# Patient Record
Sex: FEMALE | Race: WHITE | Employment: OTHER | URBAN - METROPOLITAN AREA
[De-identification: names, ages, dates, MRNs, and addresses within clinical notes are randomized per-mention and may not be internally consistent; named-entity substitution may affect disease eponyms.]

---

## 2017-12-10 PROBLEM — A41.9 SEPSIS (HCC): Status: ACTIVE | Noted: 2017-12-10

## 2017-12-10 PROBLEM — J20.9 ACUTE BRONCHITIS: Status: ACTIVE | Noted: 2017-12-10

## 2017-12-10 PROBLEM — R65.10 SIRS (SYSTEMIC INFLAMMATORY RESPONSE SYNDROME) (HCC): Status: ACTIVE | Noted: 2017-12-10

## 2018-05-01 ENCOUNTER — OFFICE VISIT (OUTPATIENT)
Dept: NEUROLOGY | Age: 78
End: 2018-05-01
Payer: MEDICARE

## 2018-05-01 VITALS
BODY MASS INDEX: 35.63 KG/M2 | TEMPERATURE: 97.1 F | HEIGHT: 67 IN | HEART RATE: 77 BPM | SYSTOLIC BLOOD PRESSURE: 123 MMHG | DIASTOLIC BLOOD PRESSURE: 77 MMHG | WEIGHT: 227 LBS | RESPIRATION RATE: 18 BRPM

## 2018-05-01 DIAGNOSIS — G25.0 ESSENTIAL TREMOR: Primary | ICD-10-CM

## 2018-05-01 PROCEDURE — 99205 OFFICE O/P NEW HI 60 MIN: CPT | Performed by: PSYCHIATRY & NEUROLOGY

## 2018-05-01 RX ORDER — PRIMIDONE 50 MG/1
50 TABLET ORAL 3 TIMES DAILY
Qty: 90 TABLET | Refills: 5 | Status: SHIPPED | OUTPATIENT
Start: 2018-05-01 | End: 2018-09-10 | Stop reason: SDUPTHER

## 2018-05-02 ENCOUNTER — TELEPHONE (OUTPATIENT)
Dept: NEUROLOGY | Age: 78
End: 2018-05-02

## 2018-05-03 ENCOUNTER — TELEPHONE (OUTPATIENT)
Dept: NEUROLOGY | Age: 78
End: 2018-05-03

## 2018-06-15 ENCOUNTER — OFFICE VISIT (OUTPATIENT)
Dept: CARDIOLOGY CLINIC | Age: 78
End: 2018-06-15
Payer: MEDICARE

## 2018-06-15 VITALS
HEART RATE: 76 BPM | BODY MASS INDEX: 36.21 KG/M2 | HEIGHT: 67 IN | WEIGHT: 230.7 LBS | SYSTOLIC BLOOD PRESSURE: 122 MMHG | RESPIRATION RATE: 16 BRPM | DIASTOLIC BLOOD PRESSURE: 60 MMHG

## 2018-06-15 DIAGNOSIS — I10 HTN (HYPERTENSION), BENIGN: Primary | Chronic | ICD-10-CM

## 2018-06-15 PROCEDURE — 99213 OFFICE O/P EST LOW 20 MIN: CPT | Performed by: INTERNAL MEDICINE

## 2018-06-15 PROCEDURE — 93000 ELECTROCARDIOGRAM COMPLETE: CPT | Performed by: INTERNAL MEDICINE

## 2018-09-10 ENCOUNTER — OFFICE VISIT (OUTPATIENT)
Dept: NEUROLOGY | Age: 78
End: 2018-09-10
Payer: MEDICARE

## 2018-09-10 VITALS
OXYGEN SATURATION: 97 % | BODY MASS INDEX: 35.79 KG/M2 | HEIGHT: 67 IN | WEIGHT: 228 LBS | HEART RATE: 70 BPM | SYSTOLIC BLOOD PRESSURE: 147 MMHG | TEMPERATURE: 99.3 F | RESPIRATION RATE: 18 BRPM | DIASTOLIC BLOOD PRESSURE: 66 MMHG

## 2018-09-10 DIAGNOSIS — M54.17 L-S RADICULOPATHY: Primary | ICD-10-CM

## 2018-09-10 PROCEDURE — 99214 OFFICE O/P EST MOD 30 MIN: CPT | Performed by: PSYCHIATRY & NEUROLOGY

## 2018-09-10 RX ORDER — WARFARIN SODIUM 5 MG/1
7 TABLET ORAL NIGHTLY
COMMUNITY

## 2018-09-10 RX ORDER — PRIMIDONE 50 MG/1
50 TABLET ORAL 3 TIMES DAILY
Qty: 90 TABLET | Refills: 11 | Status: SHIPPED | OUTPATIENT
Start: 2018-09-10 | End: 2019-09-19 | Stop reason: SDUPTHER

## 2018-09-10 RX ORDER — WARFARIN SODIUM 1 MG/1
6 TABLET ORAL
COMMUNITY
End: 2021-03-23

## 2018-09-10 NOTE — PROGRESS NOTES
This 67-year-old right-handed woman was referred for additional evaluation and management of tremors. She remained an excellent historian as well as her granddaughter. Her medications included Coumadin, primidone, Amaryl, Cardizem, Aldactone, prenatal vitamins, lisinopril, furosemide and laxatives. She developed tremors over 3 years ago. These have worsened recently. The shakings involved only her hands and only one day are outstretched or when she is eating or writing. She was diagnosed with late life tremors. She had improved moderately with primidone 50 mg twice daily. However, her head shaking and hand tremors have persisted. These were especially troublesome when attempting to eat. She is willing to increase the medication even more. She noted posterior neck pains as well as. She was also plagued with right shoulder discomforts. There were no shooting discomforts into her arms or legs. Physical examination displayed stable vital signs. She was an elderly woman in no acute distress who was alert, cooperative and oriented. She was very pleasant. Her neck was supple without thyromegaly; there was tenderness in both posterior cervical paraspinals. There were +1 carotid upstrokes without bruits. Her spine revealed minimal gibbus deformity without tenderness. Her lungs were clear to auscultation. Cardiac examination revealed an irregular irregular rhythm but was otherwise unrevealing. Peripheral pulses were +1 without bruits. Her limbs were unremarkable. Neurological examination produced an intact mental status except for minimal vocal quivering. Cranial nerve examination was unremarkable except for increased palpebral fissure in the left eye. Motor examination produced normal tone and bulk with 5/5 strength throughout. There was minimal tremor of the outstretched hands as well as minimal horizontal tremor of her head. There was no cogwheel rigidity or bradykinesia.   Vibration

## 2018-09-20 ENCOUNTER — HOSPITAL ENCOUNTER (OUTPATIENT)
Dept: NEUROLOGY | Age: 78
Discharge: HOME | End: 2018-09-20
Payer: MEDICARE

## 2018-09-20 VITALS — HEIGHT: 67 IN | BODY MASS INDEX: 35.63 KG/M2 | WEIGHT: 227 LBS

## 2018-09-20 DIAGNOSIS — M54.17 L-S RADICULOPATHY: ICD-10-CM

## 2018-09-20 PROCEDURE — 95886 MUSC TEST DONE W/N TEST COMP: CPT | Performed by: PSYCHIATRY & NEUROLOGY

## 2018-09-20 PROCEDURE — 95886 MUSC TEST DONE W/N TEST COMP: CPT

## 2018-09-20 PROCEDURE — 95911 NRV CNDJ TEST 9-10 STUDIES: CPT

## 2018-09-20 PROCEDURE — 95911 NRV CNDJ TEST 9-10 STUDIES: CPT | Performed by: PSYCHIATRY & NEUROLOGY

## 2018-09-20 NOTE — PROCEDURES
[address] [employer]         Full Name: Jovanny Landry Gender: Female  MRN: [id number] YOB: 1940  Location: [facility] ()      Visit Date: 9/20/2018 09:52  Age: 69 Years 0 Months Old  Examining Physician: Dr. Delano Haley   Referring Physician: Dr. Delano Haley   Technician: Leon Blizzard   Height: 5 feet 7 inch  Weight: 227 lbs  Notes: L/S Radiculopathy       Motor NCS      Nerve / Sites Lat. Lat Diff Amplitude Amp. 1-2 Distance Velocity Temp.    ms ms mV % cm m/s °C   R Peroneal - EDB      Ankle 4.95  1.0 100 8  33.9      Pop fossa 14.06 9.11 1.0 97.8 37 41 33.9   L Peroneal - EDB      Ankle 4.64  0.1 100 8  34      Pop fossa 14.01 9.38 0.1 72.9 41 44 34   R Tibial - AH      Ankle 4.22  0.9 100 8  34.1      Pop fossa 15.31 11.09 0.4 41 41 37 34.1   L Tibial - AH      Ankle 6.09  0.1 100 8  34      Pop fossa 16.61 10.52 0.1 97.8 41 39 34       Sensory NCS      Nerve / Sites Onset Lat Peak Lat PP Amp Distance Velocity Temp.    ms ms µV cm m/s °C   R Superficial peroneal - Ankle      Lat leg NR NR NR 10 NR 34   L Superficial peroneal - Ankle      Lat leg NR NR NR 10 NR 34   R Sural - Ankle (Calf)      Calf NR NR NR 14 NR 34   L Sural - Ankle (Calf)      Calf NR NR NR 14 NR 34       F  Wave      Nerve F Lat M Lat F-M Lat    ms ms ms   R Peroneal - EDB 58.2 5.4 52.8   R Tibial - AH 58.7 6.0 52.7   L Peroneal - EDB NR NR NR   L Tibial - AH NR NR NR       H Reflex      Nerve Lat Hmax    ms   R Tibial - Soleus 34.3   L Tibial - Soleus 34. 3       EMG         EMG Summary Table     Spontaneous MUAP Recruitment   Muscle IA Fib PSW Fasc H.F. Amp Dur. PPP Pattern   R. Lumbar paraspinals (mid) Incr None None None CRDs N N N N   R. Lumbar paraspinals (low) Incr None None None CRDs N N N N   R. Sacral paraspinals Incr None None None CRDs N N N N   R. Gluteus medius N None None None Few Serrated N N N N   R. Iliopsoas N None None None +2 Nascent N N N N   R.  Biceps femoris neuropathy but also suspected lumbosacral radicular disease. Her radicular disease and peripheral neuropathy are producing her difficulties. MRIs of lumbosacral spine were in order. Clinical correlation was highly advised.       Normal nerve Conduction Values    Sensory Nerves Peak to Peak  Amplitude  (mV) Peak Latency (ms)   Superficial Radial Sensory Antidromic (10cm) 11 2.8    Median Sensory Antidromic Dig II   Palm (7cm)  Wrist (14 cm)  Age 19-49 BMI <24  Age 47-78 BMI <24  Age 20-48 BMI >/= 24  Age 47-78 BMI >/= 24   8  13  19  15  13  8   2.3  4     Ulnar Sensory Antidromic Dig V (14 cm)  Age 20-48 BMI <24  Age 47-78 BMI <24  Age 20-48 BMI >/= 24  Age 47-78 BMI >/= 24 9  13  13  8  4 4   Medial Antebrachial cutaneous Sensory Antidromic (10 cm)   3 2.6   Lateral Antebrachial cutaneous Sensory Antidromic (10 cm) 6 2.5   Sural Sensory Antidromic (14 cm) 4 4.5     Medial and lateral Plantars (14 cm)   Compare side to side <3.8     Superficial peroneal sensory (10 cm)  Age <9  Age 7-34  Age 35-46  Age 52-63  Age >57   >6  >6  >5  >4  >3   <4.3  <4.4  <4.5  <4.6  <4.6     Saphenous sensory (12 cm)  Age <9  Age 7-34  Age 35-46  Age 52-63  Age >57   >6  >6  >4  >4  >3   <4.3  <4.4  <4.5  <4.6  <4.6     Dorsal ulnar sensory (8 cm)  Age <9  Age 7-34  Age 35-46  Age 52-63  Age >57   >24  >24  >16  >9  >5   <2.9  <3  <3.1  <3.1  <3.2         Study Latency difference (ms)   Median compared to (minus) ulnar motor comparison  All ages  Age 20-48  Age 47-78   1.5  1.4  1.7   Median to Ulnar comparison (second lumbrical/interossei)  0.4     Combined Sensory Index:   Study Latency Difference (ms)</=   Median to Ulnar Palmar Orthodromic mixed nerve comparison 0.3   Median to Ulnar sensory Ring finger comparison 0.4       Median: Radial sensory digit 1 comparison 0.5     Combined Sensory Index (CSI)  0.9       Motor Nerves Baseline to Peak Amplitude  (mV) Conduction Velocity (m/s) Distal Latency (ms)   Median motor >4  >4  >4  >4  >3    <3.5  <3.5  <3.5  <3.5  <3.8   Axillary motor (Deltoid)  Age <9  Age 7-34  Age 35-46  Age 52-63  Age >57   >4  >4  >4  >4  >3    <4.8  <4.8  <4.8  <4.8  <5     Tibial motor (ADQP)  Age <9  Age 7-34  Age 35-46  Age 52-63  Age >57   >4  >4  >4  >3  >3   >41  >41  >41  >40  >40   <6.0  <6.0  <6.5  <6.5  <6.5   Peroneal motor (TA)  Age <9  Age 7-32>4  Age 35-46  Age 52-63  Age >57   >4  >4  >4  >3  >3   >41  >41  >40  >40  >40   <4  <4  <4  <4.5  <4.5     Femoral motor (RF)  Age <9  Age 7-34  Age 35-46  Age 52-63  Age >57   >4  >4  >4  >3  >3      <6.0  <6.0  <6.5  <6.5  <6.5         Nerve F  Minimal latency (ms)   Median (APB) 32   Ulnar (ADM)  32   Peroneal motor (EDB) <56   Tibial motor (AH) <56     Nerve  H-reflex latency (ms) H reflex- side to side latency  difference (ms)   Tibial  (gatroc-soleus) <34  <1.5

## 2018-11-29 ENCOUNTER — TELEPHONE (OUTPATIENT)
Dept: NEUROLOGY | Age: 78
End: 2018-11-29

## 2018-11-29 NOTE — TELEPHONE ENCOUNTER
The patient may increase her primidone. Please have her take 50 mg in the morning and afternoon and 100 mg at night. Please have her report back in 3-4 weeks.   Thank you

## 2018-11-29 NOTE — TELEPHONE ENCOUNTER
Patient called in stating that she had a visit with her PCP on 11/28/2018. She stated that he mentioned to her that he noticed her tremors were getting a little worse. He recommended the possibility of increasing her Primidone. She currently takes 50 MG TID. She is wanting to know if it should be increased now or if this should wait until her next visit with Dr. Pardeep Figueroa on 1/15/2018. MA informed patient that this would be forwarded to Dr. Pardeep Figueroa for advisement. Patient can be reached at [phone].

## 2019-01-15 ENCOUNTER — OFFICE VISIT (OUTPATIENT)
Dept: NEUROLOGY | Age: 79
End: 2019-01-15
Payer: MEDICARE

## 2019-01-15 VITALS
DIASTOLIC BLOOD PRESSURE: 72 MMHG | HEART RATE: 89 BPM | RESPIRATION RATE: 18 BRPM | WEIGHT: 230 LBS | SYSTOLIC BLOOD PRESSURE: 153 MMHG | TEMPERATURE: 98.6 F | BODY MASS INDEX: 36.1 KG/M2 | HEIGHT: 67 IN

## 2019-01-15 DIAGNOSIS — M54.17 L-S RADICULOPATHY: ICD-10-CM

## 2019-01-15 DIAGNOSIS — G25.0 ESSENTIAL TREMOR: Primary | ICD-10-CM

## 2019-01-15 DIAGNOSIS — E11.42 DIABETIC POLYNEUROPATHY ASSOCIATED WITH TYPE 2 DIABETES MELLITUS (HCC): ICD-10-CM

## 2019-01-15 PROCEDURE — 99215 OFFICE O/P EST HI 40 MIN: CPT | Performed by: PSYCHIATRY & NEUROLOGY

## 2019-05-20 ENCOUNTER — OFFICE VISIT (OUTPATIENT)
Dept: NEUROLOGY | Age: 79
End: 2019-05-20
Payer: MEDICARE

## 2019-05-20 VITALS
TEMPERATURE: 97.6 F | DIASTOLIC BLOOD PRESSURE: 67 MMHG | BODY MASS INDEX: 35.79 KG/M2 | RESPIRATION RATE: 18 BRPM | HEIGHT: 67 IN | WEIGHT: 228 LBS | SYSTOLIC BLOOD PRESSURE: 122 MMHG | OXYGEN SATURATION: 97 % | HEART RATE: 63 BPM

## 2019-05-20 DIAGNOSIS — E11.42 DIABETIC POLYNEUROPATHY ASSOCIATED WITH TYPE 2 DIABETES MELLITUS (HCC): ICD-10-CM

## 2019-05-20 DIAGNOSIS — G25.0 ESSENTIAL TREMOR: Primary | ICD-10-CM

## 2019-05-20 DIAGNOSIS — M54.17 L-S RADICULOPATHY: ICD-10-CM

## 2019-05-20 PROCEDURE — 99215 OFFICE O/P EST HI 40 MIN: CPT | Performed by: PSYCHIATRY & NEUROLOGY

## 2019-05-20 NOTE — PROGRESS NOTES
This 72-year-old right-handed woman was referred for additional evaluation and management of tremors. She remained an excellent historian as well as her aide. Her medications included Coumadin, primidone, Amaryl, Cardizem, Aldactone, prenatal vitamins, lisinopril, furosemide and laxatives. She developed tremors over 5 years ago. She was then diagnosed with late life tremors. She continued on primidone 50 mg in the morning and afternoon and 100 mg at night. She still missed her afternoon dose. She again noted tremors---especially when attempting to write or perform fine finger movements. She had no difficulties crocheting. There remained no vocal tremor or head shaking. She denied other neurological issues. She was sleeping erratically---sometimes not retiring until 1:30 in the morning. She would sit for hours crocheting or doing her paperwork. She was exercising little. On many occasions, she missed lunch. She would awaken twice during the night after drinking fluids until bedtime. She reported no other medical issues. Her blood sugars were moderately well controlled. Her last hemoglobin A1c was 7.1. Her aide agreed. She remained in good spirits. Review of systems was otherwise unremarkable. Physical examination displayed stable vital signs. She was an elderly woman in no acute distress who was alert, cooperative and oriented. She was very pleasant. There were +1 carotid upstrokes without bruits. Her lungs were clear to auscultation. Cardiac examination revealed an irregular, irregular rhythm but was otherwise unrevealing. Her limbs were unremarkable except for minimal, bilateral pes planus deformity. Neurological examination produced an intact mental status except for minimal vocal quivering. Cranial nerve examination was unremarkable except for an increased palpebral fissure in the left eye.   Motor examination produced normal tone and bulk with 5/5 strength

## 2019-06-07 NOTE — PROGRESS NOTES
[facility]  Marie Heads. Baudilio Hu M.D. Inge Major M.D.  Justin Oneill. Lulla Hodgkins, M.D. Nuvia Fuentes M.D. Zachary Calvert M.D. Julio Perez   1940  Everton Law MD      This 71-year-old woman had outpatient cardiac assessment today. Her history includes COPD and atrial fibrillation. She receives [facility]    Medical History:  1. Atrial fibrillation initial [id number]. Converted spontaneously. Diastolic CHF at that time. AF recurred later in . Started on sotalol. And converted spontaneously. 2. History goiter  3. Admitted 2017 with progressive cough and dyspnea  Dx : Pneumonia /bronchitis. 4. COPD. 5. Atrial fibrillation. Chronic OAC (Coumadin). Admission INR=1.6. 6. Hypertension and LVH. 7. CKD. 8. DM.  9. Obesity. BMI 36.59 - 2019. 10. Surgical history: ADRIEN/BSO, breast biopsy, vitreous hemorrhage and cataracts  11. Per EMR, history of aortic valve stenosis and CHF. 12. Apparently a rhythm control strategy has been chronically deployed. Noncompliant with BID dosing of Sotolol and was taken off  medication during hospitalization Duration of recurrent AF and long term monitoring of anti-arrythmic Rx unknown. Records have been requested per Dr. Arguelles Min office,2017. 13. Echo 12/10/2017. EF biplane = 52%, no chamber dilatation. RVSP 40. AF. 14. CXR, 2017. Patchy airspace opacities are present in upper lobes of both lungs. Improved aeration is seen at mid lung fields and lung bases when compared to prior chest radiograph. Short-term follow-up may be helpful for further evaluation. Family history:  Father  76 pulmonary embolus, also had Hashimoto thyroiditis. Mother  68, end-stage CHF, carotid stenosis, hypertension.     Review of Systems:  Constitutional: negative for fever and chills  Respiratory: negative for cough and hemoptysis  Cardiovascular:   Gastrointestinal: negative for abdominal pain, diarrhea, nausea and vomiting  Genitourinary:negative for dysuria and hematuria  Derm: negative for rash and skin lesion(s)  Neurological: negative for seizures and tremors  Endocrine: negative for diabetic symptoms including polydipsia and polyuria  Musculoskeletal: negative for CTD  Psychiatric: negative for psychosis and major depression    On examination, she is an alert, pleasant, obese, elderly female in no distress. Skin is warm and dry. Respirations are unlabored. /82   Pulse 60   Resp 16   Ht 5' 7\" (1.702 m)   Wt 233 lb 9.6 oz (106 kg)   BMI 36.59 kg/m² . HEENT negative for scleral icterus. Extraocular muscles intact. No facial asymmetry or central cyanosis. Neck without masses or goiter. No bruit or JVD. Cardiac apex not displaced. Rhythm irregular. Grade 1 ROMAN left sternal border r. Abdomen normal.  Extremities without edema. EKG shows atrial fibrillation. Ventricular response 60/min. Nonspecific ST segment abnormality. She is asymptomatic on DOAC. Medications are reviewed and discussed with her. She will continue close follow-up with Dr. Bianca Trevizo. She will have cardiac follow-up in 1 year.     At completion of today's visit, medications include the following:    Current Outpatient Medications:     docusate sodium (COLACE) 100 MG capsule, Take 100 mg by mouth 2 times daily, Disp: , Rfl:     warfarin (JANTOVEN) 5 MG tablet, Take 6 mg by mouth , Disp: , Rfl:     warfarin (COUMADIN) 1 MG tablet, Take 6 mg by mouth , Disp: , Rfl:     primidone (MYSOLINE) 50 MG tablet, Take 1 tablet by mouth 3 times daily, Disp: 90 tablet, Rfl: 11    Prenatal MV-Min-Fe Fum-FA-DHA (PRENATAL 1 PO), Take by mouth daily, Disp: , Rfl:     glimepiride (AMARYL) 4 MG tablet, Take 1 tablet by mouth 2 times daily (before meals) (Patient taking differently: Take 4 mg by mouth 2 times daily ), Disp: 30 tablet, Rfl: 3    diltiazem (CARDIZEM CD) 360 MG extended release capsule, Take 1 capsule

## 2019-06-10 ENCOUNTER — OFFICE VISIT (OUTPATIENT)
Dept: CARDIOLOGY CLINIC | Age: 79
End: 2019-06-10
Payer: MEDICARE

## 2019-06-10 VITALS
DIASTOLIC BLOOD PRESSURE: 82 MMHG | RESPIRATION RATE: 16 BRPM | SYSTOLIC BLOOD PRESSURE: 128 MMHG | BODY MASS INDEX: 36.66 KG/M2 | HEIGHT: 67 IN | WEIGHT: 233.6 LBS | HEART RATE: 60 BPM

## 2019-06-10 DIAGNOSIS — I10 HTN (HYPERTENSION), BENIGN: Primary | ICD-10-CM

## 2019-06-10 PROCEDURE — 93000 ELECTROCARDIOGRAM COMPLETE: CPT | Performed by: INTERNAL MEDICINE

## 2019-06-10 PROCEDURE — 99213 OFFICE O/P EST LOW 20 MIN: CPT | Performed by: INTERNAL MEDICINE

## 2019-06-10 RX ORDER — DOCUSATE SODIUM 100 MG/1
100 CAPSULE, LIQUID FILLED ORAL PRN
COMMUNITY
End: 2022-05-24 | Stop reason: ALTCHOICE

## 2019-09-19 ENCOUNTER — OFFICE VISIT (OUTPATIENT)
Dept: NEUROLOGY | Age: 79
End: 2019-09-19
Payer: MEDICARE

## 2019-09-19 VITALS
DIASTOLIC BLOOD PRESSURE: 69 MMHG | HEART RATE: 87 BPM | SYSTOLIC BLOOD PRESSURE: 154 MMHG | HEIGHT: 68 IN | BODY MASS INDEX: 35.16 KG/M2 | RESPIRATION RATE: 17 BRPM | WEIGHT: 232 LBS | OXYGEN SATURATION: 95 %

## 2019-09-19 DIAGNOSIS — E11.42 DIABETIC POLYNEUROPATHY ASSOCIATED WITH TYPE 2 DIABETES MELLITUS (HCC): ICD-10-CM

## 2019-09-19 DIAGNOSIS — G25.0 ESSENTIAL TREMOR: Primary | ICD-10-CM

## 2019-09-19 DIAGNOSIS — M54.17 L-S RADICULOPATHY: ICD-10-CM

## 2019-09-19 PROCEDURE — 99215 OFFICE O/P EST HI 40 MIN: CPT | Performed by: PSYCHIATRY & NEUROLOGY

## 2019-09-19 RX ORDER — PRIMIDONE 50 MG/1
50 TABLET ORAL 3 TIMES DAILY
Qty: 90 TABLET | Refills: 11 | Status: SHIPPED
Start: 2019-09-19 | End: 2020-07-14 | Stop reason: SDUPTHER

## 2019-11-15 ENCOUNTER — TELEPHONE (OUTPATIENT)
Dept: NEUROLOGY | Age: 79
End: 2019-11-15

## 2019-11-21 ENCOUNTER — HOSPITAL ENCOUNTER (OUTPATIENT)
Dept: GENERAL RADIOLOGY | Age: 79
Discharge: HOME | End: 2019-11-23
Payer: MEDICARE

## 2019-11-21 ENCOUNTER — HOSPITAL ENCOUNTER (OUTPATIENT)
Age: 79
Discharge: HOME | End: 2019-11-23
Payer: MEDICARE

## 2019-11-21 DIAGNOSIS — M25.551 PAIN IN RIGHT HIP: ICD-10-CM

## 2019-11-21 PROCEDURE — 73502 X-RAY EXAM HIP UNI 2-3 VIEWS: CPT

## 2020-01-14 ENCOUNTER — OFFICE VISIT (OUTPATIENT)
Dept: NEUROLOGY | Age: 80
End: 2020-01-14
Payer: MEDICARE

## 2020-01-14 VITALS
HEIGHT: 68 IN | OXYGEN SATURATION: 94 % | HEART RATE: 99 BPM | WEIGHT: 227 LBS | DIASTOLIC BLOOD PRESSURE: 72 MMHG | SYSTOLIC BLOOD PRESSURE: 161 MMHG | RESPIRATION RATE: 12 BRPM | BODY MASS INDEX: 34.4 KG/M2

## 2020-01-14 PROCEDURE — 99215 OFFICE O/P EST HI 40 MIN: CPT | Performed by: PSYCHIATRY & NEUROLOGY

## 2020-01-14 NOTE — PROGRESS NOTES
head.  There was no jaw tremor, cogwheel rigidity or bradykinesia. There were no other abnormal movements. Vibration was moderately decreased to the mid calves; pinprick and light touch continued intact. Coordination was unrevealing. She walked with an arthritic gait and again displayed minimal bilateral foot drop. Laboratory data revealed EDX studies consistent with lumbosacral motor radiculopathies and moderate diabetic peripheral neuropathy. Hip films revealed moderate degenerative joint disease. This individual presents a long history of tremors of the outstretched hands. She suffers from late life tremors-- she feels these have worsened. Therefore, primidone was increased to 100 mg twice daily. She also suffers from early diabetic peripheral neuropathy and motor radiculopathy. She, fortunately, has not developed pain or motor weakness from these issues. 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 If necessary, injection can be given at the site. She is medically stable despite her comorbidities. She will return in 6 months. She will take primidone 100 mg twice daily. She will call any time if problems arise. She we will continue with 3 meals per day and retire at 11:30 PM at the latest.    I spent 40 minutes with the patient with over 50 % spent in counseling and disease management. All patient issues were addressed and all questions were answered.

## 2020-02-03 NOTE — PROGRESS NOTES
(COUMADIN) 1 MG tablet, Take 6 mg by mouth , Disp: , Rfl:     Prenatal MV-Min-Fe Fum-FA-DHA (PRENATAL 1 PO), Take by mouth daily, Disp: , Rfl:     glimepiride (AMARYL) 4 MG tablet, Take 1 tablet by mouth 2 times daily (before meals) (Patient taking differently: Take 4 mg by mouth 2 times daily ), Disp: 30 tablet, Rfl: 3    diltiazem (CARDIZEM CD) 360 MG extended release capsule, Take 1 capsule by mouth daily, Disp: 30 capsule, Rfl: 3    latanoprost (XALATAN) 0.005 % ophthalmic solution, Place 1 drop into both eyes nightly, Disp: , Rfl:     lisinopril (PRINIVIL;ZESTRIL) 20 MG tablet, Take 20 mg by mouth nightly.  , Disp: , Rfl:     spironolactone (ALDACTONE) 25 MG tablet, Take 25 mg by mouth daily. , Disp: , Rfl:     Vitamin D (CHOLECALCIFEROL) 1000 UNITS CAPS capsule, Take 2,000 Units by mouth daily , Disp: , Rfl:       Note: This report was completed utilizing computer voice recognition software. Every effort has been made to ensure accuracy, however; inadvertent computerized transcription errors may be present. Fatmata Amaya.  Ravinder Aguilar MD

## 2020-02-04 ENCOUNTER — OFFICE VISIT (OUTPATIENT)
Dept: CARDIOLOGY CLINIC | Age: 80
End: 2020-02-04
Payer: MEDICARE

## 2020-02-04 VITALS
HEIGHT: 67 IN | BODY MASS INDEX: 36.22 KG/M2 | RESPIRATION RATE: 16 BRPM | DIASTOLIC BLOOD PRESSURE: 68 MMHG | HEART RATE: 104 BPM | SYSTOLIC BLOOD PRESSURE: 136 MMHG | WEIGHT: 230.8 LBS

## 2020-02-04 PROCEDURE — 93000 ELECTROCARDIOGRAM COMPLETE: CPT | Performed by: INTERNAL MEDICINE

## 2020-02-04 PROCEDURE — 99213 OFFICE O/P EST LOW 20 MIN: CPT | Performed by: INTERNAL MEDICINE

## 2020-06-11 ENCOUNTER — HOSPITAL ENCOUNTER (OUTPATIENT)
Dept: MAMMOGRAPHY | Age: 80
Discharge: HOME | End: 2020-06-13
Payer: MEDICARE

## 2020-06-11 PROCEDURE — 77063 BREAST TOMOSYNTHESIS BI: CPT

## 2020-06-22 ENCOUNTER — TELEPHONE (OUTPATIENT)
Dept: GENERAL RADIOLOGY | Age: 80
End: 2020-06-22

## 2020-06-22 NOTE — TELEPHONE ENCOUNTER
Call to patient in reference to her recent mammogram at [address] [place] [address] . Instructed patient that the radiologist has recommended some additional breast imaging, in order to make a final determination/result. Informed her that a request for orders for additional imaging has been faxed to her physician. Once we receive the script a  from [facility] will contact her to schedule the additional imaging study/studies. Verbalizes understanding and is agreeable to proceed.        Matt Knight RN, OCN, CN-ROSANNA Henson

## 2020-07-02 ENCOUNTER — HOSPITAL ENCOUNTER (OUTPATIENT)
Dept: GENERAL RADIOLOGY | Age: 80
End: 2020-07-02
Payer: MEDICARE

## 2020-07-02 ENCOUNTER — HOSPITAL ENCOUNTER (OUTPATIENT)
Dept: GENERAL RADIOLOGY | Age: 80
Discharge: HOME | End: 2020-07-04
Payer: MEDICARE

## 2020-07-02 PROCEDURE — G0279 TOMOSYNTHESIS, MAMMO: HCPCS

## 2020-07-14 ENCOUNTER — OFFICE VISIT (OUTPATIENT)
Dept: NEUROLOGY | Age: 80
End: 2020-07-14
Payer: MEDICARE

## 2020-07-14 VITALS
OXYGEN SATURATION: 96 % | TEMPERATURE: 98.7 F | DIASTOLIC BLOOD PRESSURE: 74 MMHG | SYSTOLIC BLOOD PRESSURE: 167 MMHG | RESPIRATION RATE: 12 BRPM | HEART RATE: 99 BPM

## 2020-07-14 PROCEDURE — 99215 OFFICE O/P EST HI 40 MIN: CPT | Performed by: PSYCHIATRY & NEUROLOGY

## 2020-07-14 RX ORDER — PRIMIDONE 50 MG/1
100 TABLET ORAL 2 TIMES DAILY
Qty: 120 TABLET | Refills: 11 | Status: SHIPPED
Start: 2020-07-14 | End: 2020-12-02 | Stop reason: SDUPTHER

## 2020-07-14 NOTE — PROGRESS NOTES
This 67-year-old right-handed woman was referred for evaluation and management of tremors. She remained an excellent historian as well as her friend. Her medications continued as Coumadin, primidone, Amaryl, Cardizem, Aldactone, prenatal vitamins, lisinopril and laxatives. She developed tremors 7 years ago. She was diagnosed with late life tremors. She was now on primidone 50 mg in the morning and 100 mg at night, despite my recommendations to take 100 mg in the morning as well. With that dosing, her tremors were reduced, but continued, especially when writing or eating. Her friend again noted she needed increasing doses of primidone. There were now horizontal head tremors but no vocal quivering. She denied other medical issues again. She was sleeping well. She still exercise minimally. She denied recurrent pains of her right greater trochanter. Unfortunately, she is still cheating on her diet. However, her last hemoglobin A1c was under 7.0. She denied other medical issues. She remained in good spirits. Review of systems was otherwise unremarkable. Physical examination displayed stable vital signs. She was an elderly woman in no acute distress who was alert, cooperative and oriented. She remained very pleasant. She still appreciated my excellent humor. There were +1 carotid upstrokes without bruits. Her lungs were clear to auscultation. Cardiac testing revealed an irregular, irregular rhythm but was otherwise unrevealing. Her limbs displayed moderate tenderness over the right greater trochanteric bursa and minimal, bilateral pes planus deformities. Neurological examination produced an intact mental status, with no vocal quivering. Cranial nerve testing was unremarkable, with an unchanged, increased left palpebral fissure. I found normal tone and bulk with 5/5 strength throughout.   There was minimal tremor of the outstretched hands and increasing horizontal tremor of her head.  There was no jaw tremor, cogwheel rigidity or bradykinesia. There were no other abnormal movements. Vibration was moderately decreased to the mid calves; pinprick and light touch continued intact. Coordination was unrevealing. She walked with an arthritic gait and again displayed minimal bilateral foot drop. Laboratory data revealed EDX studies consistent with lumbosacral motor radiculopathies and moderate diabetic peripheral neuropathy. Hip films revealed moderate degenerative joint disease. There were no recent blood test available. This individual presents a long history of tremors of the outstretched hands and now her head. 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 Therefore, primidone was increased to 100 mg twice daily. She may require increasing doses. She also suffers from early diabetic peripheral neuropathy and motor radiculopathy. She, fortunately, has not developed pain or motor weakness from these issues. She requires better dieting and blood sugar control. She also suffered from a right hip pointer, which has resolved on its own. She is medically stable despite her comorbidities. She will return in 6 months. She will take primidone 100 mg twice daily. She report back in 1 to 2 months. She will call any time if problems arise. She will, hopefully, improve her diet. I spent 40 minutes with the patient with over 50 % spent in counseling and disease management. All patient issues were addressed and all questions were answered.

## 2020-08-04 ENCOUNTER — OFFICE VISIT (OUTPATIENT)
Dept: CARDIOLOGY CLINIC | Age: 80
End: 2020-08-04
Payer: MEDICARE

## 2020-08-04 VITALS
DIASTOLIC BLOOD PRESSURE: 72 MMHG | HEIGHT: 68 IN | HEART RATE: 92 BPM | SYSTOLIC BLOOD PRESSURE: 140 MMHG | OXYGEN SATURATION: 98 % | WEIGHT: 232.2 LBS | BODY MASS INDEX: 35.19 KG/M2 | RESPIRATION RATE: 16 BRPM

## 2020-08-04 PROCEDURE — 99213 OFFICE O/P EST LOW 20 MIN: CPT | Performed by: INTERNAL MEDICINE

## 2020-08-04 PROCEDURE — 93000 ELECTROCARDIOGRAM COMPLETE: CPT | Performed by: INTERNAL MEDICINE

## 2020-08-04 NOTE — PROGRESS NOTES
[facility]  [facility]. Viry Dickey M.D. Mj Mtecalf M.D.  Camron Nguyễn. Teresa Millard M.D. Kadi Liang, Chino Villalpando M.D. Ricardo Goldberg M.D. MD Wilmer Perez   1940  Jean-Pierre Burrows MD      This 78-year-old woman is seen today for outpatient cardiac follow-up. She has had a history of atrial fibrillation since at least 2010 at that time had HFpEF. She additionally has atrial fibrillation and COPD. She is chronically anticoagulated with the Coumadin. She continues to live in her own home and seems self-sufficient    :  Medical History:  1. Atrial fibrillation initial episode, 2010. Converted spontaneously. Diastolic CHF at that time. AF recurred later in 2010. Started on sotalol. And converted spontaneously. 2. History goiter  3. Admitted 12/09/2017 with progressive cough and dyspnea  Dx : Pneumonia /bronchitis. 4. COPD. 5. Atrial fibrillation. Chronic OAC (Coumadin). Admission INR=1.6. 6. Hypertension and LVH. 7. CKD. 8. DM.  9. Obesity. BMI 36- 02/2020. 10. Surgical history: ADRIEN/BSO, breast biopsy, vitreous hemorrhage and cataracts  11. Per EMR, history of aortic valve stenosis and CHF. 12. Apparently a rhythm control strategy has been chronically deployed. Noncompliant with BID dosing of Sotolol and was taken off  medication during hospitalization Duration of recurrent AF and long term monitoring of anti-arrythmic Rx unknown. Records have been requested per Dr. Gil Faye office,12/2017. 13. Echo, 12/10/2017. EF biplane = 52%, no chamber dilatation. RVSP 40. AF. 14. CXR, 12/13/2017. Patchy airspace opacities  in upper lobes bilateral.  Improved aeration at mid lung fields and  bases when compared to prior CXR.  .          Review of Systems:  Constitutional: negative for fever and chills  Respiratory: negative for cough and hemoptysis  Cardiovascular:   Gastrointestinal: negative for abdominal pain, diarrhea, nausea and vomiting  Genitourinary:negative for dysuria and hematuria  Derm: negative for rash and skin lesion(s)  Neurological: negative for seizures and tremors  Endocrine: negative for diabetic symptoms including polydipsia and polyuria  Musculoskeletal: negative for CTD  Psychiatric: negative for psychosis and major depression    On examination, she is an elderly  female in no acute distress. Skin is warm and dry. Respirations are unlabored. BP (!) 140/72 (Site: Left Upper Arm, Position: Sitting, Cuff Size: Medium Adult)   Pulse 92   Resp 16   Ht 5' 7.5\" (1.715 m)   Wt 232 lb 3.2 oz (105.3 kg)   SpO2 98%   BMI 35.83 kg/m² . HEENT negative for scleral icterus. Extraocular muscles intact. No facial asymmetry or central cyanosis. Neck without masses or goiter. No bruit or JVD. Cardiac apex not displaced. Rhythm r irregular. 2/6 ROMAN left sternal border. Abdomen normal.  Extremities without edema. Lungs are clear    EKG today shows nonspecific ST segment abnormality. Jazmin atrial fibrillation. Ventricular response 92/min. The patient is functional capacity remains unchanged. She reports some recent fluctuation in INR based on diet but continues to have no interest in switching to a DOAC.   She will have cardiac follow-up in 1 year    At completion of today's visit, medications include the following:    Current Outpatient Medications:     primidone (MYSOLINE) 50 MG tablet, Take 2 tablets by mouth 2 times daily, Disp: 120 tablet, Rfl: 11    docusate sodium (COLACE) 100 MG capsule, Take 100 mg by mouth 2 times daily, Disp: , Rfl:     warfarin (JANTOVEN) 5 MG tablet, Take 6 mg by mouth , Disp: , Rfl:     warfarin (COUMADIN) 1 MG tablet, Take 6 mg by mouth , Disp: , Rfl:     Prenatal MV-Min-Fe Fum-FA-DHA (PRENATAL 1 PO), Take by mouth daily, Disp: , Rfl:     glimepiride (AMARYL) 4 MG tablet, Take 1 tablet by mouth 2 times daily (before meals) (Patient taking differently: Take 4 mg by mouth 2 times daily ), Disp: 30 tablet, Rfl: 3    diltiazem (CARDIZEM CD) 360 MG extended release capsule, Take 1 capsule by mouth daily, Disp: 30 capsule, Rfl: 3    latanoprost (XALATAN) 0.005 % ophthalmic solution, Place 1 drop into both eyes nightly, Disp: , Rfl:     lisinopril (PRINIVIL;ZESTRIL) 20 MG tablet, Take 20 mg by mouth nightly.  , Disp: , Rfl:     spironolactone (ALDACTONE) 25 MG tablet, Take 25 mg by mouth daily. , Disp: , Rfl:     Vitamin D (CHOLECALCIFEROL) 1000 UNITS CAPS capsule, Take 2,000 Units by mouth daily , Disp: , Rfl:       Note: This report was completed utilizing computer voice recognition software. Every effort has been made to ensure accuracy, however; inadvertent computerized transcription errors may be present.     --Rocio Waters MD on 8/4/2020 at 1:44 PM

## 2020-10-20 ENCOUNTER — TELEPHONE (OUTPATIENT)
Dept: NEUROLOGY | Age: 80
End: 2020-10-20

## 2020-10-20 NOTE — TELEPHONE ENCOUNTER
LM for patient to call office to schedule a follow up appointment with Dr Teofilo Higuera in Jan 2020

## 2020-12-01 ENCOUNTER — TELEPHONE (OUTPATIENT)
Dept: NEUROLOGY | Age: 80
End: 2020-12-01

## 2020-12-01 NOTE — TELEPHONE ENCOUNTER
Jalyn Richards called on behalf of pt to update office on tremors. Since increasing primidone at 7/2020 ov, pt has not had any improvement of tremors and feels they have worsened slightly. Pt has follow up appt 1/5/21. Forwarding to Dr. Kedar Cano for advisement.   Electronically signed by Jonathan Nagel on 12/1/20 at 11:28 AM EST

## 2020-12-02 RX ORDER — PRIMIDONE 50 MG/1
100 TABLET ORAL 3 TIMES DAILY
Qty: 180 TABLET | Refills: 5 | Status: SHIPPED
Start: 2020-12-02 | End: 2021-08-03 | Stop reason: SDUPTHER

## 2020-12-02 NOTE — TELEPHONE ENCOUNTER
Please have her increase the primidone to 100 mg 3 times daily. Please have report back in 3 to 4 weeks.   Thank you

## 2020-12-02 NOTE — TELEPHONE ENCOUNTER
MA informed Sofía Kumari of recommendations from provider. Sofía Kumari understood and will have pt increase primidone to 100mg tid. New Rx pending for provider signature.   Electronically signed by Declan Hartmann on 12/2/20 at 10:41 AM EST

## 2021-01-20 ENCOUNTER — HOSPITAL ENCOUNTER (OUTPATIENT)
Dept: GENERAL RADIOLOGY | Age: 81
Discharge: HOME | End: 2021-01-22
Payer: MEDICARE

## 2021-01-20 ENCOUNTER — HOSPITAL ENCOUNTER (OUTPATIENT)
Age: 81
Discharge: HOME | End: 2021-01-22
Payer: MEDICARE

## 2021-01-20 DIAGNOSIS — M25.531 PAIN IN BOTH WRISTS: ICD-10-CM

## 2021-01-20 DIAGNOSIS — M79.641 PAIN IN BOTH HANDS: ICD-10-CM

## 2021-01-20 DIAGNOSIS — M25.532 PAIN IN BOTH WRISTS: ICD-10-CM

## 2021-01-20 DIAGNOSIS — M79.642 PAIN IN BOTH HANDS: ICD-10-CM

## 2021-01-20 PROCEDURE — 73110 X-RAY EXAM OF WRIST: CPT

## 2021-01-20 PROCEDURE — 73130 X-RAY EXAM OF HAND: CPT

## 2021-01-29 ENCOUNTER — IMMUNIZATION (OUTPATIENT)
Dept: PRIMARY CARE CLINIC | Age: 81
End: 2021-01-29
Payer: MEDICARE

## 2021-01-29 PROCEDURE — 91300 COVID-19, PFIZER VACCINE 30MCG/0.3ML DOSE: CPT | Performed by: NURSE PRACTITIONER

## 2021-01-29 PROCEDURE — 0001A COVID-19, PFIZER VACCINE 30MCG/0.3ML DOSE: CPT | Performed by: NURSE PRACTITIONER

## 2021-02-22 ENCOUNTER — IMMUNIZATION (OUTPATIENT)
Dept: PRIMARY CARE CLINIC | Age: 81
End: 2021-02-22
Payer: MEDICARE

## 2021-02-22 PROCEDURE — 0002A COVID-19, PFIZER VACCINE 30MCG/0.3ML DOSE: CPT | Performed by: NURSE PRACTITIONER

## 2021-02-22 PROCEDURE — 91300 COVID-19, PFIZER VACCINE 30MCG/0.3ML DOSE: CPT | Performed by: NURSE PRACTITIONER

## 2021-03-23 ENCOUNTER — OFFICE VISIT (OUTPATIENT)
Dept: NEUROLOGY | Age: 81
End: 2021-03-23
Payer: MEDICARE

## 2021-03-23 VITALS
BODY MASS INDEX: 34.72 KG/M2 | RESPIRATION RATE: 18 BRPM | HEART RATE: 99 BPM | SYSTOLIC BLOOD PRESSURE: 136 MMHG | WEIGHT: 225 LBS | DIASTOLIC BLOOD PRESSURE: 84 MMHG | TEMPERATURE: 97.7 F | OXYGEN SATURATION: 96 %

## 2021-03-23 DIAGNOSIS — M54.17 L-S RADICULOPATHY: Primary | ICD-10-CM

## 2021-03-23 DIAGNOSIS — E11.42 DIABETIC POLYNEUROPATHY ASSOCIATED WITH TYPE 2 DIABETES MELLITUS (HCC): ICD-10-CM

## 2021-03-23 DIAGNOSIS — G25.0 ESSENTIAL TREMOR: ICD-10-CM

## 2021-03-23 PROCEDURE — 99215 OFFICE O/P EST HI 40 MIN: CPT | Performed by: PSYCHIATRY & NEUROLOGY

## 2021-03-23 RX ORDER — WARFARIN SODIUM 2 MG/1
7 TABLET ORAL NIGHTLY
COMMUNITY

## 2021-03-23 NOTE — PROGRESS NOTES
This 58-year-old right-handed woman was referred for evaluation and management of tremors. She remained an excellent historian as well as her friend. Her medications continued as warfarin, primidone, glimepiride, diltiazem, spironolactone, prenatal vitamins, lisinopril and laxatives. She developed tremors over 7 years ago, subsequently diagnosed with late life tremors. She continued on primidone 100 mg 3 times daily. However, she was frequently missing her second dose. When taking the proper dosing, her tremors were reduced, although still present when writing or eating. There continued horizontal head tremors, but no vocal quivering. She denied other neurological issues. She was sleeping well. She still exercise minimally. She denied recurrent pains of her right greater trochanter. Unfortunately, she is still cheating on her diet. However, her last hemoglobin A1c was under 7.0. She denied other medical issues. She remained in good spirits. Review of systems was otherwise unremarkable. Physical examination displayed stable vital signs. She was an elderly woman in no acute distress who was alert, cooperative and oriented. She remained very pleasant. She still appreciated my wonderful humor. There were +1 carotid upstrokes without bruits. Her lungs were clear to auscultation. Cardiac testing revealed an irregular, irregular rhythm but was otherwise unrevealing. Her limbs no longer displayed moderate tenderness over the right greater trochanteric bursa but minimal, bilateral pes planus deformities. Neurological examination produced an intact mental status, with no vocal quivering. Cranial nerve testing was unremarkable, with an unchanged, increased left palpebral fissure. I found normal tone and bulk with 5/5 strength throughout. There was minimal tremor of the outstretched hands and horizontal tremor of her head.   There remained no jaw tremor, cogwheel rigidity or bradykinesia. There were no other abnormal movements. Vibration was moderately decreased to the mid calves; pinprick and light touch continued intact. Coordination was unrevealing. She walked with an arthritic gait and again displayed minimal bilateral foot drop. Laboratory data revealed EDX studies consistent with lumbosacral motor radiculopathies and moderate diabetic peripheral neuropathy. Hip films revealed moderate degenerative joint disease. There were no recent blood test available. This individual presents a long history of tremors of the outstretched hands and now her head. 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 However, she is not taking primidone as prescribed. Hopefully, she will take 100 mg 3 times daily, not missing any doses. If necessary, increasing doses will be given. She also suffers from early diabetic peripheral neuropathy and motor radiculopathy. She has not developed pain or motor weakness from these issues. She still requires better dieting and blood sugar control. She also suffered from a right hip pointer, which has resolved. She is medically stable despite her comorbidities. She will return in 6 months. She was maintained at primidone 100 mg thrice daily. She report back in 1 to 2 months. She will call any time if problems arise. She will, hopefully, improve her diet. I spent 40 minutes with the patient with over 50 % spent in counseling and disease management. All patient issues were addressed and all questions were answered.

## 2021-05-03 ENCOUNTER — HOSPITAL ENCOUNTER (OUTPATIENT)
Dept: GENERAL RADIOLOGY | Age: 81
Discharge: HOME | End: 2021-05-05
Payer: MEDICARE

## 2021-05-03 ENCOUNTER — HOSPITAL ENCOUNTER (OUTPATIENT)
Age: 81
Discharge: HOME | End: 2021-05-05
Payer: MEDICARE

## 2021-05-03 DIAGNOSIS — M25.551 RIGHT HIP PAIN: ICD-10-CM

## 2021-05-03 PROCEDURE — 73502 X-RAY EXAM HIP UNI 2-3 VIEWS: CPT

## 2021-06-12 ENCOUNTER — APPOINTMENT (OUTPATIENT)
Dept: CT IMAGING | Age: 81
End: 2021-06-12
Payer: MEDICARE

## 2021-06-12 ENCOUNTER — HOSPITAL ENCOUNTER (OUTPATIENT)
Age: 81
Setting detail: OBSERVATION
Discharge: HOME | End: 2021-06-14
Attending: EMERGENCY MEDICINE | Admitting: INTERNAL MEDICINE
Payer: MEDICARE

## 2021-06-12 DIAGNOSIS — R91.8 LUNG NODULES: ICD-10-CM

## 2021-06-12 DIAGNOSIS — M25.551 CHRONIC PAIN OF RIGHT HIP: ICD-10-CM

## 2021-06-12 DIAGNOSIS — R26.2 UNABLE TO AMBULATE: Primary | ICD-10-CM

## 2021-06-12 DIAGNOSIS — G89.29 CHRONIC PAIN OF RIGHT HIP: ICD-10-CM

## 2021-06-12 PROBLEM — E16.2 HYPOGLYCEMIA: Status: ACTIVE | Noted: 2021-06-12

## 2021-06-12 PROBLEM — R53.1 GENERAL WEAKNESS: Status: ACTIVE | Noted: 2021-06-12

## 2021-06-12 PROBLEM — S22.31XD CLOSED FRACTURE OF ONE RIB OF RIGHT SIDE WITH ROUTINE HEALING: Status: ACTIVE | Noted: 2021-06-12

## 2021-06-12 PROBLEM — R29.6 FALLS FREQUENTLY: Status: ACTIVE | Noted: 2021-06-12

## 2021-06-12 LAB
ALBUMIN SERPL-MCNC: 3.9 G/DL (ref 3.5–5.2)
ALP BLD-CCNC: 100 U/L (ref 35–104)
ALT SERPL-CCNC: 38 U/L (ref 0–32)
ANION GAP SERPL CALCULATED.3IONS-SCNC: 12 MMOL/L (ref 7–16)
AST SERPL-CCNC: 24 U/L (ref 0–31)
BASOPHILS ABSOLUTE: 0.01 E9/L (ref 0–0.2)
BASOPHILS RELATIVE PERCENT: 0.2 % (ref 0–2)
BILIRUB SERPL-MCNC: <0.2 MG/DL (ref 0–1.2)
BUN BLDV-MCNC: 25 MG/DL (ref 6–23)
CALCIUM SERPL-MCNC: 9.1 MG/DL (ref 8.6–10.2)
CHLORIDE BLD-SCNC: 104 MMOL/L (ref 98–107)
CHP ED QC CHECK: YES
CHP ED QC CHECK: YES
CO2: 23 MMOL/L (ref 22–29)
CREAT SERPL-MCNC: 1.3 MG/DL (ref 0.5–1)
EOSINOPHILS ABSOLUTE: 0.01 E9/L (ref 0.05–0.5)
EOSINOPHILS RELATIVE PERCENT: 0.2 % (ref 0–6)
GFR AFRICAN AMERICAN: 48
GFR NON-AFRICAN AMERICAN: 39 ML/MIN/1.73
GLUCOSE BLD-MCNC: 275 MG/DL
GLUCOSE BLD-MCNC: 65 MG/DL
GLUCOSE BLD-MCNC: 77 MG/DL (ref 74–99)
HCT VFR BLD CALC: 34.8 % (ref 34–48)
HEMOGLOBIN: 11.4 G/DL (ref 11.5–15.5)
IMMATURE GRANULOCYTES #: 0.04 E9/L
IMMATURE GRANULOCYTES %: 0.7 % (ref 0–5)
LYMPHOCYTES ABSOLUTE: 0.28 E9/L (ref 1.5–4)
LYMPHOCYTES RELATIVE PERCENT: 4.7 % (ref 20–42)
MCH RBC QN AUTO: 31 PG (ref 26–35)
MCHC RBC AUTO-ENTMCNC: 32.8 % (ref 32–34.5)
MCV RBC AUTO: 94.6 FL (ref 80–99.9)
METER GLUCOSE: 275 MG/DL (ref 74–99)
METER GLUCOSE: 65 MG/DL (ref 74–99)
MONOCYTES ABSOLUTE: 0.32 E9/L (ref 0.1–0.95)
MONOCYTES RELATIVE PERCENT: 5.3 % (ref 2–12)
NEUTROPHILS ABSOLUTE: 5.36 E9/L (ref 1.8–7.3)
NEUTROPHILS RELATIVE PERCENT: 88.9 % (ref 43–80)
PDW BLD-RTO: 14.5 FL (ref 11.5–15)
PLATELET # BLD: 99 E9/L (ref 130–450)
PLATELET CONFIRMATION: NORMAL
PMV BLD AUTO: 9.8 FL (ref 7–12)
POTASSIUM REFLEX MAGNESIUM: 4.3 MMOL/L (ref 3.5–5)
RBC # BLD: 3.68 E12/L (ref 3.5–5.5)
SARS-COV-2, NAAT: NOT DETECTED
SODIUM BLD-SCNC: 139 MMOL/L (ref 132–146)
TOTAL PROTEIN: 6.2 G/DL (ref 6.4–8.3)
WBC # BLD: 6 E9/L (ref 4.5–11.5)

## 2021-06-12 PROCEDURE — 6370000000 HC RX 637 (ALT 250 FOR IP): Performed by: EMERGENCY MEDICINE

## 2021-06-12 PROCEDURE — 80053 COMPREHEN METABOLIC PANEL: CPT

## 2021-06-12 PROCEDURE — 2500000003 HC RX 250 WO HCPCS: Performed by: EMERGENCY MEDICINE

## 2021-06-12 PROCEDURE — 71250 CT THORAX DX C-: CPT

## 2021-06-12 PROCEDURE — G0378 HOSPITAL OBSERVATION PER HR: HCPCS

## 2021-06-12 PROCEDURE — 70450 CT HEAD/BRAIN W/O DYE: CPT

## 2021-06-12 PROCEDURE — 87635 SARS-COV-2 COVID-19 AMP PRB: CPT

## 2021-06-12 PROCEDURE — 74176 CT ABD & PELVIS W/O CONTRAST: CPT

## 2021-06-12 PROCEDURE — 73700 CT LOWER EXTREMITY W/O DYE: CPT

## 2021-06-12 PROCEDURE — 99285 EMERGENCY DEPT VISIT HI MDM: CPT

## 2021-06-12 PROCEDURE — 82962 GLUCOSE BLOOD TEST: CPT

## 2021-06-12 PROCEDURE — 85025 COMPLETE CBC W/AUTO DIFF WBC: CPT

## 2021-06-12 PROCEDURE — 96374 THER/PROPH/DIAG INJ IV PUSH: CPT

## 2021-06-12 RX ORDER — FOLIC ACID 1 MG/1
1 TABLET ORAL EVERY MORNING
COMMUNITY

## 2021-06-12 RX ORDER — OXYCODONE HYDROCHLORIDE AND ACETAMINOPHEN 5; 325 MG/1; MG/1
1 TABLET ORAL ONCE
Status: COMPLETED | OUTPATIENT
Start: 2021-06-12 | End: 2021-06-12

## 2021-06-12 RX ORDER — DOXYCYCLINE HYCLATE 100 MG/1
100 CAPSULE ORAL ONCE
Status: COMPLETED | OUTPATIENT
Start: 2021-06-12 | End: 2021-06-12

## 2021-06-12 RX ORDER — DEXTROSE MONOHYDRATE 25 G/50ML
25 INJECTION, SOLUTION INTRAVENOUS ONCE
Status: COMPLETED | OUTPATIENT
Start: 2021-06-12 | End: 2021-06-12

## 2021-06-12 RX ADMIN — DEXTROSE MONOHYDRATE 25 G: 25 INJECTION, SOLUTION INTRAVENOUS at 13:19

## 2021-06-12 RX ADMIN — DOXYCYCLINE HYCLATE 100 MG: 100 CAPSULE ORAL at 18:50

## 2021-06-12 RX ADMIN — OXYCODONE HYDROCHLORIDE AND ACETAMINOPHEN 1 TABLET: 5; 325 TABLET ORAL at 18:50

## 2021-06-12 ASSESSMENT — ENCOUNTER SYMPTOMS
CONSTIPATION: 0
SHORTNESS OF BREATH: 0
FACIAL SWELLING: 0
NAUSEA: 0
ABDOMINAL PAIN: 0
RHINORRHEA: 0
COUGH: 0
EYE REDNESS: 0
SINUS PAIN: 0
BACK PAIN: 0

## 2021-06-12 ASSESSMENT — PAIN DESCRIPTION - LOCATION: LOCATION: RIB CAGE

## 2021-06-12 ASSESSMENT — PAIN SCALES - GENERAL: PAINLEVEL_OUTOF10: 8

## 2021-06-12 ASSESSMENT — PAIN DESCRIPTION - PAIN TYPE: TYPE: ACUTE PAIN

## 2021-06-12 ASSESSMENT — PAIN DESCRIPTION - ORIENTATION: ORIENTATION: RIGHT

## 2021-06-12 NOTE — ED PROVIDER NOTES
HPI:  6/12/21, Time: 4:55 PM EDT         Cathy Garcia is a 79 y.o. female presenting to the ED for fall, rib pain, flank pain right side, beginning this am ago. The complaint has been persistent, moderate in severity, and worsened by nothing. 80 yo f who states fell rolling out of bed hurting right flank and ribs. Pt ashley;led ems and was found to have LOw BS was given oral glucose tabs. Pt denies cp, sob, abdominal pain, urinary sx, hematuria, dysuria. Pain is 3/10. Pt denies any head or neck injury. Pt hasn taken anything for sx. She denies any presyncope or dizzyness    Review of Systems:   Review of Systems   Constitutional: Negative for chills, fatigue and unexpected weight change. HENT: Negative for congestion, ear discharge, facial swelling, rhinorrhea and sinus pain. Eyes: Negative for redness and visual disturbance. Respiratory: Negative for cough and shortness of breath. Cardiovascular: Negative for chest pain, palpitations and leg swelling. Gastrointestinal: Negative for abdominal pain, constipation and nausea. Endocrine: Negative for polydipsia and polyuria. Genitourinary: Positive for flank pain. Negative for pelvic pain and urgency. Musculoskeletal: Negative for back pain and neck pain. Skin: Negative for pallor and rash. Allergic/Immunologic: Negative for food allergies and immunocompromised state. Neurological: Negative for dizziness, syncope, weakness and headaches. Hematological: Negative for adenopathy. Psychiatric/Behavioral: Negative for agitation.  The patient is not nervous/anxious.                  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 Past Surgical History:  has a past surgical history that includes Hysterectomy (1986); Eye surgery (2004); Colonoscopy (5840,5108,2610); and Cataract removal (2004). Social History:  reports that she has quit smoking. Her smoking use included cigarettes. She has a 45.00 pack-year smoking history. She quit smokeless tobacco use about 28 years ago. She reports that she does not drink alcohol and does not use drugs. Family History: family history is not on file. The patients home medications have been reviewed. Allergies: Patient has no known allergies.     -------------------------------------------------- RESULTS -------------------------------------------------  All laboratory and radiology results have been personally reviewed by myself   LABS:  Results for orders placed or performed during the hospital encounter of 06/12/21   CBC Auto Differential   Result Value Ref Range    WBC 6.0 4.5 - 11.5 E9/L    RBC 3.68 3.50 - 5.50 E12/L    Hemoglobin 11.4 (L) 11.5 - 15.5 g/dL    Hematocrit 34.8 34.0 - 48.0 %    MCV 94.6 80.0 - 99.9 fL    MCH 31.0 26.0 - 35.0 pg    MCHC 32.8 32.0 - 34.5 %    RDW 14.5 11.5 - 15.0 fL    Platelets 99 (L) 890 - 450 E9/L    MPV 9.8 7.0 - 12.0 fL    Neutrophils % 88.9 (H) 43.0 - 80.0 %    Immature Granulocytes % 0.7 0.0 - 5.0 %    Lymphocytes % 4.7 (L) 20.0 - 42.0 %    Monocytes % 5.3 2.0 - 12.0 %    Eosinophils % 0.2 0.0 - 6.0 %    Basophils % 0.2 0.0 - 2.0 %    Neutrophils Absolute 5.36 1.80 - 7.30 E9/L    Immature Granulocytes # 0.04 E9/L    Lymphocytes Absolute 0.28 (L) 1.50 - 4.00 E9/L    Monocytes Absolute 0.32 0.10 - 0.95 E9/L    Eosinophils Absolute 0.01 (L) 0.05 - 0.50 E9/L    Basophils Absolute 0.01 0.00 - 0.20 E9/L   Comprehensive Metabolic Panel w/ Reflex to MG   Result Value Ref Range    Sodium 139 132 - 146 mmol/L    Potassium reflex Magnesium 4.3 3.5 - 5.0 mmol/L    Chloride 104 98 - 107 mmol/L    CO2 23 22 - 29 mmol/L    Anion Gap 12 7 - 16 mmol/L    Glucose 77 74 - 99 mg/dL    BUN 25 (H) 6 - 23 mg/dL    CREATININE 1.3 (H) 0.5 - 1.0 mg/dL    GFR Non-African American 39 >=60 mL/min/1.73    GFR African American 48     Calcium 9.1 8.6 - 10.2 mg/dL    Total Protein 6.2 (L) 6.4 - 8.3 g/dL    Albumin 3.9 3.5 - 5.2 g/dL    Total Bilirubin <0.2 0.0 - 1.2 mg/dL    Alkaline Phosphatase 100 35 - 104 U/L    ALT 38 (H) 0 - 32 U/L    AST 24 0 - 31 U/L   Platelet Confirmation   Result Value Ref Range    Platelet Confirmation CONFIRMED    POCT glucose   Result Value Ref Range    Glucose 65 mg/dL    QC OK? yes    POCT Glucose   Result Value Ref Range    Meter Glucose 65 (L) 74 - 99 mg/dL   POCT glucose   Result Value Ref Range    Glucose 275 mg/dL    QC OK? yes    POCT Glucose   Result Value Ref Range    Meter Glucose 275 (H) 74 - 99 mg/dL       RADIOLOGY:  Interpreted by Radiologist.  CT HEAD WO CONTRAST   Final Result   No acute intracranial abnormality. CT ABDOMEN PELVIS WO CONTRAST Additional Contrast? None   Final Result   1. Cluster of nodules located in right middle lobe measuring up to 11 mm   with surrounding ground-glass opacities. Findings could suggest developing   atypical pneumonia or neoplasm. Short-term follow-up recommended. 2.  Healing right 10th rib fracture. 3.  Diverticulosis. 4.  Nonobstructing bilateral renal calculi. CT CHEST WO CONTRAST    (Results Pending)       ------------------------- NURSING NOTES AND VITALS REVIEWED ---------------------------   The nursing notes within the ED encounter and vital signs as below have been reviewed.    BP (!) 187/95   Pulse 90   Temp 96.9 °F (36.1 °C) (Axillary)   Resp 14   Ht 5' 7\" (1.702 m)   Wt 225 lb (102.1 kg)   SpO2 100%   BMI 35.24 kg/m²   Oxygen Saturation Interpretation: Normal      ---------------------------------------------------PHYSICAL EXAM--------------------------------------      Constitutional/General: Alert and oriented x3, well appearing, non toxic in NAD  Head: Normocephalic and atraumatic  Eyes: PERRL, EOMI  Mouth: Oropharynx clear, handling secretions, no trismus  Neck: Supple, full ROM,   Pulmonary: Lungs clear to auscultation bilaterally, no wheezes, rales, or rhonchi. Not in respiratory distress  Cardiovascular:  Regular rate and rhythm, no murmurs, gallops, or rubs. 2+ distal pulses  Abdomen: Soft, non tender, non distended, R flank pain, lower rib tenderness  Extremities: Moves all extremities x 4. Warm and well perfused  Skin: warm and dry without rash  Neurologic: GCS 15, 5/5 upper and lower ext strength and eqquial, no numbness  Psych: Normal Affect      ------------------------------ ED COURSE/MEDICAL DECISION MAKING----------------------  Medications   dextrose 50 % IV solution (25 g Intravenous Given 6/12/21 1319)         ED COURSE:       Medical Decision Making:    Pt presented w flank pain and hypoglycemia, I personally gave pt juice. Repeat bs stable,. CT scans pending for flank pain post fall. Pt signed out to dr mayer    Counseling: The emergency provider has spoken with the patient and discussed todays results, in addition to providing specific details for the plan of care and counseling regarding the diagnosis and prognosis. Questions are answered at this time and they are agreeable with the plan.      --------------------------------- IMPRESSION AND DISPOSITION ---------------------------------    IMPRESSION  No diagnosis found. DISPOSITION  Disposition: pending  Patient condition is fair      NOTE: This report was transcribed using voice recognition software.  Every effort was made to ensure accuracy; however, inadvertent computerized transcription errors may be present       Yazan Asencio DO  06/12/21 [id number]

## 2021-06-12 NOTE — ED NOTES
Patient was ambulated to bathroom with nurse. She was very unsteady on feet and needed constant adjustment to stay upright. Pt with adequate strength but inability to maintain balance. Also having right sided back pain. Oxygen level maintained at 94% on RA. Niraj Weiner. Kailyn Recinos, RN  06/12/21 530 [facility]  Kailyn Recinos RN  06/12/21 2856

## 2021-06-13 PROBLEM — E11.9 DM (DIABETES MELLITUS) (HCC): Status: ACTIVE | Noted: 2021-06-13

## 2021-06-13 LAB
ANION GAP SERPL CALCULATED.3IONS-SCNC: 8 MMOL/L (ref 7–16)
BACTERIA: ABNORMAL /HPF
BILIRUBIN URINE: NEGATIVE
BLOOD, URINE: ABNORMAL
BUN BLDV-MCNC: 21 MG/DL (ref 6–23)
CALCIUM SERPL-MCNC: 9.1 MG/DL (ref 8.6–10.2)
CHLORIDE BLD-SCNC: 107 MMOL/L (ref 98–107)
CLARITY: CLEAR
CO2: 23 MMOL/L (ref 22–29)
COLOR: YELLOW
CREAT SERPL-MCNC: 1.1 MG/DL (ref 0.5–1)
GFR AFRICAN AMERICAN: 58
GFR NON-AFRICAN AMERICAN: 48 ML/MIN/1.73
GLUCOSE BLD-MCNC: 100 MG/DL (ref 74–99)
GLUCOSE URINE: NEGATIVE MG/DL
HCT VFR BLD CALC: 30.9 % (ref 34–48)
HEMOGLOBIN: 10.7 G/DL (ref 11.5–15.5)
INR BLD: 2.1
KETONES, URINE: NEGATIVE MG/DL
LEUKOCYTE ESTERASE, URINE: ABNORMAL
METER GLUCOSE: 102 MG/DL (ref 74–99)
METER GLUCOSE: 115 MG/DL (ref 74–99)
METER GLUCOSE: 117 MG/DL (ref 74–99)
METER GLUCOSE: 65 MG/DL (ref 74–99)
METER GLUCOSE: 84 MG/DL (ref 74–99)
NITRITE, URINE: POSITIVE
PH UA: 6 (ref 5–9)
POTASSIUM REFLEX MAGNESIUM: 4.5 MMOL/L (ref 3.5–5)
PROTEIN UA: NEGATIVE MG/DL
PROTHROMBIN TIME: 23.1 SEC (ref 9.3–12.4)
RBC UA: ABNORMAL /HPF (ref 0–2)
SODIUM BLD-SCNC: 138 MMOL/L (ref 132–146)
SPECIFIC GRAVITY UA: 1.01 (ref 1–1.03)
UROBILINOGEN, URINE: 0.2 E.U./DL
WBC UA: ABNORMAL /HPF (ref 0–5)

## 2021-06-13 PROCEDURE — 2580000003 HC RX 258: Performed by: NURSE PRACTITIONER

## 2021-06-13 PROCEDURE — 6370000000 HC RX 637 (ALT 250 FOR IP): Performed by: NURSE PRACTITIONER

## 2021-06-13 PROCEDURE — 97116 GAIT TRAINING THERAPY: CPT

## 2021-06-13 PROCEDURE — 6370000000 HC RX 637 (ALT 250 FOR IP): Performed by: INTERNAL MEDICINE

## 2021-06-13 PROCEDURE — 82962 GLUCOSE BLOOD TEST: CPT

## 2021-06-13 PROCEDURE — G0378 HOSPITAL OBSERVATION PER HR: HCPCS

## 2021-06-13 PROCEDURE — 36415 COLL VENOUS BLD VENIPUNCTURE: CPT

## 2021-06-13 PROCEDURE — 97161 PT EVAL LOW COMPLEX 20 MIN: CPT

## 2021-06-13 PROCEDURE — 80048 BASIC METABOLIC PNL TOTAL CA: CPT

## 2021-06-13 PROCEDURE — 81001 URINALYSIS AUTO W/SCOPE: CPT

## 2021-06-13 PROCEDURE — 85014 HEMATOCRIT: CPT

## 2021-06-13 PROCEDURE — 96372 THER/PROPH/DIAG INJ SC/IM: CPT

## 2021-06-13 PROCEDURE — 97530 THERAPEUTIC ACTIVITIES: CPT

## 2021-06-13 PROCEDURE — 94664 DEMO&/EVAL PT USE INHALER: CPT

## 2021-06-13 PROCEDURE — 85018 HEMOGLOBIN: CPT

## 2021-06-13 PROCEDURE — 85610 PROTHROMBIN TIME: CPT

## 2021-06-13 PROCEDURE — 6360000002 HC RX W HCPCS: Performed by: NURSE PRACTITIONER

## 2021-06-13 RX ORDER — FOLIC ACID 1 MG/1
1 TABLET ORAL DAILY
Status: DISCONTINUED | OUTPATIENT
Start: 2021-06-13 | End: 2021-06-14 | Stop reason: HOSPADM

## 2021-06-13 RX ORDER — LISINOPRIL 20 MG/1
20 TABLET ORAL NIGHTLY
Status: DISCONTINUED | OUTPATIENT
Start: 2021-06-13 | End: 2021-06-14 | Stop reason: HOSPADM

## 2021-06-13 RX ORDER — GLIMEPIRIDE 4 MG/1
4 TABLET ORAL
Status: DISCONTINUED | OUTPATIENT
Start: 2021-06-14 | End: 2021-06-13

## 2021-06-13 RX ORDER — ONDANSETRON 4 MG/1
4 TABLET, ORALLY DISINTEGRATING ORAL EVERY 8 HOURS PRN
Status: DISCONTINUED | OUTPATIENT
Start: 2021-06-13 | End: 2021-06-14 | Stop reason: HOSPADM

## 2021-06-13 RX ORDER — DEXTROSE MONOHYDRATE 25 G/50ML
12.5 INJECTION, SOLUTION INTRAVENOUS PRN
Status: DISCONTINUED | OUTPATIENT
Start: 2021-06-13 | End: 2021-06-14 | Stop reason: HOSPADM

## 2021-06-13 RX ORDER — ONDANSETRON 2 MG/ML
4 INJECTION INTRAMUSCULAR; INTRAVENOUS EVERY 6 HOURS PRN
Status: DISCONTINUED | OUTPATIENT
Start: 2021-06-13 | End: 2021-06-14 | Stop reason: HOSPADM

## 2021-06-13 RX ORDER — SODIUM CHLORIDE 9 MG/ML
25 INJECTION, SOLUTION INTRAVENOUS PRN
Status: DISCONTINUED | OUTPATIENT
Start: 2021-06-13 | End: 2021-06-14 | Stop reason: HOSPADM

## 2021-06-13 RX ORDER — DILTIAZEM HYDROCHLORIDE 120 MG/1
360 CAPSULE, COATED, EXTENDED RELEASE ORAL DAILY
Status: DISCONTINUED | OUTPATIENT
Start: 2021-06-13 | End: 2021-06-14 | Stop reason: HOSPADM

## 2021-06-13 RX ORDER — WARFARIN SODIUM 6 MG/1
6 TABLET ORAL
Status: COMPLETED | OUTPATIENT
Start: 2021-06-13 | End: 2021-06-13

## 2021-06-13 RX ORDER — LATANOPROST 50 UG/ML
1 SOLUTION/ DROPS OPHTHALMIC NIGHTLY
Status: DISCONTINUED | OUTPATIENT
Start: 2021-06-13 | End: 2021-06-14 | Stop reason: HOSPADM

## 2021-06-13 RX ORDER — ACETAMINOPHEN 650 MG/1
650 SUPPOSITORY RECTAL EVERY 6 HOURS PRN
Status: DISCONTINUED | OUTPATIENT
Start: 2021-06-13 | End: 2021-06-14 | Stop reason: HOSPADM

## 2021-06-13 RX ORDER — ACETAMINOPHEN 325 MG/1
650 TABLET ORAL EVERY 6 HOURS PRN
Status: DISCONTINUED | OUTPATIENT
Start: 2021-06-13 | End: 2021-06-14 | Stop reason: HOSPADM

## 2021-06-13 RX ORDER — PRIMIDONE 50 MG/1
100 TABLET ORAL 3 TIMES DAILY
Status: DISCONTINUED | OUTPATIENT
Start: 2021-06-13 | End: 2021-06-14 | Stop reason: HOSPADM

## 2021-06-13 RX ORDER — SODIUM CHLORIDE 0.9 % (FLUSH) 0.9 %
5-40 SYRINGE (ML) INJECTION EVERY 12 HOURS SCHEDULED
Status: DISCONTINUED | OUTPATIENT
Start: 2021-06-13 | End: 2021-06-14 | Stop reason: HOSPADM

## 2021-06-13 RX ORDER — DEXTROSE MONOHYDRATE 50 MG/ML
100 INJECTION, SOLUTION INTRAVENOUS PRN
Status: DISCONTINUED | OUTPATIENT
Start: 2021-06-13 | End: 2021-06-14 | Stop reason: HOSPADM

## 2021-06-13 RX ORDER — SODIUM CHLORIDE 0.9 % (FLUSH) 0.9 %
5-40 SYRINGE (ML) INJECTION PRN
Status: DISCONTINUED | OUTPATIENT
Start: 2021-06-13 | End: 2021-06-14 | Stop reason: HOSPADM

## 2021-06-13 RX ORDER — IPRATROPIUM BROMIDE AND ALBUTEROL SULFATE 2.5; .5 MG/3ML; MG/3ML
1 SOLUTION RESPIRATORY (INHALATION) EVERY 6 HOURS PRN
Status: DISCONTINUED | OUTPATIENT
Start: 2021-06-13 | End: 2021-06-14 | Stop reason: HOSPADM

## 2021-06-13 RX ORDER — BENZONATATE 100 MG/1
100 CAPSULE ORAL 3 TIMES DAILY PRN
Status: DISCONTINUED | OUTPATIENT
Start: 2021-06-13 | End: 2021-06-14 | Stop reason: HOSPADM

## 2021-06-13 RX ORDER — SPIRONOLACTONE 25 MG/1
25 TABLET ORAL DAILY
Status: DISCONTINUED | OUTPATIENT
Start: 2021-06-13 | End: 2021-06-14 | Stop reason: HOSPADM

## 2021-06-13 RX ORDER — NICOTINE POLACRILEX 4 MG
15 LOZENGE BUCCAL PRN
Status: DISCONTINUED | OUTPATIENT
Start: 2021-06-13 | End: 2021-06-14 | Stop reason: HOSPADM

## 2021-06-13 RX ADMIN — PRIMIDONE 100 MG: 50 TABLET ORAL at 21:08

## 2021-06-13 RX ADMIN — WARFARIN SODIUM 6 MG: 6 TABLET ORAL at 17:43

## 2021-06-13 RX ADMIN — DILTIAZEM HYDROCHLORIDE 360 MG: 120 CAPSULE, EXTENDED RELEASE ORAL at 09:17

## 2021-06-13 RX ADMIN — PRIMIDONE 100 MG: 50 TABLET ORAL at 09:17

## 2021-06-13 RX ADMIN — SPIRONOLACTONE 25 MG: 25 TABLET ORAL at 09:17

## 2021-06-13 RX ADMIN — BENZONATATE 100 MG: 100 CAPSULE ORAL at 21:08

## 2021-06-13 RX ADMIN — PRIMIDONE 100 MG: 50 TABLET ORAL at 12:31

## 2021-06-13 RX ADMIN — Medication 10 ML: at 09:18

## 2021-06-13 RX ADMIN — IPRATROPIUM BROMIDE AND ALBUTEROL SULFATE 1 AMPULE: .5; 3 SOLUTION RESPIRATORY (INHALATION) at 22:36

## 2021-06-13 RX ADMIN — LISINOPRIL 20 MG: 20 TABLET ORAL at 21:08

## 2021-06-13 RX ADMIN — FOLIC ACID 1 MG: 1 TABLET ORAL at 09:17

## 2021-06-13 RX ADMIN — ENOXAPARIN SODIUM 40 MG: 40 INJECTION SUBCUTANEOUS at 09:17

## 2021-06-13 RX ADMIN — LATANOPROST 1 DROP: 50 SOLUTION OPHTHALMIC at 21:09

## 2021-06-13 RX ADMIN — LISINOPRIL 20 MG: 20 TABLET ORAL at 01:55

## 2021-06-13 RX ADMIN — Medication 10 ML: at 21:09

## 2021-06-13 ASSESSMENT — PAIN SCALES - GENERAL
PAINLEVEL_OUTOF10: 0

## 2021-06-13 NOTE — ED NOTES
Spoke to [employer] on 7w verified sbar received.       Para Him, RN  06/12/21 1638

## 2021-06-13 NOTE — PROGRESS NOTES
Discontinue lovenox 40 mg daily.   Give warfarin 6 mg tonight as ordered in addition to the already given lovenox 40 mg dose per perfect serve Dr. Lalo Mason

## 2021-06-13 NOTE — ED PROVIDER NOTES
Patient signed out to me from previous physician. I was to discuss patient's CAT scan results and guide disposition. Patient has evidence of multiple nodules which was discussed at length with the patient. There is a concern for possible inflammatory process. The patient has had a productive cough recently. I am not highly suspicious for a community-acquired pneumonia but with her productive cough and the concern for possible infectious etiology of these nodules she will be covered with doxycycline for her safety. I believe this will also cover for the patient's family's concern for cellulitis although I find no evidence of this to the patient's groin. I see more evidence of a tinea cruris type infection. The patient initially wanted to be discharged however the family states that she is not able to care for self at home. She has been increasingly difficult to ambulate with chronic right-sided hip pain that he she has had multiple evaluations with multiple images performed with no answer. The family believes that the patient requires admission for placement as they cannot care for her and she cannot care for self at home. I agree with this after our ambulatory pulse ox was failed by the patient. She was unable to stand on her own even. I believe the patient will benefit from admission for further evaluation and management.  Discussed patient care with admitting team.     Latha Kolb MD  06/12/21 [id number]

## 2021-06-13 NOTE — H&P
Prior to Admission:    Medications Prior to Admission: methotrexate (RHEUMATREX) 2.5 MG chemo tablet, Take 2.5 mg by mouth once a week On Fridays  folic acid (FOLVITE) 1 MG tablet, Take 1 mg by mouth daily  lisinopril (PRINIVIL;ZESTRIL) 20 MG tablet, Take 20 mg by mouth nightly. warfarin (JANTOVEN) 1 MG tablet, Take 1 mg by mouth daily  primidone (MYSOLINE) 50 MG tablet, Take 2 tablets by mouth 3 times daily  docusate sodium (COLACE) 100 MG capsule, Take 100 mg by mouth as needed   warfarin (JANTOVEN) 5 MG tablet, Take 6 mg by mouth   glimepiride (AMARYL) 4 MG tablet, Take 1 tablet by mouth 2 times daily (before meals) (Patient taking differently: Take 4 mg by mouth 2 times daily )  diltiazem (CARDIZEM CD) 360 MG extended release capsule, Take 1 capsule by mouth daily  latanoprost (XALATAN) 0.005 % ophthalmic solution, Place 1 drop into both eyes nightly  spironolactone (ALDACTONE) 25 MG tablet, Take 25 mg by mouth daily. Vitamin D (CHOLECALCIFEROL) 1000 UNITS CAPS capsule, Take 2,000 Units by mouth daily     Allergies:  Patient has no known allergies. Social History:   TOBACCO:   reports that she has quit smoking. Her smoking use included cigarettes. She has a 45.00 pack-year smoking history. She quit smokeless tobacco use about 28 years ago. ETOH:   reports no history of alcohol use. MARITAL STATUS:    OCCUPATION:      Family History:   No family history on file.     REVIEW OF SYSTEMS:    General ROS: negative  Hematological and Lymphatic ROS: negative  Endocrine ROS: negative  Respiratory ROS: no cough, shortness of breath, or wheezing  Cardiovascular ROS: no chest pain or dyspnea on exertion  Gastrointestinal ROS: no abdominal pain, change in bowel habits, or black or bloody stools  Genito-Urinary ROS: no dysuria, trouble voiding, or hematuria  Neurological ROS: no TIA or stroke symptoms  negative    Vitals:  BP (!) 177/79   Pulse 104   Temp 98.3 °F (36.8 °C) (Oral)   Resp 16   Ht 5' 7\" (1.702 m) Wt 225 lb (102.1 kg)   SpO2 95%   BMI 35.24 kg/m²     PHYSICAL EXAM:  General:  Awake, alert, oriented X 3. Well developed, well nourished, well groomed. No apparent distress. HEENT:  Normocephalic, atraumatic. Pupils equal, round, reactive to light. No scleral icterus. No conjunctival injection. Normal lips, teeth, and gums. No nasal discharge. Neck:  Supple, FROM  Heart:  RRR, no murmurs, gallops, rubs, carotid upstroke normal, no carotid bruits  Lungs:  CTA bilaterally, bilat symmetrical expansion, no wheeze, rales, or rhonchi  Abdomen: Bowel sounds present, soft, nontender, no masses, no organomegaly, no peritoneal signs  Extremities:  No clubbing, cyanosis, or edema  Skin:  Warm and dry, no open lesions or rash  Neuro:  Cranial nerves 2-12 intact, no focal deficits  Vascular: Radial and pedal Pulses 2+  Breast: deferred  Rectal: deferred  Genitalia:  deferred      DATA:     Recent Labs     06/12/21  1321 06/13/21  0517   WBC 6.0  --    HGB 11.4* 10.7*   PLT 99*  --      Recent Labs     06/12/21  1321 06/13/21  0517    138   K 4.3 4.5   BUN 25* 21   CREATININE 1.3* 1.1*     Recent Labs     06/12/21  1321 06/13/21  0743   PROT 6.2*  --    INR  --  2.1     Recent Labs     06/12/21  1321   AST 24   ALT 38*   ALKPHOS 100   BILITOT <0.2     No results for input(s): BNP in the last 72 hours. No results for input(s): CKTOTAL, CKMB, CKMBINDEX, TROPONINI in the last 72 hours. ASSESSMENT:      Principal Problem:    Ambulatory dysfunction  Active Problems:    HTN (hypertension), benign    Paroxysmal atrial fibrillation (HCC)    L-S radiculopathy    General weakness    Falls frequently    Hypoglycemia     Healing Closed fracture of one rib of right side with routine healing # 10     Pulmonary nodules    Unable to ambulate    DM (diabetes mellitus) ([facility]  Resolved Problems:    * No resolved hospital problems. *        PLAN:    Fall, initial evaluation  Mechanical, rolled out of bed.   -PT/OT consulted. -Trauma imaging including CT ABD/PEL, CT right hip, CT chest negative for acute trauma. CT head negative.  -Weightbearing as tolerated. Hypoglycemia  Secondary to glimepiride use in the 79year-old. -Stop glimepiride. Recommend discontinuing at discharge. Alternative therapy reasonable for this patient.     Paroxysmal atrial fibrillation  -Continue warfarin, Cardizem CD    Chronic HFpEF  -Continue lisinopril, Cardizem CD, spironolactone    Essential hypertension  -Continue lisinopril, spironolactone        DVT prophylaxis  PT OT  Discharge plan    Bettina Birmingham DO  6/13/2021  10:02 AM

## 2021-06-13 NOTE — PROGRESS NOTES
Initial Evaluation      Attending Provider:  Tashi Arellano MD    Evaluating PT:  [place] PT    Room #:  5485/6012-S  Diagnosis:  Mechanical fall OOB, roll onto floor   Pertinent PMHx/PSHx:  DM, CHF, HTN, A-Fib, Fatty Liver, Diastolic dysfunction, LVH, Mitral Regurg, Glaucoma,  Precautions:  Falls, General, cardiac,   Equipment Needs:  [employer]    SUBJECTIVE:    Pt lives with alone in a 1 story home with 2 stairs and 1 rail to enter. Pt ambulated with no AD PTA. OBJECTIVE:   Initial Evaluation  Date: 6/13/21 Treatment Short Term/ Long Term   Goals   Was pt agreeable to Eval/treatment? Yes     Does pt have pain? Rib RT      Bed Mobility  Rolling: Indep  Supine to sit: S/SBA  Sit to supine: NA  Scooting: Indep/S  Indep all levels   Transfers Sit to stand: CGA  Stand to sit: S/SBA  Stand pivot: NA  Indep/S all surfaces    Ambulation   100 feet with [employer] SBA  200 feet with [employer] negotiation: ascended and descended 4 steps with 1 rail S/SBA  4 steps with 1 rail Indep   ROM WFL     MMT WFL     AM-PAC 6 Clicks 47/23       Pt is A & O x 3   Sensation:  Pt denies numbness and tingling to extremities  Edema:  NA  Balance: sitting:Indep standing: S/SBA with [employer]  Endurance: Good    ASSESSMENT:    Comments: In bed on arrival receptive for getting OOB into chair. Reporting RT rib area pain but tolerable. Bed mobility SBA-Indep levels using bed rails to assist.  No inc c/o RT rib pain during bed mobility and transfer standing. Transfer w/o c/o dizziness. Amb with [employer] despite no AD used prior to hospitalization. Gait pattern reciprocal step through w/o drifting, lateral veering or LOB. No fatigue reported during 100' walk, steps and to room. Steps safely negotiated with 1 HR guide only. Return to room and sat in Lg stack chair. Call light and wall phone on LT side on bed and reachable. Tray brought to front for lunch tray eating. Very appreciative for care.       Treatment:  Patient practiced and was instructed in the following treatment:     Eval   Amb   Steps    Pt's/ family goals   1. Home    Patient and or family understand(s) diagnosis, prognosis, and plan of care. PLAN:    PT care will be provided in accordance with the objectives noted above. Exercises and functional mobility practice will be used as well as appropriate assistive devices or modalities to obtain goals. Patient and family education will also be administered as needed. Frequency of treatments: 2-5x/week x 1-2 weeks. Total Treatment Time  20 minutes     Evaluation Time includes thorough review of current medical information, gathering information on past medical history/social history and prior level of function, completion of standardized testing/informal observation of tasks, assessment of data and education on plan of care and goals.     CPT codes:  [x] Low Complexity PT evaluation 68972  [] Moderate Complexity PT evaluation 74104  [] High Complexity PT evaluation 27646  [] PT Re-evaluation 77980  [x] Gait training 76768 10 minutes  [] Manual therapy 39450  minutes  [x] Therapeutic activities 96206  10 minutes  [] Therapeutic exercises 88318  minutes  [] Neuromuscular reeducation 63677   minutes    Ronald Jay PT

## 2021-06-14 VITALS
BODY MASS INDEX: 35.24 KG/M2 | DIASTOLIC BLOOD PRESSURE: 72 MMHG | HEART RATE: 92 BPM | OXYGEN SATURATION: 94 % | WEIGHT: 224.5 LBS | TEMPERATURE: 98 F | SYSTOLIC BLOOD PRESSURE: 142 MMHG | HEIGHT: 67 IN | RESPIRATION RATE: 16 BRPM

## 2021-06-14 LAB — METER GLUCOSE: 119 MG/DL (ref 74–99)

## 2021-06-14 PROCEDURE — G0378 HOSPITAL OBSERVATION PER HR: HCPCS

## 2021-06-14 PROCEDURE — 97535 SELF CARE MNGMENT TRAINING: CPT

## 2021-06-14 PROCEDURE — 82962 GLUCOSE BLOOD TEST: CPT

## 2021-06-14 PROCEDURE — 97165 OT EVAL LOW COMPLEX 30 MIN: CPT

## 2021-06-14 PROCEDURE — 6370000000 HC RX 637 (ALT 250 FOR IP): Performed by: NURSE PRACTITIONER

## 2021-06-14 RX ADMIN — SPIRONOLACTONE 25 MG: 25 TABLET ORAL at 10:10

## 2021-06-14 RX ADMIN — PRIMIDONE 100 MG: 50 TABLET ORAL at 10:12

## 2021-06-14 RX ADMIN — DILTIAZEM HYDROCHLORIDE 360 MG: 120 CAPSULE, EXTENDED RELEASE ORAL at 10:10

## 2021-06-14 RX ADMIN — FOLIC ACID 1 MG: 1 TABLET ORAL at 10:10

## 2021-06-14 ASSESSMENT — PAIN SCALES - GENERAL: PAINLEVEL_OUTOF10: 0

## 2021-06-14 NOTE — PROGRESS NOTES
Subjective:  Feeling better   No CP or SOB  No fever or chills   No uncontrolled pain  No vomiting or diarrhea     Objective:    BP (!) 152/74   Pulse 96   Temp 98.1 °F (36.7 °C) (Oral)   Resp 16   Ht 5' 6.5\" (1.689 m)   Wt 224 lb 8 oz (101.8 kg)   SpO2 96%   BMI 35.69 kg/m²     24HR INTAKE/OUTPUT:      Intake/Output Summary (Last 24 hours) at 6/14/2021 0938  Last data filed at 6/14/2021 0732  Gross per 24 hour   Intake 360 ml   Output 750 ml   Net -390 ml     General appearance: NAD, conversant  Neck: FROM, supple   Lungs: Clear bilaterally no wheezes, no rhonchi, no crackles  CV: RRR, no MRGs; normal carotid upstroke and amplitude without Bruits  Abdomen: Soft, non-tender; no masses or HSM  Extremities: No edema, no cyanosis, no clubbing  Skin: Intact no rash, no lesions, no ulcers    Psych: Alert and oriented normal affect  Neuro: Nonfocal  Most Recent Labs  Lab Results   Component Value Date    WBC 6.0 06/12/2021    HGB 10.7 (L) 06/13/2021    HCT 30.9 (L) 06/13/2021    PLT 99 (L) 06/12/2021     06/13/2021    K 4.5 06/13/2021     06/13/2021    CREATININE 1.1 (H) 06/13/2021    BUN 21 06/13/2021    CO2 23 06/13/2021    GLUCOSE 100 (H) 06/13/2021    ALT 38 (H) 06/12/2021    AST 24 06/12/2021    INR 2.1 06/13/2021    TSH 0.565 12/11/2017    LABA1C 7.8 (H) 08/24/2012     No results for input(s): MG in the last 72 hours. Lab Results   Component Value Date    CALCIUM 9.1 06/13/2021        CT HIP RIGHT WO CONTRAST   Final Result   No evidence of acute right hip fracture. Tiny well corticated bony fragment adjacent to the greater trochanter which   may represent sequela from prior avulsion injury. Consider further evaluation with MRI as clinically indicated. CT HEAD WO CONTRAST   Final Result   No acute intracranial abnormality. CT CHEST WO CONTRAST   Final Result   No evidence of acute trauma, specifically no evidence for right rib fractures.       A cluster of subcentimeter solid appearing nodules are noted involving the   right middle right lower lobes with adjacent halo of ground-glass appearance. Although the findings may be related to infectious or inflammatory process, 3   month CT follow-up is recommended to exclude pulmonary masses. PET scan may   also be helpful to further characterize on a nonemergent basis. CT ABDOMEN PELVIS WO CONTRAST Additional Contrast? None   Final Result   1. Cluster of nodules located in right middle lobe measuring up to 11 mm   with surrounding ground-glass opacities. Findings could suggest developing   atypical pneumonia or neoplasm. Short-term follow-up recommended. 2.  Healing right 10th rib fracture. 3.  Diverticulosis. 4.  Nonobstructing bilateral renal calculi. Assessment    Principal Problem:    Ambulatory dysfunction  Active Problems:    HTN (hypertension), benign    Paroxysmal atrial fibrillation (HCC)    L-S radiculopathy    General weakness    Falls frequently    Hypoglycemia     Healing Closed fracture of one rib of right side with routine healing # 10     Pulmonary nodules    Unable to ambulate    DM (diabetes mellitus) ([facility] [address]  Resolved Problems:    * No resolved hospital problems. *      Plan: 80-year-old female history of PAF, hyperlipidemia, diabetes admitted post fall    Fall, initial evaluation  Mechanical, rolled out of bed. -PT/OT consulted. -Trauma imaging including CT ABD/PEL, CT right hip, CT chest negative for acute trauma. CT head negative.  -Weightbearing as tolerated.     DM2 with hypoglycemia  Secondary to glimepiride use in the 79year-old. -Stop glimepiride. Recommend discontinuing at discharge. Alternative therapy reasonable for this patient.   -MBS/SSI     Paroxysmal atrial fibrillation  -Continue warfarin, Cardizem CD     Chronic HFpEF  -Continue lisinopril, Cardizem CD, spironolactone     Essential hypertension  -Continue lisinopril, spironolactone     DVT prophylaxis  PT OT  Discharge plan Home w [facility]    Electronically signed by Shakira Landrum MD on 6/14/2021 at 9:38 AM

## 2021-06-14 NOTE — PROGRESS NOTES
for technique d/t poor grasp and intention tremor Mod I   UB Dressing SBA  Mod I   LB Dressing Min A  Donning/doffing brief and pants  Donning/doffing socks with cross over method Significant safety concerns in LB dressing as pt has poor technique initially. Increased time and instruction to recognize and complete task with compensatory strategies for safety. Pt has questionable understanding post training, high likelihood pt will return to her known method, recommend  follow up to ensure safety and decreased fall risk at home Mod I    Bathing Min A  Tub transfer: Min A  Use of clamp on tub rail Increased time and trials in order to complete safe tub transfer. Pt has highly unsafe technique for home and almost trips with initial trial utilizing her own technique. Max increased time and cues provided to instruct on safe compensatory strategies and proper use of clamp on tub rail for safety in home environment. OT recommends having friend present and assisting in tub transfer at home. Pt verbalizes she will sponge bathe initially at home. Mod I   Toileting Min A  Use of grab bar for support in transfer Pt is unaware of soiled brief and requires mod cues for cleanliness to agree to change brief and pad. Pt requires increased time and cues for safe technique, attempts to stand on jeans with B feet still in cuffs to manage brief with no awareness of fall risk with this technique.  Able to manage jessica care Mod I   Bed Mobility  Supine to sit: out of bed on entry     Functional Transfers STS: SBA  Mod I   Functional Mobility SBA with [address]  Hallway distance  Min cues on safety and management of WW in bathroom environment  Mod I during ADLs   Balance Sitting: fair plus    Standing: fair at [address]     Activity Tolerance fair  standing reagan x7-8 min with fair plus balance during self care tasks   B UE Proximally: 4/5  Distally: 3+/5  Grasp: grossly 3+/5, fatigues quickly  Intention tremor with movements  Poor plus FMC during self function, completion of standardized testing/informal observation of tasks, assessment of data, and development of POC/Goals    Yesy Lee OTR/L  [id number]

## 2021-06-14 NOTE — PROGRESS NOTES
Wound / ostomy dept consulted for this pt admitted for Ambulatory dysfunction. History includes: A-fib, DM, CHF, HTN and glaucoma. The Pt has moist , red pannus folds. The area is tender. Recommend Interdry cloth to wick moisture and decrease bio burden. Instructed the Pt on its use and benefits.

## 2021-07-29 ENCOUNTER — TELEPHONE (OUTPATIENT)
Dept: CARDIOLOGY CLINIC | Age: 81
End: 2021-07-29

## 2021-07-29 NOTE — TELEPHONE ENCOUNTER
Left message for Rosalie Yen to contact office to schedule patient for office visit with Dr. Rossi Warren the week of 8/23/2021.

## 2021-08-03 RX ORDER — PRIMIDONE 50 MG/1
100 TABLET ORAL 3 TIMES DAILY
Qty: 180 TABLET | Refills: 5 | Status: SHIPPED
Start: 2021-08-03 | End: 2022-05-24

## 2021-09-24 ENCOUNTER — HOSPITAL ENCOUNTER (OUTPATIENT)
Dept: CT IMAGING | Age: 81
Discharge: HOME | End: 2021-09-26
Payer: MEDICARE

## 2021-09-24 DIAGNOSIS — R91.8 LUNG MASS: ICD-10-CM

## 2021-09-24 PROCEDURE — 71250 CT THORAX DX C-: CPT

## 2021-09-29 ENCOUNTER — OFFICE VISIT (OUTPATIENT)
Dept: CARDIOLOGY CLINIC | Age: 81
End: 2021-09-29
Payer: MEDICARE

## 2021-09-29 VITALS
WEIGHT: 216 LBS | RESPIRATION RATE: 18 BRPM | HEART RATE: 93 BPM | BODY MASS INDEX: 33.9 KG/M2 | DIASTOLIC BLOOD PRESSURE: 72 MMHG | SYSTOLIC BLOOD PRESSURE: 124 MMHG | HEIGHT: 67 IN

## 2021-09-29 DIAGNOSIS — E08.00 DIABETES MELLITUS DUE TO UNDERLYING CONDITION WITH HYPEROSMOLARITY WITHOUT COMA, WITHOUT LONG-TERM CURRENT USE OF INSULIN (HCC): ICD-10-CM

## 2021-09-29 DIAGNOSIS — R29.6 FALLS FREQUENTLY: ICD-10-CM

## 2021-09-29 DIAGNOSIS — D68.2 HYPOPROTHROMBINEMIA (HCC): ICD-10-CM

## 2021-09-29 DIAGNOSIS — E11.641 UNCONTROLLED TYPE 2 DIABETES MELLITUS WITH HYPOGLYCEMIA AND COMA (HCC): Chronic | ICD-10-CM

## 2021-09-29 DIAGNOSIS — I10 HTN (HYPERTENSION), BENIGN: ICD-10-CM

## 2021-09-29 DIAGNOSIS — I48.0 PAROXYSMAL ATRIAL FIBRILLATION (HCC): Primary | Chronic | ICD-10-CM

## 2021-09-29 PROCEDURE — 93000 ELECTROCARDIOGRAM COMPLETE: CPT | Performed by: INTERNAL MEDICINE

## 2021-09-29 PROCEDURE — 99214 OFFICE O/P EST MOD 30 MIN: CPT | Performed by: INTERNAL MEDICINE

## 2021-10-27 ENCOUNTER — OFFICE VISIT (OUTPATIENT)
Dept: NEUROLOGY | Age: 81
End: 2021-10-27
Payer: MEDICARE

## 2021-10-27 VITALS
TEMPERATURE: 96.5 F | OXYGEN SATURATION: 96 % | SYSTOLIC BLOOD PRESSURE: 137 MMHG | WEIGHT: 206 LBS | RESPIRATION RATE: 18 BRPM | HEART RATE: 98 BPM | BODY MASS INDEX: 32.33 KG/M2 | HEIGHT: 67 IN | DIASTOLIC BLOOD PRESSURE: 57 MMHG

## 2021-10-27 DIAGNOSIS — R25.1 TREMORS OF NERVOUS SYSTEM: ICD-10-CM

## 2021-10-27 DIAGNOSIS — E11.42 DIABETIC POLYNEUROPATHY ASSOCIATED WITH TYPE 2 DIABETES MELLITUS (HCC): Primary | ICD-10-CM

## 2021-10-27 PROCEDURE — 99215 OFFICE O/P EST HI 40 MIN: CPT | Performed by: PSYCHIATRY & NEUROLOGY

## 2021-10-27 RX ORDER — GLIMEPIRIDE 1 MG/1
1 TABLET ORAL
COMMUNITY

## 2021-10-27 NOTE — PROGRESS NOTES
This 62-year-old right-handed woman was referred for evaluation and management of tremors and diabetic neuropathy. She remained an excellent historian as well as her friend. Her medications were now methotrexate, folate, warfarin, primidone, glimepiride, diltiazem, spironolactone, prenatal vitamins, cholecalciferol, lisinopril and laxatives. She developed tremors 8 years ago, subsequently diagnosed with late life tremors. She continued on primidone 100 mg 3 times daily, now taking her doses more regularly. Her tremors were reduced, now slight when writing or eating. There continued horizontal head tremors, but no vocal quivering. She denied other neurological issues. There were difficulties holding objects. She noted marked pains in her wrist and finger joints. She was diagnosed with rheumatoid arthritis, responding well to methotrexate and folate rescue. There remained difficulties completely flexing all her fingers. She slept well. She still minimally exercise. She denied recurrent pains of her right greater trochanter. She was better with her diet. She lost 30 pounds in weight, with hemoglobin A1c of 6.1. She denied other medical issues. She remained in good spirits. Review of systems was otherwise unremarkable. Physical examination displayed stable vital signs. She was an elderly woman in no acute distress who was alert, cooperative and oriented. She remained very pleasant. She still appreciated my wit and humor. There were +1 carotid upstrokes without bruits. Her lungs were clear to auscultation. Cardiac testing revealed an irregular, irregular rhythm but was otherwise unrevealing. Her limbs not displayed minimal bogginess at both wrists and all PIP joints. Neurological examination produced an intact mental status, without vocal quivering. Cranial nerve testing was unremarkable, with an unchanged, increased left palpebral fissure.   I found normal tone and bulk with 5/5 strength throughout. There was only slight tremor of the outstretched hands and minimal horizontal head tremor. I found no jaw tremor, cogwheel rigidity or bradykinesia. Vibration was moderately decreased to the mid calves; pinprick and light touch continued intact. Coordination was unrevealing. She walked with an arthritic and unsteady gait, with minimal bilateral foot drop. She lurched minimally from side to side when turning. Romberg's was unremarkable. Electrodiagnostic studies were consistent with lumbosacral motor radiculopathies and moderate diabetic peripheral neuropathy. CBC with differential revealed the previously low platelet count. Her lymphocyte count was also reduced. This individual presents longstanding tremors of the outstretched hands and head. 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 We will continue that regimen. She also suffers from early diabetic peripheral neuropathy and motor radiculopathy. She has not developed pain or motor weakness from these disorders. Her sugars are better controlled. She now displays a cautious gait, likely from joint position sense abnormalities. She must use a cane at all times, especially in the inclement weather. She also suffered from a right hip pointer, which has resolved. She is medically stable despite her comorbidities, including recently diagnosed rheumatoid arthritis. She will return in 6 months. She was maintained on primidone 100 mg thrice daily. She will use a cane at all times. She will call any time if problems arise. I spent 40 minutes with the patient with over 50 % spent in counseling and disease management. All patient issues were addressed and all questions were answered.

## 2021-11-30 ENCOUNTER — HOSPITAL ENCOUNTER (OUTPATIENT)
Age: 81
Discharge: HOME | End: 2021-12-02

## 2021-11-30 PROCEDURE — 88305 TISSUE EXAM BY PATHOLOGIST: CPT

## 2022-01-28 ENCOUNTER — TELEPHONE (OUTPATIENT)
Dept: CARDIOLOGY | Age: 82
End: 2022-01-28

## 2022-01-28 NOTE — TELEPHONE ENCOUNTER
CALLED PATIENT AND LEFT MESSAGE TO SCHEDULE ECHO.     Electronically signed by aMna Welsh on 1/28/2022 at 9:25 AM

## 2022-02-01 ENCOUNTER — TELEPHONE (OUTPATIENT)
Dept: CARDIOLOGY | Age: 82
End: 2022-02-01

## 2022-03-31 ENCOUNTER — TELEPHONE (OUTPATIENT)
Dept: CARDIOLOGY | Age: 82
End: 2022-03-31

## 2022-03-31 NOTE — TELEPHONE ENCOUNTER
Spoke with patient's friend Rachna No and confirmed echocardiogram appointment on April 1, 2022 at 1100.

## 2022-04-01 ENCOUNTER — HOSPITAL ENCOUNTER (OUTPATIENT)
Dept: CARDIOLOGY | Age: 82
Discharge: HOME | End: 2022-04-01
Payer: MEDICARE

## 2022-04-01 DIAGNOSIS — I10 HTN (HYPERTENSION), BENIGN: ICD-10-CM

## 2022-04-01 LAB
LV EF: 63 %
LVEF MODALITY: NORMAL

## 2022-04-01 PROCEDURE — 93306 TTE W/DOPPLER COMPLETE: CPT | Performed by: PSYCHIATRY & NEUROLOGY

## 2022-04-05 ENCOUNTER — OFFICE VISIT (OUTPATIENT)
Dept: CARDIOLOGY CLINIC | Age: 82
End: 2022-04-05
Payer: MEDICARE

## 2022-04-05 VITALS
RESPIRATION RATE: 18 BRPM | HEIGHT: 67 IN | WEIGHT: 200.5 LBS | HEART RATE: 88 BPM | BODY MASS INDEX: 31.47 KG/M2 | SYSTOLIC BLOOD PRESSURE: 132 MMHG | DIASTOLIC BLOOD PRESSURE: 80 MMHG

## 2022-04-05 DIAGNOSIS — R91.8 PULMONARY NODULES: ICD-10-CM

## 2022-04-05 DIAGNOSIS — R29.6 FALLS FREQUENTLY: ICD-10-CM

## 2022-04-05 DIAGNOSIS — E11.649 UNCONTROLLED TYPE 2 DIABETES MELLITUS WITH HYPOGLYCEMIA WITHOUT COMA (HCC): Chronic | ICD-10-CM

## 2022-04-05 DIAGNOSIS — D68.2 HYPOPROTHROMBINEMIA (HCC): ICD-10-CM

## 2022-04-05 DIAGNOSIS — I10 HTN (HYPERTENSION), BENIGN: Chronic | ICD-10-CM

## 2022-04-05 DIAGNOSIS — I31.39 PERICARDIAL EFFUSION: ICD-10-CM

## 2022-04-05 DIAGNOSIS — I36.1 NONRHEUMATIC TRICUSPID VALVE REGURGITATION: ICD-10-CM

## 2022-04-05 DIAGNOSIS — S22.31XD CLOSED FRACTURE OF ONE RIB OF RIGHT SIDE WITH ROUTINE HEALING: ICD-10-CM

## 2022-04-05 DIAGNOSIS — I07.1 TRICUSPID VALVE INSUFFICIENCY, UNSPECIFIED ETIOLOGY: ICD-10-CM

## 2022-04-05 DIAGNOSIS — I48.0 PAROXYSMAL ATRIAL FIBRILLATION (HCC): Primary | ICD-10-CM

## 2022-04-05 PROCEDURE — 93000 ELECTROCARDIOGRAM COMPLETE: CPT | Performed by: INTERNAL MEDICINE

## 2022-04-05 PROCEDURE — 99214 OFFICE O/P EST MOD 30 MIN: CPT | Performed by: INTERNAL MEDICINE

## 2022-04-05 NOTE — PROGRESS NOTES
Out Patient CARDIOLOGY FOLLOW UP    Name: Nori Trejo    Age: [de-identified] y.o. Date of Service: 4/5/2022      Referring Physician: No admitting provider for patient encounter. Chief Complaint   Patient presents with    Atrial Fibrillation     3 month ov- pt has no cardiac complaints        History of Present Illness: [de-identified] female with history of atrial fibrillation, diastolic heart failure, COPD, hypertension, chronic kidney disease, diabetes, obesity, and history of LVH. She presented for follow-up visit today with her daughter. She denies any recurrent fall and report doing well. I reviewed her echocardiogram with her and her daughter. Her heart is pumping fine. There is mild to moderate regurgitation. we will monitor and there is also physiologic pericardial effusion and asymptomatic and subsequently will monitor as well. Medical History:  1. Atrial fibrillation initial episode, 2010.  Converted spontaneously.  Diastolic CHF at that time.  AF recurred later in 2010.  Started on sotalol.  And converted spontaneously. 2. History goiter  3. Admitted 12/09/2017 with progressive cough and dyspnea  Dx : Pneumonia /bronchitis. 4. COPD. 5. Atrial fibrillation. Chronic OAC (Coumadin). Admission  INR=1.6. 6. Hypertension and LVH. 7. CKD. 8. DM.  9. Obesity.  BMI 36- 02/2020. 10. Surgical history: ADRIEN/BSO, breast biopsy, vitreous hemorrhage and cataracts  11. Per EMR, history of aortic valve stenosis and CHF. 12. Apparently a rhythm control strategy has been chronically deployed. Noncompliant with BID dosing of Sotolol and was taken off  medication during hospitalization Duration of recurrent AF and long term monitoring of anti-arrythmic Rx unknown. Records have been requested per Dr. Lamar Carson office,12/2017. 13. Echo, 12/10/2017. EF biplane = 52%, no chamber dilatation. RVSP 40. AF.    14. CXR, 12/13/2017.  Patchy airspace opacities  in upper lobes bilateral.  Improved aeration at mid lung fields and  bases when compared to prior CXR. .     Review of Systems:   Cardiac: As per HPI  General: Denies fever or chills  Pulmonary: As per HPI  HEENT: Denies runny nose  GI: No complaints  : No complaints  Endocrine: Denies night sweats  Musculoskeletal: No complaints  Skin: Dry skin  Neuro: No complaints  Psych: Denies depression    Past Medical History:  Past Medical History:   Diagnosis Date    Atrial fibrillation ([facility] 02/2010    converted with  oral    Atrial fibrillation ([facility] 05/2011    declined conversion; rate control only    Diabetes mellitus ([facility] 5594    Diastolic CHF, acute on chronic ([facility] 0411    Diastolic dysfunction 4659    STAGE I ;     Fatty liver disease, nonalcoholic 8251    Hypertension 1996    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 A. pressure    Rectal bleed 8/23/2012       Past Surgical History:  Past Surgical History:   Procedure Laterality Date    CATARACT REMOVAL  2004    BILATERAL    COLONOSCOPY  [id number],8970    BENIGN POLYPS ;INT  HEMORRHOIDS    EYE SURGERY  2004    VITREOUS  HEMORRHAGE    HYSTERECTOMY  1986    [facility]       Family History:  History reviewed. No pertinent family history. Social History:  Social History     Socioeconomic History    Marital status:       Spouse name: Not on file    Number of children: Not on file    Years of education: Not on file    Highest education level: Not on file   Occupational History    Not on file   Tobacco Use    Smoking status: Former Smoker     Packs/day: 1.00     Years: 45.00     Pack years: 45.00     Types: Cigarettes    Smokeless tobacco: Former User     Quit date: 3/23/1993   Vaping Use    Vaping Use: Never used   Substance and Sexual Activity    Alcohol use: No    Drug use: No    Sexual activity: Not on file   Other Topics Concern    Not on file   Social History Narrative    Not on file     Social Determinants of Health     Financial Resource Strain:     Difficulty of Paying Living Expenses: Not on file   Food Insecurity:     Worried About Running Out of Food in the Last Year: Not on file    Livia of Food in the Last Year: Not on file   Transportation Needs:     Lack of Transportation (Medical): Not on file    Lack of Transportation (Non-Medical): Not on file   Physical Activity:     Days of Exercise per Week: Not on file    Minutes of Exercise per Session: Not on file   Stress:     Feeling of Stress : Not on file   Social Connections:     Frequency of Communication with Friends and Family: Not on file    Frequency of Social Gatherings with Friends and Family: Not on file    Attends Protestant Services: Not on file    Active Member of [address] [place] [address] [employer] or Organizations: Not on file    Attends Club or Organization Meetings: Not on file    Marital Status: Not on file   Intimate Partner Violence:     Fear of Current or Ex-Partner: Not on file    Emotionally Abused: Not on file    Physically Abused: Not on file    Sexually Abused: Not on file   Housing Stability:     Unable to Pay for Housing in the Last Year: Not on file    Number of [place] in the Last Year: Not on file    Unstable Housing in the Last Year: Not on file       Allergies:  No Known Allergies    Home Medications:  Prior to Admission medications    Medication Sig Start Date End Date Taking?  Authorizing Provider   glimepiride (AMARYL) 1 MG tablet Take 1 mg by mouth every morning (before breakfast)   Yes Historical Provider, MD   primidone (MYSOLINE) 50 MG tablet Take 2 tablets by mouth 3 times daily  Patient taking differently: Take 150 mg by mouth in the morning and at bedtime  8/3/21  Yes ERIC Vargas NP   folic acid (FOLVITE) 1 MG tablet Take 1 mg by mouth daily   Yes Historical Provider, MD warfarin (JANTOVEN) 1 MG tablet Take 1 mg by mouth daily   Yes Historical Provider, MD   docusate sodium (COLACE) 100 MG capsule Take 100 mg by mouth as needed    Yes Historical Provider, MD   warfarin (JANTOVEN) 5 MG tablet Take 6 mg by mouth 7 mg total   Yes Historical Provider, MD   diltiazem (CARDIZEM CD) 360 MG extended release capsule Take 1 capsule by mouth daily 12/16/17  Yes César Phillips MD   latanoprost (XALATAN) 0.005 % ophthalmic solution Place 1 drop into both eyes nightly   Yes Historical Provider, MD   lisinopril (PRINIVIL;ZESTRIL) 20 MG tablet Take 20 mg by mouth nightly. Yes Historical Provider, MD   spironolactone (ALDACTONE) 25 MG tablet Take 25 mg by mouth daily. Yes Historical Provider, MD   Vitamin D (CHOLECALCIFEROL) 1000 UNITS CAPS capsule Take 2,000 Units by mouth daily    Yes Historical Provider, MD   methotrexate (RHEUMATREX) 2.5 MG chemo tablet Take 2.5 mg by mouth once a week 8 tablets on Friday  Patient not taking: Reported on 4/5/2022    Historical Provider, MD       Current Medications:  Current Outpatient Medications   Medication Sig Dispense Refill    glimepiride (AMARYL) 1 MG tablet Take 1 mg by mouth every morning (before breakfast)      primidone (MYSOLINE) 50 MG tablet Take 2 tablets by mouth 3 times daily (Patient taking differently: Take 150 mg by mouth in the morning and at bedtime ) 564 tablet 5    folic acid (FOLVITE) 1 MG tablet Take 1 mg by mouth daily      warfarin (JANTOVEN) 1 MG tablet Take 1 mg by mouth daily      docusate sodium (COLACE) 100 MG capsule Take 100 mg by mouth as needed       warfarin (JANTOVEN) 5 MG tablet Take 6 mg by mouth 7 mg total      diltiazem (CARDIZEM CD) 360 MG extended release capsule Take 1 capsule by mouth daily 30 capsule 3    latanoprost (XALATAN) 0.005 % ophthalmic solution Place 1 drop into both eyes nightly      lisinopril (PRINIVIL;ZESTRIL) 20 MG tablet Take 20 mg by mouth nightly.         spironolactone (ALDACTONE) 25 MG tablet Take 25 mg by mouth daily.  Vitamin D (CHOLECALCIFEROL) 1000 UNITS CAPS capsule Take 2,000 Units by mouth daily       methotrexate (RHEUMATREX) 2.5 MG chemo tablet Take 2.5 mg by mouth once a week 8 tablets on Friday (Patient not taking: Reported on 4/5/2022)       Current Facility-Administered Medications   Medication Dose Route Frequency Provider Last Rate Last Admin    perflutren lipid microspheres (DEFINITY) injection 1.65 mg  1.5 mL IntraVENous ONCE PRN Patricio Mckeon MD             Physical Exam:  /80   Pulse 88   Resp 18   Ht 5' 7\" (1.702 m)   Wt 200 lb 8 oz (90.9 kg)   BMI 31.40 kg/m²   Wt Readings from Last 3 Encounters:   04/05/22 200 lb 8 oz (90.9 kg)   10/27/21 206 lb (93.4 kg)   09/29/21 216 lb (98 kg)       Appearance: Alert and oriented x3 not in acute distress. Skin: Dry skin  Head: Atraumatic  Eyes: Intact extraocular muscles   ENMT: Mucous membranes are moist  Neck: Supple  Lungs: Clear to auscultation  Cardiac: Normal S1 and S2  Abdomen: Protuberant  Extremities: Intact range of motion  Neurologic: No focal neurological deficits  Peripheral Pulses: 2+ peripheral pulses    Intake/Output:  No intake or output data in the 24 hours ending 04/05/22 1303  [unfilled]    Laboratory Tests:  No results for input(s): NA, K, CL, CO2, BUN, CREATININE, GLUCOSE, CALCIUM in the last 72 hours. Lab Results   Component Value Date    MG 1.8 08/26/2012     No results for input(s): ALKPHOS, ALT, AST, PROT, BILITOT, BILIDIR, LABALBU in the last 72 hours. No results for input(s): WBC, RBC, HGB, HCT, MCV, MCH, MCHC, RDW, PLT, MPV in the last 72 hours. Lab Results   Component Value Date    TROPONINI <0.01 08/24/2012     No results for input(s): CKTOTAL, CKMB, CKMBINDEX, TROPHS in the last 72 hours.   Lab Results   Component Value Date    INR 2.1 06/13/2021    INR 4.1 12/15/2017    INR 5.3 (HH) 12/14/2017    PROTIME 23.1 (H) 06/13/2021    PROTIME 48.1 (H) 12/15/2017 PROTIME 62.6 (H) 12/14/2017     Lab Results   Component Value Date    TSH 0.565 12/11/2017    TSH 1.327 08/24/2012     Lab Results   Component Value Date    LABA1C 7.8 (H) 08/24/2012     No results found for: EAG  Lab Results   Component Value Date    CHOL 166 08/24/2012     Lab Results   Component Value Date    TRIG 292 (H) 08/24/2012     Lab Results   Component Value Date    HDL 27.2 (A) 08/24/2012     Lab Results   Component Value Date    LDLCALC 80 08/24/2012     No results found for: LABVLDL, VLDL  No results found for: CHOLHDLRATIO  No results for input(s): PROBNP in the last 72 hours. Cardiac Tests:  EKG reviewed (EKG date: Shows atrial fibrillation 88bpm and  nonspecific ST-T wave changes.):    Echocardiogram reviewed: 12/2017  Summary   Ejection fraction biplane = 52%   Left atrium is of normal size. Probable atrial ffibrillation   Structurally normal mitral valve. The aortic valve is trileaflet. The aortic valve appears mildly sclerotic. TTE 4/1/2022  Technically difficult examination. Left ventricular size is grossly normal.   Ejection fraction is visually estimated at 60 to 65%. Normal right ventricular size and function. Mild mitral regurgitation is present. Mild to moderate tricuspid regurgitation. RVSP is 38 mmHg. Mild pulmonic regurgitation present. Small pericardial effusion without tamponade      Stress test reviewed: None on file    Cardiac catheterization reviewed: On file    CXR reviewed: The ASCVD Risk score (Leigh Ann Mueller, et al., 2013) failed to calculate for the following reasons: The 2013 ASCVD risk score is only valid for ages 36 to 78    ASSESSMENT / PLAN:    1. Paroxysmal atrial fibrillation (HCC)  Continue on diltiazem and Coumadin for rate control and anticoagulation respectively  Follow-up with your PCP for Coumadin management.   Apparently patient was on sotalol in the past but was noncompliant and subsequently she was taken off of this medication She again reports he had routine blood work with his PCP and was told blood work were within normal limit  She has had no recurrent falls but if he develop recurrent falls we will discuss referring her for watchman evaluation  Echo shows EF of 60 to 65%    2. HTN (hypertension), benign  Blood pressure is stable      3. Uncontrolled type 2 diabetes mellitus with hypoglycemia and coma ([address]  Follow-up with your PCP  4. Hypoprothrombinemia ([address]  Follow-up with your PCP  5. Falls frequently  Will refer for watchman device if recurrent falls  6. Diabetes mellitus due to underlying condition with hyperosmolarity without coma, without long-term current use of insulin ([address]  Follow-up with your PCP  7. Mild to moderate tricuspid regurgitation, mild mitral regurgitation and mild pulmonic regurgitation  Asymptomatic and will monitor for now. 8.  Physiologic pericardial effusion  Asymptomatic and will monitor          FOLLOW-UP 3 to 6 months      Thank you for allowing me to participate in your patient's care. Please feel free to contact me if you have any questions or concerns.     Laurence Escamilla MD  [facility]) Cardiology

## 2022-04-07 ENCOUNTER — TELEPHONE (OUTPATIENT)
Dept: CARDIOLOGY CLINIC | Age: 82
End: 2022-04-07

## 2022-04-07 NOTE — TELEPHONE ENCOUNTER
Contacted patient with echo results per Dr. Glenn Guerrero.  she verbalized understanding.    ----- Message from Michele Mccord MD sent at 4/6/2022  4:45 PM EDT -----  Heart is pumping fine. Details will be discussed during follow-up visit.

## 2022-04-26 ENCOUNTER — OFFICE VISIT (OUTPATIENT)
Dept: NEUROLOGY | Age: 82
End: 2022-04-26
Payer: MEDICARE

## 2022-04-26 VITALS
SYSTOLIC BLOOD PRESSURE: 150 MMHG | DIASTOLIC BLOOD PRESSURE: 60 MMHG | OXYGEN SATURATION: 97 % | BODY MASS INDEX: 32.02 KG/M2 | WEIGHT: 204 LBS | HEART RATE: 90 BPM | HEIGHT: 67 IN | TEMPERATURE: 96.6 F

## 2022-04-26 DIAGNOSIS — G25.0 ESSENTIAL TREMOR: Primary | ICD-10-CM

## 2022-04-26 DIAGNOSIS — E11.42 DIABETIC POLYNEUROPATHY ASSOCIATED WITH TYPE 2 DIABETES MELLITUS (HCC): ICD-10-CM

## 2022-04-26 DIAGNOSIS — M54.17 L-S RADICULOPATHY: ICD-10-CM

## 2022-04-26 DIAGNOSIS — M05.79 RHEUMATOID ARTHRITIS INVOLVING MULTIPLE SITES WITH POSITIVE RHEUMATOID FACTOR (HCC): ICD-10-CM

## 2022-04-26 PROCEDURE — 99215 OFFICE O/P EST HI 40 MIN: CPT | Performed by: PSYCHIATRY & NEUROLOGY

## 2022-04-26 NOTE — PROGRESS NOTES
This 81-year-old right-handed woman was referred for evaluation and management of tremors and diabetic neuropathy. She remained a good historian; her friend again provided an excellent history. Her medications were now folate, warfarin, primidone, glimepiride, diltiazem, spironolactone, multivitamins, cholecalciferol, lisinopril; cyanocobalamin injections and laxatives. She developed tremors 9 years ago, subsequently diagnosed with late life tremors. She continued on primidone 100 mg thrice daily, taking those doses regularly. Her tremors remained reduced, only minimal when writing or eating. There continued horizontal head tremors, but no vocal quivering. She did not find these troublesome. She denied other neurological issues. There continued difficulties holding objects, with bogginess in her wrist and fingers. Her rheumatoid arthritis, had responded well to methotrexate; she was no longer taking that medication. She more recently refused newer agents. She slept well. She was again rarely exercising. She continued to lose weight. She received her COVID-19 vaccinations and booster. Her friend noted decreasing mood. She was now rarely leaving the home, usually sitting in a poorly lightheaded room. She is recently diagnosed with cyanocobalamin deficiency. She was now receiving monthly injections; her affect had not improved so far. Review of systems was otherwise unremarkable. Physical examination displayed stable vital signs. Blood pressure is 150/60. She was an elderly woman in no acute distress who was alert, cooperative and oriented. She remained pleasant; however her mood was flat and she was not humorous today. Her neck remained supple. Graylon Steven Her lungs were clear to auscultation. Cardiac testing revealed an irregular, irregular rhythm but was otherwise unrevealing. Her limbs again displayed minimal bogginess at both wrists and all PIP joints.   She cannot completely extend issues were addressed and all questions were answered.

## 2022-05-19 ENCOUNTER — APPOINTMENT (OUTPATIENT)
Dept: GENERAL RADIOLOGY | Age: 82
End: 2022-05-19
Payer: MEDICARE

## 2022-05-19 ENCOUNTER — HOSPITAL ENCOUNTER (EMERGENCY)
Age: 82
Discharge: HOME | End: 2022-05-19
Attending: EMERGENCY MEDICINE
Payer: MEDICARE

## 2022-05-19 ENCOUNTER — APPOINTMENT (OUTPATIENT)
Dept: CT IMAGING | Age: 82
End: 2022-05-19
Payer: MEDICARE

## 2022-05-19 VITALS
OXYGEN SATURATION: 97 % | HEART RATE: 79 BPM | BODY MASS INDEX: 32.02 KG/M2 | DIASTOLIC BLOOD PRESSURE: 69 MMHG | WEIGHT: 204 LBS | HEIGHT: 67 IN | SYSTOLIC BLOOD PRESSURE: 166 MMHG | TEMPERATURE: 97.8 F | RESPIRATION RATE: 16 BRPM

## 2022-05-19 DIAGNOSIS — S09.90XA CLOSED HEAD INJURY, INITIAL ENCOUNTER: Primary | ICD-10-CM
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
## 2022-05-19 LAB
ALBUMIN SERPL-MCNC: 3.8 G/DL (ref 3.5–5.2)
ALP BLD-CCNC: 119 U/L (ref 35–104)
ALT SERPL-CCNC: 18 U/L (ref 0–32)
ANION GAP SERPL CALCULATED.3IONS-SCNC: 13 MMOL/L (ref 7–16)
ANISOCYTOSIS: ABNORMAL
APTT: 33.3 SEC (ref 24.5–35.1)
AST SERPL-CCNC: 29 U/L (ref 0–31)
ATYPICAL LYMPHOCYTE RELATIVE PERCENT: 0.9 % (ref 0–4)
BASOPHILS ABSOLUTE: 0 E9/L (ref 0–0.2)
BASOPHILS RELATIVE PERCENT: 0 % (ref 0–2)
BILIRUB SERPL-MCNC: 0.3 MG/DL (ref 0–1.2)
BUN BLDV-MCNC: 30 MG/DL (ref 6–23)
CALCIUM SERPL-MCNC: 8.8 MG/DL (ref 8.6–10.2)
CHLORIDE BLD-SCNC: 99 MMOL/L (ref 98–107)
CO2: 22 MMOL/L (ref 22–29)
CREAT SERPL-MCNC: 1.4 MG/DL (ref 0.5–1)
EOSINOPHILS ABSOLUTE: 0 E9/L (ref 0.05–0.5)
EOSINOPHILS RELATIVE PERCENT: 0 % (ref 0–6)
GFR AFRICAN AMERICAN: 44
GFR NON-AFRICAN AMERICAN: 36 ML/MIN/1.73
GLUCOSE BLD-MCNC: 112 MG/DL (ref 74–99)
HCT VFR BLD CALC: 34 % (ref 34–48)
HEMOGLOBIN: 11.5 G/DL (ref 11.5–15.5)
INR BLD: 1.6
LYMPHOCYTES ABSOLUTE: 0.4 E9/L (ref 1.5–4)
LYMPHOCYTES RELATIVE PERCENT: 7.8 % (ref 20–42)
MCH RBC QN AUTO: 31.3 PG (ref 26–35)
MCHC RBC AUTO-ENTMCNC: 33.8 % (ref 32–34.5)
MCV RBC AUTO: 92.6 FL (ref 80–99.9)
MONOCYTES ABSOLUTE: 0.31 E9/L (ref 0.1–0.95)
MONOCYTES RELATIVE PERCENT: 7 % (ref 2–12)
NEUTROPHILS ABSOLUTE: 3.7 E9/L (ref 1.8–7.3)
NEUTROPHILS RELATIVE PERCENT: 84.3 % (ref 43–80)
NUCLEATED RED BLOOD CELLS: 0 /100 WBC
OVALOCYTES: ABNORMAL
PDW BLD-RTO: 12.9 FL (ref 11.5–15)
PLATELET # BLD: 84 E9/L (ref 130–450)
PLATELET CONFIRMATION: NORMAL
PMV BLD AUTO: 9.8 FL (ref 7–12)
POLYCHROMASIA: ABNORMAL
POTASSIUM REFLEX MAGNESIUM: 4.6 MMOL/L (ref 3.5–5)
PROTHROMBIN TIME: 17.5 SEC (ref 9.3–12.4)
RBC # BLD: 3.67 E12/L (ref 3.5–5.5)
SODIUM BLD-SCNC: 134 MMOL/L (ref 132–146)
TOTAL CK: 431 U/L (ref 20–180)
TOTAL PROTEIN: 6.1 G/DL (ref 6.4–8.3)
WBC # BLD: 4.4 E9/L (ref 4.5–11.5)

## 2022-05-19 PROCEDURE — 71045 X-RAY EXAM CHEST 1 VIEW: CPT

## 2022-05-19 PROCEDURE — 93005 ELECTROCARDIOGRAM TRACING: CPT | Performed by: EMERGENCY MEDICINE

## 2022-05-19 PROCEDURE — 36415 COLL VENOUS BLD VENIPUNCTURE: CPT

## 2022-05-19 PROCEDURE — 99285 EMERGENCY DEPT VISIT HI MDM: CPT

## 2022-05-19 PROCEDURE — 80053 COMPREHEN METABOLIC PANEL: CPT

## 2022-05-19 PROCEDURE — 2580000003 HC RX 258: Performed by: EMERGENCY MEDICINE

## 2022-05-19 PROCEDURE — 85025 COMPLETE CBC W/AUTO DIFF WBC: CPT

## 2022-05-19 PROCEDURE — 72125 CT NECK SPINE W/O DYE: CPT

## 2022-05-19 PROCEDURE — 70450 CT HEAD/BRAIN W/O DYE: CPT

## 2022-05-19 PROCEDURE — 82550 ASSAY OF CK (CPK): CPT

## 2022-05-19 PROCEDURE — 72170 X-RAY EXAM OF PELVIS: CPT

## 2022-05-19 PROCEDURE — 85730 THROMBOPLASTIN TIME PARTIAL: CPT

## 2022-05-19 PROCEDURE — 85610 PROTHROMBIN TIME: CPT

## 2022-05-19 RX ORDER — 0.9 % SODIUM CHLORIDE 0.9 %
1000 INTRAVENOUS SOLUTION INTRAVENOUS ONCE
Status: COMPLETED | OUTPATIENT
Start: 2022-05-19 | End: 2022-05-19

## 2022-05-19 RX ADMIN — SODIUM CHLORIDE 1000 ML: 9 INJECTION, SOLUTION INTRAVENOUS at 14:30

## 2022-05-19 NOTE — ED NOTES
I was told by nursing that the patient had multiple falls per her friend that is at bedside. Per nursing she was weak going to the wheelchair. The nurse and I ambulated the patient in the room. She used a walker. She was able to take 4 steps. She states that she does not want to stay in the emergency room she states that she can take care of her self. She is alert and oriented x3. She is adamant about not being admitted to the hospital or not going to rehabilitation. As she fell by sliding out of her chair today. Patient set for discharge again.      Petra Morgan MD  Resident  05/19/22 6523

## 2022-05-19 NOTE — ED PROVIDER NOTES
HPI:  5/19/22,   Time: 12:07 PM EDT       Stephie Gomez is a [de-identified] y.o. female presenting to the ED for fall, beginning 12 hrs ago. The complaint has been persistent, moderate in severity, and worsened by nothing. mech fall last night. Pt chronically with issues getting up from seated, unable to get up due to chronic weakness. Has hematoma to left forehead, on coumadin, bib ems. No cp/sob/abd pain/ext pain. No neck pain. No sensory changes or new weakness    Review of Systems:   Pertinent positives and negatives are stated within HPI, all other systems reviewed and are negative.          --------------------------------------------- PAST HISTORY ---------------------------------------------  Past Medical History:  has a past medical history of Atrial fibrillation ([address] Atrial fibrillation ([address] Diabetes mellitus ([address] Diastolic CHF, acute on chronic ([address] Diastolic dysfunction, Fatty liver disease, nonalcoholic, Hypertension, Hypoprothrombinemia ([address] Hypotension, LVH (left ventricular hypertrophy), Mitral regurgitation, Open-angle glaucoma, Pulmonary regurgitation, and Rectal bleed. Past Surgical History:  has a past surgical history that includes Hysterectomy (1986); Eye surgery (2004); Colonoscopy (6174,7740,3034); and Cataract removal (2004). Social History:  reports that she has quit smoking. Her smoking use included cigarettes. She has a 45.00 pack-year smoking history. She quit smokeless tobacco use about 29 years ago. She reports that she does not drink alcohol and does not use drugs. Family History: family history is not on file. The patients home medications have been reviewed. Allergies: Patient has no known allergies.         ---------------------------------------------------PHYSICAL EXAM--------------------------------------    Constitutional/General: Alert and oriented x3, well appearing, non toxic in NAD  Head: Normocephalic and small left temp hematoma  Eyes: PERRL, EOMI, conjunctive normal, sclera non icteric  Mouth: Oropharynx clear, handling secretions, no trismus, no asymmetry of the posterior oropharynx or uvular edema  Neck: Supple, full ROM, non tender to palpation in the midline,   Respiratory: Lungs clear to auscultation bilaterally, no wheezes, rales, or rhonchi. Not in respiratory distress  Cardiovascular:  Regular rate. Regular rhythm. No murmurs, gallops, or rubs. 2+ distal pulses  Chest: No chest wall tenderness  GI:  Abdomen Soft, Non tender, Non distended. .   Musculoskeletal: Moves all extremities x 4. Warm and well perfused, no clubbing, cyanosis, or edema. Capillary refill <3 seconds  Integument: skin warm and dry. No rashes. Lymphatic: no lymphadenopathy noted  Neurologic: GCS 15, no focal deficits, s  Psychiatric: Normal Affect    -------------------------------------------------- RESULTS -------------------------------------------------  I have personally reviewed all laboratory and imaging results for this patient. Results are listed below.      LABS:  Results for orders placed or performed during the hospital encounter of 05/19/22   CBC with Auto Differential   Result Value Ref Range    WBC 4.4 (L) 4.5 - 11.5 E9/L    RBC 3.67 3.50 - 5.50 E12/L    Hemoglobin 11.5 11.5 - 15.5 g/dL    Hematocrit 34.0 34.0 - 48.0 %    MCV 92.6 80.0 - 99.9 fL    MCH 31.3 26.0 - 35.0 pg    MCHC 33.8 32.0 - 34.5 %    RDW 12.9 11.5 - 15.0 fL    Platelets 84 (L) 622 - 450 E9/L    MPV 9.8 7.0 - 12.0 fL    Neutrophils % 84.3 (H) 43.0 - 80.0 %    Lymphocytes % 7.8 (L) 20.0 - 42.0 %    Monocytes % 7.0 2.0 - 12.0 %    Eosinophils % 0.0 0.0 - 6.0 %    Basophils % 0.0 0.0 - 2.0 %    Neutrophils Absolute 3.70 1.80 - 7.30 E9/L    Lymphocytes Absolute 0.40 (L) 1.50 - 4.00 E9/L    Monocytes Absolute 0.31 0.10 - 0.95 E9/L    Eosinophils Absolute 0.00 (L) 0.05 - 0.50 E9/L    Basophils Absolute 0.00 0.00 - 0.20 E9/L    Atypical Lymphocytes Relative 0.9 0.0 - 4.0 %    nRBC 0.0 /100 WBC Anisocytosis 1+     Polychromasia 1+     Ovalocytes 1+    Comprehensive Metabolic Panel w/ Reflex to MG   Result Value Ref Range    Sodium 134 132 - 146 mmol/L    Potassium reflex Magnesium 4.6 3.5 - 5.0 mmol/L    Chloride 99 98 - 107 mmol/L    CO2 22 22 - 29 mmol/L    Anion Gap 13 7 - 16 mmol/L    Glucose 112 (H) 74 - 99 mg/dL    BUN 30 (H) 6 - 23 mg/dL    CREATININE 1.4 (H) 0.5 - 1.0 mg/dL    GFR Non-African American 36 >=60 mL/min/1.73    GFR African American 44     Calcium 8.8 8.6 - 10.2 mg/dL    Total Protein 6.1 (L) 6.4 - 8.3 g/dL    Albumin 3.8 3.5 - 5.2 g/dL    Total Bilirubin 0.3 0.0 - 1.2 mg/dL    Alkaline Phosphatase 119 (H) 35 - 104 U/L    ALT 18 0 - 32 U/L    AST 29 0 - 31 U/L   Protime-INR   Result Value Ref Range    Protime 17.5 (H) 9.3 - 12.4 sec    INR 1.6    APTT   Result Value Ref Range    aPTT 33.3 24.5 - 35.1 sec   CK   Result Value Ref Range    Total  (H) 20 - 180 U/L   EKG 12 Lead   Result Value Ref Range    Ventricular Rate 109 BPM    Atrial Rate 119 BPM    QRS Duration 72 ms    Q-T Interval 332 ms    QTc Calculation (Bazett) 447 ms    R Axis 44 degrees    T Axis 40 degrees       RADIOLOGY:  Interpreted by Radiologist.  XR CHEST PORTABLE   Final Result   There is no acute cardiopulmonary disease. XR PELVIS (1-2 VIEWS)   Final Result   No evidence of pelvis or hip fracture. CT Head WO Contrast   Final Result   No acute intracranial abnormality. Cortical atrophy and periventricular leukomalacia. CT Cervical Spine WO Contrast   Final Result   No acute abnormality of the cervical spine. Extensive degenerative changes, ankylosis and facet arthrosis. EKG:  This EKG is signed and interpreted by the EP.     Time: 1341  Rate: 110  Rhythm: Sinus  Interpretation: non-specific EKG  Comparison: None      ------------------------- NURSING NOTES AND VITALS REVIEWED ---------------------------   The nursing notes within the ED encounter and vital signs as below have been reviewed by myself. BP (!) 166/69   Pulse 79   Temp 97.8 °F (36.6 °C)   Resp 16   Ht 5' 7\" (1.702 m)   Wt 204 lb (92.5 kg)   SpO2 97%   BMI 31.95 kg/m²   Oxygen Saturation Interpretation: Normal    The patients available past medical records and past encounters were reviewed. ------------------------------ ED COURSE/MEDICAL DECISION MAKING----------------------  Medications   0.9 % sodium chloride bolus (has no administration in time range)         ED COURSE:       Medical Decision Making:    Results noted, concern for rhabdo, but cpk less than limits for rhabdo. Mild cr bump, ivf and dc home. Pt agreeable with poc. Pt states main concern was soft tissue hematoma, ct neg for emergent path, dc home      This patient's ED course included: a personal history and physicial examination    This patient has remained hemodynamically stable during their ED course. Re-Evaluations:             Re-evaluation. Patients symptoms show no change    Re-examination  5/19/22   \2:07 PM EDT          Vital Signs:   Vitals:    05/19/22 1159 05/19/22 1405   BP: (!) 173/76 (!) 166/69   Pulse: 88 79   Resp: 16 16   Temp: 97.8 °F (36.6 °C)    SpO2: 97% 97%   Weight: 204 lb (92.5 kg)    Height: 5' 7\" (1.702 m)              Counseling: The emergency provider has spoken with the patient and family member patient and sister and discussed todays results, in addition to providing specific details for the plan of care and counseling regarding the diagnosis and prognosis. Questions are answered at this time and they are agreeable with the plan.       --------------------------------- IMPRESSION AND DISPOSITION ---------------------------------    IMPRESSION  1. Closed head injury, initial encounter    2.  Fall on same level from slipping, tripping or stumbling, initial encounter        DISPOSITION  Disposition: Discharge to home  Patient condition is stable    NOTE: This report was transcribed using voice

## 2022-05-19 NOTE — ED NOTES
This RN at bedside to help patient into wheelchair. Patient unable to ambulate or take steps, states she feels weak. Friend at bedside stating she has been shuffling and weak lately. provider informed.      Elisha Ace RN  05/19/22 7301

## 2022-05-19 NOTE — ED NOTES
This RN and provider at bedside to evaluate patient, patient able to stand up and ambulate with walker which is her baseline at home. States she was just feeling weak previously from being in bed for too long. Patient able to take steps without assistance.      Noemí Esquivel RN  05/19/22 5139

## 2022-05-20 LAB
EKG ATRIAL RATE: 119 BPM
EKG Q-T INTERVAL: 332 MS
EKG QRS DURATION: 72 MS
EKG QTC CALCULATION (BAZETT): 447 MS
EKG R AXIS: 44 DEGREES
EKG T AXIS: 40 DEGREES
EKG VENTRICULAR RATE: 109 BPM

## 2022-05-24 ENCOUNTER — APPOINTMENT (OUTPATIENT)
Dept: CT IMAGING | Age: 82
End: 2022-05-24
Payer: MEDICARE

## 2022-05-24 ENCOUNTER — HOSPITAL ENCOUNTER (INPATIENT)
Age: 82
LOS: 9 days | Discharge: NURSING, REHAB OR LONG-TERM CARE | DRG: 551 | End: 2022-06-02
Attending: EMERGENCY MEDICINE | Admitting: FAMILY MEDICINE
Payer: MEDICARE

## 2022-05-24 ENCOUNTER — APPOINTMENT (OUTPATIENT)
Dept: GENERAL RADIOLOGY | Age: 82
End: 2022-05-24
Payer: MEDICARE

## 2022-05-24 ENCOUNTER — HOSPITAL ENCOUNTER (EMERGENCY)
Age: 82
Discharge: ANOTHER HOSPITAL | End: 2022-05-24
Attending: EMERGENCY MEDICINE
Payer: MEDICARE

## 2022-05-24 VITALS
DIASTOLIC BLOOD PRESSURE: 75 MMHG | HEART RATE: 105 BPM | RESPIRATION RATE: 16 BRPM | TEMPERATURE: 98 F | OXYGEN SATURATION: 99 % | SYSTOLIC BLOOD PRESSURE: 151 MMHG

## 2022-05-24 DIAGNOSIS — T14.90XA TRAUMA: Primary | ICD-10-CM

## 2022-05-24 DIAGNOSIS — G93.89 CEREBELLAR MASS: ICD-10-CM

## 2022-05-24 DIAGNOSIS — R93.7 ABNORMAL CT SCAN, CERVICAL SPINE: ICD-10-CM

## 2022-05-24 DIAGNOSIS — R29.6 FREQUENT FALLS: ICD-10-CM

## 2022-05-24 DIAGNOSIS — U07.1 COVID-19: ICD-10-CM
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
## 2022-05-24 DIAGNOSIS — R93.89 ABNORMAL CT SCAN, NECK: ICD-10-CM

## 2022-05-24 DIAGNOSIS — R26.2 AMBULATORY DYSFUNCTION: ICD-10-CM

## 2022-05-24 DIAGNOSIS — R53.1 GENERAL WEAKNESS: ICD-10-CM

## 2022-05-24 DIAGNOSIS — M54.2 NECK PAIN: ICD-10-CM

## 2022-05-24 DIAGNOSIS — U07.1 PNEUMONIA DUE TO 2019-NCOV: ICD-10-CM

## 2022-05-24 DIAGNOSIS — S12.591A OTHER CLOSED NONDISPLACED FRACTURE OF SIXTH CERVICAL VERTEBRA, INITIAL ENCOUNTER (HCC): Primary | ICD-10-CM

## 2022-05-24 DIAGNOSIS — J12.82 PNEUMONIA DUE TO 2019-NCOV: ICD-10-CM

## 2022-05-24 DIAGNOSIS — M48.12 DIFFUSE IDIOPATHIC SKELETAL HYPEROSTOSIS OF CERVICAL SPINE: ICD-10-CM

## 2022-05-24 LAB
ALBUMIN SERPL-MCNC: 3.5 G/DL (ref 3.5–5.2)
ALP BLD-CCNC: 107 U/L (ref 35–104)
ALT SERPL-CCNC: 32 U/L (ref 0–32)
ANION GAP SERPL CALCULATED.3IONS-SCNC: 13 MMOL/L (ref 7–16)
APTT: 32.9 SEC (ref 24.5–35.1)
AST SERPL-CCNC: 44 U/L (ref 0–31)
BACTERIA: ABNORMAL /HPF
BASOPHILS ABSOLUTE: 0 E9/L (ref 0–0.2)
BASOPHILS RELATIVE PERCENT: 0 % (ref 0–2)
BILIRUB SERPL-MCNC: 0.5 MG/DL (ref 0–1.2)
BILIRUBIN URINE: NEGATIVE
BLOOD, URINE: ABNORMAL
BUN BLDV-MCNC: 20 MG/DL (ref 6–23)
C-REACTIVE PROTEIN: 7.9 MG/DL (ref 0–0.4)
CALCIUM SERPL-MCNC: 8.7 MG/DL (ref 8.6–10.2)
CHLORIDE BLD-SCNC: 102 MMOL/L (ref 98–107)
CLARITY: CLEAR
CO2: 21 MMOL/L (ref 22–29)
COLOR: YELLOW
CREAT SERPL-MCNC: 1.1 MG/DL (ref 0.5–1)
EKG ATRIAL RATE: 133 BPM
EKG Q-T INTERVAL: 326 MS
EKG QRS DURATION: 68 MS
EKG QTC CALCULATION (BAZETT): 443 MS
EKG R AXIS: 59 DEGREES
EKG T AXIS: 72 DEGREES
EKG VENTRICULAR RATE: 111 BPM
EOSINOPHILS ABSOLUTE: 0 E9/L (ref 0.05–0.5)
EOSINOPHILS RELATIVE PERCENT: 0 % (ref 0–6)
FERRITIN: 653 NG/ML
GFR AFRICAN AMERICAN: 58
GFR NON-AFRICAN AMERICAN: 48 ML/MIN/1.73
GLUCOSE BLD-MCNC: 107 MG/DL (ref 74–99)
GLUCOSE URINE: NEGATIVE MG/DL
HCT VFR BLD CALC: 35.3 % (ref 34–48)
HEMOGLOBIN: 12 G/DL (ref 11.5–15.5)
INR BLD: 1.7
KETONES, URINE: NEGATIVE MG/DL
LACTATE DEHYDROGENASE: 311 U/L (ref 135–214)
LEUKOCYTE ESTERASE, URINE: NEGATIVE
LYMPHOCYTES ABSOLUTE: 0.13 E9/L (ref 1.5–4)
LYMPHOCYTES RELATIVE PERCENT: 5.3 % (ref 20–42)
MAGNESIUM: 1.5 MG/DL (ref 1.6–2.6)
MCH RBC QN AUTO: 30.7 PG (ref 26–35)
MCHC RBC AUTO-ENTMCNC: 34 % (ref 32–34.5)
MCV RBC AUTO: 90.3 FL (ref 80–99.9)
MONOCYTES ABSOLUTE: 0.1 E9/L (ref 0.1–0.95)
MONOCYTES RELATIVE PERCENT: 4.4 % (ref 2–12)
NEUTROPHILS ABSOLUTE: 2.34 E9/L (ref 1.8–7.3)
NEUTROPHILS RELATIVE PERCENT: 90.3 % (ref 43–80)
NITRITE, URINE: NEGATIVE
NUCLEATED RED BLOOD CELLS: 0 /100 WBC
OVALOCYTES: ABNORMAL
PDW BLD-RTO: 12.9 FL (ref 11.5–15)
PH UA: 6 (ref 5–9)
PLATELET # BLD: 88 E9/L (ref 130–450)
PLATELET CONFIRMATION: NORMAL
PMV BLD AUTO: 9.7 FL (ref 7–12)
POIKILOCYTES: ABNORMAL
POTASSIUM SERPL-SCNC: 3.9 MMOL/L (ref 3.5–5)
PRO-BNP: 2984 PG/ML (ref 0–450)
PROCALCITONIN: 0.19 NG/ML (ref 0–0.08)
PROTEIN UA: 100 MG/DL
PROTHROMBIN TIME: 18.8 SEC (ref 9.3–12.4)
RBC # BLD: 3.91 E12/L (ref 3.5–5.5)
RBC UA: ABNORMAL /HPF (ref 0–2)
SARS-COV-2, NAAT: DETECTED
SEDIMENTATION RATE, ERYTHROCYTE: 54 MM/HR (ref 0–20)
SODIUM BLD-SCNC: 136 MMOL/L (ref 132–146)
SPECIFIC GRAVITY UA: 1.02 (ref 1–1.03)
TOTAL CK: 189 U/L (ref 20–180)
TOTAL PROTEIN: 6.2 G/DL (ref 6.4–8.3)
TROPONIN, HIGH SENSITIVITY: 23 NG/L (ref 0–9)
TROPONIN, HIGH SENSITIVITY: 24 NG/L (ref 0–9)
UROBILINOGEN, URINE: 0.2 E.U./DL
WBC # BLD: 2.6 E9/L (ref 4.5–11.5)
WBC UA: ABNORMAL /HPF (ref 0–5)

## 2022-05-24 PROCEDURE — 83735 ASSAY OF MAGNESIUM: CPT

## 2022-05-24 PROCEDURE — 85651 RBC SED RATE NONAUTOMATED: CPT

## 2022-05-24 PROCEDURE — 85610 PROTHROMBIN TIME: CPT

## 2022-05-24 PROCEDURE — 99285 EMERGENCY DEPT VISIT HI MDM: CPT

## 2022-05-24 PROCEDURE — 70450 CT HEAD/BRAIN W/O DYE: CPT

## 2022-05-24 PROCEDURE — 85025 COMPLETE CBC W/AUTO DIFF WBC: CPT

## 2022-05-24 PROCEDURE — 84484 ASSAY OF TROPONIN QUANT: CPT

## 2022-05-24 PROCEDURE — 93005 ELECTROCARDIOGRAM TRACING: CPT | Performed by: STUDENT IN AN ORGANIZED HEALTH CARE EDUCATION/TRAINING PROGRAM

## 2022-05-24 PROCEDURE — 87635 SARS-COV-2 COVID-19 AMP PRB: CPT

## 2022-05-24 PROCEDURE — 2140000000 HC CCU INTERMEDIATE R&B

## 2022-05-24 PROCEDURE — 2580000003 HC RX 258: Performed by: STUDENT IN AN ORGANIZED HEALTH CARE EDUCATION/TRAINING PROGRAM

## 2022-05-24 PROCEDURE — 86140 C-REACTIVE PROTEIN: CPT

## 2022-05-24 PROCEDURE — 84145 PROCALCITONIN (PCT): CPT

## 2022-05-24 PROCEDURE — 72125 CT NECK SPINE W/O DYE: CPT

## 2022-05-24 PROCEDURE — 71045 X-RAY EXAM CHEST 1 VIEW: CPT

## 2022-05-24 PROCEDURE — 73521 X-RAY EXAM HIPS BI 2 VIEWS: CPT

## 2022-05-24 PROCEDURE — 83880 ASSAY OF NATRIURETIC PEPTIDE: CPT

## 2022-05-24 PROCEDURE — 85730 THROMBOPLASTIN TIME PARTIAL: CPT

## 2022-05-24 PROCEDURE — 71250 CT THORAX DX C-: CPT

## 2022-05-24 PROCEDURE — 82550 ASSAY OF CK (CPK): CPT

## 2022-05-24 PROCEDURE — 81001 URINALYSIS AUTO W/SCOPE: CPT

## 2022-05-24 PROCEDURE — 83615 LACTATE (LD) (LDH) ENZYME: CPT

## 2022-05-24 PROCEDURE — 87088 URINE BACTERIA CULTURE: CPT

## 2022-05-24 PROCEDURE — 82728 ASSAY OF FERRITIN: CPT

## 2022-05-24 PROCEDURE — 80053 COMPREHEN METABOLIC PANEL: CPT

## 2022-05-24 RX ORDER — 0.9 % SODIUM CHLORIDE 0.9 %
500 INTRAVENOUS SOLUTION INTRAVENOUS ONCE
Status: COMPLETED | OUTPATIENT
Start: 2022-05-24 | End: 2022-05-24

## 2022-05-24 RX ORDER — MAGNESIUM SULFATE IN WATER 40 MG/ML
2000 INJECTION, SOLUTION INTRAVENOUS ONCE
Status: DISCONTINUED | OUTPATIENT
Start: 2022-05-24 | End: 2022-05-24 | Stop reason: HOSPADM

## 2022-05-24 RX ORDER — PRIMIDONE 50 MG/1
150 TABLET ORAL 2 TIMES DAILY
COMMUNITY

## 2022-05-24 RX ADMIN — SODIUM CHLORIDE 500 ML: 9 INJECTION, SOLUTION INTRAVENOUS at 21:28

## 2022-05-24 RX ADMIN — SODIUM CHLORIDE 500 ML: 9 INJECTION, SOLUTION INTRAVENOUS at 13:58

## 2022-05-24 ASSESSMENT — ENCOUNTER SYMPTOMS
COLOR CHANGE: 0
CONSTIPATION: 0
WHEEZING: 0
SHORTNESS OF BREATH: 0
COUGH: 0
ABDOMINAL DISTENTION: 0
DIARRHEA: 0
BACK PAIN: 0
STRIDOR: 0
VOMITING: 0
ABDOMINAL PAIN: 0
NAUSEA: 0

## 2022-05-24 ASSESSMENT — PAIN SCALES - GENERAL: PAINLEVEL_OUTOF10: 6

## 2022-05-24 ASSESSMENT — PAIN DESCRIPTION - PAIN TYPE: TYPE: ACUTE PAIN

## 2022-05-24 ASSESSMENT — PAIN - FUNCTIONAL ASSESSMENT
PAIN_FUNCTIONAL_ASSESSMENT: 0-10
PAIN_FUNCTIONAL_ASSESSMENT: 0-10

## 2022-05-24 ASSESSMENT — PAIN DESCRIPTION - LOCATION
LOCATION: FACE
LOCATION: NECK;BACK

## 2022-05-24 ASSESSMENT — PAIN DESCRIPTION - DESCRIPTORS
DESCRIPTORS: ACHING
DESCRIPTORS: DISCOMFORT

## 2022-05-24 ASSESSMENT — PAIN DESCRIPTION - ORIENTATION: ORIENTATION: LEFT

## 2022-05-24 NOTE — ED PROVIDER NOTES
Department of Emergency Medicine   ED Provider Note  Admit Date/RoomTime: 5/24/2022  6:35 PM  ED Room: 13/13          History of Present Illness:  5/24/22, Time: 6:39 PM EDT         Kody Mead is a [de-identified] y.o. female with history of diabetes, paroxysmal A. Fib (on anticoagulation with warfarin) presenting to the ED for generalized weakness, neck pain, beginning 5 days ago after a fall on 5/19/2022. The complaint has been intermittent, moderate in severity, and worsened by light exertion. Patient states that the pillow slipped off her chair on 5/19/2022, she fell onto the ground and landed on her tailbone. She then had pain around her coccyx region as well as neck pain due to a flexion and extension mechanism of head movement when she fell. She laid on the ground for several hours unable to get up due to generalized weakness. She will presented to the emergency department at [facility], was evaluated with CT imaging with no acute process and discharged home. Since then she has continued to have pain around the coccyx as well as neck pain. Today she was unable to stand up due to generalized weakness. She denies any focal weakness or numbness or tingling. She uses a cane and walker at home, lives by herself. She states for the past 2 days she has had a cough productive of a mild amount of sputum, no shortness of breath or fevers or chills. She denies any chest pain. She was vaccinated x2 for COVID-19. Patient was evaluated today at [facility], had repeat CT imaging which showed evidence of diffuse idiopathic skeletal hyperostosis as well as a lucency around C5/C6 concerning for a possible fracture of the anterior ossification with a few bubbles of air. Patient was placed in c-collar, transferred to here to [facility] for further evaluation. Patient was also found to have COVID-19, with evidence of scattered bilateral pneumonia on chest CT.   She is not hypoxic. Review of Systems:     Pertinent positives and negatives are stated within HPI. 10 point ROS otherwise negative.    --------------------------------------------- PAST HISTORY ---------------------------------------------  Past Medical History:  has a past medical history of Atrial fibrillation ([facility] [address] Atrial fibrillation ([facility] [address] Diabetes mellitus ([facility] [address] Diastolic CHF, acute on chronic ([facility] [address] Diastolic dysfunction, Fatty liver disease, nonalcoholic, Hypertension, Hypoprothrombinemia ([facility] [address] Hypotension, LVH (left ventricular hypertrophy), Mitral regurgitation, Open-angle glaucoma, Pulmonary regurgitation, and Rectal bleed. Past Surgical History:  has a past surgical history that includes Hysterectomy (1986); Eye surgery (2004); Colonoscopy ([id number]); and Cataract removal (2004). Social History:  reports that she has quit smoking. Her smoking use included cigarettes. She has a 45.00 pack-year smoking history. She quit smokeless tobacco use about 29 years ago. She reports that she does not drink alcohol and does not use drugs. Family History: family history is not on file. The patients home medications have been reviewed. Allergies: Patient has no known allergies. ---------------------------------------------------PHYSICAL EXAM--------------------------------------    Constitutional/General: AAO to person/place/time/purpose, NAD, no labored breathing  Head: Normocephalic and atraumatic  Eyes: EOMI, conjunctiva normal, sclera non icteric  Mouth: Moist mucous membranes, uvula midline  Neck: Cervical collar in place, no midline tenderness  Respiratory: Lungs clear to auscultation bilaterally, no wheezes, rales, or rhonchi. Not in respiratory distress  Cardiovascular: Mildly tachycardic. regular rhythm. No murmurs, no gallops, or rubs. 2+ distal pulses. Equal extremity pulses. Chest: No chest wall tenderness or deformity  GI:  Abdomen Soft, Non tender, Non distended. No rebound, guarding, or rigidity. No pulsatile masses. Musculoskeletal: Moves all extremities x 4. Warm and well perfused, no clubbing, cyanosis, or edema. Capillary refill <3 seconds  Integument: skin warm and dry. No rashes. Neurologic: GCS 15, no focal deficits, symmetric strength 5/5 in the major muscle groups of upper and lower extremities bilaterally  Psychiatric: Normal Affect      -----------------------------------------PROCEDURES----------------------------------------------------      -------------------------------------------------- RESULTS -------------------------------------------------  I have personally reviewed all laboratory and imaging results for this patient. Results are listed below. LABS:  No results found for this visit on 05/24/22. CT CHEST WO CONTRAST [[id number]] Collected: 05/24/22 1613   Order Status: Completed Updated: 05/24/22 1636   Narrative:     EXAMINATION:   CT OF THE CHEST WITHOUT CONTRAST 5/24/2022 1:47 pm     TECHNIQUE:   CT of the chest was performed without the administration of intravenous   contrast. Multiplanar reformatted images are provided for review. Automated   exposure control, iterative reconstruction, and/or weight based adjustment of   the mA/kV was utilized to reduce the radiation dose to as low as reasonably   achievable. COMPARISON:   Chest x-ray 05/24/2022, 05/19/2022.  CT chest 09/24/2021.      HISTORY:   ORDERING SYSTEM PROVIDED HISTORY: chest pain   TECHNOLOGIST PROVIDED HISTORY:   Reason for exam:->chest pain   Decision Support Exception - unselect if not a suspected or confirmed   emergency medical condition->Emergency Medical Condition (MA)     FINDINGS:   Mediastinum: No significant lymphadenopathy is seen, but evaluation is   limited without intravenous contrast.  The heart is prominent size, but   stable.  Atherosclerotic vascular calcification including coronary artery and   aortic valve calcification noted.  The main pulmonary artery is dilated   suggestive of possible pulmonary arterial hypertension.  Thoracic aorta is   normal in caliber.  Trace pericardial effusion is noted. Lungs/pleura: A right upper lobe posterior consolidative opacity is   identified with air bronchograms.  Additional scattered patchy bilateral   peribronchial opacities are noted throughout the lungs.  Diffuse airway wall   thickening is seen.  No pleural effusion is identified.  No evidence of   significant pulmonary edema. Upper Abdomen: A left adrenal adenoma is stable.  Partially visualized   kidneys demonstrate nonobstructing renal calculi. Soft Tissues/Bones:  No acute abnormality of the visualized osseous   structures. Impression:     Right upper lobe pneumonia with endobronchial spread of infection throughout   the lungs    CT HEAD WO CONTRAST [[id number]] Collected: 05/24/22 1403   Order Status: Completed Updated: 05/24/22 1407   Narrative:     EXAMINATION:   CT OF THE HEAD WITHOUT CONTRAST  5/24/2022 1:13 pm     TECHNIQUE:   CT of the head was performed without the administration of intravenous   contrast. Automated exposure control, iterative reconstruction, and/or weight   based adjustment of the mA/kV was utilized to reduce the radiation dose to as   low as reasonably achievable. COMPARISON:   None. HISTORY:   ORDERING SYSTEM PROVIDED HISTORY: HEAD TRAUMA, CLOSED, MILD, GCS >= 13, NO   RISK FACTORS, NEURO EXAM NORMAL   TECHNOLOGIST PROVIDED HISTORY:   Has a \"code stroke\" or \"stroke alert\" been called? ->No   Reason for exam:->pain   Decision Support Exception - unselect if not a suspected or confirmed   emergency medical condition->Emergency Medical Condition (MA)     FINDINGS:   BRAIN/VENTRICLES: There is no acute intracranial hemorrhage, mass effect or   midline shift.  No abnormal extra-axial fluid collection.  The gray-white   differentiation is maintained without evidence of an acute infarct. Kamilah Dellen is   no evidence of hydrocephalus. ORBITS: The visualized portion of the orbits demonstrate no acute abnormality. SINUSES: The visualized paranasal sinuses and mastoid air cells demonstrate   no acute abnormality. SOFT TISSUES/SKULL:  No acute abnormality of the visualized skull or soft   tissues. Impression:     No acute intracranial abnormality. No intracranial hemorrhage. XR CHEST PORTABLE [[id number]] Collected: 05/24/22 1357   Order Status: Completed Updated: 05/24/22 1401   Narrative:     EXAMINATION:   ONE XRAY VIEW OF THE CHEST     5/24/2022 1:14 pm     COMPARISON:   Chest radiograph May 19, 2022     HISTORY:   ORDERING SYSTEM PROVIDED HISTORY: fatigue   TECHNOLOGIST PROVIDED HISTORY:   Reason for exam:->fatigue     FINDINGS:   Focal opacity identified in right upper lung.  Mild diffuse opacities in left   lung.  There is no effusion or pneumothorax. The cardiomediastinal silhouette   is without acute process. The osseous structures are without acute process. Impression:     Focal opacity in right upper lung may represent pneumonia or pulmonary edema   in the proper clinical setting.  Further evaluation may be obtained with CT   chest if clinically warranted. XR HIP BILATERAL W AP PELVIS (2 VIEWS) [[id number]] Collected: 05/24/22 1355   Order Status: Completed Updated: 05/24/22 135   Narrative:     EXAMINATION:   ONE XRAY VIEW OF THE PELVIS AND TWO XRAY VIEWS OF EACH OF THE BILATERAL HIPS     5/24/2022 1:14 pm     COMPARISON:   None. HISTORY:   ORDERING SYSTEM PROVIDED HISTORY: pain   TECHNOLOGIST PROVIDED HISTORY:   Reason for exam:->pain     FINDINGS:   No acute fractures or dislocations in the pelvis, right hip or left hip. Bilateral femoral head contours are intact. Bilateral hip joints intact. Symphysis pubis is not widened. Bilateral sacroiliac joints are intact.     Impression:     No acute osseous findings in the pelvis, right hip or left hip    CT CERVICAL SPINE WO CONTRAST [[id number]] Collected: 05/24/22 1327   Order Status: Completed Updated: 05/24/22 1349   Narrative:     EXAMINATION:   CT OF THE CERVICAL SPINE WITHOUT CONTRAST 5/24/2022 1:13 pm     TECHNIQUE:   CT of the cervical spine was performed without the administration of   intravenous contrast. Multiplanar reformatted images are provided for review. Automated exposure control, iterative reconstruction, and/or weight based   adjustment of the mA/kV was utilized to reduce the radiation dose to as low   as reasonably achievable. COMPARISON:   None. HISTORY:   ORDERING SYSTEM PROVIDED HISTORY: pain   TECHNOLOGIST PROVIDED HISTORY:   Reason for exam:->pain   Decision Support Exception - unselect if not a suspected or confirmed   emergency medical condition->Emergency Medical Condition (MA)     FINDINGS:   BONES/ALIGNMENT: There is large flowing anterior calcification calcification   C3 through C7-T1 suggesting DISH.  There is linear lucency with bubbles of   gas extending through the dense calcification anterior longitudinal ligament   and into the disc space C5-6. Lateral masses align normally with C2.     DEGENERATIVE CHANGES: There is marked disc space narrowing C5-6 C6-7. Mild-to-moderate degenerative change seen at the facets. SOFT TISSUES: Calcified mass seen lateral left cerebellum along petrous apex   measures 2.5 cm possible meningioma. Impression:     There is rigid spine secondary to DISH with large ossification anteriorly C3   through T1.  However there is now a few bubbles of gas along linear lucency   at the C5-6 level which could represent fracture of the anterior   ossification.  However alignment is maintained.  Facets are maintained.  MRI   could further evaluate as well as evaluate any ligamentous injury. Critical results were called by Dr. Bobo Ramos to Dr. Will Green On   5/24/2022 at 13:44.       LABS------------    COVID-19, Rapid [[id number]] (Abnormal) Collected: 05/24/22 1220   Order Status: Completed Specimen: Nasopharyngeal Swab Updated: 05/24/22 1338    SARS-CoV-2, NAAT DETECTED Abnormal  Not Detected     Comment: Corrected result; previously reported as Not Detected on 05/24/2022 at 13:21   by [facility]   Rapid NAAT:   Negative results should be treated as presumptive and,   if inconsistent with clinical signs and symptoms or necessary for   patient management, should be tested with an alternative molecular   assay. Negative results do not preclude SARS-CoV-2 infection and   should not be used as the sole basis for patient management decisions. This test has been authorized by the FDA under an Emergency Use   Authorization (EUA) for use by authorized laboratories. Fact sheet for Healthcare Providers:   BuildHer.es   Fact sheet for Patients: BuildHer.es     METHODOLOGY: Isothermal Nucleic Acid Amplification       CK [[id number]] (Abnormal) Collected: 05/24/22 1220   Order Status: Completed Specimen: Blood Updated: 05/24/22 1321    Total  High  20 - 180 U/L    Magnesium [[id number]] (Abnormal) Collected: 05/24/22 1220   Order Status: Completed Specimen: Blood Updated: 05/24/22 1321    Magnesium 1.5 Low  1.6 - 2.6 mg/dL    Brain Natriuretic Peptide [[id number]] (Abnormal) Collected: 05/24/22 1220   Order Status: Completed Specimen: Blood Updated: 05/24/22 1321    Pro-BNP 2,984 High  0 - 450 pg/mL    Comprehensive Metabolic Panel [[id number]] (Abnormal) Collected: 05/24/22 1220   Order Status: Completed Specimen: Blood Updated: 05/24/22 1321    Sodium 136 132 - 146 mmol/L     Potassium 3.9 3.5 - 5.0 mmol/L     Chloride 102 98 - 107 mmol/L     CO2 21 Low  22 - 29 mmol/L     Anion Gap 13 7 - 16 mmol/L     Glucose 107 High  74 - 99 mg/dL     BUN 20 6 - 23 mg/dL     CREATININE 1.1 High  0.5 - 1.0 mg/dL     GFR Non- 48 >=60 mL/min/1.73     Comment: Chronic Kidney Disease: less than 60 ml/min/1.73 sq.m.         Kidney Failure: less than 15 ml/min/1.73 sq.m. Results valid for patients 18 years and older. GFR  58    Calcium 8.7 8.6 - 10.2 mg/dL     Total Protein 6.2 Low  6.4 - 8.3 g/dL     Albumin 3.5 3.5 - 5.2 g/dL     Total Bilirubin 0.5 0.0 - 1.2 mg/dL     Alkaline Phosphatase 107 High  35 - 104 U/L     ALT 32 0 - 32 U/L     AST 44 High  0 - 31 U/L    Troponin [[id number]] (Abnormal) Collected: 05/24/22 1220   Order Status: Completed Specimen: Blood Updated: 05/24/22 1321    Troponin, High Sensitivity 24 High  0 - 9 ng/L     Comment: High Sensitivity Troponin values cannot be compared with   other Troponin methodologies. Patients with high levels of Biotin oral intake (i.e. >5 mg/day)   may have falsely decreased Troponin levels. Samples collected   within 8 hours of biotin intake may require additional information   for diagnosis.        APTT [[id number]] Collected: 05/24/22 1220   Order Status: Completed Specimen: Blood Updated: 05/24/22 1305    aPTT 32.9 24.5 - 35.1 sec    Protime-INR [[id number]] (Abnormal) Collected: 05/24/22 1220   Order Status: Completed Specimen: Blood Updated: 05/24/22 1302    Protime 18.8 High  9.3 - 12.4 sec     INR 1.7     Urinalysis with Microscopic [[id number]] (Abnormal) Collected: 05/24/22 1354   Order Status: Completed Specimen: Urine, clean catch Updated: 05/24/22 1726    Color, UA Yellow Straw/Yellow     Clarity, UA Clear Clear     Glucose, Ur Negative Negative mg/dL     Bilirubin Urine Negative Negative     Ketones, Urine Negative Negative mg/dL     Specific Gravity, UA 1.025 1.005 - 1.030     Blood, Urine MODERATE Abnormal  Negative     pH, UA 6.0 5.0 - 9.0     Protein,  Abnormal  Negative mg/dL     Urobilinogen, Urine 0.2 <2.0 E.U./dL     Nitrite, Urine Negative Negative     Leukocyte Esterase, Urine Negative Negative     WBC, UA NONE 0 - 5 /HPF     RBC, UA 2-5 0 - 2 /HPF     Bacteria, UA RARE Abnormal  None Seen /HPF    Lactate Dehydrogenase [[id number]] (Abnormal) Collected: 05/24/22 1220   Order Status: Completed Updated: 05/24/22 1709     High  135 - 214 U/L    Sedimentation Rate [[id number]] Collected: 05/24/22 1220   Order Status: No result Updated: 05/24/22 1648                       Troponin [[id number]] (Abnormal) Collected: 05/24/22 1354   Order Status: Completed Specimen: Blood Updated: 05/24/22 1432    Troponin, High Sensitivity 23 High  0 - 9 ng/L     Comment: High Sensitivity Troponin values cannot be compared with   other Troponin methodologies. Patients with high levels of Biotin oral intake (i.e. >5 mg/day)   may have falsely decreased Troponin levels. Samples collected   within 8 hours of biotin intake may require additional information   for diagnosis.        CBC with Auto Differential [[id number]] (Abnormal) Collected: 05/24/22 1220   Order Status: Completed Specimen: Blood Updated: 05/24/22 1418    WBC 2.6 Low  4.5 - 11.5 E9/L     RBC 3.91 3.50 - 5.50 E12/L     Hemoglobin 12.0 11.5 - 15.5 g/dL     Hematocrit 35.3 34.0 - 48.0 %     MCV 90.3 80.0 - 99.9 fL     MCH 30.7 26.0 - 35.0 pg     MCHC 34.0 32.0 - 34.5 %     RDW 12.9 11.5 - 15.0 fL     Platelets 20 ABJ  399 - 450 E9/L     MPV 9.7 7.0 - 12.0 fL     Neutrophils % 90.3 High  43.0 - 80.0 %     Lymphocytes % 5.3 Low  20.0 - 42.0 %     Monocytes % 4.4 2.0 - 12.0 %     Eosinophils % 0.0 0.0 - 6.0 %     Basophils % 0.0 0.0 - 2.0 %     Neutrophils Absolute 2.34 1.80 - 7.30 E9/L     Lymphocytes Absolute 0.13 Low  1.50 - 4.00 E9/L     Monocytes Absolute 0.10 0.10 - 0.95 E9/L     Eosinophils Absolute 0.00 Low  0.05 - 0.50 E9/L     Basophils Absolute 0.00 0.00 - 0.20 E9/L     nRBC 0.0 /100 WBC     Poikilocytes 1+    Ovalocytes 1+   Platelet Confirmation [[id number]] Collected: 05/24/22 1220   Order Status: Completed Updated: 05/24/22 1418    Platelet Confirmation CONFIRMED           RADIOLOGY:  Interpreted by Radiologist unless otherwise specified  No orders to display ------------------------- NURSING NOTES AND VITALS REVIEWED ---------------------------   The nursing notes within the ED encounter and vital signs as below have been reviewed by myself. BP (!) 147/114   Pulse (!) 105   Temp 98.2 °F (36.8 °C)   Resp 18   Ht 5' 5\" (1.651 m)   Wt 192 lb (87.1 kg)   SpO2 96%   BMI 31.95 kg/m²   Oxygen Saturation Interpretation: Normal    The patients available past medical records and past encounters were reviewed. ------------------------------ ED COURSE/MEDICAL DECISION MAKING----------------------  Medications   0.9 % sodium chloride bolus (500 mLs IntraVENous New Bag 5/24/22 2128)            Medical Decision Making: This is an 21-year-old female presenting to the emergency department as a trauma transfer from [facility].  Patient had a fall from a chair on 5/19/2022, was evaluated at [facility] at that time and was found to have no acute findings on imaging. She developed a cough, generalized fatigue and weakness, continue to have pain in her neck as well as her coccyx from the original fall. Patient was seen again in the emergency department after she was unable to stand up, had repeat imaging at that time which showed a lucency concerning for a fracture of the anterior ossification at C5/C6. Discussed case with neurosurgery, did note that this likely is a fracture, but no acute intervention needed, recommended soft cervical collar. While in the emergency department at [facility], patient was found to be positive for COVID-19. On CT imaging patient with diffuse infiltrates, consistent with COVID-19 pneumonia versus bacterial pneumonia. Patient with no leukocytosis, no hypoxia. She is vaccinated x2 for COVID-19. Here she is hypertensive, afebrile, intermittently mildly tachycardic. She is in no respiratory distress. Given patient is not hypoxic, Decadron was not given.   Will defer possible antibiotics for bacterial pneumonia to inpatient team.  Patient also noted to have a possible cerebellar mass on CT imaging incidentally. Given patient's generalized weakness, multiple falls and inability to stand, the fact the patient lives at home by herself, patient will be admitted to the hospital for further work-up, treatment, monitoring. See ED COURSE for additional MDM. Re-Evaluations:             ED Course as of 05/24/22 2149   Tue May 24, 2022   2007 Discussed case with neurosurgery Dr. Faustino Dhillon, will evaluate CT and call back [RH]   2007 Discussed case with Dr. Faustino Dhillon, neurosurgery. He states that patient does have a fracture of the anterior ossification of the cervical spine. He recommended soft collar. He states fracture is not unstable, no emergent intervention necessary, states he will be able to see patient in the morning [RH]   2126 Discussed case with Dr. Janis Simons, agreed to admit patient [RH]      ED Course User Index  [RH] [address], DO         This patient's ED course included: a personal history and physicial examination, re-evaluation prior to disposition, multiple bedside re-evaluations, IV medications, cardiac monitoring and continuous pulse oximetry    This patient has improved during their ED course. Consultations:  See ED Course    Counseling: The emergency provider has spoken with the patient and discussed todays results, in addition to providing specific details for the plan of care and counseling regarding the diagnosis and prognosis. Questions are answered at this time and they are agreeable with the plan.       --------------------------------- IMPRESSION AND DISPOSITION ---------------------------------    IMPRESSION  1. Other closed nondisplaced fracture of sixth cervical vertebra, initial encounter ([facility] [address]    2. COVID-19    3. Pneumonia due to 2019-nCoV    4. Cerebellar mass    5. Abnormal CT scan, cervical spine    6.  Diffuse idiopathic skeletal hyperostosis of cervical spine    7. Accidental fall from chair, subsequent encounter    8. General weakness        DISPOSITION  Disposition: Admit to intermediate  Patient condition is stable    NOTE: This report was transcribed using voice recognition software. Every effort was made to ensure accuracy; however, inadvertent computerized transcription errors may be present. Also please note that patient was seen and examined by attending physician. Plan of care and disposition discussed with attending physician and they were immediately available or present for all procedures performed.        -- Wilner Delatorre D.O. PGY-2     Resident Physician     Emergency Medicine      5/24/2022 9:49 PM        [address] DO  Resident  05/24/22 1439

## 2022-05-24 NOTE — ED NOTES
Report called to Adolfo Steiner, [facility], [facility]. Made aware antibiotics and mag sulfate ordered and not given due to time constraints. Med orders placed [phone], squad arrival at [address] [facility].      Mirian Garcia RN  05/24/22 1794

## 2022-05-24 NOTE — ED PROVIDER NOTES
[facility] ENCOUNTER      Pt Name: Yaima Rojas  MRN: [id number]  [place] 1940  Date of evaluation: 5/24/2022      CHIEF COMPLAINT       Chief Complaint   Patient presents with    Extremity Weakness    Neck Pain    Tailbone Pain        HPI  Yaima Rojas is a [de-identified] y.o. female history of diffuse idiopathic skeletal hyperostosis, a condition where her ligaments harden presents with complaints of extremity weakness, neck pain, and tailbone pain. Patient was recently seen on 5/19/2022 when she fell off her chair. States that she fell back in her head. Was too weak to get up. She was evaluated with CT imaging and discharged home. She is presenting here today with complaints of severe extremity weakness, fatigue, myalgias, neck pain, and tailbone pain. States that she was unable to get up out of her chair. Pain is exacerbated with movement. Alleviated with rest.  Is denying any focal neurologic deficits currently. Denies any recent fevers, chills, chest pain, shortness of breath, abdominal pain, flank pain, dysuria, hematuria, diarrhea, constipation, new rashes or sores. Except as noted above the remainder of the review of systems was reviewed and negative. Review of Systems   Constitutional: Positive for fatigue. Negative for activity change, appetite change, diaphoresis, fever and unexpected weight change. HENT: Negative for congestion. Eyes: Negative for visual disturbance. Respiratory: Negative for cough, shortness of breath, wheezing and stridor. Cardiovascular: Negative for chest pain, palpitations and leg swelling. Gastrointestinal: Negative for abdominal distention, abdominal pain, constipation, diarrhea, nausea and vomiting. Endocrine: Negative for polyphagia and polyuria. Genitourinary: Negative for dysuria and hematuria. Musculoskeletal: Positive for myalgias and neck pain.  Negative for back pain and joint swelling. Skin: Negative for color change, pallor, rash and wound. Neurological: Negative for dizziness and syncope. Hematological: Negative for adenopathy. Does not bruise/bleed easily. Psychiatric/Behavioral: Negative for agitation. Physical Exam  Vitals and nursing note reviewed. Constitutional:       General: She is not in acute distress. Appearance: Normal appearance. She is not ill-appearing or diaphoretic. HENT:      Head: Normocephalic and atraumatic. Right Ear: External ear normal.      Left Ear: External ear normal.      Nose: Nose normal.   Eyes:      Extraocular Movements: Extraocular movements intact. Pupils: Pupils are equal, round, and reactive to light. Neck:      Comments: Diffuse ecchymosis patient's neck. No meningeal signs. Cardiovascular:      Rate and Rhythm: Normal rate and regular rhythm. Pulses: Normal pulses. Heart sounds: Normal heart sounds. Pulmonary:      Effort: Pulmonary effort is normal. No respiratory distress. Breath sounds: Normal breath sounds. No stridor. No wheezing or rhonchi. Abdominal:      General: Abdomen is flat. Palpations: Abdomen is soft. Tenderness: There is no abdominal tenderness. There is no right CVA tenderness, left CVA tenderness or rebound. Negative signs include Fofana's sign, Rovsing's sign and McBurney's sign. Musculoskeletal:         General: Normal range of motion. Cervical back: Tenderness present. No rigidity. Right lower leg: No edema. Left lower leg: No edema. Skin:     General: Skin is warm and dry. Capillary Refill: Capillary refill takes less than 2 seconds. Neurological:      General: No focal deficit present. Mental Status: She is alert and oriented to person, place, and time. Sensory: No sensory deficit. Motor: No weakness.       Gait: Gait abnormal.   Psychiatric:         Mood and Affect: Mood normal.          Procedures MDM  Number of Diagnoses or Management Options  Abnormal CT scan, neck  Ambulatory dysfunction  COVID-19  Frequent falls  Neck pain  Trauma  Diagnosis management comments: 55-year-old female history of DISH (diffuse idiopathic skeletal hyper ostosis ), A. fib on warfarin presents with myalgias, extremity weakness, presents with complaints of neck pain, and lower back pain for the last 4 days. Patient had a mechanical fall from a chair on 5/19/2022. She was evaluated at [facility].  CT imaging negative for any acute process. Today she states that she was unable to get out of her bed as she was too weak. Vitals here are significant for hypertension. Patient is afebrile in no distress. On physical exam patient has ecchymosis at the back of her neck. Mild tenderness to palpation. No areas of crepitus on palpation. Lungs clear to auscultation bilaterally. Normal S1-S2. Abdomen soft nontender with no rebound or guarding. Patient is able to bend her lower extremities without difficulty. Diagnostic labs and imaging interpreted and reviewed. Significant for area of lucency at C5-C6. This was compared to previous CT imaging the patient had 5 days ago. This was not present on previous exam.  Concerned that this may have been caused by patient's fall 5 days ago. Patient also is positive for COVID-19. She is not in any acute respiratory distress. Patient informed of all the results or evaluation. She is agreeable with plan for transfer. Dr. Cl Jurado accepts patient. Amount and/or Complexity of Data Reviewed  Decide to obtain previous medical records or to obtain history from someone other than the patient: yes       ED Course as of 05/24/22 [facility] May 24, 2022   1347 Dr. Wanda Hanna, lucency C5-C6. Gas at space. Interruption of rigid spine. No change in the facets. Alignment fine. [JV]   6092 Dr. Mary Anne Padilla accepted trauma transfer.  [JV]      ED Course User Index  [JV] Macrina Viramontes MD       --------------------------------------------- PAST HISTORY ---------------------------------------------  Past Medical History:  has a past medical history of Atrial fibrillation ([facility] [address] Atrial fibrillation ([facility] [address] Diabetes mellitus ([facility] [address] Diastolic CHF, acute on chronic ([facility] [address] Diastolic dysfunction, Fatty liver disease, nonalcoholic, Hypertension, Hypoprothrombinemia ([facility] [address] Hypotension, LVH (left ventricular hypertrophy), Mitral regurgitation, Open-angle glaucoma, Pulmonary regurgitation, and Rectal bleed. Past Surgical History:  has a past surgical history that includes Hysterectomy (1986); Eye surgery (2004); Colonoscopy (9194,4783,2489); and Cataract removal (2004). Social History:  reports that she has quit smoking. Her smoking use included cigarettes. She has a 45.00 pack-year smoking history. She quit smokeless tobacco use about 29 years ago. She reports that she does not drink alcohol and does not use drugs. Family History: family history is not on file. The patients home medications have been reviewed. Allergies: Patient has no known allergies.     -------------------------------------------------- RESULTS -------------------------------------------------    Lab  Results for orders placed or performed during the hospital encounter of 05/24/22   COVID-19, Rapid    Specimen: Nasopharyngeal Swab   Result Value Ref Range    SARS-CoV-2, NAAT DETECTED (A) Not Detected   CBC with Auto Differential   Result Value Ref Range    WBC 2.6 (L) 4.5 - 11.5 E9/L    RBC 3.91 3.50 - 5.50 E12/L    Hemoglobin 12.0 11.5 - 15.5 g/dL    Hematocrit 35.3 34.0 - 48.0 %    MCV 90.3 80.0 - 99.9 fL    MCH 30.7 26.0 - 35.0 pg    MCHC 34.0 32.0 - 34.5 %    RDW 12.9 11.5 - 15.0 fL    Platelets 88 (L) 093 - 450 E9/L    MPV 9.7 7.0 - 12.0 fL    Neutrophils % 90.3 (H) 43.0 - 80.0 %    Lymphocytes % 5.3 (L) 20.0 - 42.0 %    Monocytes % 4.4 2.0 - 12.0 %    Eosinophils % 0.0 0.0 - 6.0 %    Basophils % 0.0 0.0 - 2.0 %    Neutrophils Absolute 2.34 1.80 - 7.30 E9/L    Lymphocytes Absolute 0.13 (L) 1.50 - 4.00 E9/L    Monocytes Absolute 0.10 0.10 - 0.95 E9/L    Eosinophils Absolute 0.00 (L) 0.05 - 0.50 E9/L    Basophils Absolute 0.00 0.00 - 0.20 E9/L    nRBC 0.0 /100 WBC    Poikilocytes 1+     Ovalocytes 1+    Comprehensive Metabolic Panel   Result Value Ref Range    Sodium 136 132 - 146 mmol/L    Potassium 3.9 3.5 - 5.0 mmol/L    Chloride 102 98 - 107 mmol/L    CO2 21 (L) 22 - 29 mmol/L    Anion Gap 13 7 - 16 mmol/L    Glucose 107 (H) 74 - 99 mg/dL    BUN 20 6 - 23 mg/dL    CREATININE 1.1 (H) 0.5 - 1.0 mg/dL    GFR Non-African American 48 >=60 mL/min/1.73    GFR African American 58     Calcium 8.7 8.6 - 10.2 mg/dL    Total Protein 6.2 (L) 6.4 - 8.3 g/dL    Albumin 3.5 3.5 - 5.2 g/dL    Total Bilirubin 0.5 0.0 - 1.2 mg/dL    Alkaline Phosphatase 107 (H) 35 - 104 U/L    ALT 32 0 - 32 U/L    AST 44 (H) 0 - 31 U/L   Magnesium   Result Value Ref Range    Magnesium 1.5 (L) 1.6 - 2.6 mg/dL   Troponin   Result Value Ref Range    Troponin, High Sensitivity 24 (H) 0 - 9 ng/L   Protime-INR   Result Value Ref Range    Protime 18.8 (H) 9.3 - 12.4 sec    INR 1.7    APTT   Result Value Ref Range    aPTT 32.9 24.5 - 35.1 sec   Brain Natriuretic Peptide   Result Value Ref Range    Pro-BNP 2,984 (H) 0 - 450 pg/mL   Urinalysis with Microscopic   Result Value Ref Range    Color, UA Yellow Straw/Yellow    Clarity, UA Clear Clear    Glucose, Ur Negative Negative mg/dL    Bilirubin Urine Negative Negative    Ketones, Urine Negative Negative mg/dL    Specific Gravity, UA 1.025 1.005 - 1.030    Blood, Urine MODERATE (A) Negative    pH, UA 6.0 5.0 - 9.0    Protein,  (A) Negative mg/dL    Urobilinogen, Urine 0.2 <2.0 E.U./dL    Nitrite, Urine Negative Negative    Leukocyte Esterase, Urine Negative Negative   CK   Result Value Ref Range    Total  (H) 20 - 180 U/L   Platelet Confirmation   Result Value Ref Range Platelet Confirmation CONFIRMED    Troponin   Result Value Ref Range    Troponin, High Sensitivity 23 (H) 0 - 9 ng/L   EKG 12 Lead   Result Value Ref Range    Ventricular Rate 111 BPM    Atrial Rate 133 BPM    QRS Duration 68 ms    Q-T Interval 326 ms    QTc Calculation (Bazett) 443 ms    R Axis 59 degrees    T Axis 72 degrees       Radiology  CT CHEST WO CONTRAST   Final Result   Right upper lobe pneumonia with endobronchial spread of infection throughout   the lungs         XR CHEST PORTABLE   Final Result   Focal opacity in right upper lung may represent pneumonia or pulmonary edema   in the proper clinical setting. Further evaluation may be obtained with CT   chest if clinically warranted. XR HIP BILATERAL W AP PELVIS (2 VIEWS)   Final Result   No acute osseous findings in the pelvis, right hip or left hip         CT HEAD WO CONTRAST   Final Result   No acute intracranial abnormality. No intracranial hemorrhage. CT CERVICAL SPINE WO CONTRAST   Final Result   There is rigid spine secondary to DISH with large ossification anteriorly C3   through T1. However there is now a few bubbles of gas along linear lucency   at the C5-6 level which could represent fracture of the anterior   ossification. However alignment is maintained. Facets are maintained. MRI   could further evaluate as well as evaluate any ligamentous injury. Critical results were called by Dr. Burleigh Sacks to Dr. Jaden Aldridge On   5/24/2022 at 13:44. MRI NECK SOFT TISSUE W CONTRAST    (Results Pending)       EKG:  Heart rate 110. Atrial fibrillation. Normal axis deviation. QTc 440. No ST elevations or depressions. Q waves present in leads V1 through V3.   Present on previous EKG.      ------------------------- NURSING NOTES AND VITALS REVIEWED ---------------------------  Date / Time Roomed:  5/24/2022 11:30 AM  ED Bed Assignment:  14/14    The nursing notes within the ED encounter and vital signs as below have been reviewed. Patient Vitals for the past 24 hrs:   BP Temp Temp src Pulse Resp SpO2   05/24/22 1451 -- -- -- -- -- 99 %   05/24/22 1421 (!) 182/100 -- -- 97 16 99 %   05/24/22 1154 -- 98 °F (36.7 °C) Oral -- -- --   05/24/22 1153 (!) 175/95 -- Oral 114 16 98 %       Oxygen Saturation Interpretation: Normal      ------------------------------------------ PROGRESS NOTES ------------------------------------------  Re-evaluation(s):  Time: 1400. Patients symptoms show no change  Repeat physical examination is not changed    Time: 1600. Patients symptoms show no change  Repeat physical examination is not changed    I have spoken with the patient and discussed todays results, in addition to providing specific details for the plan of care and counseling regarding the diagnosis and prognosis. Their questions are answered at this time and they are agreeable with the plan. I have discussed the risks and benefits of transfer and they wish to proceed with the transfer. --------------------------------- ADDITIONAL PROVIDER NOTES ---------------------------------  Consultations:  Spoke with Dr. Ammy Jules (Emergency Medicine). Discussed case. They accept the patient for transfer. Reason for transfer: Higher level of care. This patient's ED course included: a personal history and physicial examination, re-evaluation prior to disposition, multiple bedside re-evaluations, IV medications, cardiac monitoring and continuous pulse oximetry    This patient has remained hemodynamically stable during their ED course. Please note that the withdrawal or failure to initiate urgent interventions for this patient would likely result in a life threatening deterioration or permanent disability. Accordingly this patient received 30 minutes of critical care time, excluding separately billable procedures. Clinical Impression  1. Trauma    2. COVID-19    3. Frequent falls    4. Ambulatory dysfunction    5.  Neck pain    6. Abnormal CT scan, neck          Disposition  Patient's disposition: Transfer to [facility]. Transferred by: Garfield Horton MD.  Patient's condition is fair.        Garfield Horton MD  Resident  05/24/22 5164

## 2022-05-24 NOTE — ED NOTES
Pt AOx4, easy respirations, C-collar in place, call bell within reach, connected to monitor, stable vitals, and purewick placed for incontinence.       Any Mascorro RN  05/24/22 3029

## 2022-05-24 NOTE — LETTER
[facility]  CERTIFICATION OF NECESSITY  FOR NON-EMERGENCY TRANSPORTATION   BY GROUND AMBULANCE      Individual Information   1. Name: Shawanda Robins 2. [place] Medicaid Billing Number:    3. Address: [place] [address]      Transportation Provider Information   4. Provider Name:    5. [place] Medicaid Provider Number:  National Provider Identifier (NPI):      Certification  7. Criteria:  During transport, this individual requires:  [x] Medical treatment or continuous     supervision by an EMT. [] The administration or regulation of oxygen by another person. [] Supervised protective restraint. 8. Period Beginning Date:    5. Length  [x] Not more than 1 day(s)  [] One Year     Additional Information Relevant to Certification   10. Comments or Explanations, If Necessary or Appropriate   C5-C6 fracture with collar. Debilitation with falls  covid 19  afib rvr  Confusion at times  Uncontrolled htn     Certifying Practitioner Information   11. Name of Practitioner: Dr Karla Simon   12. [place] Medicaid Provider Number, If Applicable:  [address] Provider Identifier (NPI):      Signature Information   14. Date of Signature: Electronically signed by Nayeli Mejia RN on 5/31/2022 at 3:43 PM 15. Name of Person Signing: Electronically signed by Nayeli Mejia RN on 5/31/2022 at 3:44 PM   16. Signature and Professional Designation: Electronically signed by Nayeli Mejia RN on 5/31/2022 at 3:44 PM     OD 91301  Rev. 7/2015     [address] Encounter Date/Time: 5/24/2022 [facility] Account: [de-identified]    MRN: [id number]    Patient: Shawanda Robins    Contact Serial #: [id number]      ENCOUNTER          Patient Class: I Private Enc?   No Unit  Priyank Longoria [facility] Service: MED   Encounter DX: General weakness [R53.1]   ADM Provider: Fatemeh Oropeza DO   Procedure:     ATT Provider: Otto Todd MD   REF Provider:        Admission DX: General weakness, Cerebellar mass, Abnormal CT scan, cervical spine, Accidental fall from chair, subsequent encounter, Other closed nondisplaced fracture of sixth cervical vertebra, initial encounter ([facility] [address] Pneumonia due to 2019-nCoV, Diffuse idiopathic skeletal hyperostosis of cervical spine, Pneumonia due to COVID-19 virus, COVID-19 and DX codes: R53.1, G93.89, R93.7, W07. Jesse Quiver, U07.1, J12.82, M48.12, U07.1, J12.82, U07.1      PATIENT                 Name: Eulalia Jean Baptiste : 1940 (81 yrs)   [facility] Sex: Female   City: [place] [address]         Marital Status:    Employer: RETIRED         Sabianist: Jew   Primary Care Provider: Steven Higgins MD         Primary Phone: [phone]   EMERGENCY CONTACT   Contact Name Legal Guardian? Relationship to Patient Home Phone Work Phone   1. Rosita Greene  2. Gay Goldmann      Other  Other [phone]              GUARANTOR            Guarantor: Eulalia Jean Baptiste     : 1940   Address: [address] [place] [address] Sex: Female     [id number] [place] [address]     Relation to Patient: Self       Home Phone: [phone]   Guarantor ID: [id number]       Work Phone:     Guarantor Employer: RETIRED         Status: RETIRED      COVERAGE        PRIMARY INSURANCE   Payor: [facility] MEDICARE Plan: [facility]*   Payor Address: [facility] [id number] [place] [address]       Group Number: [id number] Type: INDEMNITY   Subscriber Name: Elena Alvarez : 1940   Subscriber ID: [id number] [facility] [place]. Rel. to Sub: Self   SECONDARY INSURANCE   Payor:   Plan:     Payor Address:  ,           Group Number:   Insurance Type:     Subscriber Name:   Subscriber :     Subscriber ID:   Pat.  Rel. to Sub:             CSN: [id number]

## 2022-05-25 LAB
ALBUMIN SERPL-MCNC: 3.5 G/DL (ref 3.5–5.2)
ALP BLD-CCNC: 100 U/L (ref 35–104)
ALT SERPL-CCNC: 29 U/L (ref 0–32)
ANION GAP SERPL CALCULATED.3IONS-SCNC: 18 MMOL/L (ref 7–16)
ANISOCYTOSIS: ABNORMAL
AST SERPL-CCNC: 43 U/L (ref 0–31)
BASOPHILS ABSOLUTE: 0 E9/L (ref 0–0.2)
BASOPHILS RELATIVE PERCENT: 0.2 % (ref 0–2)
BILIRUB SERPL-MCNC: 0.5 MG/DL (ref 0–1.2)
BUN BLDV-MCNC: 18 MG/DL (ref 6–23)
BURR CELLS: ABNORMAL
C-REACTIVE PROTEIN: 14.5 MG/DL (ref 0–0.4)
CALCIUM SERPL-MCNC: 8.5 MG/DL (ref 8.6–10.2)
CHLORIDE BLD-SCNC: 102 MMOL/L (ref 98–107)
CO2: 17 MMOL/L (ref 22–29)
CREAT SERPL-MCNC: 1 MG/DL (ref 0.5–1)
D DIMER: 783 NG/ML DDU
EOSINOPHILS ABSOLUTE: 0 E9/L (ref 0.05–0.5)
EOSINOPHILS RELATIVE PERCENT: 0 % (ref 0–6)
FERRITIN: 667 NG/ML
FIBRINOGEN: 629 MG/DL (ref 200–400)
GFR AFRICAN AMERICAN: >60
GFR NON-AFRICAN AMERICAN: 53 ML/MIN/1.73
GLUCOSE BLD-MCNC: 91 MG/DL (ref 74–99)
HCT VFR BLD CALC: 34.7 % (ref 34–48)
HEMOGLOBIN: 12 G/DL (ref 11.5–15.5)
INR BLD: 1.8
LACTATE DEHYDROGENASE: 286 U/L (ref 135–214)
LACTIC ACID: 1 MMOL/L (ref 0.5–2.2)
LYMPHOCYTES ABSOLUTE: 0.2 E9/L (ref 1.5–4)
LYMPHOCYTES RELATIVE PERCENT: 5.2 % (ref 20–42)
MCH RBC QN AUTO: 30.2 PG (ref 26–35)
MCHC RBC AUTO-ENTMCNC: 34.6 % (ref 32–34.5)
MCV RBC AUTO: 87.4 FL (ref 80–99.9)
METER GLUCOSE: 112 MG/DL (ref 74–99)
MONOCYTES ABSOLUTE: 0.16 E9/L (ref 0.1–0.95)
MONOCYTES RELATIVE PERCENT: 3.5 % (ref 2–12)
NEUTROPHILS ABSOLUTE: 3.64 E9/L (ref 1.8–7.3)
NEUTROPHILS RELATIVE PERCENT: 91.3 % (ref 43–80)
OVALOCYTES: ABNORMAL
PDW BLD-RTO: 12.9 FL (ref 11.5–15)
PLATELET # BLD: 92 E9/L (ref 130–450)
PLATELET CONFIRMATION: NORMAL
PMV BLD AUTO: 9.5 FL (ref 7–12)
POIKILOCYTES: ABNORMAL
POLYCHROMASIA: ABNORMAL
POTASSIUM REFLEX MAGNESIUM: 4 MMOL/L (ref 3.5–5)
PROCALCITONIN: 0.23 NG/ML (ref 0–0.08)
PROTHROMBIN TIME: 19.1 SEC (ref 9.3–12.4)
RBC # BLD: 3.97 E12/L (ref 3.5–5.5)
SODIUM BLD-SCNC: 137 MMOL/L (ref 132–146)
TOTAL PROTEIN: 6.1 G/DL (ref 6.4–8.3)
TROPONIN, HIGH SENSITIVITY: 28 NG/L (ref 0–9)
VITAMIN D 25-HYDROXY: 51 NG/ML (ref 30–100)
WBC # BLD: 4 E9/L (ref 4.5–11.5)

## 2022-05-25 PROCEDURE — 82962 GLUCOSE BLOOD TEST: CPT

## 2022-05-25 PROCEDURE — 6360000002 HC RX W HCPCS: Performed by: FAMILY MEDICINE

## 2022-05-25 PROCEDURE — 80053 COMPREHEN METABOLIC PANEL: CPT

## 2022-05-25 PROCEDURE — 83605 ASSAY OF LACTIC ACID: CPT

## 2022-05-25 PROCEDURE — 2500000003 HC RX 250 WO HCPCS: Performed by: FAMILY MEDICINE

## 2022-05-25 PROCEDURE — 84484 ASSAY OF TROPONIN QUANT: CPT

## 2022-05-25 PROCEDURE — 85384 FIBRINOGEN ACTIVITY: CPT

## 2022-05-25 PROCEDURE — 6370000000 HC RX 637 (ALT 250 FOR IP): Performed by: FAMILY MEDICINE

## 2022-05-25 PROCEDURE — 85025 COMPLETE CBC W/AUTO DIFF WBC: CPT

## 2022-05-25 PROCEDURE — 2140000000 HC CCU INTERMEDIATE R&B

## 2022-05-25 PROCEDURE — 2580000003 HC RX 258: Performed by: FAMILY MEDICINE

## 2022-05-25 PROCEDURE — 82728 ASSAY OF FERRITIN: CPT

## 2022-05-25 PROCEDURE — 85378 FIBRIN DEGRADE SEMIQUANT: CPT

## 2022-05-25 PROCEDURE — 83615 LACTATE (LD) (LDH) ENZYME: CPT

## 2022-05-25 PROCEDURE — 36415 COLL VENOUS BLD VENIPUNCTURE: CPT

## 2022-05-25 PROCEDURE — 85610 PROTHROMBIN TIME: CPT

## 2022-05-25 PROCEDURE — 84145 PROCALCITONIN (PCT): CPT

## 2022-05-25 PROCEDURE — 82306 VITAMIN D 25 HYDROXY: CPT

## 2022-05-25 PROCEDURE — 86140 C-REACTIVE PROTEIN: CPT

## 2022-05-25 RX ORDER — DEXAMETHASONE SODIUM PHOSPHATE 10 MG/ML
6 INJECTION INTRAMUSCULAR; INTRAVENOUS EVERY 24 HOURS
Status: DISCONTINUED | OUTPATIENT
Start: 2022-05-25 | End: 2022-06-03 | Stop reason: HOSPADM

## 2022-05-25 RX ORDER — DILTIAZEM HYDROCHLORIDE 120 MG/1
360 CAPSULE, COATED, EXTENDED RELEASE ORAL DAILY
Status: DISCONTINUED | OUTPATIENT
Start: 2022-05-25 | End: 2022-06-03 | Stop reason: HOSPADM

## 2022-05-25 RX ORDER — LISINOPRIL 20 MG/1
20 TABLET ORAL NIGHTLY
Status: DISCONTINUED | OUTPATIENT
Start: 2022-05-25 | End: 2022-06-03 | Stop reason: HOSPADM

## 2022-05-25 RX ORDER — SODIUM CHLORIDE 0.9 % (FLUSH) 0.9 %
10 SYRINGE (ML) INJECTION PRN
Status: DISCONTINUED | OUTPATIENT
Start: 2022-05-25 | End: 2022-06-03 | Stop reason: HOSPADM

## 2022-05-25 RX ORDER — MAGNESIUM SULFATE IN WATER 40 MG/ML
2000 INJECTION, SOLUTION INTRAVENOUS PRN
Status: DISCONTINUED | OUTPATIENT
Start: 2022-05-25 | End: 2022-06-03 | Stop reason: HOSPADM

## 2022-05-25 RX ORDER — PRIMIDONE 50 MG/1
150 TABLET ORAL 2 TIMES DAILY
Status: DISCONTINUED | OUTPATIENT
Start: 2022-05-25 | End: 2022-06-03 | Stop reason: HOSPADM

## 2022-05-25 RX ORDER — SPIRONOLACTONE 25 MG/1
25 TABLET ORAL EVERY MORNING
Status: DISCONTINUED | OUTPATIENT
Start: 2022-05-25 | End: 2022-06-03 | Stop reason: HOSPADM

## 2022-05-25 RX ORDER — PROMETHAZINE HYDROCHLORIDE 12.5 MG/1
12.5 TABLET ORAL EVERY 6 HOURS PRN
Status: DISCONTINUED | OUTPATIENT
Start: 2022-05-25 | End: 2022-06-03 | Stop reason: HOSPADM

## 2022-05-25 RX ORDER — POLYETHYLENE GLYCOL 3350 17 G/17G
17 POWDER, FOR SOLUTION ORAL DAILY PRN
Status: DISCONTINUED | OUTPATIENT
Start: 2022-05-25 | End: 2022-06-03 | Stop reason: HOSPADM

## 2022-05-25 RX ORDER — SODIUM CHLORIDE 0.9 % (FLUSH) 0.9 %
10 SYRINGE (ML) INJECTION EVERY 12 HOURS SCHEDULED
Status: DISCONTINUED | OUTPATIENT
Start: 2022-05-25 | End: 2022-06-03 | Stop reason: HOSPADM

## 2022-05-25 RX ORDER — LATANOPROST 50 UG/ML
1 SOLUTION/ DROPS OPHTHALMIC NIGHTLY
Status: DISCONTINUED | OUTPATIENT
Start: 2022-05-25 | End: 2022-06-03 | Stop reason: HOSPADM

## 2022-05-25 RX ORDER — POTASSIUM CHLORIDE 7.45 MG/ML
10 INJECTION INTRAVENOUS PRN
Status: DISCONTINUED | OUTPATIENT
Start: 2022-05-25 | End: 2022-06-03 | Stop reason: HOSPADM

## 2022-05-25 RX ORDER — ACETAMINOPHEN 325 MG/1
650 TABLET ORAL EVERY 6 HOURS PRN
Status: DISCONTINUED | OUTPATIENT
Start: 2022-05-25 | End: 2022-06-03 | Stop reason: HOSPADM

## 2022-05-25 RX ORDER — ACETAMINOPHEN 650 MG/1
650 SUPPOSITORY RECTAL EVERY 6 HOURS PRN
Status: DISCONTINUED | OUTPATIENT
Start: 2022-05-25 | End: 2022-06-03 | Stop reason: HOSPADM

## 2022-05-25 RX ORDER — GLIPIZIDE 5 MG/1
2.5 TABLET ORAL
Status: DISCONTINUED | OUTPATIENT
Start: 2022-05-25 | End: 2022-06-03 | Stop reason: HOSPADM

## 2022-05-25 RX ORDER — SODIUM CHLORIDE 9 MG/ML
INJECTION, SOLUTION INTRAVENOUS PRN
Status: DISCONTINUED | OUTPATIENT
Start: 2022-05-25 | End: 2022-06-03 | Stop reason: HOSPADM

## 2022-05-25 RX ORDER — ONDANSETRON 2 MG/ML
4 INJECTION INTRAMUSCULAR; INTRAVENOUS EVERY 6 HOURS PRN
Status: DISCONTINUED | OUTPATIENT
Start: 2022-05-25 | End: 2022-06-03 | Stop reason: HOSPADM

## 2022-05-25 RX ORDER — POTASSIUM CHLORIDE 20 MEQ/1
40 TABLET, EXTENDED RELEASE ORAL PRN
Status: DISCONTINUED | OUTPATIENT
Start: 2022-05-25 | End: 2022-06-03 | Stop reason: HOSPADM

## 2022-05-25 RX ORDER — FOLIC ACID 1 MG/1
1 TABLET ORAL EVERY MORNING
Status: DISCONTINUED | OUTPATIENT
Start: 2022-05-25 | End: 2022-06-03 | Stop reason: HOSPADM

## 2022-05-25 RX ORDER — DILTIAZEM HYDROCHLORIDE 5 MG/ML
10 INJECTION INTRAVENOUS ONCE
Status: COMPLETED | OUTPATIENT
Start: 2022-05-25 | End: 2022-05-25

## 2022-05-25 RX ORDER — VITAMIN B COMPLEX
1000 TABLET ORAL EVERY MORNING
Status: DISCONTINUED | OUTPATIENT
Start: 2022-05-25 | End: 2022-06-03 | Stop reason: HOSPADM

## 2022-05-25 RX ADMIN — FOLIC ACID 1 MG: 1 TABLET ORAL at 09:21

## 2022-05-25 RX ADMIN — DILTIAZEM HYDROCHLORIDE 10 MG: 5 INJECTION INTRAVENOUS at 01:29

## 2022-05-25 RX ADMIN — PRIMIDONE 150 MG: 50 TABLET ORAL at 20:29

## 2022-05-25 RX ADMIN — LISINOPRIL 20 MG: 20 TABLET ORAL at 01:53

## 2022-05-25 RX ADMIN — POLYETHYLENE GLYCOL 3350 17 G: 17 POWDER, FOR SOLUTION ORAL at 09:21

## 2022-05-25 RX ADMIN — ACETAMINOPHEN 650 MG: 325 TABLET ORAL at 20:48

## 2022-05-25 RX ADMIN — Medication 1000 UNITS: at 09:21

## 2022-05-25 RX ADMIN — Medication 10 ML: at 20:30

## 2022-05-25 RX ADMIN — WARFARIN SODIUM 7 MG: 6 TABLET ORAL at 02:22

## 2022-05-25 RX ADMIN — Medication 10 ML: at 09:21

## 2022-05-25 RX ADMIN — GLIPIZIDE 2.5 MG: 5 TABLET ORAL at 06:23

## 2022-05-25 RX ADMIN — SPIRONOLACTONE 25 MG: 25 TABLET ORAL at 09:21

## 2022-05-25 RX ADMIN — LATANOPROST 1 DROP: 50 SOLUTION OPHTHALMIC at 01:53

## 2022-05-25 RX ADMIN — PRIMIDONE 150 MG: 50 TABLET ORAL at 09:21

## 2022-05-25 RX ADMIN — LISINOPRIL 20 MG: 20 TABLET ORAL at 20:30

## 2022-05-25 RX ADMIN — DEXAMETHASONE SODIUM PHOSPHATE 6 MG: 10 INJECTION INTRAMUSCULAR; INTRAVENOUS at 01:38

## 2022-05-25 RX ADMIN — DILTIAZEM HYDROCHLORIDE 360 MG: 120 CAPSULE, COATED, EXTENDED RELEASE ORAL at 09:21

## 2022-05-25 RX ADMIN — PRIMIDONE 150 MG: 50 TABLET ORAL at 01:53

## 2022-05-25 RX ADMIN — WARFARIN SODIUM 7 MG: 6 TABLET ORAL at 20:29

## 2022-05-25 RX ADMIN — MAGNESIUM SULFATE HEPTAHYDRATE 2000 MG: 40 INJECTION, SOLUTION INTRAVENOUS at 02:10

## 2022-05-25 RX ADMIN — LATANOPROST 1 DROP: 50 SOLUTION OPHTHALMIC at 20:47

## 2022-05-25 ASSESSMENT — PAIN SCALES - GENERAL: PAINLEVEL_OUTOF10: 5

## 2022-05-25 ASSESSMENT — PAIN DESCRIPTION - LOCATION: LOCATION: GENERALIZED

## 2022-05-25 NOTE — PROGRESS NOTES
Hospitalist Progress Note      PCP: Nito Power MD    Date of Admission: 5/24/2022  Days in the hospital: 1       Subjective  Patient seen and examined at bedside. Forgetful, chart reviewed, overnight events reviewed. Awaiting cardiology and neurosurgery evaluation. She otherwise is saturating well on room air. Denies any headache, dizziness, chest pain. Exam:    BP (!) 119/109   Pulse 92   Temp 97.4 °F (36.3 °C) (Temporal)   Resp 18   Ht 5' 5\" (1.651 m)   Wt 192 lb (87.1 kg)   SpO2 98%   BMI 31.95 kg/m²     HEENT: No pallor, no icterus. C-collar noted  Respiratory:  CTA, good air entry. Cardiovascular: RRR, no murmur. Abdomen: Soft, non-tender, BS noted. Musculoskeletal: No joint pains or joint swelling noted. Neurologic: awake, alert, forgetful    Assessment/Plan:  · COVID-19 infection, currently on steroids, saturating well on room air, continue to follow closely   · A. fib with RVR, heart rate better controlled, monitor, INR slightly subtherapeutic, awaiting cardiology evaluation  · Possible C6 fracture, follow-up with neurosurgery  · Leukopenia secondary to COVID-19 infection  · Hypomagnesemia, corrected, check labs in a.m.     Labs:   Recent Labs     05/24/22  1220 05/25/22  0125   WBC 2.6* 4.0*   HGB 12.0 12.0   HCT 35.3 34.7   PLT 88* 92*     Recent Labs     05/24/22  1220 05/25/22  0125    137   K 3.9 4.0    102   CO2 21* 17*   BUN 20 18   CREATININE 1.1* 1.0   CALCIUM 8.7 8.5*     Recent Labs     05/24/22  1220 05/25/22  0125   AST 44* 43*   ALT 32 29   BILITOT 0.5 0.5   ALKPHOS 107* 100     Recent Labs     05/24/22  1220 05/25/22  0125   INR 1.7 1.8     Recent Labs     05/24/22  1220   CKTOTAL 189*       Medications:  Reviewed    Infusion Medications    sodium chloride       Scheduled Medications    dilTIAZem  360 mg Oral Daily    folic acid  1 mg Oral QAM    glipiZIDE  2.5 mg Oral QAM AC    latanoprost  1 drop Both Eyes Nightly    lisinopril  20 mg Oral Nightly    primidone  150 mg Oral BID    spironolactone  25 mg Oral QAM    Vitamin D  1,000 Units Oral QAM    warfarin  7 mg Oral Nightly    sodium chloride flush  10 mL IntraVENous 2 times per day    dexamethasone  6 mg IntraVENous Q24H     PRN Meds: sodium chloride flush, sodium chloride, promethazine **OR** ondansetron, polyethylene glycol, acetaminophen **OR** acetaminophen, potassium chloride **OR** potassium alternative oral replacement **OR** potassium chloride, magnesium sulfate      Intake/Output Summary (Last 24 hours) at 5/25/2022 1326  Last data filed at 5/25/2022 0538  Gross per 24 hour   Intake 499.58 ml   Output --   Net 499.58 ml     · Body mass index is 31.95 kg/m². · Diet  · ADULT DIET; Regular; Low Sodium (2 gm)    · Code Status  · Full Code       Electronically signed by Ruddy Arriaga MD on 5/25/2022 at 1:26 PM  [employer]   Please contact me through perfect [employer]    NOTE: This report was transcribed using voice recognition software. Every effort was made to ensure accuracy; however, inadvertent computerized transcription errors may be present.

## 2022-05-25 NOTE — PLAN OF CARE
Patient's chart updated to reflect:      . - HF care plan, HF education points and HF discharge instructions.  -Orders: 2 gram sodium diet, daily weights, I/O.  -PCP and/or Cardiologist appointment to be scheduled within 7 days of hospital discharge.  -History of HF, not primary admission Dx.   Patient admitted for treatment of COVID-19 pneumonia  Chico Coto RN RN, BSN  Heart Failure Navigator

## 2022-05-25 NOTE — ED NOTES
Pt cleaned up from incontinent urine and purewick placed back. Linens and gown changed.       Susan Malik, CORDELL  05/24/22 4602

## 2022-05-25 NOTE — PLAN OF CARE
Problem: Chronic Conditions and Co-morbidities  Goal: Patient's chronic conditions and co-morbidity symptoms are monitored and maintained or improved  Outcome: Progressing     Problem: Skin/Tissue Integrity  Goal: Absence of new skin breakdown  Description: 1. Monitor for areas of redness and/or skin breakdown  2. Assess vascular access sites hourly  3. Every 4-6 hours minimum:  Change oxygen saturation probe site  4. Every 4-6 hours:  If on nasal continuous positive airway pressure, respiratory therapy assess nares and determine need for appliance change or resting period.   Outcome: Progressing     Problem: Cardiovascular - Adult  Goal: Maintains optimal cardiac output and hemodynamic stability  Outcome: Progressing     Problem: Safety - Adult  Goal: Free from fall injury  Outcome: Progressing     Problem: Metabolic/Fluid and Electrolytes - Adult  Goal: Hemodynamic stability and optimal renal function maintained  Outcome: Progressing     Problem: Cardiovascular - Adult  Goal: Maintains optimal cardiac output and hemodynamic stability  Outcome: Progressing

## 2022-05-25 NOTE — ED NOTES
Lindsay Arce ([phone]) called for pt update. She also asked to be called with any updates on pt.       Ej Womack RN  05/24/22 2123

## 2022-05-25 NOTE — ED NOTES
Report called and given to RN. MARTIAR faxed. RN notified of still needing a soft c-collar to replace the current c-collar for pt comfort.       Zain Perez RN  05/24/22 3378

## 2022-05-25 NOTE — CONSULTS
[address]                  [address] [facility]  [facility]                                  CONSULTATION    PATIENT NAME: Jacob Hernandez                     :        1940  MED REC NO:   [id number]                            ROOM:       6412  ACCOUNT NO:   [de-identified]                           ADMIT DATE: 2022  PROVIDER:     Jordon Black MD    CONSULT DATE:  2022    CHIEF COMPLAINT:  Mechanical fall. HISTORY OF PRESENT ILLNESS:  This 80-year-old female who apparently fell  and was taken to [facility] Emergency Room where she was tested positive  for COVID-19. She also underwent a CT scan of the brain, which revealed  no acute pathology. CT scan of the cervical spine was also performed  and reviewed. It was reported to show large ossification anterior to C3  through T1 and there are few bubbles of gas along linear lucency at the  level of C5-C6. There may be fracture of the anterior ossification at  the level of C5-C6. The patient does complain of generalized weakness  and neck pain. She was then transferred to [facility] for further  management. The CT scan of the cervical spine was reviewed by me and I  discussed the findings with the ER physician. PAST MEDICAL HISTORY:  Atrial fibrillation, diabetes mellitus, diastolic  CHF, hypertension, hypotension, mitral regurgitation, left ventricular  hypertrophy, pulmonary regurgitation, and rectal bleed. PAST SURGICAL HISTORY:  Cataract removal, colonoscopy, eye surgery, and  hysterectomy. SOCIAL HISTORY:  Former smoker. Does not use alcohol or street drugs. PERSONAL HISTORY:  No known allergies. PHYSICAL EXAMINATION:  GENERAL:  She is a well-developed, well-nourished female in no acute  distress. She does have cough. She is COVID positive. HEENT:  Pupils are equal and reactive. Extraocular movements are  present. NEUROLOGIC:  She can count the fingers well. She states she had no  focal motor or sensory symptoms in the upper or lower extremities. She  does have generalized weakness. IMAGING:  I reviewed the various studies. IMPRESSION:  1. 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 2.  Multiple medical comorbidities including positive COVID-19. PLAN:  No neurosurgical intervention is recommended at this time. The  patient does have slight tenderness in the cervical spine. We will get  an MRI scan of the cervical spine when medically stable. At this time,  she can be monitored for medical issues. We will follow along.         Samantha Brown MD    D: 05/25/2022 16:11:06       T: 05/25/2022 16:13:29     [email or web address]  Job#: [id number]     Doc#: [id number]    CC:

## 2022-05-25 NOTE — H&P
Hospitalist History & Physical      PCP: Miguel Wallis MD    Date of Service: Pt seen/examined on 5/24/2022     Chief Complaint:  had concerns including Fall (transfer from [address] [place] [address]) and Concern For COVID-19 (tested positive today). History Of Present Illness:    Ms. Chi Bowman, a [de-identified]y.o. year old female  who  has a past medical history of Atrial fibrillation ([address] Atrial fibrillation ([address] Diabetes mellitus ([address] Diastolic CHF, acute on chronic ([address] Diastolic dysfunction, Fatty liver disease, nonalcoholic, Hypertension, Hypoprothrombinemia ([address] Hypotension, LVH (left ventricular hypertrophy), Mitral regurgitation, Open-angle glaucoma, Pulmonary regurgitation, and Rectal bleed. Patient presented to the emergency department after a fall that occurred on the 19th of this month. She is also reporting generalized weakness, and neck pain. Patient reported she slipped off a chair landing on her tailbone. Neck pain due to flexion and extension. After the fall she laid on the ground for several hours unable to get up due to generalized weakness. She initially presented to the [facility] emergency department which work-up did not show any acute processes. She was discharged home. She continued to have pain around her coccyx and neck pain. Today she is unable to stand due to generalized weakness. She also reports last today she had a cough productive of sputum. Denies fever, chills, chest pain. She was vaccinated x2 for COVID-19. Vital signs show the patient to be tachycardic with a rate of 117. EKG shows atrial fibrillation. She is also hypertensive with a pressure of 167/115. Patient is afebrile. Laboratory studies demonstrate creatinine 1.1, magnesium 1.5, glucose 107, total CK1 89, CRP 7.9, , proBNP 2000 984, troponin of 24 with repeat of 23, WBC 2.6. CT chest shows right upper lobe pneumonia with endobronchial spread of infection throughout the lungs.   CT cervical spine shows a few bubbles of gas on linear lucency at the C5-6 level which could represent fracture. Neurosurgery was consulted in emergency department. They advised c-collar and they will see the patient in the morning. Medicine consulted for admission. 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 A. pressure    Rectal bleed 8/23/2012       Past Surgical History:   Procedure Laterality Date    CATARACT REMOVAL  2004    BILATERAL    COLONOSCOPY  [id number]    BENIGN POLYPS ;INT  HEMORRHOIDS    EYE SURGERY  2004    VITREOUS  HEMORRHAGE    HYSTERECTOMY  1986    ADRIEN-BSO       Prior to Admission medications    Medication Sig Start Date End Date Taking?  Authorizing Provider   primidone (MYSOLINE) 50 MG tablet Take 150 mg by mouth 2 times daily    Historical Provider, MD   glimepiride (AMARYL) 1 MG tablet Take 1 mg by mouth every morning (before breakfast)    Historical Provider, MD   folic acid (FOLVITE) 1 MG tablet Take 1 mg by mouth every morning     Historical Provider, MD   warfarin (JANTOVEN) 2 MG tablet Take 7 mg by mouth nightly *TAKE ALONG WITH 5MG=7MG*  **SEE OTHER ORDER**    Historical Provider, MD   warfarin (JANTOVEN) 5 MG tablet Take 7 mg by mouth nightly *TAKE ALONG WITH 2MG=7MG*  **SEE OTHER ORDER**    Historical Provider, MD   diltiazem (CARDIZEM CD) 360 MG extended release capsule Take 1 capsule by mouth daily 12/16/17   Emmanuel Richmond MD   latanoprost (XALATAN) 0.005 % ophthalmic solution Place 1 drop into both eyes nightly    Historical Provider, MD   lisinopril (PRINIVIL;ZESTRIL) 20 MG tablet Take 20 mg by mouth nightly. Historical Provider, MD   spironolactone (ALDACTONE) 25 MG tablet Take 25 mg by mouth every morning     Historical Provider, MD   Vitamin D (CHOLECALCIFEROL) 1000 UNITS CAPS capsule Take 1,000 Units by mouth every morning     Historical Provider, MD         Allergies:  Patient has no known allergies. Social History:    TOBACCO:   reports that she has quit smoking. Her smoking use included cigarettes. She has a 45.00 pack-year smoking history. She quit smokeless tobacco use about 29 years ago. ETOH:   reports no history of alcohol use. Family History:    Reviewed in detail and negative for DM, CAD, Cancer, CVA. Positive as follows\"  History reviewed. No pertinent family history. REVIEW OF SYSTEMS:   Pertinent positives as noted in the HPI. All other systems reviewed and negative. PHYSICAL EXAM:  BP (!) 178/117   Pulse (!) 105   Temp 98.2 °F (36.8 °C)   Resp 18   Ht 5' 5\" (1.651 m)   Wt 192 lb (87.1 kg)   SpO2 98%   BMI 31.95 kg/m²   General appearance: No apparent distress, appears stated age and cooperative. HEENT: Normal cephalic, atraumatic without obvious deformity. Pupils equal, round, and reactive to light. Extra ocular muscles intact. Conjunctivae/corneas clear. Neck: C-collar in place  Respiratory: Clear to auscultation bilaterally  Cardiovascular: Tachycardic, irregularly irregular  Abdomen: Soft, nontender, nondistended  Musculoskeletal: No clubbing, cyanosis, edema of bilateral lower extremities. Brisk capillary refill. Skin: Normal skin color. No rashes or lesions. Neurologic:  Neurovascularly intact without any focal sensory/motor deficits.  Cranial nerves: II-XII intact, grossly non-focal.  Psychiatric: Alert and oriented, thought content appropriate, normal insight    Reviewed EKG and CXR personally      CBC:   Recent Labs     05/24/22  1220   WBC 2.6*   RBC 3.91   HGB 12.0   HCT 35.3   MCV 90.3   RDW 12.9   PLT 88*     BMP:   Recent Labs     05/24/22  1220      K 3.9      CO2 21*   BUN 20   CREATININE 1.1*   MG 1.5*     LFT:  Recent Labs     05/24/22  1220   PROT 6.2*   ALKPHOS 107*   ALT 32   AST 44*   BILITOT 0.5     CE:  Recent Labs     05/24/22  1220   CKTOTAL 189*     PT/INR:   Recent Labs     05/24/22  1220   INR 1.7   APTT 32.9     BNP: No results for input(s): BNP in the last 72 hours. ESR:   Lab Results   Component Value Date    SEDRATE 54 (H) 05/24/2022     CRP:   Lab Results   Component Value Date    CRP 7.9 (H) 05/24/2022     D Dimer: No results found for: DDIMER   Folate and B12: No results found for: [id number], No results found for: FOLATE  Lactic Acid:   Lab Results   Component Value Date    LACTA 1.8 12/09/2017     Thyroid Studies:   Lab Results   Component Value Date    TSH 0.565 12/11/2017       Oupatient labs:  Lab Results   Component Value Date    CHOL 166 08/24/2012    TRIG 292 (H) 08/24/2012    HDL 27.2 (A) 08/24/2012    LDLCALC 80 08/24/2012    TSH 0.565 12/11/2017    INR 1.7 05/24/2022    LABA1C 7.8 (H) 08/24/2012       Urinalysis:    Lab Results   Component Value Date    NITRU Negative 05/24/2022    WBCUA NONE 05/24/2022    BACTERIA RARE 05/24/2022    RBCUA 2-5 05/24/2022    RBCUA >20 08/24/2012    BLOODU MODERATE 05/24/2022    SPECGRAV 1.025 05/24/2022    GLUCOSEU Negative 05/24/2022       Imaging:  XR PELVIS (1-2 VIEWS)    Result Date: 5/19/2022  EXAMINATION: ONE XRAY VIEW OF THE PELVIS 5/19/2022 1:03 pm COMPARISON: July 6, 2017. HISTORY: ORDERING SYSTEM PROVIDED HISTORY: fall TECHNOLOGIST PROVIDED HISTORY: Reason for exam:->fall FINDINGS: No evidence of pelvic fracture. No fracture or dislocation of bilateral hips. No evidence of pelvis or hip fracture.      XR HIP BILATERAL W AP PELVIS (2 VIEWS)    Result Date: 5/24/2022  EXAMINATION: ONE XRAY VIEW OF THE PELVIS AND TWO XRAY VIEWS OF EACH OF THE BILATERAL HIPS 5/24/2022 1:14 pm COMPARISON: None. HISTORY: ORDERING SYSTEM PROVIDED HISTORY: pain TECHNOLOGIST PROVIDED HISTORY: Reason for exam:->pain FINDINGS: No acute fractures or dislocations in the pelvis, right hip or left hip. Bilateral femoral head contours are intact. Bilateral hip joints intact. Symphysis pubis is not widened. Bilateral sacroiliac joints are intact. No acute osseous findings in the pelvis, right hip or left hip     CT HEAD WO CONTRAST    Result Date: 5/24/2022  EXAMINATION: CT OF THE HEAD WITHOUT CONTRAST  5/24/2022 1:13 pm TECHNIQUE: CT of the head was performed without the administration of intravenous contrast. Automated exposure control, iterative reconstruction, and/or weight based adjustment of the mA/kV was utilized to reduce the radiation dose to as low as reasonably achievable. COMPARISON: None. HISTORY: ORDERING SYSTEM PROVIDED HISTORY: HEAD TRAUMA, CLOSED, MILD, GCS >= 13, NO RISK FACTORS, NEURO EXAM NORMAL TECHNOLOGIST PROVIDED HISTORY: Has a \"code stroke\" or \"stroke alert\" been called? ->No Reason for exam:->pain Decision Support Exception - unselect if not a suspected or confirmed emergency medical condition->Emergency Medical Condition (MA) FINDINGS: BRAIN/VENTRICLES: There is no acute intracranial hemorrhage, mass effect or midline shift. No abnormal extra-axial fluid collection. The gray-white differentiation is maintained without evidence of an acute infarct. There is no evidence of hydrocephalus. ORBITS: The visualized portion of the orbits demonstrate no acute abnormality. SINUSES: The visualized paranasal sinuses and mastoid air cells demonstrate no acute abnormality. SOFT TISSUES/SKULL:  No acute abnormality of the visualized skull or soft tissues. No acute intracranial abnormality. No intracranial hemorrhage.      CT Head WO Contrast    Result Date: PROVIDED HISTORY: Reason for exam:->chest pain Decision Support Exception - unselect if not a suspected or confirmed emergency medical condition->Emergency Medical Condition (MA) FINDINGS: Mediastinum: No significant lymphadenopathy is seen, but evaluation is limited without intravenous contrast.  The heart is prominent size, but stable. Atherosclerotic vascular calcification including coronary artery and aortic valve calcification noted. The main pulmonary artery is dilated suggestive of possible pulmonary arterial hypertension. Thoracic aorta is normal in caliber. Trace pericardial effusion is noted. Lungs/pleura: A right upper lobe posterior consolidative opacity is identified with air bronchograms. Additional scattered patchy bilateral peribronchial opacities are noted throughout the lungs. Diffuse airway wall thickening is seen. No pleural effusion is identified. No evidence of significant pulmonary edema. Upper Abdomen: A left adrenal adenoma is stable. Partially visualized kidneys demonstrate nonobstructing renal calculi. Soft Tissues/Bones:  No acute abnormality of the visualized osseous structures. Right upper lobe pneumonia with endobronchial spread of infection throughout the lungs     CT CERVICAL SPINE WO CONTRAST    Result Date: 5/24/2022  EXAMINATION: CT OF THE CERVICAL SPINE WITHOUT CONTRAST 5/24/2022 1:13 pm TECHNIQUE: CT of the cervical spine was performed without the administration of intravenous contrast. Multiplanar reformatted images are provided for review. Automated exposure control, iterative reconstruction, and/or weight based adjustment of the mA/kV was utilized to reduce the radiation dose to as low as reasonably achievable. COMPARISON: None.  HISTORY: ORDERING SYSTEM PROVIDED HISTORY: pain TECHNOLOGIST PROVIDED HISTORY: Reason for exam:->pain Decision Support Exception - unselect if not a suspected or confirmed emergency medical condition->Emergency Medical Condition (MA) FINDINGS: BONES/ALIGNMENT: There is large flowing anterior calcification calcification C3 through C7-T1 suggesting DISH. There is linear lucency with bubbles of gas extending through the dense calcification anterior longitudinal ligament and into the disc space C5-6. Lateral masses align normally with C2. DEGENERATIVE CHANGES: There is marked disc space narrowing C5-6 C6-7. Mild-to-moderate degenerative change seen at the facets. SOFT TISSUES: Calcified mass seen lateral left cerebellum along petrous apex measures 2.5 cm possible meningioma. There is rigid spine secondary to DISH with large ossification anteriorly C3 through T1. However there is now a few bubbles of gas along linear lucency at the C5-6 level which could represent fracture of the anterior ossification. However alignment is maintained. Facets are maintained. MRI could further evaluate as well as evaluate any ligamentous injury. Critical results were called by Dr. Darwin Saenz to Dr. Mohan Parsons On 5/24/2022 at 13:44. CT Cervical Spine WO Contrast    Result Date: 5/19/2022  EXAMINATION: CT OF THE CERVICAL SPINE WITHOUT CONTRAST 5/19/2022 12:46 pm TECHNIQUE: CT of the cervical spine was performed without the administration of intravenous contrast. Multiplanar reformatted images are provided for review. Automated exposure control, iterative reconstruction, and/or weight based adjustment of the mA/kV was utilized to reduce the radiation dose to as low as reasonably achievable. COMPARISON: None. HISTORY: ORDERING SYSTEM PROVIDED HISTORY: fall TECHNOLOGIST PROVIDED HISTORY: Reason for exam:->fall Decision Support Exception - unselect if not a suspected or confirmed emergency medical condition->Emergency Medical Condition (MA) FINDINGS: BONES/ALIGNMENT: There is no acute fracture or traumatic malalignment.   There is grade 1 degenerative anterolisthesis of C7 on T1. DEGENERATIVE CHANGES: There are multilevel degenerative changes and extensive ankylosis. Multilevel anterior and posterior bridging osteophytes noted. Bilateral facet arthrosis noted. SOFT TISSUES: There is no prevertebral soft tissue swelling. There is focal calcification in the right thyroid gland. No acute abnormality of the cervical spine. Extensive degenerative changes, ankylosis and facet arthrosis. XR CHEST PORTABLE    Result Date: 5/24/2022  EXAMINATION: ONE XRAY VIEW OF THE CHEST 5/24/2022 1:14 pm COMPARISON: Chest radiograph May 19, 2022 HISTORY: ORDERING SYSTEM PROVIDED HISTORY: fatigue TECHNOLOGIST PROVIDED HISTORY: Reason for exam:->fatigue FINDINGS: Focal opacity identified in right upper lung. Mild diffuse opacities in left lung. There is no effusion or pneumothorax. The cardiomediastinal silhouette is without acute process. The osseous structures are without acute process. Focal opacity in right upper lung may represent pneumonia or pulmonary edema in the proper clinical setting. Further evaluation may be obtained with CT chest if clinically warranted. XR CHEST PORTABLE    Result Date: 5/19/2022  EXAMINATION: ONE XRAY VIEW OF THE CHEST 5/19/2022 1:03 pm COMPARISON: None. HISTORY: ORDERING SYSTEM PROVIDED HISTORY: fall TECHNOLOGIST PROVIDED HISTORY: Reason for exam:->fall FINDINGS: The cardiac silhouette is at upper limits of normal in size. The lungs are clear. There is no focal infiltrate or effusion. There is no pneumothorax     There is no acute cardiopulmonary disease.        ASSESSMENT:  -COVID-19 pneumonia  -Possible C6 fracture  -Atrial fibrillation with RVR  -Hypomagnesemia  -Generalized weakness  -Chronically anticoagulated on Coumadin  -CKD stage III plan-diabetes mellitus type 2  -Glaucoma  -Hypertension  -Congestive heart failure      PLAN:  -Admit to medicine  -Consult neurosurgery  -Consult cardiology  -C-collar  -Dexamethasone 6 mg IV daily  -Monitor serial inflammatory markers  -Monitor serum electrolytes replete as needed  -PT/OT  -COVID-19 precautions  -Continue home medications        Diet: No diet orders on file  Code Status: Prior  Surrogate decision maker confirmed with patient:   Extended Emergency Contact Information  Primary Emergency Contact: Rosita Greene  Address: [address] [place] [address] [address] [facility] [address] Phone: [phone]  Relation: Other  Secondary Emergency Contact: [place], [address] [place] [address] Phone: [phone]  Mobile Phone: [phone]  Relation: Other    DVT Prophylaxis: []Lovenox []Heparin []PCD [] [address] []Encouraged ambulation  Disposition: []Med/Surg [] Intermediate [] ICU/CCU  Admit status: [] Observation [] Inpatient     +++++++++++++++++++++++++++++++++++++++++++++++++  Nitesh Ibrahim DO  +++++++++++++++++++++++++++++++++++++++++++++++++  NOTE: This report was transcribed using voice recognition software. Every effort was made to ensure accuracy; however, inadvertent computerized transcription errors may be present.

## 2022-05-25 NOTE — ED NOTES
The only soft c-collar on unit is a large which did not fit pt. An x-large soft c-collar is needed. Central called and asked to send up x-large soft c-collar.       Gavin Abreu RN  05/24/22 [id number]

## 2022-05-26 ENCOUNTER — APPOINTMENT (OUTPATIENT)
Dept: MRI IMAGING | Age: 82
DRG: 551 | End: 2022-05-26
Payer: MEDICARE

## 2022-05-26 LAB
ALBUMIN SERPL-MCNC: 3.2 G/DL (ref 3.5–5.2)
ALP BLD-CCNC: 85 U/L (ref 35–104)
ALT SERPL-CCNC: 27 U/L (ref 0–32)
ANION GAP SERPL CALCULATED.3IONS-SCNC: 15 MMOL/L (ref 7–16)
AST SERPL-CCNC: 29 U/L (ref 0–31)
BASOPHILS ABSOLUTE: 0 E9/L (ref 0–0.2)
BASOPHILS RELATIVE PERCENT: 0 % (ref 0–2)
BILIRUB SERPL-MCNC: 0.4 MG/DL (ref 0–1.2)
BUN BLDV-MCNC: 29 MG/DL (ref 6–23)
C-REACTIVE PROTEIN: 14.9 MG/DL (ref 0–0.4)
CALCIUM SERPL-MCNC: 8.6 MG/DL (ref 8.6–10.2)
CHLORIDE BLD-SCNC: 101 MMOL/L (ref 98–107)
CO2: 19 MMOL/L (ref 22–29)
CREAT SERPL-MCNC: 1 MG/DL (ref 0.5–1)
D DIMER: 686 NG/ML DDU
EOSINOPHILS ABSOLUTE: 0 E9/L (ref 0.05–0.5)
EOSINOPHILS RELATIVE PERCENT: 0 % (ref 0–6)
GFR AFRICAN AMERICAN: >60
GFR NON-AFRICAN AMERICAN: 53 ML/MIN/1.73
GLUCOSE BLD-MCNC: 184 MG/DL (ref 74–99)
HCT VFR BLD CALC: 33 % (ref 34–48)
HEMOGLOBIN: 11.1 G/DL (ref 11.5–15.5)
INR BLD: 2.5
LYMPHOCYTES ABSOLUTE: 0.14 E9/L (ref 1.5–4)
LYMPHOCYTES RELATIVE PERCENT: 4.4 % (ref 20–42)
MAGNESIUM: 2.1 MG/DL (ref 1.6–2.6)
MCH RBC QN AUTO: 30.4 PG (ref 26–35)
MCHC RBC AUTO-ENTMCNC: 33.6 % (ref 32–34.5)
MCV RBC AUTO: 90.4 FL (ref 80–99.9)
METER GLUCOSE: 144 MG/DL (ref 74–99)
METER GLUCOSE: 176 MG/DL (ref 74–99)
MONOCYTES ABSOLUTE: 0.14 E9/L (ref 0.1–0.95)
MONOCYTES RELATIVE PERCENT: 4.3 % (ref 2–12)
NEUTROPHILS ABSOLUTE: 3.28 E9/L (ref 1.8–7.3)
NEUTROPHILS RELATIVE PERCENT: 91.3 % (ref 43–80)
OVALOCYTES: ABNORMAL
PDW BLD-RTO: 13 FL (ref 11.5–15)
PLATELET # BLD: 112 E9/L (ref 130–450)
PMV BLD AUTO: 10.3 FL (ref 7–12)
POIKILOCYTES: ABNORMAL
POTASSIUM REFLEX MAGNESIUM: 4.4 MMOL/L (ref 3.5–5)
POTASSIUM SERPL-SCNC: 4.4 MMOL/L (ref 3.5–5)
PROTHROMBIN TIME: 26.7 SEC (ref 9.3–12.4)
RBC # BLD: 3.65 E12/L (ref 3.5–5.5)
SODIUM BLD-SCNC: 135 MMOL/L (ref 132–146)
TOTAL PROTEIN: 5.7 G/DL (ref 6.4–8.3)
URINE CULTURE, ROUTINE: NORMAL
WBC # BLD: 3.6 E9/L (ref 4.5–11.5)

## 2022-05-26 PROCEDURE — 80048 BASIC METABOLIC PNL TOTAL CA: CPT

## 2022-05-26 PROCEDURE — 85378 FIBRIN DEGRADE SEMIQUANT: CPT

## 2022-05-26 PROCEDURE — 82962 GLUCOSE BLOOD TEST: CPT

## 2022-05-26 PROCEDURE — 85025 COMPLETE CBC W/AUTO DIFF WBC: CPT

## 2022-05-26 PROCEDURE — 83735 ASSAY OF MAGNESIUM: CPT

## 2022-05-26 PROCEDURE — 2140000000 HC CCU INTERMEDIATE R&B

## 2022-05-26 PROCEDURE — 6370000000 HC RX 637 (ALT 250 FOR IP): Performed by: INTERNAL MEDICINE

## 2022-05-26 PROCEDURE — 86140 C-REACTIVE PROTEIN: CPT

## 2022-05-26 PROCEDURE — 2580000003 HC RX 258: Performed by: FAMILY MEDICINE

## 2022-05-26 PROCEDURE — 72141 MRI NECK SPINE W/O DYE: CPT

## 2022-05-26 PROCEDURE — 6370000000 HC RX 637 (ALT 250 FOR IP): Performed by: FAMILY MEDICINE

## 2022-05-26 PROCEDURE — 6360000002 HC RX W HCPCS: Performed by: FAMILY MEDICINE

## 2022-05-26 PROCEDURE — 85610 PROTHROMBIN TIME: CPT

## 2022-05-26 PROCEDURE — 36415 COLL VENOUS BLD VENIPUNCTURE: CPT

## 2022-05-26 PROCEDURE — 80053 COMPREHEN METABOLIC PANEL: CPT

## 2022-05-26 RX ORDER — WARFARIN SODIUM 5 MG/1
5 TABLET ORAL NIGHTLY
Status: DISCONTINUED | OUTPATIENT
Start: 2022-05-26 | End: 2022-05-27

## 2022-05-26 RX ADMIN — Medication 10 ML: at 21:29

## 2022-05-26 RX ADMIN — SPIRONOLACTONE 25 MG: 25 TABLET ORAL at 09:43

## 2022-05-26 RX ADMIN — PRIMIDONE 150 MG: 50 TABLET ORAL at 21:28

## 2022-05-26 RX ADMIN — WARFARIN SODIUM 5 MG: 6 TABLET ORAL at 21:28

## 2022-05-26 RX ADMIN — FOLIC ACID 1 MG: 1 TABLET ORAL at 09:43

## 2022-05-26 RX ADMIN — ACETAMINOPHEN 650 MG: 325 TABLET ORAL at 09:44

## 2022-05-26 RX ADMIN — DILTIAZEM HYDROCHLORIDE 360 MG: 120 CAPSULE, COATED, EXTENDED RELEASE ORAL at 09:42

## 2022-05-26 RX ADMIN — LATANOPROST 1 DROP: 50 SOLUTION OPHTHALMIC at 21:36

## 2022-05-26 RX ADMIN — LISINOPRIL 20 MG: 20 TABLET ORAL at 21:29

## 2022-05-26 RX ADMIN — Medication 1000 UNITS: at 09:43

## 2022-05-26 RX ADMIN — GLIPIZIDE 2.5 MG: 5 TABLET ORAL at 06:15

## 2022-05-26 RX ADMIN — PRIMIDONE 150 MG: 50 TABLET ORAL at 09:43

## 2022-05-26 RX ADMIN — DEXAMETHASONE SODIUM PHOSPHATE 6 MG: 10 INJECTION INTRAMUSCULAR; INTRAVENOUS at 00:18

## 2022-05-26 RX ADMIN — ACETAMINOPHEN 650 MG: 325 TABLET ORAL at 21:29

## 2022-05-26 ASSESSMENT — PAIN DESCRIPTION - ONSET: ONSET: ON-GOING

## 2022-05-26 ASSESSMENT — PAIN DESCRIPTION - ORIENTATION: ORIENTATION: LEFT

## 2022-05-26 ASSESSMENT — PAIN SCALES - GENERAL: PAINLEVEL_OUTOF10: 5

## 2022-05-26 ASSESSMENT — PAIN DESCRIPTION - LOCATION: LOCATION: NECK

## 2022-05-26 ASSESSMENT — PAIN DESCRIPTION - DESCRIPTORS: DESCRIPTORS: ACHING;DISCOMFORT;THROBBING

## 2022-05-26 ASSESSMENT — PAIN - FUNCTIONAL ASSESSMENT: PAIN_FUNCTIONAL_ASSESSMENT: PREVENTS OR INTERFERES SOME ACTIVE ACTIVITIES AND ADLS

## 2022-05-26 ASSESSMENT — PAIN DESCRIPTION - PAIN TYPE: TYPE: ACUTE PAIN

## 2022-05-26 ASSESSMENT — PAIN DESCRIPTION - FREQUENCY: FREQUENCY: CONTINUOUS

## 2022-05-26 NOTE — PROGRESS NOTES
Occupational Therapy  Received OT order, Reviewed Chart. Noted NS ordered MRI after noting possible injury on CT of cervical Spine. Will Hold OT assessment until after NS's recommendations are made for pt's safety.   Thank you for this referral. MADONNA Logan, OTR/L  # [phone]

## 2022-05-26 NOTE — PROGRESS NOTES
[facility] [address] [facility] -  they state they have 20 inpatient MRI's to be completed prior to this patient and because she is Covid she will be done last - hopefully this evening. Will follow up this afternoon.     Jovita Ac RN;

## 2022-05-26 NOTE — CARE COORDINATION
Care Coordination: spoke to pt to discuss transition of care upon discharge. Pt is covid positive, lives alone, uses a walker and follows with Dr José Shields. She is agreeable to short term [facility]. Preferrs SOV in [place] as was there in past but if cannot accept d/t covid, she would prefer location [facility]. She did not want me to call any emergency contacts, as she does not want them staying with her upon discharge out of concern. [facility] and she will review to see if bed is available and if can accept. Pt declined referral to [facility] as she felt too far from home, but many need to consider as options limited with covid. Pt currently receiving care at bedside, Will follow up for further assessment    Neli Max    The Plan for Transition of Care is related to the following treatment goals: isabel      The Patient and/or patient representative was provided with a choice of provider and agrees   with the discharge plan. [x] Yes [] No    Freedom of choice list was provided with basic dialogue that supports the patient's individualized plan of care/goals, treatment preferences and shares the quality data associated with the providers.  [x] Yes [] No

## 2022-05-27 LAB
ALBUMIN SERPL-MCNC: 3.2 G/DL (ref 3.5–5.2)
ALP BLD-CCNC: 88 U/L (ref 35–104)
ALT SERPL-CCNC: 25 U/L (ref 0–32)
ANION GAP SERPL CALCULATED.3IONS-SCNC: 11 MMOL/L (ref 7–16)
AST SERPL-CCNC: 25 U/L (ref 0–31)
BASOPHILS ABSOLUTE: 0 E9/L (ref 0–0.2)
BASOPHILS RELATIVE PERCENT: 0 % (ref 0–2)
BILIRUB SERPL-MCNC: 0.3 MG/DL (ref 0–1.2)
BUN BLDV-MCNC: 31 MG/DL (ref 6–23)
CALCIUM SERPL-MCNC: 8.8 MG/DL (ref 8.6–10.2)
CHLORIDE BLD-SCNC: 100 MMOL/L (ref 98–107)
CO2: 21 MMOL/L (ref 22–29)
CREAT SERPL-MCNC: 1 MG/DL (ref 0.5–1)
EOSINOPHILS ABSOLUTE: 0.01 E9/L (ref 0.05–0.5)
EOSINOPHILS RELATIVE PERCENT: 0.2 % (ref 0–6)
GFR AFRICAN AMERICAN: >60
GFR NON-AFRICAN AMERICAN: 53 ML/MIN/1.73
GLUCOSE BLD-MCNC: 178 MG/DL (ref 74–99)
HCT VFR BLD CALC: 32.2 % (ref 34–48)
HEMOGLOBIN: 11 G/DL (ref 11.5–15.5)
IMMATURE GRANULOCYTES #: 0.04 E9/L
IMMATURE GRANULOCYTES %: 1 % (ref 0–5)
INR BLD: 3.3
LYMPHOCYTES ABSOLUTE: 0.26 E9/L (ref 1.5–4)
LYMPHOCYTES RELATIVE PERCENT: 6.4 % (ref 20–42)
MCH RBC QN AUTO: 30.6 PG (ref 26–35)
MCHC RBC AUTO-ENTMCNC: 34.2 % (ref 32–34.5)
MCV RBC AUTO: 89.4 FL (ref 80–99.9)
METER GLUCOSE: 188 MG/DL (ref 74–99)
MONOCYTES ABSOLUTE: 0.2 E9/L (ref 0.1–0.95)
MONOCYTES RELATIVE PERCENT: 4.9 % (ref 2–12)
NEUTROPHILS ABSOLUTE: 3.54 E9/L (ref 1.8–7.3)
NEUTROPHILS RELATIVE PERCENT: 87.5 % (ref 43–80)
PDW BLD-RTO: 12.8 FL (ref 11.5–15)
PLATELET # BLD: 135 E9/L (ref 130–450)
PMV BLD AUTO: 10 FL (ref 7–12)
POIKILOCYTES: ABNORMAL
POLYCHROMASIA: ABNORMAL
POTASSIUM REFLEX MAGNESIUM: 4.5 MMOL/L (ref 3.5–5)
PROCALCITONIN: 0.15 NG/ML (ref 0–0.08)
PROTHROMBIN TIME: 36.3 SEC (ref 9.3–12.4)
RBC # BLD: 3.6 E12/L (ref 3.5–5.5)
ROULEAUX: ABNORMAL
SODIUM BLD-SCNC: 132 MMOL/L (ref 132–146)
TOTAL PROTEIN: 5.9 G/DL (ref 6.4–8.3)
WBC # BLD: 4.1 E9/L (ref 4.5–11.5)

## 2022-05-27 PROCEDURE — 6360000002 HC RX W HCPCS: Performed by: FAMILY MEDICINE

## 2022-05-27 PROCEDURE — 80053 COMPREHEN METABOLIC PANEL: CPT

## 2022-05-27 PROCEDURE — 85610 PROTHROMBIN TIME: CPT

## 2022-05-27 PROCEDURE — 85025 COMPLETE CBC W/AUTO DIFF WBC: CPT

## 2022-05-27 PROCEDURE — 84145 PROCALCITONIN (PCT): CPT

## 2022-05-27 PROCEDURE — 36415 COLL VENOUS BLD VENIPUNCTURE: CPT

## 2022-05-27 PROCEDURE — 82962 GLUCOSE BLOOD TEST: CPT

## 2022-05-27 PROCEDURE — 2140000000 HC CCU INTERMEDIATE R&B

## 2022-05-27 PROCEDURE — 6370000000 HC RX 637 (ALT 250 FOR IP): Performed by: FAMILY MEDICINE

## 2022-05-27 PROCEDURE — 2580000003 HC RX 258: Performed by: FAMILY MEDICINE

## 2022-05-27 RX ORDER — WARFARIN SODIUM 5 MG/1
5 TABLET ORAL NIGHTLY
Status: DISCONTINUED | OUTPATIENT
Start: 2022-05-28 | End: 2022-05-27

## 2022-05-27 RX ADMIN — GLIPIZIDE 2.5 MG: 5 TABLET ORAL at 06:05

## 2022-05-27 RX ADMIN — PROMETHAZINE HYDROCHLORIDE 12.5 MG: 12.5 TABLET ORAL at 09:51

## 2022-05-27 RX ADMIN — FOLIC ACID 1 MG: 1 TABLET ORAL at 09:51

## 2022-05-27 RX ADMIN — PRIMIDONE 150 MG: 50 TABLET ORAL at 09:00

## 2022-05-27 RX ADMIN — Medication 10 ML: at 09:52

## 2022-05-27 RX ADMIN — Medication 1000 UNITS: at 09:51

## 2022-05-27 RX ADMIN — DEXAMETHASONE SODIUM PHOSPHATE 6 MG: 10 INJECTION INTRAMUSCULAR; INTRAVENOUS at 00:56

## 2022-05-27 RX ADMIN — DILTIAZEM HYDROCHLORIDE 360 MG: 120 CAPSULE, COATED, EXTENDED RELEASE ORAL at 09:51

## 2022-05-27 RX ADMIN — Medication 10 ML: at 21:02

## 2022-05-27 RX ADMIN — SPIRONOLACTONE 25 MG: 25 TABLET ORAL at 09:51

## 2022-05-27 RX ADMIN — LATANOPROST 1 DROP: 50 SOLUTION OPHTHALMIC at 21:01

## 2022-05-27 RX ADMIN — LISINOPRIL 20 MG: 20 TABLET ORAL at 21:01

## 2022-05-27 RX ADMIN — PRIMIDONE 150 MG: 50 TABLET ORAL at 21:01

## 2022-05-27 RX ADMIN — ACETAMINOPHEN 650 MG: 325 TABLET ORAL at 21:00

## 2022-05-27 RX ADMIN — ACETAMINOPHEN 650 MG: 325 TABLET ORAL at 06:05

## 2022-05-27 ASSESSMENT — PAIN SCALES - GENERAL
PAINLEVEL_OUTOF10: 5
PAINLEVEL_OUTOF10: 5

## 2022-05-27 NOTE — PROGRESS NOTES
Pharmacy Consultation Note  (Warfarin Dosing and Monitoring)  Initial consult date: 5/27/22  Consulting Provider: Dr. Toro Powell is a [de-identified] y.o. female for whom pharmacy has been asked to manage warfarin therapy. Weight:   Wt Readings from Last 1 Encounters:   05/27/22 192 lb 6.4 oz (87.3 kg)       TSH:    Lab Results   Component Value Date    TSH 0.565 12/11/2017       Hepatic Function Panel:                            Lab Results   Component Value Date    ALKPHOS 88 05/27/2022    ALT 25 05/27/2022    AST 25 05/27/2022    PROT 5.9 05/27/2022    BILITOT 0.3 05/27/2022    LABALBU 3.2 05/27/2022       Current warfarin drug-drug interactions include:  primidone (home med), dexamethasone    Recent Labs     05/25/22  0125 05/26/22  0559 05/27/22  0511   HGB 12.0 11.1* 11.0*   PLT 92* 112* 135     Date Warfarin Dose INR Heparin or LMWH Comment   5/27 hold 3.3 --                                  Assessment:  Patient is a [de-identified] y.o. female on warfarin for Atrial Fibrillation. Patient's home warfarin dosing regimen is 7 mg daily.    Goal INR 2 - 3  Patient admitted for COVID-19 pneumonia  INR 3.3 today (up from 2.5 yesterday)    Plan:  Hold warfarin dose tonight  Daily PT/INR until the INR is stable within the therapeutic range  Pharmacist will follow and monitor/adjust dosing as necessary    Thank you for this consult,    MARY Stevenson [facility] 5/27/2022 3:10 PM

## 2022-05-28 ENCOUNTER — APPOINTMENT (OUTPATIENT)
Dept: GENERAL RADIOLOGY | Age: 82
DRG: 551 | End: 2022-05-28
Payer: MEDICARE

## 2022-05-28 ENCOUNTER — APPOINTMENT (OUTPATIENT)
Dept: CT IMAGING | Age: 82
DRG: 551 | End: 2022-05-28
Payer: MEDICARE

## 2022-05-28 LAB
ALBUMIN SERPL-MCNC: 3 G/DL (ref 3.5–5.2)
ALP BLD-CCNC: 96 U/L (ref 35–104)
ALT SERPL-CCNC: 26 U/L (ref 0–32)
ANION GAP SERPL CALCULATED.3IONS-SCNC: 12 MMOL/L (ref 7–16)
AST SERPL-CCNC: 29 U/L (ref 0–31)
B.E.: 2.5 MMOL/L (ref -3–3)
BASOPHILS ABSOLUTE: 0.01 E9/L (ref 0–0.2)
BASOPHILS RELATIVE PERCENT: 0.2 % (ref 0–2)
BILIRUB SERPL-MCNC: 0.4 MG/DL (ref 0–1.2)
BUN BLDV-MCNC: 29 MG/DL (ref 6–23)
CALCIUM SERPL-MCNC: 8.7 MG/DL (ref 8.6–10.2)
CHLORIDE BLD-SCNC: 100 MMOL/L (ref 98–107)
CO2: 22 MMOL/L (ref 22–29)
COHB: 0.6 % (ref 0–1.5)
CREAT SERPL-MCNC: 1 MG/DL (ref 0.5–1)
CRITICAL: ABNORMAL
DATE ANALYZED: ABNORMAL
DATE OF COLLECTION: ABNORMAL
EOSINOPHILS ABSOLUTE: 0.03 E9/L (ref 0.05–0.5)
EOSINOPHILS RELATIVE PERCENT: 0.6 % (ref 0–6)
GFR AFRICAN AMERICAN: >60
GFR NON-AFRICAN AMERICAN: 53 ML/MIN/1.73
GLUCOSE BLD-MCNC: 96 MG/DL (ref 74–99)
HCO3: 25.8 MMOL/L (ref 22–26)
HCT VFR BLD CALC: 33.4 % (ref 34–48)
HEMOGLOBIN: 11.2 G/DL (ref 11.5–15.5)
HHB: 4.4 % (ref 0–5)
IMMATURE GRANULOCYTES #: 0.05 E9/L
IMMATURE GRANULOCYTES %: 0.9 % (ref 0–5)
INR BLD: 3.4
LAB: ABNORMAL
LYMPHOCYTES ABSOLUTE: 0.62 E9/L (ref 1.5–4)
LYMPHOCYTES RELATIVE PERCENT: 11.5 % (ref 20–42)
Lab: ABNORMAL
MAGNESIUM: 1.9 MG/DL (ref 1.6–2.6)
MCH RBC QN AUTO: 29.9 PG (ref 26–35)
MCHC RBC AUTO-ENTMCNC: 33.5 % (ref 32–34.5)
MCV RBC AUTO: 89.3 FL (ref 80–99.9)
METER GLUCOSE: 105 MG/DL (ref 74–99)
METER GLUCOSE: 98 MG/DL (ref 74–99)
METHB: 0.1 % (ref 0–1.5)
MONOCYTES ABSOLUTE: 0.6 E9/L (ref 0.1–0.95)
MONOCYTES RELATIVE PERCENT: 11.1 % (ref 2–12)
NEUTROPHILS ABSOLUTE: 4.08 E9/L (ref 1.8–7.3)
NEUTROPHILS RELATIVE PERCENT: 75.7 % (ref 43–80)
O2 SATURATION: 96.4 % (ref 92–98.5)
O2HB: 94.9 % (ref 94–97)
OPERATOR ID: 421
PATIENT TEMP: 37 C
PCO2: 35.6 MMHG (ref 35–45)
PDW BLD-RTO: 12.8 FL (ref 11.5–15)
PH BLOOD GAS: 7.48 (ref 7.35–7.45)
PHOSPHORUS: 2.7 MG/DL (ref 2.5–4.5)
PLATELET # BLD: 158 E9/L (ref 130–450)
PMV BLD AUTO: 9.5 FL (ref 7–12)
PO2: 78.2 MMHG (ref 75–100)
POTASSIUM REFLEX MAGNESIUM: 4.2 MMOL/L (ref 3.5–5)
PROTHROMBIN TIME: 38.5 SEC (ref 9.3–12.4)
RBC # BLD: 3.74 E12/L (ref 3.5–5.5)
SODIUM BLD-SCNC: 134 MMOL/L (ref 132–146)
SOURCE, BLOOD GAS: ABNORMAL
THB: 12.6 G/DL (ref 11.5–16.5)
TIME ANALYZED: 2004
TOTAL PROTEIN: 5.9 G/DL (ref 6.4–8.3)
WBC # BLD: 5.4 E9/L (ref 4.5–11.5)

## 2022-05-28 PROCEDURE — 36415 COLL VENOUS BLD VENIPUNCTURE: CPT

## 2022-05-28 PROCEDURE — 6360000002 HC RX W HCPCS: Performed by: FAMILY MEDICINE

## 2022-05-28 PROCEDURE — 80053 COMPREHEN METABOLIC PANEL: CPT

## 2022-05-28 PROCEDURE — 83735 ASSAY OF MAGNESIUM: CPT

## 2022-05-28 PROCEDURE — 6370000000 HC RX 637 (ALT 250 FOR IP): Performed by: INTERNAL MEDICINE

## 2022-05-28 PROCEDURE — 85610 PROTHROMBIN TIME: CPT

## 2022-05-28 PROCEDURE — 82962 GLUCOSE BLOOD TEST: CPT

## 2022-05-28 PROCEDURE — 70450 CT HEAD/BRAIN W/O DYE: CPT

## 2022-05-28 PROCEDURE — 85025 COMPLETE CBC W/AUTO DIFF WBC: CPT

## 2022-05-28 PROCEDURE — 84100 ASSAY OF PHOSPHORUS: CPT

## 2022-05-28 PROCEDURE — 6370000000 HC RX 637 (ALT 250 FOR IP): Performed by: FAMILY MEDICINE

## 2022-05-28 PROCEDURE — 82805 BLOOD GASES W/O2 SATURATION: CPT

## 2022-05-28 PROCEDURE — 71045 X-RAY EXAM CHEST 1 VIEW: CPT

## 2022-05-28 PROCEDURE — APPSS60 APP SPLIT SHARED TIME 46-60 MINUTES: Performed by: PHYSICIAN ASSISTANT

## 2022-05-28 PROCEDURE — 2140000000 HC CCU INTERMEDIATE R&B

## 2022-05-28 PROCEDURE — 2580000003 HC RX 258: Performed by: FAMILY MEDICINE

## 2022-05-28 RX ORDER — LANOLIN ALCOHOL/MO/W.PET/CERES
1.5 CREAM (GRAM) TOPICAL ONCE
Status: COMPLETED | OUTPATIENT
Start: 2022-05-28 | End: 2022-05-28

## 2022-05-28 RX ORDER — HYDRALAZINE HYDROCHLORIDE 20 MG/ML
5 INJECTION INTRAMUSCULAR; INTRAVENOUS EVERY 6 HOURS PRN
Status: DISCONTINUED | OUTPATIENT
Start: 2022-05-28 | End: 2022-05-29

## 2022-05-28 RX ORDER — ACETAMINOPHEN 500 MG
1000 TABLET ORAL ONCE
Status: COMPLETED | OUTPATIENT
Start: 2022-05-28 | End: 2022-05-28

## 2022-05-28 RX ADMIN — PRIMIDONE 150 MG: 50 TABLET ORAL at 08:56

## 2022-05-28 RX ADMIN — DILTIAZEM HYDROCHLORIDE 360 MG: 120 CAPSULE, COATED, EXTENDED RELEASE ORAL at 08:56

## 2022-05-28 RX ADMIN — LISINOPRIL 20 MG: 20 TABLET ORAL at 20:42

## 2022-05-28 RX ADMIN — Medication 10 ML: at 08:57

## 2022-05-28 RX ADMIN — ACETAMINOPHEN 1000 MG: 500 TABLET ORAL at 20:33

## 2022-05-28 RX ADMIN — DEXAMETHASONE SODIUM PHOSPHATE 6 MG: 10 INJECTION INTRAMUSCULAR; INTRAVENOUS at 04:05

## 2022-05-28 RX ADMIN — Medication 10 ML: at 20:35

## 2022-05-28 RX ADMIN — ACETAMINOPHEN 650 MG: 325 TABLET ORAL at 04:22

## 2022-05-28 RX ADMIN — PRIMIDONE 150 MG: 50 TABLET ORAL at 20:33

## 2022-05-28 RX ADMIN — FOLIC ACID 1 MG: 1 TABLET ORAL at 08:57

## 2022-05-28 RX ADMIN — Medication 1000 UNITS: at 08:57

## 2022-05-28 RX ADMIN — LATANOPROST 1 DROP: 50 SOLUTION OPHTHALMIC at 20:34

## 2022-05-28 RX ADMIN — Medication 1.5 MG: at 21:28

## 2022-05-28 RX ADMIN — SPIRONOLACTONE 25 MG: 25 TABLET ORAL at 08:56

## 2022-05-28 ASSESSMENT — PAIN DESCRIPTION - LOCATION: LOCATION: NECK

## 2022-05-28 ASSESSMENT — PAIN SCALES - GENERAL
PAINLEVEL_OUTOF10: 0
PAINLEVEL_OUTOF10: 4

## 2022-05-28 NOTE — PROGRESS NOTES
Pharmacy Consultation Note  (Warfarin Dosing and Monitoring)  Initial consult date: 5/27/22  Consulting Provider: Dr. Alvina Shelton is a [de-identified] y.o. female for whom pharmacy has been asked to manage warfarin therapy. Weight:   Wt Readings from Last 1 Encounters:   05/28/22 186 lb 8 oz (84.6 kg)       TSH:    Lab Results   Component Value Date    TSH 0.565 12/11/2017       Hepatic Function Panel:                            Lab Results   Component Value Date    ALKPHOS 96 05/28/2022    ALT 26 05/28/2022    AST 29 05/28/2022    PROT 5.9 05/28/2022    BILITOT 0.4 05/28/2022    LABALBU 3.0 05/28/2022       Current warfarin drug-drug interactions include:  primidone (home med), dexamethasone    Recent Labs     05/26/22  0559 05/27/22  0511 05/28/22  0624   HGB 11.1* 11.0* 11.2*   * 135 158     Date Warfarin Dose INR Heparin or LMWH Comment   5/27 hold 3.3 --    5/28 HOLD 3.4 --                           Assessment:  · Patient is a [de-identified] y.o. female on warfarin for Atrial Fibrillation. Patient's home warfarin dosing regimen is 7 mg daily.    · Goal INR 2 - 3  · Patient admitted for COVID-19 pneumonia  · INR 3.4 today, increased despite holding warfarin yesterday    Plan:  · Hold warfarin dose tonight  · Daily PT/INR until the INR is stable within the therapeutic range  · Pharmacist will follow and monitor/adjust dosing as necessary    Thank you for this consult,    Milo FriedmanD   Pharmacy Resident   Phone: [phone]  5/28/2022 9:04 AM

## 2022-05-28 NOTE — PROGRESS NOTES
Dr. Jyotsna Welch made aware of increased confusion and agitation, new orders placed at this time for ABG, cat scan of the head and a UAC+S.

## 2022-05-28 NOTE — CONSULTS
Inpatient [facility] Cardiology Consultation      Reason for Consult: AF RVR    Consulting Physician: Dr. Noble Faria    Requesting Physician: Dr. Kenn Haro    Date of Consultation: 5/28/2022    HISTORY OF PRESENT ILLNESS:   Patient is an [de-identified]year old WF known to Dr. Ryan Thayer. She is being seen in consultation this hospital admission by Dr. Noble Faria for evaluation recommendations regarding AF RVR. Of note, patient is current COVID-19+ and in droplet plus isolation. As a result, telephone interview was attempted x 2 in order to preserve PPE and limit exposure. I was unsuccessful in reaching the patient at this time. As a result, most of history obtained through chart review and discussion with medical staff    PMH: obesity, COPD, former tobacco smoker, persistent AF on chronic Coumadin therapy, HTN, HLD, T2DM, CKD, VHD, chronic pericardial effusion and history of frequent mechanical falls. No prior ischemic evaluation available for review. · Patient was seen in the [facility] ED on 5/19/2022 for mechanical fall. Patient was unable to get up due to chronic generalized weakness and fatigue. Noted to have left forehead hematoma, head CT negative for acute process at that time. Discharged home same day. Patient presented back to [facility] on May 24 2022 for cough, generalized weakness and neck pain. She was transferred from [address] for trauma evaluation and possible C5-C6 fracture on CT C-spine, and was additionally noted to be positive for COVID-19. · Through chart review, patient presented back to the ED with complaints of neck pain, generalized weakness and fatigue. She also admitted to new onset productive cough. As a result, she was tested for COVID-19, and noted to be positive this admission. · Through chart review, patient received initial COVID-19 vaccination x 2 in January 2021, no booster noted. · She is currently being followed by neurosurgery service due to possible C5-C6 fracture noted on MRI.   · Cardiology was consulted due to AF RVR on initial admission. Telemetry reviewed today reveals AF with heart rate in the 80s to 90s. She was also noted to be in AF with RVR on EKG 5/19/2022 at time of her initial ED visit. Last cardiology office visit 4/5/2022 revealed AF with CVR. Please note: past medical records were reviewed per electronic medical record (EMR) - see detailed reports under Past Medical/ Surgical History. PAST MEDICAL HISTORY:    · Obesity  · COPD  · Former tobacco smoker  · Likely persistent AF, chronic Coumadin therapy  · HTN  · HLD, not on statin therapy  · T2DM  · CKD  · History of frequent mechanical falls  · VHD. Chronic pericardial effusion      CARDIAC TESTING    Echocardiogram reviewed: 12/2017  Summary   Ejection fraction biplane = 52%   Left atrium is of normal size.   Probable atrial ffibrillation   Structurally normal mitral valve.   The aortic valve is trileaflet.   The aortic valve appears mildly sclerotic.     TTE 4/1/2022  Technically difficult examination.   Left ventricular size is grossly normal.   Ejection fraction is visually estimated at 60 to 65%.   Normal right ventricular size and function.   Mild mitral regurgitation is present.   Mild to moderate tricuspid regurgitation.  RVSP is 38 mmHg.   Mild pulmonic regurgitation present.   Small pericardial effusion without tamponade      PAST SURGICAL HISTORY:    Past Surgical History:   Procedure Laterality Date    CATARACT REMOVAL  2004    BILATERAL    COLONOSCOPY  [id number]    BENIGN POLYPS ;INT  HEMORRHOIDS    EYE SURGERY  2004    VITREOUS  HEMORRHAGE    HYSTERECTOMY  1986    [facility]       HOME MEDICATIONS:  Prior to Admission medications    Medication Sig Start Date End Date Taking?  Authorizing Provider   primidone (MYSOLINE) 50 MG tablet Take 150 mg by mouth 2 times daily    Historical Provider, MD   glimepiride (AMARYL) 1 MG tablet Take 1 mg by mouth every morning (before breakfast)    Historical Provider, MD folic acid (FOLVITE) 1 MG tablet Take 1 mg by mouth every morning     Historical Provider, MD   warfarin (JANTOVEN) 2 MG tablet Take 7 mg by mouth nightly *TAKE ALONG WITH 5MG=7MG*  **SEE OTHER ORDER**    Historical Provider, MD   warfarin (JANTOVEN) 5 MG tablet Take 7 mg by mouth nightly *TAKE ALONG WITH 2MG=7MG*  **SEE OTHER ORDER**    Historical Provider, MD   diltiazem (CARDIZEM CD) 360 MG extended release capsule Take 1 capsule by mouth daily 12/16/17   Paola Tomlinson MD   latanoprost (XALATAN) 0.005 % ophthalmic solution Place 1 drop into both eyes nightly    Historical Provider, MD   lisinopril (PRINIVIL;ZESTRIL) 20 MG tablet Take 20 mg by mouth nightly.       Historical Provider, MD   spironolactone (ALDACTONE) 25 MG tablet Take 25 mg by mouth every morning     Historical Provider, MD   Vitamin D (CHOLECALCIFEROL) 1000 UNITS CAPS capsule Take 1,000 Units by mouth every morning     Historical Provider, MD       CURRENT MEDICATIONS:      Current Facility-Administered Medications:     warfarin placeholder: dosing by pharmacy, , Other, RX Placeholder, Eliel Rucker MD    dilTIAZem (CARDIZEM CD) extended release capsule 360 mg, 360 mg, Oral, Daily, Dois Fusi, DO, 360 mg at 57/85/76 5064    folic acid (FOLVITE) tablet 1 mg, 1 mg, Oral, QAM, Dois Fusi, DO, 1 mg at 05/27/22 0951    glipiZIDE (GLUCOTROL) tablet 2.5 mg, 2.5 mg, Oral, QAM AC, Dois Fusi, DO, 2.5 mg at 05/27/22 0605    latanoprost (XALATAN) 0.005 % ophthalmic solution 1 drop, 1 drop, Both Eyes, Nightly, Dois Fusi, DO, 1 drop at 05/27/22 2101    lisinopril (PRINIVIL;ZESTRIL) tablet 20 mg, 20 mg, Oral, Nightly, Dois Fusi, DO, 20 mg at 05/27/22 2101    primidone (MYSOLINE) tablet 150 mg, 150 mg, Oral, BID, Dois Fusi, DO, 150 mg at 05/27/22 2101    spironolactone (ALDACTONE) tablet 25 mg, 25 mg, Oral, QAM, Dois Claytoni, DO, 25 mg at 05/27/22 0951    Vitamin D (CHOLECALCIFEROL) tablet 1,000 Units, 1,000 Units, Oral, QAM, Drug use: No    Sexual activity: Not on file   Other Topics Concern    Not on file   Social History Narrative    Not on file     Social Determinants of Health     Financial Resource Strain:     Difficulty of Paying Living Expenses: Not on file   Food Insecurity:     Worried About Running Out of Food in the Last Year: Not on file    Livia of Food in the Last Year: Not on file   Transportation Needs:     Lack of Transportation (Medical): Not on file    Lack of Transportation (Non-Medical): Not on file   Physical Activity:     Days of Exercise per Week: Not on file    Minutes of Exercise per Session: Not on file   Stress:     Feeling of Stress : Not on file   Social Connections:     Frequency of Communication with Friends and Family: Not on file    Frequency of Social Gatherings with Friends and Family: Not on file    Attends Protestant Services: Not on file    Active Member of [employer] or Organizations: Not on file    Attends Club or Organization Meetings: Not on file    Marital Status: Not on file   Intimate Partner Violence:     Fear of Current or Ex-Partner: Not on file    Emotionally Abused: Not on file    Physically Abused: Not on file    Sexually Abused: Not on file   Housing Stability:     Unable to Pay for Housing in the Last Year: Not on file    Number of [place] in the Last Year: Not on file    Unstable Housing in the Last Year: Not on file       FAMILY HISTORY:   History reviewed. No pertinent family history.       REVIEW OF SYSTEMS:     Unable to obtain personal review of systems at this time as noted above in HPI      PHYSICAL EXAM:   /87   Pulse 82   Temp 98.4 °F (36.9 °C) (Oral)   Resp 18   Ht 5' 5\" (1.651 m)   Wt 186 lb 8 oz (84.6 kg)   SpO2 100%   BMI 34.11 kg/m²   Due to patient's COVID-19+ status and in droplet plus isolation, physical exam was not performed in order to preserve PPE and limit exposure      DATA:    Telemetry: AF with HR in the 80s to 90s    Diagnostic:  All diagnostic testing and lab work thus far this admission reviewed in detail. CT Head 5/24/2022  Impression  No acute intracranial abnormality. No intracranial hemorrhage. 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  However there is now a few bubbles of gas along linear lucency  at the C5-6 level which could represent fracture of the anterior  ossification.  However alignment is maintained.  Facets are maintained.  MRI  could further evaluate as well as evaluate any ligamentous injury. CXR 5/24/2022  Impression  Focal opacity in right upper lung may represent pneumonia or pulmonary edema  in the proper clinical setting.  Further evaluation may be obtained with CT  chest if clinically warranted. Chest CT 5/24/2022  Impression  Right upper lobe pneumonia with endobronchial spread of infection throughout  the lungs    MRI C-spine 5/26/2022  Impression  Rigidus spine with bridging anterior syndesmophytes. There is a defect at the level of C5-C6 disc space which appears to be a  fracture line along the fused space of C5-C6 extending anteriorly into the  anterior syndesmophyte with some of the fluid tracking along the fracture  line. Findings are concerning for acute fracture. Extensive amount of prevertebral soft tissue fluid is likely secondary to  C5-C6 fracture. Extensive amount of edema is also noted within the inter spinous ligaments of  C2 through C6 likely related to strain. Marrow edema of C2 vertebral body is likely related to degenerative disease. Multilevel high-grade degenerative disease most pronounced at C4-C5, C5-C6  and C6-C7 as described.       Intake/Output Summary (Last 24 hours) at 5/28/2022 0844  Last data filed at 5/28/2022 0532  Gross per 24 hour   Intake 650 ml   Output 200 ml   Net 450 ml       Labs:   CBC:   Recent Labs     05/27/22  0511 05/28/22  0624   WBC 4.1* 5.4   HGB 11.0* 11.2*   HCT 32.2* 33.4*    158     BMP:   Recent Labs     05/27/22  0511 05/28/22  0624    134   K 4.5 4.2   CO2 21* 22   BUN 31* 29*   CREATININE 1.0 1.0   LABGLOM 53 53   CALCIUM 8.8 8.7     Mag:   Recent Labs     05/26/22  0559 05/28/22  0624   MG 2.1 1.9     Phos:   Recent Labs     05/28/22  0624   PHOS 2.7     HgA1c:   Lab Results   Component Value Date    LABA1C 7.8 (H) 08/24/2012     PT/INR:   Recent Labs     05/27/22  0511 05/28/22  0624   PROTIME 36.3* 38.5*   INR 3.3 3.4     FASTING LIPID PANEL:  Lab Results   Component Value Date    CHOL 166 08/24/2012    HDL 27.2 08/24/2012    LDLCALC 80 08/24/2012    TRIG 292 08/24/2012     LIVER PROFILE:  Recent Labs     05/27/22  0511 05/28/22  0624   AST 25 29   ALT 25 26   LABALBU 3.2* 3.0*     Ref Range & Units 5/24/22 1220 5/19/22 1215    Total CK 20 - 180 U/L 189 High   431 High       Component Ref Range & Units 5/25/22 0125 5/24/22 1354 5/24/22 1220   Troponin, High Sensitivity 0 - 9 ng/L 28 High   23 High  CM  24 High  CM          Ref Range & Units 5/24/22 1220 6/12/21 2114   SARS-CoV-2, NAAT Not Detected DETECTED Abnormal  VC  Not Detected CM      Ref Range & Units 5/24/22 1220 12/10/17 1049    Pro-BNP 0 - 450 pg/mL 2,984 High   1,407 High        Ref Range & Units 5/24/22 1220   Sed Rate 0 - 20 mm/Hr 54 High           Ref Range & Units 5/24/22 1220   CRP 0.0 - 0.4 mg/dL 7.9 High          Ref Range & Units 5/24/22 1220 12/11/17 1835    Procalcitonin 0.00 - 0.08 ng/mL 0.19 High   3.05 High        Ref Range & Units 5/25/22 0125   D-Dimer, Quant ng/mL         ASSESSMENT:  · Persistent AF, with AF with RVR on initial admission  · Today, patient is noted to be in AF with CVR on telemetry review, HR in the 80-90s  · On chronic Coumadin therapy, INR 3.0 today  · COVID-19+ infection with associated pneumonia on chest CT this admission  · Possible C5-C6 fracture s/p recent mechanical fall.   Neurosurgery following  · HTN, uncontrolled at facility for neurosurgical consultation. Patient was found to be in atrial fibrillation with rapid ventricular response. Her blood pressure is 107/87 and pulse 82/min. EKG revealed atrial fibrillation at 111 bpm, poor R wave progression and nonspecific T wave changes. Labs:  High sensitivity troponin 28, 23 and 24. ESR 54. CRP 7.9. Albumin 3.0. INR 3.0. Hematocrit 33.4, hemoglobin 11.2. BUN 29 and creatinine 1.0 with potassium 4.2.       TTE 4/1/2022  Technically difficult examination.   Left ventricular size is grossly normal.   Ejection fraction is visually estimated at 60 to 65%.   Normal right ventricular size and function.   Mild mitral regurgitation is present.   Mild to moderate tricuspid regurgitation.  RVSP is 38 mmHg.   Mild pulmonic regurgitation present.   Small pericardial effusion without tamponade. ASSESSMENT:  · Persistent AF, with AF with RVR on initial admission  ? Today, patient is noted to be in AF with CVR on telemetry review, HR in the 80-90s  ? On chronic Coumadin therapy, INR 3.0 today  · COVID-19+ infection with associated pneumonia on chest CT this admission  · Possible C5-C6 fracture s/p recent mechanical fall. Neurosurgery following  · HTN, uncontrolled at times  · HLD, not on statin therapy  · Mild MR, mild to moderate TR, mild PI, small pericardial effusion without tamponade, normal LV function and mild pulmonary hypertension with RVSP 38 on TTE 4/2022  · T2DM  · CKD  · COPD. Former tobacco smoker  · History of frequent mechanical falls  · Obesity    RECOMMENDATIONS:  · Check lipid panel and consider adding a statin due to known diabetes. · Continue with PO diltiazem. Can consider low-dose Toprol if needed for rate control. · Consider watchman device placement due to history of frequent mechanical falls. · Cardiology will sign off. May call if needed. · May follow-up with Dr. Adan Kumari  in 4 weeks after hospital discharge.     Thank you for allow me to share in the care of patient.

## 2022-05-28 NOTE — PROGRESS NOTES
IMPRESSION:  1. 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 2.  Multiple medical comorbidities   Patient wearing soft cervical collar. Nothing new to add from neurosurgical stand point at this time.

## 2022-05-29 LAB
ALBUMIN SERPL-MCNC: 3.3 G/DL (ref 3.5–5.2)
ALP BLD-CCNC: 119 U/L (ref 35–104)
ALT SERPL-CCNC: 38 U/L (ref 0–32)
ANION GAP SERPL CALCULATED.3IONS-SCNC: 13 MMOL/L (ref 7–16)
ANISOCYTOSIS: ABNORMAL
AST SERPL-CCNC: 32 U/L (ref 0–31)
BACTERIA: ABNORMAL /HPF
BASOPHILS ABSOLUTE: 0 E9/L (ref 0–0.2)
BASOPHILS RELATIVE PERCENT: 0.2 % (ref 0–2)
BILIRUB SERPL-MCNC: 0.5 MG/DL (ref 0–1.2)
BILIRUBIN URINE: NEGATIVE
BLOOD, URINE: ABNORMAL
BUN BLDV-MCNC: 25 MG/DL (ref 6–23)
BURR CELLS: ABNORMAL
CALCIUM SERPL-MCNC: 8.9 MG/DL (ref 8.6–10.2)
CHLORIDE BLD-SCNC: 100 MMOL/L (ref 98–107)
CHOLESTEROL, TOTAL: 192 MG/DL (ref 0–199)
CLARITY: CLEAR
CO2: 23 MMOL/L (ref 22–29)
COLOR: YELLOW
CREAT SERPL-MCNC: 0.9 MG/DL (ref 0.5–1)
EOSINOPHILS ABSOLUTE: 0 E9/L (ref 0.05–0.5)
EOSINOPHILS RELATIVE PERCENT: 0.4 % (ref 0–6)
EPITHELIAL CELLS, UA: ABNORMAL /HPF
GFR AFRICAN AMERICAN: >60
GFR NON-AFRICAN AMERICAN: >60 ML/MIN/1.73
GLUCOSE BLD-MCNC: 120 MG/DL (ref 74–99)
GLUCOSE URINE: NEGATIVE MG/DL
HCT VFR BLD CALC: 34.9 % (ref 34–48)
HDLC SERPL-MCNC: 39 MG/DL
HEMOGLOBIN: 12 G/DL (ref 11.5–15.5)
INR BLD: 2.7
KETONES, URINE: NEGATIVE MG/DL
LDL CHOLESTEROL CALCULATED: 114 MG/DL (ref 0–99)
LEUKOCYTE ESTERASE, URINE: NEGATIVE
LYMPHOCYTES ABSOLUTE: 0.44 E9/L (ref 1.5–4)
LYMPHOCYTES RELATIVE PERCENT: 7.8 % (ref 20–42)
MAGNESIUM: 1.9 MG/DL (ref 1.6–2.6)
MCH RBC QN AUTO: 30.7 PG (ref 26–35)
MCHC RBC AUTO-ENTMCNC: 34.4 % (ref 32–34.5)
MCV RBC AUTO: 89.3 FL (ref 80–99.9)
METER GLUCOSE: 116 MG/DL (ref 74–99)
METER GLUCOSE: 137 MG/DL (ref 74–99)
MONOCYTES ABSOLUTE: 0.66 E9/L (ref 0.1–0.95)
MONOCYTES RELATIVE PERCENT: 12.2 % (ref 2–12)
NEUTROPHILS ABSOLUTE: 4.4 E9/L (ref 1.8–7.3)
NEUTROPHILS RELATIVE PERCENT: 80 % (ref 43–80)
NITRITE, URINE: NEGATIVE
OVALOCYTES: ABNORMAL
PDW BLD-RTO: 12.7 FL (ref 11.5–15)
PH UA: 6 (ref 5–9)
PHOSPHORUS: 3.3 MG/DL (ref 2.5–4.5)
PLATELET # BLD: 176 E9/L (ref 130–450)
PMV BLD AUTO: 9.4 FL (ref 7–12)
POIKILOCYTES: ABNORMAL
POTASSIUM REFLEX MAGNESIUM: 4.6 MMOL/L (ref 3.5–5)
PROTEIN UA: 100 MG/DL
PROTHROMBIN TIME: 30.4 SEC (ref 9.3–12.4)
RBC # BLD: 3.91 E12/L (ref 3.5–5.5)
RBC UA: ABNORMAL /HPF (ref 0–2)
SODIUM BLD-SCNC: 136 MMOL/L (ref 132–146)
SPECIFIC GRAVITY UA: 1.02 (ref 1–1.03)
TOTAL PROTEIN: 6.3 G/DL (ref 6.4–8.3)
TRIGL SERPL-MCNC: 193 MG/DL (ref 0–149)
UROBILINOGEN, URINE: 0.2 E.U./DL
VLDLC SERPL CALC-MCNC: 39 MG/DL
WBC # BLD: 5.5 E9/L (ref 4.5–11.5)
WBC UA: ABNORMAL /HPF (ref 0–5)

## 2022-05-29 PROCEDURE — 2580000003 HC RX 258: Performed by: FAMILY MEDICINE

## 2022-05-29 PROCEDURE — 2140000000 HC CCU INTERMEDIATE R&B

## 2022-05-29 PROCEDURE — 6370000000 HC RX 637 (ALT 250 FOR IP)

## 2022-05-29 PROCEDURE — 83735 ASSAY OF MAGNESIUM: CPT

## 2022-05-29 PROCEDURE — 80053 COMPREHEN METABOLIC PANEL: CPT

## 2022-05-29 PROCEDURE — 80061 LIPID PANEL: CPT

## 2022-05-29 PROCEDURE — 6370000000 HC RX 637 (ALT 250 FOR IP): Performed by: FAMILY MEDICINE

## 2022-05-29 PROCEDURE — 84100 ASSAY OF PHOSPHORUS: CPT

## 2022-05-29 PROCEDURE — 82962 GLUCOSE BLOOD TEST: CPT

## 2022-05-29 PROCEDURE — 85025 COMPLETE CBC W/AUTO DIFF WBC: CPT

## 2022-05-29 PROCEDURE — 85610 PROTHROMBIN TIME: CPT

## 2022-05-29 PROCEDURE — 81001 URINALYSIS AUTO W/SCOPE: CPT

## 2022-05-29 PROCEDURE — 36415 COLL VENOUS BLD VENIPUNCTURE: CPT

## 2022-05-29 PROCEDURE — 6370000000 HC RX 637 (ALT 250 FOR IP): Performed by: INTERNAL MEDICINE

## 2022-05-29 PROCEDURE — 87088 URINE BACTERIA CULTURE: CPT

## 2022-05-29 PROCEDURE — 6360000002 HC RX W HCPCS: Performed by: FAMILY MEDICINE

## 2022-05-29 RX ORDER — HYDRALAZINE HYDROCHLORIDE 10 MG/1
5 TABLET, FILM COATED ORAL EVERY 8 HOURS PRN
Status: DISCONTINUED | OUTPATIENT
Start: 2022-05-29 | End: 2022-05-30

## 2022-05-29 RX ORDER — LANOLIN ALCOHOL/MO/W.PET/CERES
3 CREAM (GRAM) TOPICAL NIGHTLY PRN
Status: DISCONTINUED | OUTPATIENT
Start: 2022-05-29 | End: 2022-05-29

## 2022-05-29 RX ORDER — ACETAMINOPHEN 325 MG/1
650 TABLET ORAL 3 TIMES DAILY
Status: DISCONTINUED | OUTPATIENT
Start: 2022-05-29 | End: 2022-06-03 | Stop reason: HOSPADM

## 2022-05-29 RX ORDER — LANOLIN ALCOHOL/MO/W.PET/CERES
3 CREAM (GRAM) TOPICAL NIGHTLY
Status: DISCONTINUED | OUTPATIENT
Start: 2022-05-29 | End: 2022-06-03 | Stop reason: HOSPADM

## 2022-05-29 RX ORDER — ATORVASTATIN CALCIUM 20 MG/1
20 TABLET, FILM COATED ORAL NIGHTLY
Status: DISCONTINUED | OUTPATIENT
Start: 2022-05-29 | End: 2022-06-03 | Stop reason: HOSPADM

## 2022-05-29 RX ORDER — WARFARIN SODIUM 3 MG/1
3 TABLET ORAL
Status: COMPLETED | OUTPATIENT
Start: 2022-05-29 | End: 2022-05-29

## 2022-05-29 RX ADMIN — Medication 10 ML: at 09:22

## 2022-05-29 RX ADMIN — PRIMIDONE 150 MG: 50 TABLET ORAL at 22:16

## 2022-05-29 RX ADMIN — DEXAMETHASONE SODIUM PHOSPHATE 6 MG: 10 INJECTION INTRAMUSCULAR; INTRAVENOUS at 05:57

## 2022-05-29 RX ADMIN — FOLIC ACID 1 MG: 1 TABLET ORAL at 09:19

## 2022-05-29 RX ADMIN — ACETAMINOPHEN 650 MG: 325 TABLET ORAL at 14:49

## 2022-05-29 RX ADMIN — DILTIAZEM HYDROCHLORIDE 360 MG: 120 CAPSULE, COATED, EXTENDED RELEASE ORAL at 09:20

## 2022-05-29 RX ADMIN — Medication 10 ML: at 22:17

## 2022-05-29 RX ADMIN — LISINOPRIL 20 MG: 20 TABLET ORAL at 22:17

## 2022-05-29 RX ADMIN — Medication 1000 UNITS: at 09:20

## 2022-05-29 RX ADMIN — Medication 3 MG: at 22:16

## 2022-05-29 RX ADMIN — ATORVASTATIN CALCIUM 20 MG: 20 TABLET, FILM COATED ORAL at 22:16

## 2022-05-29 RX ADMIN — SPIRONOLACTONE 25 MG: 25 TABLET ORAL at 09:19

## 2022-05-29 RX ADMIN — ACETAMINOPHEN 650 MG: 325 TABLET ORAL at 22:17

## 2022-05-29 RX ADMIN — PRIMIDONE 150 MG: 50 TABLET ORAL at 09:21

## 2022-05-29 RX ADMIN — WARFARIN SODIUM 3 MG: 3 TABLET ORAL at 18:26

## 2022-05-29 RX ADMIN — LATANOPROST 1 DROP: 50 SOLUTION OPHTHALMIC at 22:36

## 2022-05-29 ASSESSMENT — PAIN SCALES - GENERAL: PAINLEVEL_OUTOF10: 4

## 2022-05-29 NOTE — PROGRESS NOTES
Hospitalist Progress Note      PCP: Neha Steele MD    Date of Admission: 5/24/2022  Days in the hospital: [address] Course:   Ms. J Carlos Boo, a [de-identified]y.o. 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      Patient presented to the emergency department after a fall that occurred on the 19th of this month. She is also reporting generalized weakness, and neck pain. Patient reported she slipped off a chair landing on her tailbone. Neck pain due to flexion and extension. After the fall she laid on the ground for several hours unable to get up due to generalized weakness. She initially presented to the [facility] emergency department which work-up did not show any acute processes. She was discharged home. She continued to have pain around her coccyx and neck pain. She is unable to stand due to generalized weakness. She also reports she had a cough productive of sputum. Denies fever, chills, chest pain. She was vaccinated x2 for COVID-19. CT chest shows right upper lobe pneumonia with endobronchial spread of infection throughout the lungs. CT cervical spine shows a few bubbles of gas on linear lucency at the C5-6 level which could represent fracture. Neurosurgery was consulted in emergency department. They advised c-collar and they will see the patient in the morning. Medicine consulted for admission. Subjective  Patient seen and examined at bedside. Nursing called and stated that the patient was exhibiting altered mental status- which included stating things that did not make sense and taking off her clothes.      The patient was seen and she stated that she was taking off her clothes since she had urinary incontinence and an accident today. She knows who she is and the date; she was a little unsure of the name of the hospital- but she knew \"was here to get her neck fixed. \"   ABG completed: WNL. She also states that's she was uncomfortable and stayed up all last night. No SOB or CP at this time. Exam:    BP (!) 171/85   Pulse 89   Temp 97.3 °F (36.3 °C) (Oral)   Resp 22   Ht 5' 5\" (1.651 m)   Wt 186 lb 8 oz (84.6 kg)   SpO2 100%   BMI 34.11 kg/m²       HEENT: No pallor, no icterus. C-collar noted  Respiratory:  decreased breath sounds at her baseline. Cardiovascular: RRR, no murmur. Abdomen: Soft, non-tender, BS noted. Musculoskeletal: No joint pains or joint swelling noted. Neurologic: awake, alert,AAOX(2-3)     Assessment/Plan:  1.) COVID-19 infection, currently on steroids (decadron- Day #3) , saturating well on room air, continue to follow closely. Chest xray: No significant change in bilateral airspace opacities. Patient is not on oxygen. 2.) A. fib with RVR; patient is on coumadin. Cardiology evaluation appreciated,recommend a lipid profile. PO diltiazem  With consideration of low dose beta blocker therapy. Can f/u with Dr. Yessenia Trevino in 4 weeks OP.     3.) C5- C6 fracture, seen by neurosurgery, MRI of the cervical spine reviewed; As per neurosurgery. 4.) Leukopenia secondary to COVID-19 infection: resolved.     5.) ? AMS: Patient appears to answer all questions appropriately. C/w monitoring at this time. CT head was ordered- pending. 6.) Insomnia: Melatonin ordered x 1 for tonight and the PRN     7.) Uncontrolled HTN: may be worsened secondary to pain. Tylenol 1,000mg ordered x 1- patient does not want any narcotics. hydralazine PRN. C/w diltiazem and lisinopril, aldactone. · Awaiting PT OT evaluation.       Labs:   Recent Labs     05/26/22  0559 05/27/22  0511 05/28/22  0624   WBC 3.6* 4.1* 5.4   HGB 11.1* 11.0* 11.2*   HCT 33.0* 32.2* 33.4*   * 135 158     Recent Labs     05/26/22  0559 05/27/22  0511 05/28/22  0624    132 134   K 4.4  4.4 4.5 4.2    100 100   CO2 19* 21* 22   BUN 29* 31* 29*   CREATININE 1.0 1.0 1.0   CALCIUM 8.6 8.8 8.7   PHOS  --   --  2.7     Recent Labs     05/26/22  0559 05/27/22  0511 05/28/22  0624   AST 29 25 29   ALT 27 25 26   BILITOT 0.4 0.3 0.4   ALKPHOS 85 88 96     Recent Labs     05/26/22  0854 05/27/22 0511 05/28/22 0624   INR 2.5 3.3 3.4     No results for input(s): Darinel Hawley in the last 72 hours. Medications:  Reviewed    Infusion Medications    sodium chloride       Scheduled Medications    melatonin  1.5 mg Oral Once    warfarin placeholder: dosing by pharmacy   Other RX Placeholder    dilTIAZem  360 mg Oral Daily    folic acid  1 mg Oral QAM    glipiZIDE  2.5 mg Oral QAM AC    latanoprost  1 drop Both Eyes Nightly    lisinopril  20 mg Oral Nightly    primidone  150 mg Oral BID    spironolactone  25 mg Oral QAM    Vitamin D  1,000 Units Oral QAM    sodium chloride flush  10 mL IntraVENous 2 times per day    dexamethasone  6 mg IntraVENous Q24H     PRN Meds: sodium chloride flush, sodium chloride, promethazine **OR** ondansetron, polyethylene glycol, acetaminophen **OR** acetaminophen, potassium chloride **OR** potassium alternative oral replacement **OR** potassium chloride, magnesium sulfate      Intake/Output Summary (Last 24 hours) at 5/28/2022 2014  Last data filed at 5/28/2022 0532  Gross per 24 hour   Intake 400 ml   Output 200 ml   Net 200 ml     Body mass index is 34.11 kg/m². · Diet  ADULT DIET; Regular; Low Sodium (2 gm)    · Code Status  Full Code         Electronically signed by Gisell Meredith MD on 5/28/2022 at 8:14 PM  [employer]   Please contact me through perfect [employer]    NOTE: This report was transcribed using voice recognition software.  Every effort was made to ensure accuracy; however, inadvertent computerized transcription errors may be present.

## 2022-05-29 NOTE — PROGRESS NOTES
Pharmacy Consultation Note  (Warfarin Dosing and Monitoring)  Initial consult date: 5/27/22  Consulting Provider: Dr. Aleks Ingram is a [de-identified] y.o. female for whom pharmacy has been asked to manage warfarin therapy. Weight:   Wt Readings from Last 1 Encounters:   05/29/22 184 lb 3.2 oz (83.6 kg)       TSH:    Lab Results   Component Value Date    TSH 0.565 12/11/2017       Hepatic Function Panel:                            Lab Results   Component Value Date    ALKPHOS 119 05/29/2022    ALT 38 05/29/2022    AST 32 05/29/2022    PROT 6.3 05/29/2022    BILITOT 0.5 05/29/2022    LABALBU 3.3 05/29/2022       Current warfarin drug-drug interactions include:  primidone (home med), dexamethasone    Recent Labs     05/27/22  0511 05/28/22  0624 05/29/22  0614   HGB 11.0* 11.2* 12.0    158 176     Date Warfarin Dose INR Heparin or LMWH Comment   5/27 hold 3.3 --    5/28 HOLD 3.4 --    5/29 < 3 mg > 2.7 --                    Assessment:  · Patient is a [de-identified] y.o. female on warfarin for Atrial Fibrillation. Patient's home warfarin dosing regimen is 7 mg daily. · Goal INR 2 - 3  · Patient admitted for COVID-19 pneumonia  · INR 2.7, back in therapeutic range, will resume lower dose. Unwitnessed fall this AM, CT head with no acute changes.      Plan:  · Warfarin 3 mg x 1 tonight  · Daily PT/INR until the INR is stable within the therapeutic range  · Pharmacist will follow and monitor/adjust dosing as necessary    Thank you for this consult,    George Hook, PharmD   Pharmacy Resident   Phone: [phone]  5/29/2022 8:54 AM

## 2022-05-29 NOTE — PROGRESS NOTES
Hospitalist Progress Note      PCP: Paola Tomlinson MD    Date of Admission: 5/24/2022  Days in the hospital: [address] [facility] Course:   Ms. Aimee Sheppard, a [de-identified]y.o. 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      Patient presented to the emergency department after a fall that occurred on the 19th of this month. She is also reporting generalized weakness, and neck pain. Patient reported she slipped off a chair landing on her tailbone. Neck pain due to flexion and extension. After the fall she laid on the ground for several hours unable to get up due to generalized weakness. She initially presented to the [facility] emergency department which work-up did not show any acute processes. She was discharged home. She continued to have pain around her coccyx and neck pain. She is unable to stand due to generalized weakness. She also reports she had a cough productive of sputum. Denies fever, chills, chest pain. She was vaccinated x2 for COVID-19. CT chest shows right upper lobe pneumonia with endobronchial spread of infection throughout the lungs. CT cervical spine shows a few bubbles of gas on linear lucency at the C5-6 level which could represent fracture. Neurosurgery was consulted in emergency department. They advised c-collar and they will see the patient in the morning. Medicine consulted for admission. Subjective  Patient seen and examined at bedside. Nursing states that she tried to get out of bed and a tele-sitter needed to be placed. She denies any SOB, CP or abd pain. No constipation or diarrhea.    Patient did not sleep last night with the low dose melatonin. Patient with neck pain but refuses any thing stronger but tylenol. Exam:    BP (!) 189/87   Pulse 89   Temp 98.5 °F (36.9 °C) (Oral)   Resp 18   Ht 5' 5\" (1.651 m)   Wt 184 lb 3.2 oz (83.6 kg)   SpO2 96%   BMI 33.69 kg/m²       HEENT: No pallor, no icterus. C-collar noted  Respiratory:  decreased breath sounds at her baseline. Cardiovascular: RRR, no murmur. Abdomen: Soft, non-tender, BS noted. Musculoskeletal: No calf pain. Neurologic: awake, alert,AAOX(2-3)     Assessment/Plan:  1.) COVID-19 infection, currently on steroids (decadron- Day #4) , saturating well on room air, continue to follow closely. Chest xray 22: No significant change in bilateral airspace opacities. Patient is not on oxygen. Chest Xray in the am. Pro-calcitonin is decreasing. 2.) A. fib with RVR; patient is on coumadin- pharmacy following. Cardiology evaluation appreciated,recommend a lipid profile- LDL: 114 and Tri. Will start low dose stain. Patient on  PO diltiazem  With consideration of low dose beta blocker therapy if not stable. Can f/u with Dr. Dorian Mcmahon in 4 weeks OP.     3.) C5- C6 fracture, seen by neurosurgery, MRI of the cervical spine reviewed; As per neurosurgery. PT/OT. 4.) Leukopenia secondary to COVID-19 infection: resolved.     5.) ? AMS: Patient appears to answer all questions appropriately. C/w monitoring at this time. CT head was ordered- negative. ABG: negative. Will check a UA/urine cx.     6.) Insomnia: Melatonin dose increased ordered nightly. 7.) Uncontrolled HTN: may be worsened secondary to pain. Tylenol TID ordered- will watch Lfts. Patient does not want any narcotics. hydralazine PRN. C/w diltiazem and lisinopril, aldactone. Awaiting PT/OT evaluation.        Labs:   Recent Labs     22  0511 22  0624 22  0614   WBC 4.1* 5.4 5.5   HGB 11.0* 11.2* 12.0   HCT 32.2* 33.4* 34.9    158 176     Recent Labs     22  0511 05/28/22  0624 05/29/22  0614    134 136   K 4.5 4.2 4.6    100 100   CO2 21* 22 23   BUN 31* 29* 25*   CREATININE 1.0 1.0 0.9   CALCIUM 8.8 8.7 8.9   PHOS  --  2.7 3.3     Recent Labs     05/27/22  0511 05/28/22  0624 05/29/22  0614   AST 25 29 32*   ALT 25 26 38*   BILITOT 0.3 0.4 0.5   ALKPHOS 88 96 119*     Recent Labs     05/27/22  0511 05/28/22  0624 05/29/22  0614   INR 3.3 3.4 2.7     No results for input(s): Jo Ann Cook in the last 72 hours. Medications:  Reviewed    Infusion Medications    sodium chloride       Scheduled Medications    warfarin  3 mg Oral Once    acetaminophen  650 mg Oral TID    warfarin placeholder: dosing by pharmacy   Other RX Placeholder    dilTIAZem  360 mg Oral Daily    folic acid  1 mg Oral QAM    glipiZIDE  2.5 mg Oral QAM AC    latanoprost  1 drop Both Eyes Nightly    lisinopril  20 mg Oral Nightly    primidone  150 mg Oral BID    spironolactone  25 mg Oral QAM    Vitamin D  1,000 Units Oral QAM    sodium chloride flush  10 mL IntraVENous 2 times per day    dexamethasone  6 mg IntraVENous Q24H     PRN Meds: hydrALAZINE, sodium chloride flush, sodium chloride, promethazine **OR** ondansetron, polyethylene glycol, acetaminophen **OR** acetaminophen, potassium chloride **OR** potassium alternative oral replacement **OR** potassium chloride, magnesium sulfate      Intake/Output Summary (Last 24 hours) at 5/29/2022 1205  Last data filed at 5/29/2022 0541  Gross per 24 hour   Intake 400 ml   Output --   Net 400 ml     Body mass index is 33.69 kg/m². · Diet  ADULT DIET; Regular; Low Sodium (2 gm)    · Code Status  Full Code         Electronically signed by Sae Comer MD on 5/29/2022 at 12:05 PM  [employer]   Please contact me through perfect serve    NOTE: This report was transcribed using voice recognition software. Every effort was made to ensure accuracy; however, inadvertent computerized transcription errors may be present.

## 2022-05-30 ENCOUNTER — APPOINTMENT (OUTPATIENT)
Dept: GENERAL RADIOLOGY | Age: 82
DRG: 551 | End: 2022-05-30
Payer: MEDICARE

## 2022-05-30 LAB
ALBUMIN SERPL-MCNC: 3.2 G/DL (ref 3.5–5.2)
ALP BLD-CCNC: 119 U/L (ref 35–104)
ALT SERPL-CCNC: 30 U/L (ref 0–32)
ANION GAP SERPL CALCULATED.3IONS-SCNC: 14 MMOL/L (ref 7–16)
AST SERPL-CCNC: 29 U/L (ref 0–31)
BASOPHILS ABSOLUTE: 0 E9/L (ref 0–0.2)
BASOPHILS RELATIVE PERCENT: 0 % (ref 0–2)
BILIRUB SERPL-MCNC: 0.4 MG/DL (ref 0–1.2)
BILIRUBIN DIRECT: <0.2 MG/DL (ref 0–0.3)
BILIRUBIN, INDIRECT: NORMAL MG/DL (ref 0–1)
BUN BLDV-MCNC: 30 MG/DL (ref 6–23)
CALCIUM SERPL-MCNC: 8.8 MG/DL (ref 8.6–10.2)
CHLORIDE BLD-SCNC: 100 MMOL/L (ref 98–107)
CO2: 22 MMOL/L (ref 22–29)
CREAT SERPL-MCNC: 1.1 MG/DL (ref 0.5–1)
EOSINOPHILS ABSOLUTE: 0.06 E9/L (ref 0.05–0.5)
EOSINOPHILS RELATIVE PERCENT: 0.9 % (ref 0–6)
GFR AFRICAN AMERICAN: 58
GFR NON-AFRICAN AMERICAN: 48 ML/MIN/1.73
GLUCOSE BLD-MCNC: 120 MG/DL (ref 74–99)
HCT VFR BLD CALC: 35.3 % (ref 34–48)
HEMOGLOBIN: 11.7 G/DL (ref 11.5–15.5)
IMMATURE GRANULOCYTES #: 0.07 E9/L
IMMATURE GRANULOCYTES %: 1.1 % (ref 0–5)
INR BLD: 2.2
LYMPHOCYTES ABSOLUTE: 0.66 E9/L (ref 1.5–4)
LYMPHOCYTES RELATIVE PERCENT: 10.4 % (ref 20–42)
MAGNESIUM: 1.9 MG/DL (ref 1.6–2.6)
MCH RBC QN AUTO: 30.4 PG (ref 26–35)
MCHC RBC AUTO-ENTMCNC: 33.1 % (ref 32–34.5)
MCV RBC AUTO: 91.7 FL (ref 80–99.9)
METER GLUCOSE: 171 MG/DL (ref 74–99)
MONOCYTES ABSOLUTE: 0.61 E9/L (ref 0.1–0.95)
MONOCYTES RELATIVE PERCENT: 9.6 % (ref 2–12)
NEUTROPHILS ABSOLUTE: 4.97 E9/L (ref 1.8–7.3)
NEUTROPHILS RELATIVE PERCENT: 78 % (ref 43–80)
PDW BLD-RTO: 12.7 FL (ref 11.5–15)
PHOSPHORUS: 3.5 MG/DL (ref 2.5–4.5)
PLATELET # BLD: 186 E9/L (ref 130–450)
PMV BLD AUTO: 9.4 FL (ref 7–12)
POTASSIUM REFLEX MAGNESIUM: 4.7 MMOL/L (ref 3.5–5)
PROTHROMBIN TIME: 24.5 SEC (ref 9.3–12.4)
RBC # BLD: 3.85 E12/L (ref 3.5–5.5)
SODIUM BLD-SCNC: 136 MMOL/L (ref 132–146)
TOTAL PROTEIN: 5.8 G/DL (ref 6.4–8.3)
WBC # BLD: 6.4 E9/L (ref 4.5–11.5)

## 2022-05-30 PROCEDURE — 85610 PROTHROMBIN TIME: CPT

## 2022-05-30 PROCEDURE — 2580000003 HC RX 258: Performed by: INTERNAL MEDICINE

## 2022-05-30 PROCEDURE — 80076 HEPATIC FUNCTION PANEL: CPT

## 2022-05-30 PROCEDURE — 83735 ASSAY OF MAGNESIUM: CPT

## 2022-05-30 PROCEDURE — 2140000000 HC CCU INTERMEDIATE R&B

## 2022-05-30 PROCEDURE — 6370000000 HC RX 637 (ALT 250 FOR IP): Performed by: INTERNAL MEDICINE

## 2022-05-30 PROCEDURE — 36415 COLL VENOUS BLD VENIPUNCTURE: CPT

## 2022-05-30 PROCEDURE — 6360000002 HC RX W HCPCS: Performed by: FAMILY MEDICINE

## 2022-05-30 PROCEDURE — 2580000003 HC RX 258: Performed by: FAMILY MEDICINE

## 2022-05-30 PROCEDURE — 82962 GLUCOSE BLOOD TEST: CPT

## 2022-05-30 PROCEDURE — 6370000000 HC RX 637 (ALT 250 FOR IP): Performed by: FAMILY MEDICINE

## 2022-05-30 PROCEDURE — 71045 X-RAY EXAM CHEST 1 VIEW: CPT

## 2022-05-30 PROCEDURE — 80053 COMPREHEN METABOLIC PANEL: CPT

## 2022-05-30 PROCEDURE — 84100 ASSAY OF PHOSPHORUS: CPT

## 2022-05-30 PROCEDURE — 85025 COMPLETE CBC W/AUTO DIFF WBC: CPT

## 2022-05-30 RX ORDER — SODIUM CHLORIDE 9 MG/ML
250 INJECTION, SOLUTION INTRAVENOUS CONTINUOUS
Status: ACTIVE | OUTPATIENT
Start: 2022-05-30 | End: 2022-05-30

## 2022-05-30 RX ORDER — WARFARIN SODIUM 5 MG/1
5 TABLET ORAL
Status: COMPLETED | OUTPATIENT
Start: 2022-05-30 | End: 2022-05-30

## 2022-05-30 RX ORDER — HYDRALAZINE HYDROCHLORIDE 10 MG/1
5 TABLET, FILM COATED ORAL EVERY 12 HOURS SCHEDULED
Status: DISCONTINUED | OUTPATIENT
Start: 2022-05-30 | End: 2022-06-03 | Stop reason: HOSPADM

## 2022-05-30 RX ORDER — HYDRALAZINE HYDROCHLORIDE 20 MG/ML
5 INJECTION INTRAMUSCULAR; INTRAVENOUS EVERY 6 HOURS PRN
Status: DISCONTINUED | OUTPATIENT
Start: 2022-05-30 | End: 2022-06-03 | Stop reason: HOSPADM

## 2022-05-30 RX ADMIN — Medication 3 MG: at 21:39

## 2022-05-30 RX ADMIN — LATANOPROST 1 DROP: 50 SOLUTION OPHTHALMIC at 21:50

## 2022-05-30 RX ADMIN — ACETAMINOPHEN 650 MG: 325 TABLET ORAL at 09:40

## 2022-05-30 RX ADMIN — Medication 10 ML: at 09:45

## 2022-05-30 RX ADMIN — Medication 10 ML: at 21:41

## 2022-05-30 RX ADMIN — LISINOPRIL 20 MG: 20 TABLET ORAL at 21:38

## 2022-05-30 RX ADMIN — ACETAMINOPHEN 650 MG: 325 TABLET ORAL at 13:27

## 2022-05-30 RX ADMIN — Medication 1000 UNITS: at 09:43

## 2022-05-30 RX ADMIN — PRIMIDONE 150 MG: 50 TABLET ORAL at 21:39

## 2022-05-30 RX ADMIN — DEXAMETHASONE SODIUM PHOSPHATE 6 MG: 10 INJECTION INTRAMUSCULAR; INTRAVENOUS at 04:05

## 2022-05-30 RX ADMIN — FOLIC ACID 1 MG: 1 TABLET ORAL at 09:40

## 2022-05-30 RX ADMIN — WARFARIN SODIUM 5 MG: 5 TABLET ORAL at 18:04

## 2022-05-30 RX ADMIN — ATORVASTATIN CALCIUM 20 MG: 20 TABLET, FILM COATED ORAL at 21:38

## 2022-05-30 RX ADMIN — ACETAMINOPHEN 650 MG: 325 TABLET ORAL at 21:38

## 2022-05-30 RX ADMIN — SPIRONOLACTONE 25 MG: 25 TABLET ORAL at 09:40

## 2022-05-30 RX ADMIN — DILTIAZEM HYDROCHLORIDE 360 MG: 120 CAPSULE, COATED, EXTENDED RELEASE ORAL at 09:39

## 2022-05-30 RX ADMIN — HYDRALAZINE HYDROCHLORIDE 5 MG: 10 TABLET, FILM COATED ORAL at 09:42

## 2022-05-30 RX ADMIN — HYDRALAZINE HYDROCHLORIDE 5 MG: 10 TABLET, FILM COATED ORAL at 21:38

## 2022-05-30 RX ADMIN — PRIMIDONE 150 MG: 50 TABLET ORAL at 09:43

## 2022-05-30 RX ADMIN — SODIUM CHLORIDE 250 ML: 9 INJECTION, SOLUTION INTRAVENOUS at 09:45

## 2022-05-30 ASSESSMENT — PAIN SCALES - GENERAL
PAINLEVEL_OUTOF10: 4
PAINLEVEL_OUTOF10: 0

## 2022-05-30 NOTE — PROGRESS NOTES
Hospitalist Progress Note      PCP: Cara Bateman MD    Date of Admission: 5/24/2022  Days in the hospital: [address] [facility] Course:   Ms. Tereza Pickering, a [de-identified]y.o. 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      Patient presented to the emergency department after a fall that occurred on the 19th of this month. She is also reporting generalized weakness, and neck pain. Patient reported she slipped off a chair landing on her tailbone. Neck pain due to flexion and extension. After the fall she laid on the ground for several hours unable to get up due to generalized weakness. She initially presented to the [facility] emergency department which work-up did not show any acute processes. She was discharged home. She continued to have pain around her coccyx and neck pain. She is unable to stand due to generalized weakness. She also reports she had a cough productive of sputum. Denies fever, chills, chest pain. She was vaccinated x2 for COVID-19. CT chest shows right upper lobe pneumonia with endobronchial spread of infection throughout the lungs. CT cervical spine shows a few bubbles of gas on linear lucency at the C5-6 level which could represent fracture. Neurosurgery was consulted in emergency department. They advised c-collar and they will see the patient in the morning. Medicine consulted for admission. Subjective  Patient seen and examined at bedside. She denies any SOB, CP or abd pain. No constipation or diarrhea. Patient was able to finally sleep after insomnia x 2 days- she is feeling much better.    Patient with neck pain but Labs:   Recent Labs     05/28/22  0624 05/29/22  0614 05/30/22  0519   WBC 5.4 5.5 6.4   HGB 11.2* 12.0 11.7   HCT 33.4* 34.9 35.3    176 186     Recent Labs     05/28/22  0624 05/29/22  0614 05/30/22  0519    136 136   K 4.2 4.6 4.7    100 100   CO2 22 23 22   BUN 29* 25* 30*   CREATININE 1.0 0.9 1.1*   CALCIUM 8.7 8.9 8.8   PHOS 2.7 3.3 3.5     Recent Labs     05/28/22 0624 05/29/22  0614 05/30/22  0519   AST 29 32* 29   ALT 26 38* 30   BILIDIR  --   --  <0.2   BILITOT 0.4 0.5 0.4   ALKPHOS 96 119* 119*     Recent Labs     05/28/22 0624 05/29/22 0614 05/30/22  0519   INR 3.4 2.7 2.2     No results for input(s): Dawna Gibson in the last 72 hours. Medications:  Reviewed    Infusion Medications    sodium chloride       Scheduled Medications    warfarin  5 mg Oral Once    hydrALAZINE  5 mg Oral 2 times per day    acetaminophen  650 mg Oral TID    melatonin  3 mg Oral Nightly    atorvastatin  20 mg Oral Nightly    warfarin placeholder: dosing by pharmacy   Other RX Placeholder    dilTIAZem  360 mg Oral Daily    folic acid  1 mg Oral QAM    glipiZIDE  2.5 mg Oral QAM AC    latanoprost  1 drop Both Eyes Nightly    lisinopril  20 mg Oral Nightly    primidone  150 mg Oral BID    spironolactone  25 mg Oral QAM    Vitamin D  1,000 Units Oral QAM    sodium chloride flush  10 mL IntraVENous 2 times per day    dexamethasone  6 mg IntraVENous Q24H     PRN Meds: hydrALAZINE, sodium chloride flush, sodium chloride, promethazine **OR** ondansetron, polyethylene glycol, acetaminophen **OR** acetaminophen, potassium chloride **OR** potassium alternative oral replacement **OR** potassium chloride, magnesium sulfate      Intake/Output Summary (Last 24 hours) at 5/30/2022 0439  Last data filed at 5/30/2022 0527  Gross per 24 hour   Intake 400 ml   Output 550 ml   Net -150 ml     Body mass index is 33.69 kg/m². · Diet  ADULT DIET; Regular;  Low Sodium (2 gm)    · Code Status  Full Code         Electronically signed by Lewis Marsh MD on 5/30/2022 at 3:58 PM  [employer]   Please contact me through perfect [employer]    NOTE: This report was transcribed using voice recognition software. Every effort was made to ensure accuracy; however, inadvertent computerized transcription errors may be present.

## 2022-05-30 NOTE — PROGRESS NOTES
Patient is more oriented and less restless today, the patient is ok to work with PT/OT, attempted to get patient out of bed today but the patient refused at this time.

## 2022-05-30 NOTE — PROGRESS NOTES
Pharmacy Consultation Note  (Warfarin Dosing and Monitoring)  Initial consult date: 5/27/22  Consulting Provider: Dr. Jillian Pelletier is a [de-identified] y.o. female for whom pharmacy has been asked to manage warfarin therapy. Weight:   Wt Readings from Last 1 Encounters:   05/29/22 184 lb 3.2 oz (83.6 kg)       TSH:    Lab Results   Component Value Date    TSH 0.565 12/11/2017       Hepatic Function Panel:                            Lab Results   Component Value Date    ALKPHOS 119 05/30/2022    ALT 30 05/30/2022    AST 29 05/30/2022    PROT 5.8 05/30/2022    BILITOT 0.4 05/30/2022    BILIDIR <0.2 05/30/2022    IBILI see below 05/30/2022    LABALBU 3.2 05/30/2022       Current warfarin drug-drug interactions include:  primidone (home med), dexamethasone    Recent Labs     05/28/22  0624 05/29/22  0614 05/30/22  0519   HGB 11.2* 12.0 11.7    176 186     Date Warfarin Dose INR Heparin or LMWH Comment   5/27 hold 3.3 --    5/28 HOLD 3.4 --    5/29 3 mg  2.7 --    5/30 < 5 mg > 2.2              Assessment:  · Patient is a [de-identified] y.o. female on warfarin for Atrial Fibrillation. Patient's home warfarin dosing regimen is 7 mg daily.    · Goal INR 2 - 3  · Patient admitted for COVID-19 pneumonia  · INR 2.2    Plan:  · Warfarin 5 mg x 1 tonight  · Daily PT/INR until the INR is stable within the therapeutic range  · Pharmacist will follow and monitor/adjust dosing as necessary    Thank you for this consult,    Cecilia De La Garza, PharmD, BCPS 5/30/2022 8:18 AM

## 2022-05-31 LAB
ALBUMIN SERPL-MCNC: 3.2 G/DL (ref 3.5–5.2)
ALP BLD-CCNC: 117 U/L (ref 35–104)
ALT SERPL-CCNC: 28 U/L (ref 0–32)
ANION GAP SERPL CALCULATED.3IONS-SCNC: 12 MMOL/L (ref 7–16)
AST SERPL-CCNC: 19 U/L (ref 0–31)
BASOPHILS ABSOLUTE: 0.01 E9/L (ref 0–0.2)
BASOPHILS RELATIVE PERCENT: 0.1 % (ref 0–2)
BILIRUB SERPL-MCNC: 0.3 MG/DL (ref 0–1.2)
BILIRUBIN DIRECT: <0.2 MG/DL (ref 0–0.3)
BILIRUBIN, INDIRECT: NORMAL MG/DL (ref 0–1)
BUN BLDV-MCNC: 38 MG/DL (ref 6–23)
CALCIUM SERPL-MCNC: 8.9 MG/DL (ref 8.6–10.2)
CHLORIDE BLD-SCNC: 101 MMOL/L (ref 98–107)
CO2: 24 MMOL/L (ref 22–29)
CREAT SERPL-MCNC: 1.2 MG/DL (ref 0.5–1)
EOSINOPHILS ABSOLUTE: 0.04 E9/L (ref 0.05–0.5)
EOSINOPHILS RELATIVE PERCENT: 0.5 % (ref 0–6)
GFR AFRICAN AMERICAN: 52
GFR NON-AFRICAN AMERICAN: 43 ML/MIN/1.73
GLUCOSE BLD-MCNC: 87 MG/DL (ref 74–99)
HCT VFR BLD CALC: 36.1 % (ref 34–48)
HEMOGLOBIN: 11.8 G/DL (ref 11.5–15.5)
IMMATURE GRANULOCYTES #: 0.06 E9/L
IMMATURE GRANULOCYTES %: 0.7 % (ref 0–5)
INR BLD: 2.2
LYMPHOCYTES ABSOLUTE: 1.05 E9/L (ref 1.5–4)
LYMPHOCYTES RELATIVE PERCENT: 12.5 % (ref 20–42)
MAGNESIUM: 2 MG/DL (ref 1.6–2.6)
MCH RBC QN AUTO: 30 PG (ref 26–35)
MCHC RBC AUTO-ENTMCNC: 32.7 % (ref 32–34.5)
MCV RBC AUTO: 91.9 FL (ref 80–99.9)
METER GLUCOSE: 109 MG/DL (ref 74–99)
MONOCYTES ABSOLUTE: 0.67 E9/L (ref 0.1–0.95)
MONOCYTES RELATIVE PERCENT: 8 % (ref 2–12)
NEUTROPHILS ABSOLUTE: 6.57 E9/L (ref 1.8–7.3)
NEUTROPHILS RELATIVE PERCENT: 78.2 % (ref 43–80)
PDW BLD-RTO: 12.9 FL (ref 11.5–15)
PHOSPHORUS: 3.2 MG/DL (ref 2.5–4.5)
PLATELET # BLD: 205 E9/L (ref 130–450)
PMV BLD AUTO: 9.9 FL (ref 7–12)
POTASSIUM REFLEX MAGNESIUM: 4.1 MMOL/L (ref 3.5–5)
PROTHROMBIN TIME: 24.1 SEC (ref 9.3–12.4)
RBC # BLD: 3.93 E12/L (ref 3.5–5.5)
SODIUM BLD-SCNC: 137 MMOL/L (ref 132–146)
TOTAL PROTEIN: 5.7 G/DL (ref 6.4–8.3)
URINE CULTURE, ROUTINE: NORMAL
WBC # BLD: 8.4 E9/L (ref 4.5–11.5)

## 2022-05-31 PROCEDURE — 80076 HEPATIC FUNCTION PANEL: CPT

## 2022-05-31 PROCEDURE — 85025 COMPLETE CBC W/AUTO DIFF WBC: CPT

## 2022-05-31 PROCEDURE — 6360000002 HC RX W HCPCS: Performed by: FAMILY MEDICINE

## 2022-05-31 PROCEDURE — 1200000000 HC SEMI PRIVATE

## 2022-05-31 PROCEDURE — 6370000000 HC RX 637 (ALT 250 FOR IP): Performed by: INTERNAL MEDICINE

## 2022-05-31 PROCEDURE — 85610 PROTHROMBIN TIME: CPT

## 2022-05-31 PROCEDURE — 2580000003 HC RX 258: Performed by: FAMILY MEDICINE

## 2022-05-31 PROCEDURE — 2140000000 HC CCU INTERMEDIATE R&B

## 2022-05-31 PROCEDURE — 6370000000 HC RX 637 (ALT 250 FOR IP): Performed by: FAMILY MEDICINE

## 2022-05-31 PROCEDURE — 82962 GLUCOSE BLOOD TEST: CPT

## 2022-05-31 PROCEDURE — 83735 ASSAY OF MAGNESIUM: CPT

## 2022-05-31 PROCEDURE — 80053 COMPREHEN METABOLIC PANEL: CPT

## 2022-05-31 PROCEDURE — 84100 ASSAY OF PHOSPHORUS: CPT

## 2022-05-31 PROCEDURE — 36415 COLL VENOUS BLD VENIPUNCTURE: CPT

## 2022-05-31 RX ORDER — WARFARIN SODIUM 5 MG/1
5 TABLET ORAL
Status: COMPLETED | OUTPATIENT
Start: 2022-05-31 | End: 2022-05-31

## 2022-05-31 RX ORDER — METHOCARBAMOL 500 MG/1
500 TABLET, FILM COATED ORAL 4 TIMES DAILY
Status: DISCONTINUED | OUTPATIENT
Start: 2022-05-31 | End: 2022-06-03 | Stop reason: HOSPADM

## 2022-05-31 RX ADMIN — GLIPIZIDE 2.5 MG: 5 TABLET ORAL at 08:03

## 2022-05-31 RX ADMIN — PRIMIDONE 150 MG: 50 TABLET ORAL at 09:01

## 2022-05-31 RX ADMIN — Medication 1000 UNITS: at 09:01

## 2022-05-31 RX ADMIN — ACETAMINOPHEN 650 MG: 325 TABLET ORAL at 22:20

## 2022-05-31 RX ADMIN — PRIMIDONE 150 MG: 50 TABLET ORAL at 22:19

## 2022-05-31 RX ADMIN — SPIRONOLACTONE 25 MG: 25 TABLET ORAL at 09:07

## 2022-05-31 RX ADMIN — ACETAMINOPHEN 650 MG: 325 TABLET ORAL at 09:02

## 2022-05-31 RX ADMIN — LISINOPRIL 20 MG: 20 TABLET ORAL at 22:20

## 2022-05-31 RX ADMIN — WARFARIN SODIUM 5 MG: 5 TABLET ORAL at 18:00

## 2022-05-31 RX ADMIN — METHOCARBAMOL 500 MG: 500 TABLET ORAL at 23:21

## 2022-05-31 RX ADMIN — FOLIC ACID 1 MG: 1 TABLET ORAL at 09:01

## 2022-05-31 RX ADMIN — ACETAMINOPHEN 650 MG: 325 TABLET ORAL at 15:00

## 2022-05-31 RX ADMIN — HYDRALAZINE HYDROCHLORIDE 5 MG: 10 TABLET, FILM COATED ORAL at 22:20

## 2022-05-31 RX ADMIN — DEXAMETHASONE SODIUM PHOSPHATE 6 MG: 10 INJECTION INTRAMUSCULAR; INTRAVENOUS at 05:31

## 2022-05-31 RX ADMIN — DILTIAZEM HYDROCHLORIDE 360 MG: 120 CAPSULE, COATED, EXTENDED RELEASE ORAL at 09:03

## 2022-05-31 RX ADMIN — LATANOPROST 1 DROP: 50 SOLUTION OPHTHALMIC at 22:25

## 2022-05-31 RX ADMIN — HYDRALAZINE HYDROCHLORIDE 5 MG: 10 TABLET, FILM COATED ORAL at 09:05

## 2022-05-31 RX ADMIN — Medication 3 MG: at 22:20

## 2022-05-31 RX ADMIN — Medication 10 ML: at 09:07

## 2022-05-31 RX ADMIN — ATORVASTATIN CALCIUM 20 MG: 20 TABLET, FILM COATED ORAL at 22:19

## 2022-05-31 ASSESSMENT — PAIN DESCRIPTION - ORIENTATION
ORIENTATION: POSTERIOR
ORIENTATION: POSTERIOR

## 2022-05-31 ASSESSMENT — PAIN DESCRIPTION - LOCATION
LOCATION: BACK;NECK
LOCATION: NECK
LOCATION: NECK

## 2022-05-31 ASSESSMENT — PAIN SCALES - GENERAL
PAINLEVEL_OUTOF10: 6
PAINLEVEL_OUTOF10: 4
PAINLEVEL_OUTOF10: 4

## 2022-05-31 ASSESSMENT — PAIN DESCRIPTION - DESCRIPTORS
DESCRIPTORS: ACHING
DESCRIPTORS: ACHING

## 2022-05-31 NOTE — PROGRESS NOTES
Pharmacy Consultation Note  (Warfarin Dosing and Monitoring)  Initial consult date: 5/27/22  Consulting Provider: Dr. Ramsay Service is a [de-identified] y.o. female for whom pharmacy has been asked to manage warfarin therapy. Weight:   Wt Readings from Last 1 Encounters:   05/29/22 184 lb 3.2 oz (83.6 kg)       TSH:    Lab Results   Component Value Date    TSH 0.565 12/11/2017       Hepatic Function Panel:                            Lab Results   Component Value Date    ALKPHOS 119 05/30/2022    ALT 30 05/30/2022    AST 29 05/30/2022    PROT 5.8 05/30/2022    BILITOT 0.4 05/30/2022    BILIDIR <0.2 05/30/2022    IBILI see below 05/30/2022    LABALBU 3.2 05/30/2022       Current warfarin drug-drug interactions include:  primidone (home med), dexamethasone    Recent Labs     05/29/22  0614 05/30/22  0519   HGB 12.0 11.7    186     Date Warfarin Dose INR Heparin or LMWH Comment   5/27 HOLD 3.3 --    5/28 HOLD 3.4 --    5/29 3 mg  2.7 --    5/30 5 mg 2.2 --    5/31 5 mg 2.2 --      Assessment:  · Patient is a [de-identified] y.o. female on warfarin for Atrial Fibrillation. Patient's home warfarin dosing regimen is 7 mg daily. · Goal INR 2 - 3  · Patient admitted for COVID-19 pneumonia  · INR 2.2 today    Plan:  · Warfarin 5 mg x 1 tonight  · Daily PT/INR until the INR is stable within the therapeutic range  · Pharmacist will follow and monitor/adjust dosing as necessary    Thank you for this consult,    Penny Santamaria.  Jonh Woodard, PharmD 5/31/2022 8:50 AM

## 2022-05-31 NOTE — PLAN OF CARE
Problem: Chronic Conditions and Co-morbidities  Goal: Patient's chronic conditions and co-morbidity symptoms are monitored and maintained or improved  Outcome: Progressing     Problem: Discharge Planning  Goal: Discharge to home or other facility with appropriate resources  Outcome: Progressing     Problem: Safety - Adult  Goal: Free from fall injury  Outcome: Progressing     Problem: Skin/Tissue Integrity  Goal: Absence of new skin breakdown  Description: 1. Monitor for areas of redness and/or skin breakdown  2. Assess vascular access sites hourly  3. Every 4-6 hours minimum:  Change oxygen saturation probe site  4. Every 4-6 hours:  If on nasal continuous positive airway pressure, respiratory therapy assess nares and determine need for appliance change or resting period.   Outcome: Progressing     Problem: Cardiovascular - Adult  Goal: Maintains optimal cardiac output and hemodynamic stability  Outcome: Progressing     Problem: Metabolic/Fluid and Electrolytes - Adult  Goal: Hemodynamic stability and optimal renal function maintained  Outcome: Progressing

## 2022-05-31 NOTE — PROGRESS NOTES
Hospitalist Progress Note      PCP: John Smith MD    Date of Admission: 5/24/2022  Days in the hospital: [address] Course:   Ms. Baljit Acevedo, a [de-identified]y.o. 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      Patient presented to the emergency department after a fall that occurred on the 19th of this month. She is also reporting generalized weakness, and neck pain. Patient reported she slipped off a chair landing on her tailbone. Neck pain due to flexion and extension. After the fall she laid on the ground for several hours unable to get up due to generalized weakness. She initially presented to the [facility] emergency department which work-up did not show any acute processes. She was discharged home. She continued to have pain around her coccyx and neck pain. She is unable to stand due to generalized weakness. She also reports she had a cough productive of sputum. Denies fever, chills, chest pain. She was vaccinated x2 for COVID-19. CT chest shows right upper lobe pneumonia with endobronchial spread of infection throughout the lungs. CT cervical spine shows a few bubbles of gas on linear lucency at the C5-6 level which could represent fracture. Neurosurgery was consulted in emergency department. They advised c-collar and they will see the patient in the morning. Medicine consulted for admission. Subjective  Feels okay, no chest pain no abdominal pain no nausea no vomiting no dizziness no shortness of breath. Remains afebrile.     Exam:    /72   Pulse 78   Temp 97.8 °F (36.6 °C) (Oral)   Resp 18   Ht 5' 5\" (1.651 m)   Wt 184 lb 3.2 oz (83.6 kg)   SpO2 97%   BMI 33.69 kg/m²     Awake and oriented. Patient does not appear to be in distress  HEENT: No pallor, no icterus. C-collar in place  Respiratory:  decreased breath sounds at her baseline. Cardiovascular: RRR, no murmur. Abdomen: Soft, non-tender, BS noted. Neurologic: awake, alert,AAOX(2-3)     Assessment/Plan:  1.) COVID-19 infection, currently on steroids (decadron- Day #6) ,  on room air, continue to follow closely. Chest xray 22: No significant change in bilateral airspace opacities. Chest Xray: persistent bilateral infiltrates. Patient had her 2  vaccinations; but not the booster. 2.) A. fib with RVR; patient is on coumadin- pharmacy following. Cardiology evaluation appreciated,recommend a lipid profile- LDL: 114 and Tri. Was started on dose stain. Patient on  PO diltiazem  With consideration of low dose beta blocker therapy if not stable. Can f/u with Dr. Freeman Resides in 4 weeks OP.     3.) C5- C6 fracture, seen by neurosurgery, MRI of the cervical spine reviewed; As per neurosurgery. PT/OT. 4.) Leukopenia secondary to COVID-19 infection: resolved.     5.) ? AMS-metabolic encephalopathy secondary to infection: Patient appears to answer all questions appropriately. C/w monitoring at this time. CT head was ordered- negative. ABG: negative. Likely back to baseline    6.) Insomnia: Melatonin dosed nightly. 7.) Uncontrolled HTN:  C/w diltiazem and lisinopril, aldactone. Hydralazine ordered with holding parameters. Much better. Awaiting PT/OT evaluation. Disposition: Pending PT OT evaluation, likely needs placement.       Labs:   Recent Labs     22  0614 22  0519 22  0537   WBC 5.5 6.4 8.4   HGB 12.0 11.7 11.8   HCT 34.9 35.3 36.1    186 205     Recent Labs     22  0614 22  0519 22  0537    136 137   K 4.6 4.7 4.1    100 101   CO2 23 22 24   BUN 25* 30* 38*   CREATININE 0.9 1.1* 1.2*   CALCIUM 8.9 8.8 8.9   PHOS 3.3 3.5 3.2     Recent Labs     05/29/22  0614 05/30/22  0519 05/31/22  0537   AST 32* 29 19   ALT 38* 30 28   BILIDIR  --  <0.2 <0.2   BILITOT 0.5 0.4 0.3   ALKPHOS 119* 119* 117*     Recent Labs     05/29/22  0614 05/30/22  0519 05/31/22  0537   INR 2.7 2.2 2.2     No results for input(s): Neftali Dickey in the last 72 hours. Medications:  Reviewed    Infusion Medications    sodium chloride       Scheduled Medications    warfarin  5 mg Oral Once    hydrALAZINE  5 mg Oral 2 times per day    acetaminophen  650 mg Oral TID    melatonin  3 mg Oral Nightly    atorvastatin  20 mg Oral Nightly    warfarin placeholder: dosing by pharmacy   Other RX Placeholder    dilTIAZem  360 mg Oral Daily    folic acid  1 mg Oral QAM    glipiZIDE  2.5 mg Oral QAM AC    latanoprost  1 drop Both Eyes Nightly    lisinopril  20 mg Oral Nightly    primidone  150 mg Oral BID    spironolactone  25 mg Oral QAM    Vitamin D  1,000 Units Oral QAM    sodium chloride flush  10 mL IntraVENous 2 times per day    dexamethasone  6 mg IntraVENous Q24H     PRN Meds: hydrALAZINE, sodium chloride flush, sodium chloride, promethazine **OR** ondansetron, polyethylene glycol, acetaminophen **OR** acetaminophen, potassium chloride **OR** potassium alternative oral replacement **OR** potassium chloride, magnesium sulfate      Intake/Output Summary (Last 24 hours) at 5/31/2022 1102  Last data filed at 5/31/2022 0520  Gross per 24 hour   Intake 300 ml   Output --   Net 300 ml     Body mass index is 33.69 kg/m². · Diet  ADULT DIET; Regular; Low Sodium (2 gm)    · Code Status  Full Code         Electronically signed by Noa Dyer MD on 5/31/2022 at 11:02 AM  [employer]   Please contact me through [employer]    NOTE: This report was transcribed using voice recognition software. Every effort was made to ensure accuracy; however, inadvertent computerized transcription errors may be present.

## 2022-05-31 NOTE — CARE COORDINATION
Care Coordination: Pt accepted to [facility] , covid positive. Will initiate precert once seen by therapy.  Will need hens started, transport envelope completed    Moises Hoover

## 2022-05-31 NOTE — PROGRESS NOTES
Comprehensive Nutrition Assessment    Type and Reason for Visit:  Initial (LOS)    Nutrition Recommendations/Plan:   1. Continue current diet  2. Start Glucerna BID to promote adequate oral intake  3. Will monitor     Malnutrition Assessment:  Malnutrition Status: At risk for malnutrition (Comment) (05/31/22 1302)    Context:  Chronic Illness     Findings of the 6 clinical characteristics of malnutrition:  Energy Intake:  Mild decrease in energy intake (Comment) (limited intakes recorded per EMR but intake appears poor)  Weight Loss:  Unable to assess (d/t wt fluctuations most likely d/t fluid shifts 2/2 CHF/liver disease)     Body Fat Loss:  Unable to assess (pt in isolation d/t COVID19)     Muscle Mass Loss:  Unable to assess (pt in isolation d/t COVID19)    Fluid Accumulation:  No significant fluid accumulation     Strength:  Not Performed    Nutrition Assessment:    pt adm d/t fall/weakness/pain; CT shows PNA w/ infection spread throughout lungs; pt w/ COVID-19 (+ test on 5/24); pt at nutritional risk d/t multiple comorbidities & poor PO intake; AMS noted; pt w/ C-collar d/t possible neck frx; hx of AFIB, DM, CHF, fatty liver disease, COPD, pleural effusion; pt w/ limited intakes recorded per EMR, but intake appears poor; will start ONS and monitor. Nutrition Related Findings:    alert; TINY orientation level; AMS noted; abd WNL; no edema; +I/O Wound Type: None       Current Nutrition Intake & Therapies:    Average Meal Intake: 26-50%,1-25% (limited intakes recorded per EMR)  Average Supplements Intake: None Ordered  ADULT DIET; Regular; Low Sodium (2 gm)  ADULT ORAL NUTRITION SUPPLEMENT; Breakfast, Lunch; Diabetic Oral Supplement    Anthropometric Measures:  Height: 5' 2\" (157.5 cm)  Ideal Body Weight (IBW): 110 lbs (50 kg)    Admission Body Weight: 184 lb 3.2 oz (83.6 kg) (5/29-actual; first measured)  Current Body Weight: 184 lb 3.2 oz (83.6 kg) (5/29-actual), 167.5 % IBW.     Current BMI (kg/m2): 33.7  Usual Body Weight:  (TINY d/t wt fluctuations most likely d/t fluid shifts 2/2 CHF; noted wt of 206# on 10/27/21 and 200# on 4/5/22 per EMR office visits.)     Weight Adjustment For: No Adjustment                 BMI Categories: Obese Class 1 (BMI 30.0-34. 9)    Estimated Daily Nutrient Needs:  Energy Requirements Based On: Kcal/kg  Weight Used for Energy Requirements: Current  Energy (kcal/day): 16-18kcal/ggrVIH=0085-7916  Weight Used for Protein Requirements: Ideal  Protein (g/day): 1.4-1.6g/kgxIBW=70-80g  Method Used for Fluid Requirements: 1 ml/kcal  Fluid (ml/day): 1225-4107    Nutrition Diagnosis:   · Inadequate oral intake related to impaired respiratory function (COVID PNA) as evidenced by intake 26-50%,intake 0-25%      Nutrition Interventions:   Food and/or Nutrient Delivery: Continue Current Diet,Start Oral Nutrition Supplement (Glucerna BID)  Nutrition Education/Counseling: Education not indicated  Coordination of Nutrition Care: Continue to monitor while inpatient       Goals:     Goals: PO intake 75% or greater       Nutrition Monitoring and Evaluation:   Behavioral-Environmental Outcomes: None Identified  Food/Nutrient Intake Outcomes: Food and Nutrient Intake,Supplement Intake  Physical Signs/Symptoms Outcomes: Biochemical Data,Nutrition Focused Physical Findings,Skin,Weight,Chewing or Swallowing,GI Status,Fluid Status or Edema    Discharge Planning:     Too soon to determine     Junior Kiah RD  Contact: [phone]

## 2022-05-31 NOTE — CARE COORDINATION
Care Coordination:  Called to [facility] to see if still following as no therapy orders yet on chart. I spoke to charge nurse and therapy orders obtained. I called steph from [facility] and she is checking if can take covid patients. I called caterina at [address] and no covid beds and [facility] does not take covid, but [facility] does accept covid. I received a call from Leodan Valero, she is not listed as PDM, I spoke with pt and she states \" I don't give a damn if you speak to [facility]. She agrees with referrals to [address]. \[facility].\"" as well as [facility]. I gave referral to [facility] as well. Awaiting therapy evals as ordered on 5/28/22 and still have not seen. I did call Norbert Seymour at [phone] and asked if they can see pt as soon as possible. Awaiting acceptance to a facility, will need hens once accepted and transport envelope was started    Reji Eldridge    The Plan for Transition of Care is related to the following treatment goals: dc planning    The Patient and/or patient representative was provided with a choice of provider and agrees   with the discharge plan. [x] Yes [] No    Freedom of choice list was provided with basic dialogue that supports the patient's individualized plan of care/goals, treatment preferences and shares the quality data associated with the providers.  [x] Yes [] No

## 2022-06-01 LAB
ALBUMIN SERPL-MCNC: 2.9 G/DL (ref 3.5–5.2)
ALP BLD-CCNC: 112 U/L (ref 35–104)
ALT SERPL-CCNC: 22 U/L (ref 0–32)
ANION GAP SERPL CALCULATED.3IONS-SCNC: 11 MMOL/L (ref 7–16)
AST SERPL-CCNC: 22 U/L (ref 0–31)
BASOPHILS ABSOLUTE: 0.01 E9/L (ref 0–0.2)
BASOPHILS RELATIVE PERCENT: 0.2 % (ref 0–2)
BILIRUB SERPL-MCNC: 0.3 MG/DL (ref 0–1.2)
BUN BLDV-MCNC: 41 MG/DL (ref 6–23)
CALCIUM SERPL-MCNC: 8.6 MG/DL (ref 8.6–10.2)
CHLORIDE BLD-SCNC: 100 MMOL/L (ref 98–107)
CO2: 22 MMOL/L (ref 22–29)
CREAT SERPL-MCNC: 1.3 MG/DL (ref 0.5–1)
EOSINOPHILS ABSOLUTE: 0.06 E9/L (ref 0.05–0.5)
EOSINOPHILS RELATIVE PERCENT: 1.1 % (ref 0–6)
GFR AFRICAN AMERICAN: 48
GFR NON-AFRICAN AMERICAN: 39 ML/MIN/1.73
GLUCOSE BLD-MCNC: 99 MG/DL (ref 74–99)
HCT VFR BLD CALC: 34.5 % (ref 34–48)
HEMOGLOBIN: 11.4 G/DL (ref 11.5–15.5)
IMMATURE GRANULOCYTES #: 0.05 E9/L
IMMATURE GRANULOCYTES %: 0.9 % (ref 0–5)
INR BLD: 2.5
LYMPHOCYTES ABSOLUTE: 0.83 E9/L (ref 1.5–4)
LYMPHOCYTES RELATIVE PERCENT: 15.5 % (ref 20–42)
MCH RBC QN AUTO: 30.4 PG (ref 26–35)
MCHC RBC AUTO-ENTMCNC: 33 % (ref 32–34.5)
MCV RBC AUTO: 92 FL (ref 80–99.9)
METER GLUCOSE: 106 MG/DL (ref 74–99)
MONOCYTES ABSOLUTE: 0.49 E9/L (ref 0.1–0.95)
MONOCYTES RELATIVE PERCENT: 9.2 % (ref 2–12)
NEUTROPHILS ABSOLUTE: 3.91 E9/L (ref 1.8–7.3)
NEUTROPHILS RELATIVE PERCENT: 73.1 % (ref 43–80)
PDW BLD-RTO: 13.2 FL (ref 11.5–15)
PLATELET # BLD: 176 E9/L (ref 130–450)
PMV BLD AUTO: 9.4 FL (ref 7–12)
POTASSIUM REFLEX MAGNESIUM: 4.6 MMOL/L (ref 3.5–5)
PROTHROMBIN TIME: 28.4 SEC (ref 9.3–12.4)
RBC # BLD: 3.75 E12/L (ref 3.5–5.5)
SODIUM BLD-SCNC: 133 MMOL/L (ref 132–146)
TOTAL PROTEIN: 5.6 G/DL (ref 6.4–8.3)
WBC # BLD: 5.4 E9/L (ref 4.5–11.5)

## 2022-06-01 PROCEDURE — 6360000002 HC RX W HCPCS: Performed by: FAMILY MEDICINE

## 2022-06-01 PROCEDURE — 82962 GLUCOSE BLOOD TEST: CPT

## 2022-06-01 PROCEDURE — 97530 THERAPEUTIC ACTIVITIES: CPT

## 2022-06-01 PROCEDURE — 1200000000 HC SEMI PRIVATE

## 2022-06-01 PROCEDURE — 2580000003 HC RX 258: Performed by: FAMILY MEDICINE

## 2022-06-01 PROCEDURE — 36415 COLL VENOUS BLD VENIPUNCTURE: CPT

## 2022-06-01 PROCEDURE — 6370000000 HC RX 637 (ALT 250 FOR IP): Performed by: INTERNAL MEDICINE

## 2022-06-01 PROCEDURE — 80053 COMPREHEN METABOLIC PANEL: CPT

## 2022-06-01 PROCEDURE — 6370000000 HC RX 637 (ALT 250 FOR IP): Performed by: FAMILY MEDICINE

## 2022-06-01 PROCEDURE — 97165 OT EVAL LOW COMPLEX 30 MIN: CPT

## 2022-06-01 PROCEDURE — 85610 PROTHROMBIN TIME: CPT

## 2022-06-01 PROCEDURE — 97535 SELF CARE MNGMENT TRAINING: CPT

## 2022-06-01 PROCEDURE — 85025 COMPLETE CBC W/AUTO DIFF WBC: CPT

## 2022-06-01 PROCEDURE — 97161 PT EVAL LOW COMPLEX 20 MIN: CPT

## 2022-06-01 RX ORDER — WARFARIN SODIUM 5 MG/1
5 TABLET ORAL
Status: COMPLETED | OUTPATIENT
Start: 2022-06-01 | End: 2022-06-01

## 2022-06-01 RX ADMIN — Medication 10 ML: at 21:09

## 2022-06-01 RX ADMIN — ACETAMINOPHEN 650 MG: 325 TABLET ORAL at 15:00

## 2022-06-01 RX ADMIN — ATORVASTATIN CALCIUM 20 MG: 20 TABLET, FILM COATED ORAL at 21:09

## 2022-06-01 RX ADMIN — Medication 1000 UNITS: at 09:00

## 2022-06-01 RX ADMIN — HYDRALAZINE HYDROCHLORIDE 5 MG: 10 TABLET, FILM COATED ORAL at 09:00

## 2022-06-01 RX ADMIN — Medication 3 MG: at 21:08

## 2022-06-01 RX ADMIN — WARFARIN SODIUM 5 MG: 5 TABLET ORAL at 18:00

## 2022-06-01 RX ADMIN — METHOCARBAMOL 500 MG: 500 TABLET ORAL at 17:00

## 2022-06-01 RX ADMIN — ACETAMINOPHEN 650 MG: 325 TABLET ORAL at 09:00

## 2022-06-01 RX ADMIN — METHOCARBAMOL 500 MG: 500 TABLET ORAL at 09:00

## 2022-06-01 RX ADMIN — DEXAMETHASONE SODIUM PHOSPHATE 6 MG: 10 INJECTION INTRAMUSCULAR; INTRAVENOUS at 05:24

## 2022-06-01 RX ADMIN — SPIRONOLACTONE 25 MG: 25 TABLET ORAL at 09:00

## 2022-06-01 RX ADMIN — GLIPIZIDE 2.5 MG: 5 TABLET ORAL at 08:00

## 2022-06-01 RX ADMIN — DILTIAZEM HYDROCHLORIDE 360 MG: 120 CAPSULE, COATED, EXTENDED RELEASE ORAL at 09:00

## 2022-06-01 RX ADMIN — PRIMIDONE 150 MG: 50 TABLET ORAL at 09:00

## 2022-06-01 RX ADMIN — Medication 10 ML: at 09:00

## 2022-06-01 RX ADMIN — FOLIC ACID 1 MG: 1 TABLET ORAL at 09:00

## 2022-06-01 RX ADMIN — METHOCARBAMOL 500 MG: 500 TABLET ORAL at 21:09

## 2022-06-01 RX ADMIN — LATANOPROST 1 DROP: 50 SOLUTION OPHTHALMIC at 21:23

## 2022-06-01 RX ADMIN — LISINOPRIL 20 MG: 20 TABLET ORAL at 21:09

## 2022-06-01 RX ADMIN — ACETAMINOPHEN 650 MG: 325 TABLET ORAL at 21:08

## 2022-06-01 RX ADMIN — PRIMIDONE 150 MG: 50 TABLET ORAL at 21:08

## 2022-06-01 RX ADMIN — METHOCARBAMOL 500 MG: 500 TABLET ORAL at 13:00

## 2022-06-01 ASSESSMENT — PAIN DESCRIPTION - LOCATION
LOCATION: BACK;NECK
LOCATION: BACK
LOCATION: NECK

## 2022-06-01 ASSESSMENT — PAIN DESCRIPTION - ORIENTATION
ORIENTATION: POSTERIOR
ORIENTATION: POSTERIOR

## 2022-06-01 ASSESSMENT — PAIN SCALES - GENERAL
PAINLEVEL_OUTOF10: 4
PAINLEVEL_OUTOF10: 0
PAINLEVEL_OUTOF10: 4
PAINLEVEL_OUTOF10: 0
PAINLEVEL_OUTOF10: 3

## 2022-06-01 ASSESSMENT — PAIN DESCRIPTION - DESCRIPTORS
DESCRIPTORS: ACHING
DESCRIPTORS: ACHING

## 2022-06-01 ASSESSMENT — PAIN DESCRIPTION - PAIN TYPE: TYPE: ACUTE PAIN

## 2022-06-01 ASSESSMENT — PAIN - FUNCTIONAL ASSESSMENT: PAIN_FUNCTIONAL_ASSESSMENT: PREVENTS OR INTERFERES SOME ACTIVE ACTIVITIES AND ADLS

## 2022-06-01 NOTE — PROGRESS NOTES
Physical Therapy  Physical Therapy Initial Evaluation    Name: Aimee Sheppard  : 1940  MRN: [id number]      Date of Service: 2022    Evaluating PT:  Deepthimarissa Silva, PT [id number]      Room #:  2615/0363-C  Diagnosis:  General weakness [R53.1]  Cerebellar mass [G93.89]  Abnormal CT scan, cervical spine [R93.7]  Accidental fall from chair, subsequent encounter [W07. 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 [M48.12]  Pneumonia due to COVID-19 virus [U07.1, J12.82]  COVID-19 [U07.1]  PMHx/PSHx:     has a past medical history of Atrial fibrillation ([facility] Atrial fibrillation ([facility] Diabetes mellitus ([facility] Diastolic CHF, acute on chronic ([facility] Diastolic dysfunction, Fatty liver disease, nonalcoholic, Hypertension, 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 has a past surgical history that includes Hysterectomy (); Eye surgery (); Colonoscopy (8141,2496,0641); and Cataract removal (). Procedure/Surgery:  None  Precautions:  Falls, Droplet plus/COVID-19 , FWB (full weight bearing) , Cervical Collar  Equipment Needs: To be determined,    SUBJECTIVE:    Home Living: Pt lives alone in a 1 story home with 1+1 WING and 1 hand rail. Bathroom setup: walk-in shower    Equipment owned: w/w     OBJECTIVE:   Initial Evaluation  Date: 22 Treatment Short Term/ Long Term   Goals   AM-PAC 6 Clicks      Was pt agreeable to Eval/treatment? yes     Does pt have pain? Yes with activity. Bed Mobility  Rolling: Min   Supine to sit:   Min   Sit to supine: Min   Scooting: Min   Rolling: Ind  Supine to sit: Ind  Sit to supine: Ind  Scooting: Ind   Transfers Sit to stand:  Mod  to fww  Stand to sit: Mod   Stand pivot: Min with fww cues for sequencing. Sit to stand: Mod Ind  to fww  Stand to sit: Mod Ind    Stand pivot: Mod Ind  with fww   Ambulation    15 feet with fww Mod   Cues for sequencing. 100 feet with Mod Ind  fww   Stair negotiation: ascended and descended  NT  2 steps with Min    ROM BUE: Defer to OT eval  BLE:  wfl     Strength BUE: Defer to OT eval  RLE:  Grossly 4-/5  LLE:  Grossly  4-/5  4/5   Balance Sitting EOB:  SBa  Dynamic Standing:  Min to Mod  Sitting EOB:  Ind  Dynamic Standing: Mod Ind     Patient is Alert & Oriented x person, place, time and situation and follows directions   Sensation:  Pt denies numbness and tingling to extremities  Edema:  noen  Vitals: On RA  O2 sat 96-99%, HR 68-85 bpm  Therapeutic Exercises:  Functional activity as stated above. Patient education  Pt educated regarding role of PT evaluation, need for OOB activity and spinal neutral mechanics    Patient response to education:   Pt verbalized understanding Pt demonstrated skill Pt requires further education in this area   yes yes Reminders     ASSESSMENT:    Conditions Requiring Skilled Therapeutic Intervention:    [x]Decreased strength     [x]Decreased ROM  [x]Decreased functional mobility  [x]Decreased balance   [x]Decreased endurance   [x]Decreased posture  []Decreased sensation  []Decreased coordination   []Decreased vision  [x]Decreased safety awareness   []Increased pain       Comments:    RN cleared patient for participation in therapy session. Patient was seen this date for PT evaluation. . Patient was agreeable to intervention. Results of the functional assessment are noted above. Upon entering the room patient was found supine in bed. Assisted to EOB with cues for spinal precautions. . Sat EOB x 10 minutes to increase dynamic sitting balance and activity tolerance. Transfers and gait completed with fww. Mod A required to arise from bed surface.     At end of session, patient in bed with  call light and phone within reach, all lines and tubes intact, nursing notified. Bed alarm activated for safety. This patient can benefit from the continuation of skilled PT in a rehab setting  to maximize functional level and return to PLOF. Treatment:  Patient practiced and was instructed in the following treatment:    · Bed mobility training - pt given verbal and tactile cues to facilitate proper sequencing and safety during rolling and supine>sit as well as provided with physical assistance to complete task    · Assistive device training - pt educated on using [employer] during gait, [employer] approximation/negotiation, and hand placement during sit<>stand to [employer]  · STS and transfer training - educated on hand/foot placement, safety, and sequencing during STS and pivot transfers using assistive device  · Gait training - verbal cues for [employer] approximation/negotiation, upright posture, and safety during 90 and 180 degree turns during gait   o Education in  Cervical spinal precautions . Pt's/ family goals      1. yes    Prognosis is good  for reaching above PT goals. Patient and or family understand(s) diagnosis, prognosis, and plan of care.  yes,     PHYSICAL THERAPY PLAN OF CARE:    PT POC is established based on physician order and patient diagnosis     Referring provider/PT Order:  PT Eval and Treat   05/31/22 1530  PT eval and treat       Gavin MD Chris     Diagnosis:  General weakness [R53.1]  Cerebellar mass [G93.89]  Abnormal CT scan, cervical spine [R93.7]  Accidental fall from chair, subsequent encounter [W07. 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 [M48.12]  Pneumonia due to COVID-19 virus [U07.1, J12.82]  COVID-19 [U07.1]  Specific instructions for next treatment:  Sit EOB, postural exercises, Transfer to bedside chair, Increase ambulation distance and BLE therapeutic exercise  Current Treatment Recommendations:     [x] Strengthening to improve independence with functional mobility   [x] ROM to improve independence with functional mobility   [x] Balance Training to improve static/dynamic balance and to reduce fall risk  [x] Endurance Training to improve activity tolerance during functional mobility   [x] Transfer Training to improve safety and independence with all functional transfers   [x] Gait Training to improve gait mechanics, endurance and asses need for appropriate assistive device  [x] Stair Training in preparation for safe discharge home and/or into the community   [] Positioning to prevent skin breakdown and contractures  [x] Safety and Education Training   [] Patient/Caregiver Education   [] HEP  [] Other     PT long term treatment goals are located in above grid    Frequency of treatments: 2-5x/week x 1-2 weeks. Time in  0730  Time out  0810    Total Treatment Time  25 minutes     Evaluation Time includes thorough review of current medical information, gathering information on past medical history/social history and prior level of function, completion of standardized testing/informal observation of tasks, assessment of data and education on plan of care and goals.     CPT codes:  [x] Low Complexity PT evaluation 04884  [] Moderate Complexity PT evaluation 64058  [] High Complexity PT evaluation 13506  [] PT Re-evaluation 87635  [] Gait training 79748 - minutes  [] Manual therapy 87899 - minutes  [x] Therapeutic activities 62057 25 minutes  [] Therapeutic exercises 49878 - minutes  [] Neuromuscular reeducation 21843 - minutes     Dottie Ayon, [facility]

## 2022-06-01 NOTE — PROGRESS NOTES
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 Weight:   Wt Readings from Last 1 Encounters:   06/01/22 184 lb 3.2 oz (83.6 kg)       TSH:    Lab Results   Component Value Date    TSH 0.565 12/11/2017       Hepatic Function Panel:                            Lab Results   Component Value Date    ALKPHOS 112 06/01/2022    ALT 22 06/01/2022    AST 22 06/01/2022    PROT 5.6 06/01/2022    BILITOT 0.3 06/01/2022    BILIDIR <0.2 05/31/2022    IBILI see below 05/31/2022    LABALBU 2.9 06/01/2022       Current warfarin drug-drug interactions include:  primidone (home med), dexamethasone    Recent Labs     05/30/22  0519 05/31/22  0537 06/01/22  0600   HGB 11.7 11.8 11.4*    205 176     Date Warfarin Dose INR Heparin or LMWH Comment   5/27 HOLD 3.3 --    5/28 HOLD 3.4 --    5/29 3 mg  2.7 --    5/30 5 mg 2.2 --    5/31 5 mg 2.2 --    6/1 5 mg 2.5 --      Assessment:  · Patient is a [de-identified] y.o. female on warfarin for Atrial Fibrillation. Patient's home warfarin dosing regimen is 7 mg daily. · Goal INR 2 - 3  · Patient admitted for COVID-19 pneumonia  · INR 2.5 today    Plan:  · Warfarin 5 mg x 1 tonight  · Daily PT/INR until the INR is stable within the therapeutic range  · Pharmacist will follow and monitor/adjust dosing as necessary    Thank you for this consult,    Kaitlin Jones.  Briana Block, PharmD 6/1/2022 7:55 AM

## 2022-06-01 NOTE — CARE COORDINATION
Care Coordination: Spoke with [facility] dexter and precert was initiated this am. Hens initiated, transport envelope complete    Alea Meeks

## 2022-06-01 NOTE — DISCHARGE INSTR - COC
Continuity of Care Form    Patient Name: Leopold Hunger   :  1940  MRN:  [id number]    Admit date:  2022  Discharge date:  2022    Code Status Order: Full Code   Advance Directives:      Admitting Physician:  Paulo Fraire DO  PCP: Loli Lowe MD    Discharging Nurse: [facility] Unit/Room#: [address] Unit Phone Number: [phone]    Emergency Contact:   Extended Emergency Contact Information  Primary Emergency Contact: Rosita Greene  Address: [address] [place] [address] [address]           [address] [place] [address] Phone: [phone]  Relation: Other  Secondary Emergency Contact: Joana Schirmer, [address] [place] [address] Phone: [phone]  Mobile Phone: [phone]  Relation: Other    Past Surgical History:  Past Surgical History:   Procedure Laterality Date    CATARACT REMOVAL      BILATERAL    COLONOSCOPY  [id number]    BENIGN POLYPS ;INT  HEMORRHOIDS    EYE SURGERY      VITREOUS  HEMORRHAGE    HYSTERECTOMY  1986    [facility]       Immunization History:   Immunization History   Administered Date(s) Administered    COVID-19, Pfizer Purple top, DILUTE for use, 12+ yrs, 30mcg/0.3mL dose 2021, 2021       Active Problems:  Patient Active Problem List   Diagnosis Code    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 31XD    Pulmonary nodules R91.8    Unable to ambulate R26.2    DM (diabetes mellitus) ([facility] E11.9    Pneumonia due to COVID-19 virus U07.1, J12.82       Isolation/Infection:   Isolation            Droplet Plus          Patient Infection Status       Infection Onset Added Last Indicated Last Indicated By Review Planned Expiration Resolved Resolved By    COVID-19 05/24/22 05/24/22 05/24/22 COVID-19, Rapid 05/31/22 06/07/22      Resolved    COVID-19 (Rule Out) 05/24/22 05/24/22 05/24/22 COVID-19, Rapid (Ordered)   05/24/22 Rule-Out Test Resulted    COVID-19 (Rule Out) 06/12/21 06/12/21 06/12/21 COVID-19, Rapid (Ordered)   06/12/21 Rule-Out Test Resulted            Nurse Assessment:  Last Vital Signs: /69   Pulse 75   Temp 98.6 °F (37 °C) (Oral)   Resp 18   Ht 5' 2\" (1.575 m)   Wt 184 lb 3.2 oz (83.6 kg)   SpO2 96%   BMI 33.69 kg/m²     Last documented pain score (0-10 scale): Pain Level: 6  Last Weight:   Wt Readings from Last 1 Encounters:   06/01/22 184 lb 3.2 oz (83.6 kg)     Mental Status:  oriented and alert    IV Access:  - None    Nursing Mobility/ADLs:  Walking   Assisted  Transfer  Assisted  Bathing  Assisted  Dressing  Assisted  Toileting  Assisted  Feeding  Assisted  Med Admin  Assisted  Med Delivery   whole    Wound Care Documentation and Therapy:        Elimination:  Continence: Bowel: Yes  Bladder: Yes  Urinary Catheter: None   Colostomy/Ileostomy/Ileal Conduit: No       Intake/Output Summary (Last 24 hours) at 6/1/2022 0806  Last data filed at 6/1/2022 0547  Gross per 24 hour   Intake 350 ml   Output 175 ml   Net 175 ml     I/O last 3 completed shifts: In: 650 [P.O.:650]  Out: 175 [Urine:175]    Safety Concerns:      At Risk for Falls    Impairments/Disabilities:      None    Nutrition Therapy:  Current Nutrition Therapy:   - Oral Diet:  General and Carb Control 4 carbs/meal (1800kcals/day)    Routes of Feeding: Oral  Liquids: No Restrictions  Daily Fluid Restriction: no  Last Modified Barium Swallow with Video (Video Swallowing Test): not done    Treatments at the Time of Hospital Discharge:   Respiratory Treatments: ***  Oxygen Therapy:  {Therapy; copd oxygen:42653}  Ventilator:    - No ventilator support    Rehab Therapies: Physical Therapy and Occupational Therapy  Weight Bearing Status/Restrictions: No weight bearing restrictions  Other Medical Equipment (for information only, NOT a DME order):  walker      Patient's personal belongings (please select all that are sent with patient):  Belongings sent with patient. RN SIGNATURE:  Electronically signed by Elsie Mendoza RN on 6/2/22 at 4:29 PM EDT    CASE MANAGEMENT/SOCIAL WORK SECTION    Inpatient Status Date: ***    Readmission Risk Assessment Score:  Readmission Risk              Risk of Unplanned Readmission:  21           Discharging to Facility/ Agency   Name: [address] [place] [address]  Address: [address] [place] [address]  Phone: [phone]  Fax:    Dialysis Facility (if applicable)   Name:  Address:  Dialysis Schedule:  Phone:  Fax:    / signature: Electronically signed by AMARILYS Qiu on 6/1/22 at 8:13 AM EDT    PHYSICIAN SECTION    Prognosis: {Prognosis:8946777836}    Condition at Discharge: [facility] Patient Condition:[id number]}    Rehab Potential (if transferring to Rehab): {Prognosis:4316615979}    Recommended Labs or Other Treatments After Discharge: ***    Physician Certification: I certify the above information and transfer of Lashawn Coreas  is necessary for the continuing treatment of the diagnosis listed and that she requires {Admit to Appropriate Level of Care:02726} for {GREATER/LESS:138531829} 30 days.      Update Admission H&P: {CHP DME Changes in TSZNM:553477109}    PHYSICIAN SIGNATURE:  Electronically signed by Jose Olson MD on 6/2/22 at 1:39 PM EDT

## 2022-06-01 NOTE — PROGRESS NOTES
Occupational Therapy  OCCUPATIONAL THERAPY INITIAL EVALUATION    [facility] [employer] [address] [address], [facility], [place] [address]                                                Patient Name: Kolton Reyes  MRN: [id number]  : 1940  Room: [address] [place] [address]    Evaluating OT: Tirso Roche OTR/L #[id number]     Referring Provider: Wagner Acevedo MD  Specific Provider Orders/Date: OT eval and treat 22    Diagnosis: General weakness [R53.1]  Cerebellar mass [G93.89]  Abnormal CT scan, cervical spine [R93.7]  Accidental fall from chair, subsequent encounter [W07. 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 Possible C5-C6 fx - no NS intervention     Pertinent Medical History:  has a past medical history of Atrial fibrillation ([facility] [address] Atrial fibrillation ([facility] [address] Diabetes mellitus ([facility] [address] Diastolic CHF, acute on chronic ([facility] [address] Diastolic dysfunction, Fatty liver disease, nonalcoholic, Hypertension, Hypoprothrombinemia ([facility] [address] Hypotension, LVH (left ventricular hypertrophy), Mitral regurgitation, Open-angle glaucoma, Pulmonary regurgitation, and Rectal bleed.        Precautions:  Fall Risk, Droplet plus (COVID-19), soft cervical collar, cervical precautions, bed alarm, TSM    Assessment of current deficits    [x] Functional mobility  [x]ADLs  [x] Strength               []Cognition    [x] Functional transfers   [x] IADLs         [x] Safety Awareness   [x]Endurance    [] Fine Coordination              [x] Balance      [] Vision/perception   []Sensation     []Gross Motor Coordination  [] ROM  [] Delirium                   [] Motor Control     OT PLAN OF CARE   OT POC therapist educated pt on cervical precautions and facilitated repositioning to address pain     Cognition: A&O: x3; Follows 1 step directions   Memory:  fair   Sequencing:  Fair-   Problem solving:  Fair-   Judgement/safety:  Fair-     Functional Assessment:  AM-PAC Daily Activity Raw Score: 15/24   Initial Eval Status  Date: 6/1/22 Treatment Status  Date: STGs = LTGs  Time frame: 10-14 days   Feeding set-up   Mod I   Grooming Minimal Assist   Modified [place]    UB Dressing Minimal Assist   Modified [place]    LB Dressing Maximal Assist   Modified [place]    Bathing Moderate Assist  Modified [place]    Toileting Maximal Assist   Modified [place]    Bed Mobility  Supine to sit: Minimal Assist   Sit to supine: Minimal Assist   Supine to sit: Modified [place]   Sit to supine: Modified [place]    Functional Transfers Moderate Assist   Modified [place]    Functional Mobility Minimal Assist   Modified [place]    Balance Sitting:     Static:  SBA    Dynamic:Minimal Assist  Standing: min A     Activity Tolerance F-  F+   Visual/  Perceptual Glasses: yes  wfl                  Vitals: On RA  O2 sat 96-99%, HR 68-85 bpm      Hand Dominance right   Strength ROM Additional Info:    RUE  Wfl, formal MMT deferred due to cervical precautions  wfl good  and wfl FMC/dexterity noted during ADL tasks     LUE Wfl, formal MMT deferred due to cervical precautions  wfl good  and wfl FMC/dexterity noted during ADL tasks     Hearing:  [facility]  Sensation: denies numbness and tingling   Tone: WFL  Edema:none noted      Comments: Upon arrival patient supine in bed and agreeable to OT Session. Therapist educated pt on role of OT, cervical collar and cervical precautions. At end of session, patient semi-supine in bed with call light and phone within reach, all lines and tubes intact.   Overall patient demonstrated decreased independence and safety during completion of ADL/functional transfer/mobility tasks. Pt would benefit from continued skilled OT to increase safety and independence with completion of ADL/IADL tasks for functional independence and quality of life. Treatment: OT treatment provided this date includes: Facilitation of bed mobility (log roll technique), unsupported sitting balance at EOB (impacting ADLs; addressing posture, weight shifting, dynamic reaching), functional sit to stand transfers, standing tolerance tasks (addressing posture, balance and activity tolerance; impacting ADLs and functional activity) and short functional ambulation tasks with w/w  - skilled cuing on hand placement, posture, body mechanics, energy conservation techniques and safety. Therapist facilitated self-care retraining: UB/LB self-care tasks (gown, socks), toileting hygiene task and grooming tasks while educating pt on modified techniques within precautions, posture, safety and energy conservation techniques. Skilled monitoring of HR, O2 sats and pts response to treatment. Rehab Potential: Good  for established goals     Patient / Family Goal: regain independence       Patient and/or family were instructed on functional diagnosis, prognosis/goals and OT plan of care. Demonstrated fair- understanding.      Eval Complexity: Low    Time In: 730  Time Out: 810  Total Treatment Time: 23 minutes    Min Units   OT Eval Low 97165  x  1   OT Eval Medium 65098      OT Eval High 23501      OT Re-Eval [id number]       Therapeutic Ex 61104       Therapeutic Activities 72186  12  1   ADL/Self Care 45014  11  1   Orthotic Management 90848       Manual [id number]     Neuro Re-Ed 48600       Non-Billable Time          Evaluation Time additionally includes thorough review of current medical information, gathering information on past medical history/social history and prior level of function, interpretation of standardized testing/informal observation of tasks, assessment of data and development of plan of care and goals.           Chery Meehan OTR/L #[id number]

## 2022-06-01 NOTE — PROGRESS NOTES
Hospitalist Progress Note      PCP: Kevin Bowen MD    Date of Admission: 5/24/2022  Days in the hospital: [facility] 65 22 Course:   Ms. Sigrid Sanchez, a [de-identified]y.o. 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      Patient presented to the emergency department after a fall that occurred on the 19th of this month. She is also reporting generalized weakness, and neck pain. Patient reported she slipped off a chair landing on her tailbone. Neck pain due to flexion and extension. After the fall she laid on the ground for several hours unable to get up due to generalized weakness. She initially presented to the [facility] emergency department which work-up did not show any acute processes. She was discharged home. She continued to have pain around her coccyx and neck pain. She is unable to stand due to generalized weakness. She also reports she had a cough productive of sputum. Denies fever, chills, chest pain. She was vaccinated x2 for COVID-19. CT chest shows right upper lobe pneumonia with endobronchial spread of infection throughout the lungs. CT cervical spine shows a few bubbles of gas on linear lucency at the C5-6 level which could represent fracture. Neurosurgery was consulted in emergency department. They advised c-collar and they will see the patient in the morning. Medicine consulted for admission. Subjective  Feels okay, no chest pain no abdominal pain no nausea no vomiting no dizziness no shortness of breath. Remains afebrile.    on room air     Exam:    /69   Pulse 75   Temp 98.6 °F (37 °C) (Oral)   Resp 18   Ht 5' 2\" (1.575 m)   Wt 184 lb 3.2 oz (83.6 kg)   SpO2 96%   BMI 33.69 kg/m²     Awake and oriented. Patient does not appear to be in distress  HEENT: No pallor, no icterus. C-collar in place  Respiratory:  decreased breath sounds at her baseline. Cardiovascular: RRR, no murmur. Abdomen: Soft, non-tender, BS noted. Neurologic: awake, alert,AAOX(2-3)     Assessment/Plan:  1.) COVID-19 infection, currently on steroids (decadron- Day #6) ,  on room air, continue to follow closely. Chest xray 22: No significant change in bilateral airspace opacities. Chest Xray: persistent bilateral infiltrates. Patient had her 2  vaccinations; but not the booster. 2.) A. fib with RVR; patient is on coumadin- pharmacy following. Cardiology evaluation appreciated,recommend a lipid profile- LDL: 114 and Tri. Was started on dose stain. Patient on  PO diltiazem  With consideration of low dose beta blocker therapy if not stable. Can f/u with Dr. Bhupinder Vital in 4 weeks OP.     3.) C5- C6 fracture, seen by neurosurgery, MRI of the cervical spine reviewed; As per neurosurgery. PT/OT. 4.) Leukopenia secondary to COVID-19 infection: resolved.     5.) ? AMS-metabolic encephalopathy secondary to infection: Patient appears to answer all questions appropriately. C/w monitoring at this time. CT head was ordered- negative. ABG: negative. Likely back to baseline    6.) Insomnia: Melatonin dosed nightly. 7.) Uncontrolled HTN:  C/w diltiazem and lisinopril, aldactone. Hydralazine ordered with holding parameters. Much better. Awaiting PT/OT evaluation. Disposition: Pending PT OT evaluation, likely needs placement.       Labs:   Recent Labs     22  0519 22  0537 22  0600   WBC 6.4 8.4 5.4   HGB 11.7 11.8 11.4*   HCT 35.3 36.1 34.5    205 176     Recent Labs     22  0519 22  0537 22  0600    137 133   K 4.7 4.1 4.6    101 100   CO2 22 24 22   BUN 30* 38* 41*   CREATININE 1.1* 1.2* 1.3*   CALCIUM 8.8 8.9 8.6   PHOS 3.5 3.2  --      Recent Labs     05/30/22  0519 05/31/22  0537 06/01/22  0600   AST 29 19 22   ALT 30 28 22   BILIDIR <0.2 <0.2  --    BILITOT 0.4 0.3 0.3   ALKPHOS 119* 117* 112*     Recent Labs     05/30/22  0519 05/31/22  0537 06/01/22  0600   INR 2.2 2.2 2.5     No results for input(s): Adelaide Bailey in the last 72 hours. Medications:  Reviewed    Infusion Medications    sodium chloride       Scheduled Medications    warfarin  5 mg Oral Once    methocarbamol  500 mg Oral 4x Daily    hydrALAZINE  5 mg Oral 2 times per day    acetaminophen  650 mg Oral TID    melatonin  3 mg Oral Nightly    atorvastatin  20 mg Oral Nightly    warfarin placeholder: dosing by pharmacy   Other RX Placeholder    dilTIAZem  360 mg Oral Daily    folic acid  1 mg Oral QAM    glipiZIDE  2.5 mg Oral QAM AC    latanoprost  1 drop Both Eyes Nightly    lisinopril  20 mg Oral Nightly    primidone  150 mg Oral BID    spironolactone  25 mg Oral QAM    Vitamin D  1,000 Units Oral QAM    sodium chloride flush  10 mL IntraVENous 2 times per day    dexamethasone  6 mg IntraVENous Q24H     PRN Meds: hydrALAZINE, sodium chloride flush, sodium chloride, promethazine **OR** ondansetron, polyethylene glycol, acetaminophen **OR** acetaminophen, potassium chloride **OR** potassium alternative oral replacement **OR** potassium chloride, magnesium sulfate      Intake/Output Summary (Last 24 hours) at 6/1/2022 1318  Last data filed at 6/1/2022 0547  Gross per 24 hour   Intake 350 ml   Output 175 ml   Net 175 ml     Body mass index is 33.69 kg/m². · Diet  ADULT DIET; Regular; Low Sodium (2 gm)  ADULT ORAL NUTRITION SUPPLEMENT; Breakfast, Lunch; Diabetic Oral Supplement    · Code Status  Full Code         Electronically signed by Morgan Lyn MD on 6/1/2022 at 1:18 PM  [employer]   Please contact me through perfect serve    NOTE: This report was transcribed using voice recognition software. Every effort was made to ensure accuracy; however, inadvertent computerized transcription errors may be present.

## 2022-06-02 VITALS
HEART RATE: 78 BPM | DIASTOLIC BLOOD PRESSURE: 90 MMHG | SYSTOLIC BLOOD PRESSURE: 133 MMHG | TEMPERATURE: 98.5 F | WEIGHT: 147.2 LBS | BODY MASS INDEX: 27.09 KG/M2 | OXYGEN SATURATION: 97 % | RESPIRATION RATE: 17 BRPM | HEIGHT: 62 IN

## 2022-06-02 LAB
ALBUMIN SERPL-MCNC: 3.1 G/DL (ref 3.5–5.2)
ALP BLD-CCNC: 123 U/L (ref 35–104)
ALT SERPL-CCNC: 19 U/L (ref 0–32)
ANION GAP SERPL CALCULATED.3IONS-SCNC: 11 MMOL/L (ref 7–16)
AST SERPL-CCNC: 14 U/L (ref 0–31)
BASOPHILS ABSOLUTE: 0.01 E9/L (ref 0–0.2)
BASOPHILS RELATIVE PERCENT: 0.1 % (ref 0–2)
BILIRUB SERPL-MCNC: 0.2 MG/DL (ref 0–1.2)
BUN BLDV-MCNC: 47 MG/DL (ref 6–23)
CALCIUM SERPL-MCNC: 8.7 MG/DL (ref 8.6–10.2)
CHLORIDE BLD-SCNC: 102 MMOL/L (ref 98–107)
CO2: 22 MMOL/L (ref 22–29)
CREAT SERPL-MCNC: 1.2 MG/DL (ref 0.5–1)
EOSINOPHILS ABSOLUTE: 0.03 E9/L (ref 0.05–0.5)
EOSINOPHILS RELATIVE PERCENT: 0.4 % (ref 0–6)
GFR AFRICAN AMERICAN: 52
GFR NON-AFRICAN AMERICAN: 43 ML/MIN/1.73
GLUCOSE BLD-MCNC: 125 MG/DL (ref 74–99)
HCT VFR BLD CALC: 34.2 % (ref 34–48)
HEMOGLOBIN: 11.6 G/DL (ref 11.5–15.5)
IMMATURE GRANULOCYTES #: 0.08 E9/L
IMMATURE GRANULOCYTES %: 1.1 % (ref 0–5)
INR BLD: 2.9
LYMPHOCYTES ABSOLUTE: 0.9 E9/L (ref 1.5–4)
LYMPHOCYTES RELATIVE PERCENT: 12.1 % (ref 20–42)
MCH RBC QN AUTO: 30.4 PG (ref 26–35)
MCHC RBC AUTO-ENTMCNC: 33.9 % (ref 32–34.5)
MCV RBC AUTO: 89.8 FL (ref 80–99.9)
MONOCYTES ABSOLUTE: 0.58 E9/L (ref 0.1–0.95)
MONOCYTES RELATIVE PERCENT: 7.8 % (ref 2–12)
NEUTROPHILS ABSOLUTE: 5.83 E9/L (ref 1.8–7.3)
NEUTROPHILS RELATIVE PERCENT: 78.5 % (ref 43–80)
PDW BLD-RTO: 13 FL (ref 11.5–15)
PLATELET # BLD: 193 E9/L (ref 130–450)
PMV BLD AUTO: 9.6 FL (ref 7–12)
POTASSIUM REFLEX MAGNESIUM: 4.1 MMOL/L (ref 3.5–5)
PROTHROMBIN TIME: 32 SEC (ref 9.3–12.4)
RBC # BLD: 3.81 E12/L (ref 3.5–5.5)
SODIUM BLD-SCNC: 135 MMOL/L (ref 132–146)
TOTAL PROTEIN: 5.8 G/DL (ref 6.4–8.3)
WBC # BLD: 7.4 E9/L (ref 4.5–11.5)

## 2022-06-02 PROCEDURE — 80053 COMPREHEN METABOLIC PANEL: CPT

## 2022-06-02 PROCEDURE — 36415 COLL VENOUS BLD VENIPUNCTURE: CPT

## 2022-06-02 PROCEDURE — 85025 COMPLETE CBC W/AUTO DIFF WBC: CPT

## 2022-06-02 PROCEDURE — 2580000003 HC RX 258: Performed by: FAMILY MEDICINE

## 2022-06-02 PROCEDURE — 6370000000 HC RX 637 (ALT 250 FOR IP): Performed by: INTERNAL MEDICINE

## 2022-06-02 PROCEDURE — 1200000000 HC SEMI PRIVATE

## 2022-06-02 PROCEDURE — 6370000000 HC RX 637 (ALT 250 FOR IP): Performed by: FAMILY MEDICINE

## 2022-06-02 PROCEDURE — 6360000002 HC RX W HCPCS: Performed by: FAMILY MEDICINE

## 2022-06-02 PROCEDURE — 85610 PROTHROMBIN TIME: CPT

## 2022-06-02 RX ORDER — WARFARIN SODIUM 3 MG/1
3 TABLET ORAL
Status: COMPLETED | OUTPATIENT
Start: 2022-06-02 | End: 2022-06-02

## 2022-06-02 RX ORDER — ATORVASTATIN CALCIUM 20 MG/1
20 TABLET, FILM COATED ORAL NIGHTLY
Qty: 30 TABLET | Refills: 0 | Status: SHIPPED | OUTPATIENT
Start: 2022-06-02 | End: 2022-07-02

## 2022-06-02 RX ADMIN — METHOCARBAMOL 500 MG: 500 TABLET ORAL at 11:52

## 2022-06-02 RX ADMIN — Medication 10 ML: at 10:04

## 2022-06-02 RX ADMIN — HYDRALAZINE HYDROCHLORIDE 5 MG: 10 TABLET, FILM COATED ORAL at 10:04

## 2022-06-02 RX ADMIN — SPIRONOLACTONE 25 MG: 25 TABLET ORAL at 11:52

## 2022-06-02 RX ADMIN — WARFARIN SODIUM 3 MG: 3 TABLET ORAL at 18:06

## 2022-06-02 RX ADMIN — FOLIC ACID 1 MG: 1 TABLET ORAL at 10:04

## 2022-06-02 RX ADMIN — METHOCARBAMOL 500 MG: 500 TABLET ORAL at 18:06

## 2022-06-02 RX ADMIN — DEXAMETHASONE SODIUM PHOSPHATE 6 MG: 10 INJECTION INTRAMUSCULAR; INTRAVENOUS at 04:47

## 2022-06-02 RX ADMIN — HYDRALAZINE HYDROCHLORIDE 5 MG: 10 TABLET, FILM COATED ORAL at 20:34

## 2022-06-02 RX ADMIN — METHOCARBAMOL 500 MG: 500 TABLET ORAL at 10:03

## 2022-06-02 RX ADMIN — ACETAMINOPHEN 650 MG: 325 TABLET ORAL at 10:03

## 2022-06-02 RX ADMIN — ATORVASTATIN CALCIUM 20 MG: 20 TABLET, FILM COATED ORAL at 20:35

## 2022-06-02 RX ADMIN — PRIMIDONE 150 MG: 50 TABLET ORAL at 10:03

## 2022-06-02 RX ADMIN — LISINOPRIL 20 MG: 20 TABLET ORAL at 20:34

## 2022-06-02 RX ADMIN — METHOCARBAMOL 500 MG: 500 TABLET ORAL at 20:35

## 2022-06-02 RX ADMIN — PRIMIDONE 150 MG: 50 TABLET ORAL at 20:34

## 2022-06-02 RX ADMIN — Medication 3 MG: at 20:35

## 2022-06-02 RX ADMIN — Medication 1000 UNITS: at 10:04

## 2022-06-02 RX ADMIN — ACETAMINOPHEN 650 MG: 325 TABLET ORAL at 20:34

## 2022-06-02 RX ADMIN — DILTIAZEM HYDROCHLORIDE 360 MG: 120 CAPSULE, COATED, EXTENDED RELEASE ORAL at 10:03

## 2022-06-02 ASSESSMENT — PAIN DESCRIPTION - LOCATION: LOCATION: BACK

## 2022-06-02 ASSESSMENT — PAIN SCALES - GENERAL: PAINLEVEL_OUTOF10: 5

## 2022-06-02 NOTE — PROGRESS NOTES
Pharmacy Consultation Note  (Warfarin Dosing and Monitoring)  Initial consult date: 5/27/22  Consulting Provider: Dr. Toro Powell is a [de-identified] y.o. female for whom pharmacy has been asked to manage warfarin therapy. Weight:   Wt Readings from Last 1 Encounters:   06/02/22 147 lb 3.2 oz (66.8 kg)       TSH:    Lab Results   Component Value Date    TSH 0.565 12/11/2017       Hepatic Function Panel:                            Lab Results   Component Value Date    ALKPHOS 123 06/02/2022    ALT 19 06/02/2022    AST 14 06/02/2022    PROT 5.8 06/02/2022    BILITOT 0.2 06/02/2022    BILIDIR <0.2 05/31/2022    IBILI see below 05/31/2022    LABALBU 3.1 06/02/2022       Current warfarin drug-drug interactions include:  primidone (home med), dexamethasone    Recent Labs     05/31/22  0537 06/01/22  0600 06/02/22  0500   HGB 11.8 11.4* 11.6    176 193     Date Warfarin Dose INR Heparin or LMWH Comment   5/24 7 mg   (given 5/25 @0222) 1.7     5/25 7 mg 1.8  Dexamethasone started (COVID)   5/26 5 mg 2.5  Dexamethasone   5/27 HOLD 3.3 -- Dexamethasone  Pharmacy consulted   5/28 HOLD 3.4 -- Dexamethasone   5/29 3 mg  2.7 -- Dexamethasone   5/30 5 mg 2.2 -- Dexamethasone   5/31 5 mg 2.2 -- Dexamethasone   6/1 5 mg 2.5 -- Dexamethasone   6/2 <3 mg> 2.9 -- Dexamethasone     Assessment:  · Patient is a [de-identified] y.o. female on warfarin for Atrial Fibrillation. Patient's home warfarin dosing regimen is 7 mg daily.    · Goal INR 2 - 3  · Patient admitted for COVID-19 pneumonia  · INR 2.9 today    Plan:  · Warfarin 3 mg x 1 tonight  · Daily PT/INR until the INR is stable within the therapeutic range  · Pharmacist will follow and monitor/adjust dosing as necessary    Thank you for this consult,    Philippe Edwards PharmD 6/2/2022 10:28 AM

## 2022-06-02 NOTE — PROGRESS NOTES
Hospitalist Progress Note      PCP: Paola Tomlinson MD    Date of Admission: 5/24/2022  Days in the hospital: [facility] Course:   Ms. Aimee Sheppard, a [de-identified]y.o. 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      Patient presented to the emergency department after a fall that occurred on the 19th of this month. She is also reporting generalized weakness, and neck pain. Patient reported she slipped off a chair landing on her tailbone. Neck pain due to flexion and extension. After the fall she laid on the ground for several hours unable to get up due to generalized weakness. She initially presented to the [facility] emergency department which work-up did not show any acute processes. She was discharged home. She continued to have pain around her coccyx and neck pain. She is unable to stand due to generalized weakness. She also reports she had a cough productive of sputum. Denies fever, chills, chest pain. She was vaccinated x2 for COVID-19. CT chest shows right upper lobe pneumonia with endobronchial spread of infection throughout the lungs. CT cervical spine shows a few bubbles of gas on linear lucency at the C5-6 level which could represent fracture. Neurosurgery was consulted in emergency department. They advised c-collar and they will see the patient in the morning. Medicine consulted for admission. Subjective  No major overnight changes. Patient in bed, does not appear to be in distress. Patient on room air.     Exam:    /71   Pulse 71   Temp 97.6 °F (36.4 °C) (Oral)   Resp 18   Ht 5' 2\" (1.575 m)   Wt 147 lb 3.2 oz (66.8 kg) SpO2 96%   BMI 26.92 kg/m²     Awake and oriented. Patient does not appear to be in distress  HEENT: No pallor, no icterus. C-collar in place  Respiratory:  decreased breath sounds at her baseline. Cardiovascular: RRR, no murmur. Abdomen: Soft, non-tender, BS noted. Neurologic: awake, alert,AAOX(2-3)     Assessment/Plan:  1.) COVID-19 infection, currently on steroids (decadron) ,  on room air, continue to follow closely. Chest xray 22: No significant change in bilateral airspace opacities. Chest Xray: persistent bilateral infiltrates. Patient had her 2  vaccinations; but not the booster. 2.) A. fib with RVR; patient is on coumadin- pharmacy following. Cardiology evaluation appreciated,recommend a lipid profile- LDL: 114 and Tri. Was started on dose stain. Patient on  PO diltiazem  With consideration of low dose beta blocker therapy if not stable. Can f/u with Dr. Gaynell Merlin in 4 weeks OP.     3.) C5- C6 fracture, seen by neurosurgery, MRI of the cervical spine reviewed; As per neurosurgery. PT/OT. 4.) Leukopenia secondary to COVID-19 infection: resolved.     5.) ? AMS-metabolic encephalopathy secondary to infection: Patient appears to answer all questions appropriately. C/w monitoring at this time. CT head was ordered- negative. ABG: negative. Likely back to baseline    6.) Insomnia: Melatonin dosed nightly. 7.) Uncontrolled HTN:  C/w diltiazem and lisinopril, aldactone. Hydralazine ordered with holding parameters. Awaiting PT/OT evaluation input appreciated.    Disposition: Pending placement      Labs:   Recent Labs     22  0537 22  0600 22  0500   WBC 8.4 5.4 7.4   HGB 11.8 11.4* 11.6   HCT 36.1 34.5 34.2    176 193     Recent Labs     22  0537 22  0600 22  0500    133 135   K 4.1 4.6 4.1    100 102   CO2    BUN 38* 41* 47*   CREATININE 1.2* 1.3* 1.2*   CALCIUM 8.9 8.6 8.7   PHOS 3.2  --   --      Recent Labs 05/31/22  0537 06/01/22  0600 06/02/22  0500   AST 19 22 14   ALT 28 22 19   BILIDIR <0.2  --   --    BILITOT 0.3 0.3 0.2   ALKPHOS 117* 112* 123*     Recent Labs     05/31/22  0537 06/01/22  0600 06/02/22  0500   INR 2.2 2.5 2.9     No results for input(s): CKTOTAL, TROPONINI in the last 72 hours. Medications:  Reviewed    Infusion Medications    sodium chloride       Scheduled Medications    warfarin  3 mg Oral Once    methocarbamol  500 mg Oral 4x Daily    hydrALAZINE  5 mg Oral 2 times per day    acetaminophen  650 mg Oral TID    melatonin  3 mg Oral Nightly    atorvastatin  20 mg Oral Nightly    warfarin placeholder: dosing by pharmacy   Other RX Placeholder    dilTIAZem  360 mg Oral Daily    folic acid  1 mg Oral QAM    glipiZIDE  2.5 mg Oral QAM AC    latanoprost  1 drop Both Eyes Nightly    lisinopril  20 mg Oral Nightly    primidone  150 mg Oral BID    spironolactone  25 mg Oral QAM    Vitamin D  1,000 Units Oral QAM    sodium chloride flush  10 mL IntraVENous 2 times per day    dexamethasone  6 mg IntraVENous Q24H     PRN Meds: hydrALAZINE, sodium chloride flush, sodium chloride, promethazine **OR** ondansetron, polyethylene glycol, acetaminophen **OR** acetaminophen, potassium chloride **OR** potassium alternative oral replacement **OR** potassium chloride, magnesium sulfate      Intake/Output Summary (Last 24 hours) at 6/2/2022 1131  Last data filed at 6/2/2022 0500  Gross per 24 hour   Intake 400 ml   Output 100 ml   Net 300 ml     Body mass index is 26.92 kg/m². · Diet  ADULT DIET; Regular; Low Sodium (2 gm)  ADULT ORAL NUTRITION SUPPLEMENT; Breakfast, Lunch; Diabetic Oral Supplement    · Code Status  Full Code         Electronically signed by Yuly Oliveira MD on 6/2/2022 at 11:31 AM  [employer]   Please contact me through [employer]    NOTE: This report was transcribed using voice recognition software.  Every effort was made to ensure accuracy; however, inadvertent computerized transcription errors may be present.

## 2022-06-02 NOTE — DISCHARGE SUMMARY
Hospitalist Discharge Summary    Patient ID: Rosalba Torres   Patient : [id number]  Patient's PCP: Arcenio Segovia MD    Admit Date: 2022   Admitting Physician: Roberto Blackmon DO    Discharge Date:  2022  Discharge Physician: Ketty Fernando MD   Discharge Condition: Stable  Discharge Disposition: Skilled Facility    History of presenting illness:  Ms. Rosalba Torres, a [de-identified]y.o. 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      Patient presented to the emergency department after a fall that occurred on the  of this month. She is also reporting generalized weakness, and neck pain. Patient reported she slipped off a chair landing on her tailbone. Neck pain due to flexion and extension. After the fall she laid on the ground for several hours unable to get up due to generalized weakness. She initially presented to the [facility] emergency department which work-up did not show any acute processes. She was discharged home. She continued to have pain around her coccyx and neck pain. Today she is unable to stand due to generalized weakness. She also reports last today she had a cough productive of sputum. Denies fever, chills, chest pain. She was vaccinated x2 for COVID-19. Vital signs show the patient to be tachycardic with a rate of 117. EKG shows atrial fibrillation. She is also hypertensive with a pressure of 167/115. Patient is afebrile. Laboratory studies demonstrate creatinine 1.1, magnesium 1.5, glucose 107, total CK1 89, CRP 7.9, , proBNP 2000 984, troponin of 24 with repeat of 23, WBC 2.6.   CT chest shows right upper lobe pneumonia with endobronchial spread of infection throughout the lungs. CT cervical spine shows a few bubbles of gas on linear lucency at the C5-6 level which could represent fracture. Neurosurgery was consulted in emergency department. They advised c-collar and they will see the patient in the morning. Medicine consulted for admission.  [address] course in brief:  (Please refer to daily progress notes for a comprehensive review of the hospitalization by requesting medical records)  Patient was admitted to the hospital as above with acute metabolic encephalopathy. She was found to have acute COVID-19 infection. She was started on steroids and completed the course. Chest x-ray was unremarkable for acute findings except for pulmonary infiltrates. Patient is vaccinated. She also has history of atrial fibrillation. She had tachyarrhythmia, she was started on her home medications with diltiazem and was continued on Coumadin. She was evaluated by cardiology. She will follow-up for possible watchman device. She was found to have CT 5 C6 fracture and was evaluated by neurosurgery. Cervical collar was placed. Patient continued to be weak and was evaluated by PT OT  She will be discharged to skilled nursing facility. Consults:   IP CONSULT TO NEUROSURGERY  IP CONSULT TO INTERNAL MEDICINE  IP CONSULT TO CARDIOLOGY  PHARMACY TO DOSE WARFARIN    Discharge Diagnoses:  1.) COVID-19 infection,   Patient had her 2  vaccinations; but not the booster.      2.) A. fib with RVR; patient is on coumadin- pharmacy following.      3.) C5- C6 fracture, seen by neurosurgery     4.) Leukopenia secondary to COVID-19 infection     5.) ? AMS-metabolic encephalopathy secondary to infection     6.) Insomnia:     7.) Uncontrolled HTN       Discharge Instructions / Follow up:    Continued appropriate risk factor modification of blood pressure, diabetes and serum lipids will remain essential to reducing risk of future atherosclerotic development    Activity: activity as tolerated    Significant labs:  CBC:   Recent Labs     05/31/22  0537 06/01/22  0600 06/02/22  0500   WBC 8.4 5.4 7.4   RBC 3.93 3.75 3.81   HGB 11.8 11.4* 11.6   HCT 36.1 34.5 34.2   MCV 91.9 92.0 89.8   RDW 12.9 13.2 13.0    176 193     BMP:   Recent Labs     05/31/22  0537 06/01/22  0600 06/02/22  0500    133 135   K 4.1 4.6 4.1    100 102   CO2 24 22 22   BUN 38* 41* 47*   CREATININE 1.2* 1.3* 1.2*   MG 2.0  --   --    PHOS 3.2  --   --      LFT:  Recent Labs     05/31/22  0537 06/01/22  0600 06/02/22  0500   PROT 5.7* 5.6* 5.8*   ALKPHOS 117* 112* 123*   ALT 28 22 19   AST 19 22 14   BILITOT 0.3 0.3 0.2     PT/INR:   Recent Labs     05/31/22  0537 06/01/22  0600 06/02/22  0500   INR 2.2 2.5 2.9     BNP: No results for input(s): BNP in the last 72 hours. Hgb A1C:   Lab Results   Component Value Date    LABA1C 7.8 (H) 08/24/2012     Folate and B12: No results found for: Mohan Oneill, No results found for: FOLATE  Thyroid Studies:   Lab Results   Component Value Date    TSH 0.565 12/11/2017       Urinalysis:    Lab Results   Component Value Date    NITRU Negative 05/29/2022    WBCUA 0-1 05/29/2022    BACTERIA RARE 05/29/2022    RBCUA 0-1 05/29/2022    RBCUA >20 08/24/2012    BLOODU TRACE-INTACT 05/29/2022    SPECGRAV 1.020 05/29/2022    GLUCOSEU Negative 05/29/2022       Imaging:  XR PELVIS (1-2 VIEWS)    Result Date: 5/19/2022  EXAMINATION: ONE XRAY VIEW OF THE PELVIS 5/19/2022 1:03 pm COMPARISON: July 6, 2017. HISTORY: ORDERING SYSTEM PROVIDED HISTORY: fall TECHNOLOGIST PROVIDED HISTORY: Reason for exam:->fall FINDINGS: No evidence of pelvic fracture. No fracture or dislocation of bilateral hips. No evidence of pelvis or hip fracture. XR HIP BILATERAL W AP PELVIS (2 VIEWS)    Result Date: 5/24/2022  EXAMINATION: ONE XRAY VIEW OF THE PELVIS AND TWO XRAY VIEWS OF EACH OF THE BILATERAL HIPS 5/24/2022 1:14 pm COMPARISON: None.  HISTORY: ORDERING SYSTEM PROVIDED HISTORY: pain TECHNOLOGIST PROVIDED HISTORY: Reason for exam:->pain FINDINGS: No acute fractures or dislocations in the pelvis, right hip or left hip. Bilateral femoral head contours are intact. Bilateral hip joints intact. Symphysis pubis is not widened. Bilateral sacroiliac joints are intact. No acute osseous findings in the pelvis, right hip or left hip     CT HEAD WO CONTRAST    Result Date: 5/28/2022  EXAMINATION: CT OF THE HEAD WITHOUT CONTRAST  5/28/2022 9:17 pm TECHNIQUE: CT of the head was performed without the administration of intravenous contrast. Automated exposure control, iterative reconstruction, and/or weight based adjustment of the mA/kV was utilized to reduce the radiation dose to as low as reasonably achievable. COMPARISON: 05/24/2022 HISTORY: ORDERING SYSTEM PROVIDED HISTORY: AMS TECHNOLOGIST PROVIDED HISTORY: Reason for exam:->AMS Has a \"code stroke\" or \"stroke alert\" been called? ->No What reading provider will be dictating this exam?->CRC FINDINGS: BRAIN/VENTRICLES: There is no acute intracranial hemorrhage, mass effect or midline shift. No abnormal extra-axial fluid collection. The gray-white differentiation is maintained without evidence of an acute infarct. There is prominence of the ventricles and sulci due to global parenchymal volume loss. There are nonspecific areas of hypoattenuation within the periventricular and subcortical white matter, which likely represent chronic microvascular ischemic change. ORBITS: The visualized portion of the orbits demonstrate no acute abnormality. SINUSES: The visualized paranasal sinuses and mastoid air cells demonstrate no acute abnormality. SOFT TISSUES/SKULL: No acute abnormality of the visualized skull or soft tissues. No acute intracranial abnormality.      CT HEAD WO CONTRAST    Result Date: 5/24/2022  EXAMINATION: CT OF THE HEAD WITHOUT CONTRAST  5/24/2022 1:13 pm TECHNIQUE: CT of the head was performed without the administration of intravenous contrast. Automated exposure control, iterative reconstruction, and/or weight based adjustment of the mA/kV was utilized to reduce the radiation dose to as low as reasonably achievable. COMPARISON: None. HISTORY: ORDERING SYSTEM PROVIDED HISTORY: HEAD TRAUMA, CLOSED, MILD, GCS >= 13, NO RISK FACTORS, NEURO EXAM NORMAL TECHNOLOGIST PROVIDED HISTORY: Has a \"code stroke\" or \"stroke alert\" been called? ->No Reason for exam:->pain Decision Support Exception - unselect if not a suspected or confirmed emergency medical condition->Emergency Medical Condition (MA) FINDINGS: BRAIN/VENTRICLES: There is no acute intracranial hemorrhage, mass effect or midline shift. No abnormal extra-axial fluid collection. The gray-white differentiation is maintained without evidence of an acute infarct. There is no evidence of hydrocephalus. ORBITS: The visualized portion of the orbits demonstrate no acute abnormality. SINUSES: The visualized paranasal sinuses and mastoid air cells demonstrate no acute abnormality. SOFT TISSUES/SKULL:  No acute abnormality of the visualized skull or soft tissues. No acute intracranial abnormality. No intracranial hemorrhage. CT Head WO Contrast    Result Date: 5/19/2022  EXAMINATION: CT OF THE HEAD WITHOUT CONTRAST  5/19/2022 12:46 pm TECHNIQUE: CT of the head was performed without the administration of intravenous contrast. Automated exposure control, iterative reconstruction, and/or weight based adjustment of the mA/kV was utilized to reduce the radiation dose to as low as reasonably achievable. COMPARISON: June 12, 2021 HISTORY: ORDERING SYSTEM PROVIDED HISTORY: fall TECHNOLOGIST PROVIDED HISTORY: Reason for exam:->fall Has a \"code stroke\" or \"stroke alert\" been called? ->No Decision Support Exception - unselect if not a suspected or confirmed emergency medical condition->Emergency Medical Condition (MA) FINDINGS: BRAIN/VENTRICLES: There are age related cortical atrophy and periventricular white matter ischemic changes. There is no acute intracranial hemorrhage, mass effect or midline shift. No abnormal extra-axial fluid collection. The gray-white differentiation is maintained without evidence of an acute infarct. There is no evidence of hydrocephalus. ORBITS: The visualized portion of the orbits demonstrate no acute abnormality. SINUSES: The visualized paranasal sinuses and mastoid air cells demonstrate no acute abnormality. SOFT TISSUES/SKULL:  No acute abnormality of the visualized skull or soft tissues. No acute intracranial abnormality. Cortical atrophy and periventricular leukomalacia. CT CHEST WO CONTRAST    Result Date: 5/24/2022  EXAMINATION: CT OF THE CHEST WITHOUT CONTRAST 5/24/2022 1:47 pm TECHNIQUE: CT of the chest was performed without the administration of intravenous contrast. Multiplanar reformatted images are provided for review. Automated exposure control, iterative reconstruction, and/or weight based adjustment of the mA/kV was utilized to reduce the radiation dose to as low as reasonably achievable. COMPARISON: Chest x-ray 05/24/2022, 05/19/2022. CT chest 09/24/2021. HISTORY: ORDERING SYSTEM PROVIDED HISTORY: chest pain TECHNOLOGIST PROVIDED HISTORY: Reason for exam:->chest pain Decision Support Exception - unselect if not a suspected or confirmed emergency medical condition->Emergency Medical Condition (MA) FINDINGS: Mediastinum: No significant lymphadenopathy is seen, but evaluation is limited without intravenous contrast.  The heart is prominent size, but stable. Atherosclerotic vascular calcification including coronary artery and aortic valve calcification noted. The main pulmonary artery is dilated suggestive of possible pulmonary arterial hypertension. Thoracic aorta is normal in caliber. Trace pericardial effusion is noted. Lungs/pleura: A right upper lobe posterior consolidative opacity is identified with air bronchograms. Additional scattered patchy bilateral peribronchial opacities are noted throughout the lungs. Diffuse airway wall thickening is seen. No pleural effusion is identified. No evidence of significant pulmonary edema. Upper Abdomen: A left adrenal adenoma is stable. Partially visualized kidneys demonstrate nonobstructing renal calculi. Soft Tissues/Bones:  No acute abnormality of the visualized osseous structures. Right upper lobe pneumonia with endobronchial spread of infection throughout the lungs     CT CERVICAL SPINE WO CONTRAST    Result Date: 5/24/2022  EXAMINATION: CT OF THE CERVICAL SPINE WITHOUT CONTRAST 5/24/2022 1:13 pm TECHNIQUE: CT of the cervical spine was performed without the administration of intravenous contrast. Multiplanar reformatted images are provided for review. Automated exposure control, iterative reconstruction, and/or weight based adjustment of the mA/kV was utilized to reduce the radiation dose to as low as reasonably achievable. COMPARISON: None. HISTORY: ORDERING SYSTEM PROVIDED HISTORY: pain TECHNOLOGIST PROVIDED HISTORY: Reason for exam:->pain Decision Support Exception - unselect if not a suspected or confirmed emergency medical condition->Emergency Medical Condition (MA) FINDINGS: BONES/ALIGNMENT: There is large flowing anterior calcification calcification C3 through C7-T1 suggesting DISH. There is linear lucency with bubbles of gas extending through the dense calcification anterior longitudinal ligament and into the disc space C5-6. Lateral masses align normally with C2. DEGENERATIVE CHANGES: There is marked disc space narrowing C5-6 C6-7. Mild-to-moderate degenerative change seen at the facets. SOFT TISSUES: Calcified mass seen lateral left cerebellum along petrous apex measures 2.5 cm possible meningioma. There is rigid spine secondary to DISH with large ossification anteriorly C3 through T1.   However there is now a few bubbles of gas along linear lucency at the C5-6 level which could represent fracture of the anterior ossification. However alignment is maintained. Facets are maintained. MRI could further evaluate as well as evaluate any ligamentous injury. Critical results were called by Dr. Jennifer Alamo to Dr. Navid Dawkins On 5/24/2022 at 13:44. CT Cervical Spine WO Contrast    Result Date: 5/19/2022  EXAMINATION: CT OF THE CERVICAL SPINE WITHOUT CONTRAST 5/19/2022 12:46 pm TECHNIQUE: CT of the cervical spine was performed without the administration of intravenous contrast. Multiplanar reformatted images are provided for review. Automated exposure control, iterative reconstruction, and/or weight based adjustment of the mA/kV was utilized to reduce the radiation dose to as low as reasonably achievable. COMPARISON: None. HISTORY: ORDERING SYSTEM PROVIDED HISTORY: fall TECHNOLOGIST PROVIDED HISTORY: Reason for exam:->fall Decision Support Exception - unselect if not a suspected or confirmed emergency medical condition->Emergency Medical Condition (MA) FINDINGS: BONES/ALIGNMENT: There is no acute fracture or traumatic malalignment. There is grade 1 degenerative anterolisthesis of C7 on T1. DEGENERATIVE CHANGES: There are multilevel degenerative changes and extensive ankylosis. Multilevel anterior and posterior bridging osteophytes noted. Bilateral facet arthrosis noted. SOFT TISSUES: There is no prevertebral soft tissue swelling. There is focal calcification in the right thyroid gland. No acute abnormality of the cervical spine. Extensive degenerative changes, ankylosis and facet arthrosis.      MRI CERVICAL SPINE WO CONTRAST    Result Date: 5/26/2022  EXAMINATION: MRI OF THE CERVICAL SPINE WITHOUT CONTRAST 5/26/2022 6:03 pm TECHNIQUE: Multiplanar multisequence MRI of the cervical spine was performed without the administration of intravenous contrast. COMPARISON: Cervical CT of 05/24/2022 HISTORY: ORDERING SYSTEM PROVIDED HISTORY: Possible C6 fracture TECHNOLOGIST PROVIDED HISTORY: Reason for exam:->Possible C6 fracture What reading provider will be dictating this exam?->CRC FINDINGS: BONES/ALIGNMENT: There is straightening of cervical lordosis. There is rigid spine with diffuse idiopathic skeletal hyperostosis and bridging anterior osteophytes. There is extensive amount of prevertebral fluid collection. There is a defect at the level of C5-C6 disc space which appears to be a fracture line along the fused space of C5-C6 extending anteriorly into the anterior syndesmophyte with some of the fluid tracking along the fracture line. Findings are concerning for acute fracture. There is also marrow edema within C2 vertebral body with no discrete fracture line. The vertebral body heights are otherwise maintained. The bone marrow signal appears otherwise unremarkable. Extensive amount of edema is also noted within the inter spinous ligaments of C2 through C6 likely related to a strain. SPINAL CORD: No abnormal cord signal is seen. SOFT TISSUES: No paraspinal mass identified. 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 Moderate bilateral set arthropathy. Moderate canal stenosis. C3-C4: Ossification of posterior longitudinal ligament. Mild bilateral facet arthropathy. Mild canal stenosis and mild bilateral neural foraminal narrowing. C4-C5: Posterior osteophytic ridging. Moderate bilateral set arthropathy. Moderate canal stenosis and moderate bilateral neural foraminal narrowing. C5-C6: Posterior osteophytic ridging, moderate bilateral facet arthropathy. Moderate canal stenosis and severe bilateral neural foraminal narrowing. C6-C7: Mature fusion of vertebral bodies. Moderate bilateral facet arthropathy. Severe bilateral neural foraminal narrowing. C7-T1: Grade 1 anterolisthesis with disc bulging. Moderate bilateral facet arthropathy. Findings resulting in severe bilateral neural foraminal narrowing.      Rigidus spine with bridging anterior syndesmophytes. There is a defect at the level of C5-C6 disc space which appears to be a fracture line along the fused space of C5-C6 extending anteriorly into the anterior syndesmophyte with some of the fluid tracking along the fracture line. Findings are concerning for acute fracture. Extensive amount of prevertebral soft tissue fluid is likely secondary to C5-C6 fracture. Extensive amount of edema is also noted within the inter spinous ligaments of C2 through C6 likely related to strain. Marrow edema of C2 vertebral body is likely related to degenerative disease. Multilevel high-grade degenerative disease most pronounced at C4-C5, C5-C6 and C6-C7 as described. The findings were sent to the Radiology Results [address] at 10:23 pm on 5/26/2022 to be communicated to a licensed caregiver. RECOMMENDATIONS: Unavailable     XR CHEST PORTABLE    Result Date: 5/30/2022  EXAMINATION: ONE XRAY VIEW OF THE CHEST 5/30/2022 8:29 am COMPARISON: May 19-May 28 Reviewed CT chest May 24 HISTORY: ORDERING SYSTEM PROVIDED HISTORY: Deirdre TURNER TECHNOLOGIST PROVIDED HISTORY: Reason for exam:->Eval COVID PNA What reading provider will be dictating this exam?->CRC FINDINGS: Persistent patchy infiltrate throughout both lungs more noticeable towards the mid upper aspect of the right lung and more diffuse in the left lung. Heart has upper normal size. No pleural effusions are seen. There is no pneumothorax on the right on the left. Persistent bilateral infiltrates compatible with acute infectious viral pneumonia. See report CT chest May 24.      XR CHEST PORTABLE    Result Date: 5/28/2022  EXAMINATION: ONE XRAY VIEW OF THE CHEST 5/28/2022 7:57 am COMPARISON: Chest radiograph May 24, 2022 CT chest May 24, 2022 HISTORY: ORDERING SYSTEM PROVIDED HISTORY: COVID 19 PNA TECHNOLOGIST PROVIDED HISTORY: Reason for exam:->COVID 19 PNA What reading provider will be dictating this exam?->CRC FINDINGS: Patchy airspace opacities in lungs without significant change. There is no effusion or pneumothorax. The cardiomediastinal silhouette is without acute process. The osseous structures are without acute process. No significant change in bilateral airspace opacities. XR CHEST PORTABLE    Result Date: 5/24/2022  EXAMINATION: ONE XRAY VIEW OF THE CHEST 5/24/2022 1:14 pm COMPARISON: Chest radiograph May 19, 2022 HISTORY: ORDERING SYSTEM PROVIDED HISTORY: fatigue TECHNOLOGIST PROVIDED HISTORY: Reason for exam:->fatigue FINDINGS: Focal opacity identified in right upper lung. Mild diffuse opacities in left lung. There is no effusion or pneumothorax. The cardiomediastinal silhouette is without acute process. The osseous structures are without acute process. Focal opacity in right upper lung may represent pneumonia or pulmonary edema in the proper clinical setting. Further evaluation may be obtained with CT chest if clinically warranted. XR CHEST PORTABLE    Result Date: 5/19/2022  EXAMINATION: ONE XRAY VIEW OF THE CHEST 5/19/2022 1:03 pm COMPARISON: None. HISTORY: ORDERING SYSTEM PROVIDED HISTORY: fall TECHNOLOGIST PROVIDED HISTORY: Reason for exam:->fall FINDINGS: The cardiac silhouette is at upper limits of normal in size. The lungs are clear. There is no focal infiltrate or effusion. There is no pneumothorax     There is no acute cardiopulmonary disease.        Discharge Medications:      Medication List      START taking these medications    atorvastatin 20 MG tablet  Commonly known as: LIPITOR  Take 1 tablet by mouth nightly        CONTINUE taking these medications    dilTIAZem 360 MG extended release capsule  Commonly known as: CARDIZEM CD  Take 1 capsule by mouth daily     folic acid 1 MG tablet  Commonly known as: FOLVITE     glimepiride 1 MG tablet  Commonly known as: AMARYL     * Jantoven 2 MG tablet  Generic drug: warfarin     * Jantoven 5 MG tablet  Generic drug: warfarin     latanoprost 0.005 % ophthalmic solution  Commonly known as: XALATAN     lisinopril 20 MG tablet  Commonly known as: PRINIVIL;ZESTRIL     primidone 50 MG tablet  Commonly known as: MYSOLINE     spironolactone 25 MG tablet  Commonly known as: ALDACTONE     Vitamin D 1000 units Caps capsule  Commonly known as: CHOLECALCIFEROL         * This list has 2 medication(s) that are the same as other medications prescribed for you. Read the directions carefully, and ask your doctor or other care provider to review them with you. Where to Get Your Medications      These medications were sent to [address] [place] [address] 1, [address] [facility] [address]    Phone: [phone]   · atorvastatin 20 MG tablet         Time Spent on discharge is more than 35 minutes in the examination, evaluation, counseling and review of medications and discharge plan.    +++++++++++++++++++++++++++++++++++++++++++++++++  MD VAHID Joseph/ Zelalem [address]  +++++++++++++++++++++++++++++++++++++++++++++++++  NOTE: This report was transcribed using voice recognition software. Every effort was made to ensure accuracy; however, inadvertent computerized transcription errors may be present.

## 2022-06-02 NOTE — CARE COORDINATION
Care Coordination: received a call from a Travis Wilson, who is not on contact information. He was requesting clinical update, and checking to see what it would take to keep her in long term care. I explained, I cannot review clinical unless he is listed as contact, and currently cousin Flo Baugh what point person and I had received approval to speak to by patient. I explained he can reach out to cousins and pcp if they feel she is not competent to go home and also reach out to APS if pt insists on home. They can work with sw at facilty at [facility] beds [facility] the [facility] if needed. He will touch base with nathaniel. He has not spoken to pcp , attending here or nurses.     Kendall Reyes

## 2022-06-02 NOTE — DISCHARGE INSTR - DIET

## 2022-06-02 NOTE — CARE COORDINATION
Care Coordination: spoke to [place] at [facility], precert remains pending, but she will check with case management to have them check with insurance    Tere Young    Addendum: precert obtained, ambulance set up for 630 pm, soonest available, nathaniel notified and she will speak to other cousins and they will call nursing facility to see what her needs will be. Nursing notified.  Facility notified    Tere Young

## 2022-06-06 ENCOUNTER — TELEPHONE (OUTPATIENT)
Dept: CARDIOLOGY CLINIC | Age: 82
End: 2022-06-06

## 2022-06-15 NOTE — TELEPHONE ENCOUNTER
Patient's friend returned our call. Patient is in rehab from a back injury and they will contact us once she is discharged.

## 2022-06-15 NOTE — TELEPHONE ENCOUNTER
Left message for patient's friend, Jael Villagomez, to contact office. Patient to be scheduled with Lelo Brennan on 6/28/2022 for HFU (Dr. Peter Chen pt).

## 2022-07-12 ENCOUNTER — TELEPHONE (OUTPATIENT)
Dept: ADMINISTRATIVE | Age: 82
End: 2022-07-12

## 2022-07-12 NOTE — TELEPHONE ENCOUNTER
Patients friend Daniela Trimble called to schedule a Hospital follow up. DC 07/09 from Rehab. She has a fractured vertabrae in her neck, Pt has an appt on 08/30 with  and Marline asked to see if she can be seen sooner.  Please contact Marline at [phone]

## 2022-09-01 ENCOUNTER — INITIAL CONSULT (OUTPATIENT)
Dept: NEUROSURGERY | Age: 82
End: 2022-09-01
Payer: MEDICARE

## 2022-09-01 VITALS
SYSTOLIC BLOOD PRESSURE: 107 MMHG | HEART RATE: 88 BPM | TEMPERATURE: 98.2 F | OXYGEN SATURATION: 97 % | BODY MASS INDEX: 27.05 KG/M2 | DIASTOLIC BLOOD PRESSURE: 67 MMHG | WEIGHT: 147 LBS | RESPIRATION RATE: 20 BRPM | HEIGHT: 62 IN

## 2022-09-01 DIAGNOSIS — M48.10 DISH (DIFFUSE IDIOPATHIC SKELETAL HYPEROSTOSIS): ICD-10-CM

## 2022-09-01 DIAGNOSIS — S12.401A CLOSED NONDISPLACED FRACTURE OF FIFTH CERVICAL VERTEBRA, UNSPECIFIED FRACTURE MORPHOLOGY, INITIAL ENCOUNTER (HCC): Primary | ICD-10-CM

## 2022-09-01 PROCEDURE — 1123F ACP DISCUSS/DSCN MKR DOCD: CPT | Performed by: PHYSICIAN ASSISTANT

## 2022-09-01 PROCEDURE — 99202 OFFICE O/P NEW SF 15 MIN: CPT

## 2022-09-01 PROCEDURE — 99203 OFFICE O/P NEW LOW 30 MIN: CPT | Performed by: PHYSICIAN ASSISTANT

## 2022-09-01 ASSESSMENT — ENCOUNTER SYMPTOMS
SHORTNESS OF BREATH: 0
PHOTOPHOBIA: 0
ABDOMINAL PAIN: 0
BACK PAIN: 0
TROUBLE SWALLOWING: 0

## 2022-09-01 NOTE — PROGRESS NOTES
Subjective:      Patient ID: Chi Bowman is a [de-identified] y.o. female. Cande Guy is an [de-identified]year old female who presents to the office today as a new patient. She was initially seen by Dr. Jason Fagan in the hospital on 5/24/22 after patient fell of out of her chair hitting her head. CT scan and MRI cervical spine were completed at that time demonstrating DISH with defect at the level of C5-C6 disc space concerning for acute fracture. Extensive amount of edema of the prevertebral soft tissue and inter spinous ligaments of C2 through C6 also noted likely related to strain. Patient was placed in a soft collar at that time. It does not appear patient had follow up or repeat imaging after the incident. Patient states her neck is sore but does not have any sharp, shooting pains or numbness/tingling. Denies loss of bowel or bladder, saddle anesthesia, pain down the legs, numbness, tingling, headache, loss of dexterity, abnormal gait, fever, chills, N/V, SOB, or chest pain. Review of Systems   Constitutional:  Negative for fever and unexpected weight change. HENT:  Negative for trouble swallowing. Eyes:  Negative for photophobia and visual disturbance. Respiratory:  Negative for shortness of breath. Cardiovascular:  Negative for chest pain. Gastrointestinal:  Negative for abdominal pain. Endocrine: Negative for heat intolerance. Genitourinary:  Negative for flank pain. Musculoskeletal:  Positive for arthralgias, myalgias, neck pain and neck stiffness. Negative for back pain and gait problem. Skin:  Negative for wound. Neurological:  Negative for numbness and headaches. Psychiatric/Behavioral:  Negative for confusion. Objective:   Physical Exam  Constitutional:       Appearance: Normal appearance. She is well-developed. HENT:      Head: Normocephalic and atraumatic. Eyes:      Extraocular Movements: Extraocular movements intact.       Conjunctiva/sclera: Conjunctivae normal.      Pupils: Pupils are equal, round, and reactive to light. Neck:      Comments: In soft cervical collar  Minimal tenderness to palpation   Cardiovascular:      Rate and Rhythm: Normal rate. Pulmonary:      Effort: Pulmonary effort is normal.   Abdominal:      General: There is no distension. Musculoskeletal:      Cervical back: Neck supple. Skin:     General: Skin is warm and dry. Neurological:      Mental Status: She is alert. Comments: Alert and oriented x3  CN3-12 intact  Motor strength 4/5 throughout   Sensation intact to light touch     Psychiatric:         Thought Content: Thought content normal.       Assessment: This is an [de-identified]year old female presenting for C5-C6 fracture from 5/24/22. Plan:      -MRI and CT scan from 5/2022 reviewed in detail with patient.   -No updated imaging to review. Cervical CT scan and cervical flex/ex XR ordered.   -Continue soft collar until updated imaging completed. Will call with results. If no abnormalities, okay to d/c collar.    -OARRS report reviewed        Ketty Barajas PA-C

## 2022-09-21 ENCOUNTER — OFFICE VISIT (OUTPATIENT)
Dept: NEUROLOGY | Age: 82
End: 2022-09-21
Payer: MEDICARE

## 2022-09-21 VITALS
SYSTOLIC BLOOD PRESSURE: 137 MMHG | HEART RATE: 115 BPM | TEMPERATURE: 97.8 F | DIASTOLIC BLOOD PRESSURE: 93 MMHG | OXYGEN SATURATION: 96 %

## 2022-09-21 DIAGNOSIS — M54.17 L-S RADICULOPATHY: Primary | ICD-10-CM

## 2022-09-21 DIAGNOSIS — R25.1 TREMORS OF NERVOUS SYSTEM: ICD-10-CM

## 2022-09-21 DIAGNOSIS — M05.79 RHEUMATOID ARTHRITIS INVOLVING MULTIPLE SITES WITH POSITIVE RHEUMATOID FACTOR (HCC): ICD-10-CM

## 2022-09-21 DIAGNOSIS — E11.42 DIABETIC POLYNEUROPATHY ASSOCIATED WITH TYPE 2 DIABETES MELLITUS (HCC): ICD-10-CM

## 2022-09-21 PROCEDURE — 1123F ACP DISCUSS/DSCN MKR DOCD: CPT | Performed by: PSYCHIATRY & NEUROLOGY

## 2022-09-21 PROCEDURE — 99215 OFFICE O/P EST HI 40 MIN: CPT | Performed by: PSYCHIATRY & NEUROLOGY

## 2022-09-21 NOTE — PROGRESS NOTES
vital signs. Blood pressure was 137/93. She was an elderly woman in no acute distress who was alert, cooperative and oriented. She remained pleasant; however her mood continued flat and she was not as humorous today. She now wore a cervical collar. Her lungs were clear to auscultation. Cardiac testing revealed an irregular, irregular rhythm but was otherwise unremarkable. Her limbs revealed only minimal l bogginess at both wrists and all PIP joints. She could not again completely extend her fingers or  tightly. Neurological examination produced an intact mental status, without vocal quivering. Cranial nerve testing was unremarkable, with an unchanged, increased left palpebral fissure. I found normal tone and bulk with 5/5 strength throughout. There continued only slight tremor of the outstretched hands and minimal horizontal head tremor, without jaw tremor, cogwheel rigidity or bradykinesia. Vibration was moderately decreased to the mid calves; pinprick and light touch were intact. Coordination was unrevealing. She walked slowly with an arthritic and cautious gait, but without other abnormalities. She walked more quickly with a walker. Past electrodiagnostic studies displayed lumbosacral motor radiculopathies and moderate diabetic peripheral neuropathy. Recent imaging studies of her cervical spine were noted. Recent CMP was unremarkable except for GFR 52. CBC with differential was unrevealing, except for an absolute cell count of 900. INR was 2.9. This individual presents longstanding tremors of the outstretched hands and head. She suffers from late life tremors, continuing well controlled with the current primidone dosing. We will maintain that regimen. Unfortunately, she fell months ago with a hairline fracture of her cervical spine. There was no compressive myelopathy or radiculopathy. Her gait difficulties are related to deconditioning and lack of confidence.   She must walk more and get outside the home. Again, there is underlying depression. Antidepressants may be in order. She also suffers from early diabetic peripheral neuropathy and motor radiculopathy, without pain or motor weakness from these disorders. Her glucose levels are better controlled    Her rheumatoid arthritis appears controlled without disease modifying therapies. She will return in 4 months, to see DIANA Shafer. She was maintained on primidone 50 mg twice daily. Hopefully, she will exercise more to improve her gait. Her friend will report back in 1 month with her progress. She will call any time if problems arise. I spent 40 minutes with the patient with over 50 % spent in counseling and disease management. All patient issues were addressed and all questions were answered. (3) slightly limited

## 2022-09-30 ENCOUNTER — HOSPITAL ENCOUNTER (OUTPATIENT)
Age: 82
Discharge: HOME | End: 2022-10-02
Payer: MEDICARE

## 2022-09-30 ENCOUNTER — HOSPITAL ENCOUNTER (OUTPATIENT)
Dept: GENERAL RADIOLOGY | Age: 82
Discharge: HOME | End: 2022-10-02
Payer: MEDICARE

## 2022-09-30 ENCOUNTER — HOSPITAL ENCOUNTER (OUTPATIENT)
Dept: CT IMAGING | Age: 82
Discharge: HOME | End: 2022-10-02
Payer: MEDICARE

## 2022-09-30 DIAGNOSIS — S12.401A CLOSED NONDISPLACED FRACTURE OF FIFTH CERVICAL VERTEBRA, UNSPECIFIED FRACTURE MORPHOLOGY, INITIAL ENCOUNTER (HCC): ICD-10-CM

## 2022-09-30 DIAGNOSIS — M48.10 DISH (DIFFUSE IDIOPATHIC SKELETAL HYPEROSTOSIS): ICD-10-CM

## 2022-09-30 PROCEDURE — 72125 CT NECK SPINE W/O DYE: CPT

## 2022-09-30 PROCEDURE — 72040 X-RAY EXAM NECK SPINE 2-3 VW: CPT

## 2022-10-07 ENCOUNTER — TELEPHONE (OUTPATIENT)
Dept: NEUROSURGERY | Age: 82
End: 2022-10-07

## 2022-10-07 NOTE — TELEPHONE ENCOUNTER
I do not see an updated MRI in the chart to review. I did see the CT scan from 10/1. It would be okay to d/c her soft collar at this time. If I am missing an updated MRI please let me know, thanks!

## 2022-10-07 NOTE — TELEPHONE ENCOUNTER
Tim Martinez and Kristie contacted office to discuss the latest results of the [facility] [phone] and Lenora Peña [phone].   Thank you

## 2022-11-18 ENCOUNTER — OFFICE VISIT (OUTPATIENT)
Dept: CARDIOLOGY CLINIC | Age: 82
End: 2022-11-18
Payer: MEDICARE

## 2022-11-18 VITALS
DIASTOLIC BLOOD PRESSURE: 62 MMHG | WEIGHT: 193.7 LBS | HEIGHT: 67 IN | SYSTOLIC BLOOD PRESSURE: 134 MMHG | HEART RATE: 83 BPM | BODY MASS INDEX: 30.4 KG/M2 | RESPIRATION RATE: 18 BRPM

## 2022-11-18 DIAGNOSIS — I38 VALVULAR HEART DISEASE: ICD-10-CM

## 2022-11-18 DIAGNOSIS — R65.10 SIRS (SYSTEMIC INFLAMMATORY RESPONSE SYNDROME) (HCC): ICD-10-CM

## 2022-11-18 DIAGNOSIS — E08.00 DIABETES MELLITUS DUE TO UNDERLYING CONDITION WITH HYPEROSMOLARITY WITHOUT COMA, WITHOUT LONG-TERM CURRENT USE OF INSULIN (HCC): ICD-10-CM

## 2022-11-18 DIAGNOSIS — I31.39 PERICARDIAL EFFUSION: ICD-10-CM

## 2022-11-18 DIAGNOSIS — R29.6 FALLS FREQUENTLY: ICD-10-CM

## 2022-11-18 DIAGNOSIS — I48.0 PAROXYSMAL ATRIAL FIBRILLATION (HCC): Primary | ICD-10-CM

## 2022-11-18 DIAGNOSIS — D68.2 HYPOPROTHROMBINEMIA (HCC): ICD-10-CM

## 2022-11-18 DIAGNOSIS — J12.82 PNEUMONIA DUE TO COVID-19 VIRUS: ICD-10-CM

## 2022-11-18 DIAGNOSIS — I10 HTN (HYPERTENSION), BENIGN: Chronic | ICD-10-CM

## 2022-11-18 DIAGNOSIS — U07.1 PNEUMONIA DUE TO COVID-19 VIRUS: ICD-10-CM

## 2022-11-18 PROCEDURE — 1123F ACP DISCUSS/DSCN MKR DOCD: CPT | Performed by: INTERNAL MEDICINE

## 2022-11-18 PROCEDURE — 3074F SYST BP LT 130 MM HG: CPT | Performed by: INTERNAL MEDICINE

## 2022-11-18 PROCEDURE — 3078F DIAST BP <80 MM HG: CPT | Performed by: INTERNAL MEDICINE

## 2022-11-18 PROCEDURE — 93000 ELECTROCARDIOGRAM COMPLETE: CPT | Performed by: INTERNAL MEDICINE

## 2022-11-18 PROCEDURE — 99214 OFFICE O/P EST MOD 30 MIN: CPT | Performed by: INTERNAL MEDICINE

## 2022-11-18 NOTE — PROGRESS NOTES
Out Patient CARDIOLOGY FOLLOW UP    Name: Radha Musa    Age: 81 y.o. Date of Service: 11/18/2022      Referring Physician: No admitting provider for patient encounter. Chief Complaint   Patient presents with    Follow-up     6 month ov    Atrial Fibrillation        History of Present Illness: 81-year-old female with history of atrial fibrillation, diastolic heart failure, COPD, hypertension, chronic kidney disease, diabetes, obesity, and history of LVH. She presented for follow-up visit today with her daughter. She denies any chest pain, dyspnea on exertion or fatigue with activities. She reports she had a mechanical fall again. I discussed with patient and daughter that if she develops recurrent falls we will have to refer the patient to EP to discuss watchman device and patient and daughter are going to think about this to see if they would be interested in a watchman device. Medical History:  Atrial fibrillation initial episode, 2010. Converted spontaneously. Diastolic CHF at that time. AF recurred later in 2010. Started on sotalol. And converted spontaneously. History goiter  Admitted 12/09/2017 with progressive cough and dyspnea  Dx : Pneumonia /bronchitis. COPD. Atrial fibrillation. Chronic OAC (Coumadin). Admission  INR=1.6. Hypertension and LVH.  CKD. DM.  Obesity. BMI 36- 02/2020. Surgical history: ADRIEN/BSO, breast biopsy, vitreous hemorrhage and cataracts  Per EMR, history of aortic valve stenosis and CHF. Apparently a rhythm control strategy has been chronically deployed. Noncompliant with BID dosing of Sotolol and was taken off  medication during hospitalization Duration of recurrent AF and long term monitoring of anti-arrythmic Rx unknown. Records have been requested per Dr. Destiney Gale office,12/2017. Echo, 12/10/2017. EF biplane = 52%, no chamber dilatation. RVSP 40. AF.   CXR, 12/13/2017.   Patchy airspace opacities  in upper lobes bilateral.  Improved aeration at mid lung fields and  bases when compared to prior CXR. [place] Review of Systems:   Cardiac: As per HPI  General: Denies fever or chills  Pulmonary: As per HPI  HEENT: Denies runny nose  GI: No complaints  : No complaints  Endocrine: Denies night sweats  Musculoskeletal: No complaints  Skin: Dry skin  Neuro: No complaints  Psych: Denies depression    Past Medical History:  Past Medical History:   Diagnosis Date    Atrial fibrillation ([facility] [address] 02/2010    converted with  oral    Atrial fibrillation ([address] 05/2011    declined conversion; rate control only    Diabetes mellitus ([address] 1408    Diastolic CHF, acute on chronic ([address] 1932    Diastolic dysfunction 0168    STAGE I ;     Fatty liver disease, nonalcoholic 4791    Hypertension 1996    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 A. pressure    Rectal bleed 8/23/2012       Past Surgical History:  Past Surgical History:   Procedure Laterality Date    CATARACT REMOVAL  2004    BILATERAL    COLONOSCOPY  [phone]    BENIGN POLYPS ;INT  HEMORRHOIDS    EYE SURGERY  2004    VITREOUS  HEMORRHAGE    HYSTERECTOMY (CERVIX STATUS UNKNOWN)  1986    [facility]       Family History:  No family history on file. Social History:  Social History     Socioeconomic History    Marital status:       Spouse name: Not on file    Number of children: Not on file    Years of education: Not on file    Highest education level: Not on file   Occupational History    Not on file   Tobacco Use    Smoking status: Former     Packs/day: 1.00     Years: 45.00     Pack years: 45.00     Types: Cigarettes    Smokeless tobacco: Former     Quit date: 3/23/1993   Vaping Use    Vaping Use: Never used   Substance and Sexual Activity    Alcohol use: No    Drug use: No    Sexual activity: Not on file   Other Topics Concern    Not on file   Social History Narrative    Not on file     Social Determinants of Health     Financial Resource Strain: Not on file   Food Insecurity: Not on file   Transportation Needs: Not on file   Physical Activity: Not on file   Stress: Not on file   Social Connections: Not on file   Intimate Partner Violence: Not on file   Housing Stability: Not on file       Allergies:  No Known Allergies    Home Medications:  Prior to Admission medications    Medication Sig Start Date End Date Taking? Authorizing Provider   atorvastatin (LIPITOR) 20 MG tablet Take 1 tablet by mouth nightly 6/2/22 11/18/22 Yes Jaspal Lew MD   PRIMIDONE PO Take 150 mg by mouth 2 times daily   Yes Historical Provider, MD   folic acid (FOLVITE) 1 MG tablet Take 1 mg by mouth every morning    Yes Historical Provider, MD   warfarin (COUMADIN) 2 MG tablet Take 7 mg by mouth nightly *TAKE ALONG WITH 5MG=7MG*  **SEE OTHER ORDER**   Yes Historical Provider, MD   warfarin (COUMADIN) 5 MG tablet Take 7 mg by mouth nightly *TAKE ALONG WITH 2MG=7MG*  **SEE OTHER ORDER**   Yes Historical Provider, MD   diltiazem (CARDIZEM CD) 360 MG extended release capsule Take 1 capsule by mouth daily 12/16/17  Yes Ruben Anton MD   latanoprost (XALATAN) 0.005 % ophthalmic solution Place 1 drop into both eyes nightly   Yes Historical Provider, MD   lisinopril (PRINIVIL;ZESTRIL) 20 MG tablet Take 20 mg by mouth nightly.      Yes Historical Provider, MD   spironolactone (ALDACTONE) 25 MG tablet Take 25 mg by mouth every morning    Yes Historical Provider, MD   Vitamin D (CHOLECALCIFEROL) 1000 UNITS CAPS capsule Take 1,000 Units by mouth every morning    Yes Historical Provider, MD   glimepiride (AMARYL) 1 MG tablet Take 1 mg by mouth every morning (before breakfast)  Patient not taking: Reported on 11/18/2022    Historical Provider, MD       Current Medications:  Current Outpatient Medications   Medication Sig Dispense Refill    atorvastatin (LIPITOR) 20 MG tablet Take 1 tablet by mouth nightly 30 tablet 0    PRIMIDONE PO Take 150 mg by mouth 2 times daily      folic acid (FOLVITE) 1 MG tablet Take 1 mg by mouth every morning       warfarin (COUMADIN) 2 MG tablet Take 7 mg by mouth nightly *TAKE ALONG WITH 5MG=7MG*  **SEE OTHER ORDER**      warfarin (COUMADIN) 5 MG tablet Take 7 mg by mouth nightly *TAKE ALONG WITH 2MG=7MG*  **SEE OTHER ORDER**      diltiazem (CARDIZEM CD) 360 MG extended release capsule Take 1 capsule by mouth daily 30 capsule 3    latanoprost (XALATAN) 0.005 % ophthalmic solution Place 1 drop into both eyes nightly      lisinopril (PRINIVIL;ZESTRIL) 20 MG tablet Take 20 mg by mouth nightly. spironolactone (ALDACTONE) 25 MG tablet Take 25 mg by mouth every morning       Vitamin D (CHOLECALCIFEROL) 1000 UNITS CAPS capsule Take 1,000 Units by mouth every morning       glimepiride (AMARYL) 1 MG tablet Take 1 mg by mouth every morning (before breakfast) (Patient not taking: Reported on 11/18/2022)       Current Facility-Administered Medications   Medication Dose Route Frequency Provider Last Rate Last Admin    perflutren lipid microspheres (DEFINITY) injection 1.65 mg  1.5 mL IntraVENous ONCE PRN Patricio Mckeon MD             Physical Exam:  /62   Pulse 83   Resp 18   Ht 5' 7\" (1.702 m)   Wt 193 lb 11.2 oz (87.9 kg)   BMI 30.34 kg/m²   Wt Readings from Last 3 Encounters:   11/18/22 193 lb 11.2 oz (87.9 kg)   09/01/22 147 lb (66.7 kg)   06/02/22 147 lb 3.2 oz (66.8 kg)       Appearance: Alert and oriented x3 not in acute distress.   Skin: Dry skin  Head: Atraumatic  Eyes: Intact extraocular muscles   ENMT: Mucous membranes are moist  Neck: Supple  Lungs: Clear to auscultation  Cardiac: Normal S1 and S2  Abdomen: Protuberant  Extremities: Intact range of motion  Neurologic: No focal neurological deficits  Peripheral Pulses: 2+ peripheral pulses    Intake/Output:  No intake or output data in the 24 hours ending 11/18/22 1017  [unfilled]    Laboratory Tests:  No results for input(s): NA, K, CL, CO2, BUN, CREATININE, GLUCOSE, CALCIUM in the last 72 hours. Lab Results   Component Value Date/Time    MG 2.0 05/31/2022 05:37 AM    MG 1.9 05/30/2022 05:19 AM    MG 1.9 05/29/2022 06:14 AM     No results for input(s): ALKPHOS, ALT, AST, PROT, BILITOT, BILIDIR, LABALBU in the last 72 hours. No results for input(s): WBC, RBC, HGB, HCT, MCV, MCH, MCHC, RDW, PLT, MPV in the last 72 hours. Lab Results   Component Value Date    CKTOTAL 189 (H) 05/24/2022    TROPONINI <0.01 08/24/2012     No results for input(s): CKTOTAL, CKMB, CKMBINDEX, TROPHS in the last 72 hours. Lab Results   Component Value Date    INR 2.9 06/02/2022    INR 2.5 06/01/2022    INR 2.2 05/31/2022    PROTIME 32.0 (H) 06/02/2022    PROTIME 28.4 (H) 06/01/2022    PROTIME 24.1 (H) 05/31/2022     Lab Results   Component Value Date    TSH 0.565 12/11/2017    TSH 1.327 08/24/2012     Lab Results   Component Value Date    LABA1C 7.8 (H) 08/24/2012     No results found for: EAG  Lab Results   Component Value Date    CHOL 192 05/29/2022    CHOL 166 08/24/2012     Lab Results   Component Value Date    TRIG 193 (H) 05/29/2022    TRIG 292 (H) 08/24/2012     Lab Results   Component Value Date    HDL 39 05/29/2022    HDL 27.2 (A) 08/24/2012     Lab Results   Component Value Date    LDLCALC 114 (H) 05/29/2022    LDLCALC 80 08/24/2012     Lab Results   Component Value Date    LABVLDL 39 05/29/2022     No results found for: CHOLHDLRATIO  No results for input(s): PROBNP in the last 72 hours. Cardiac Tests:  EKG reviewed (EKG date: Shows atrial fibrillation 83bpm and  nonspecific ST-T wave changes.):    Echocardiogram reviewed: 12/2017  Summary   Ejection fraction biplane = 52%   Left atrium is of normal size. Probable atrial ffibrillation   Structurally normal mitral valve. The aortic valve is trileaflet. The aortic valve appears mildly sclerotic.        TTE 4/1/2022  Technically difficult examination. Left ventricular size is grossly normal.   Ejection fraction is visually estimated at 60 to 65%. Normal right ventricular size and function. Mild mitral regurgitation is present. Mild to moderate tricuspid regurgitation. RVSP is 38 mmHg. Mild pulmonic regurgitation present. Small pericardial effusion without tamponade      Stress test reviewed: None on file    Cardiac catheterization reviewed: On file    CXR reviewed: The ASCVD Risk score (Gavin DK, et al., 2019) failed to calculate for the following reasons: The 2019 ASCVD risk score is only valid for ages 36 to 78    ASSESSMENT / PLAN:    1. Paroxysmal atrial fibrillation (HCC)  Continue on diltiazem and Coumadin for rate control and anticoagulation respectively  Follow-up with your PCP for Coumadin management. Apparently patient was on sotalol in the past but was noncompliant and subsequently she was taken off of this medication   Again, she  reports he had routine blood work with his PCP and was told blood work were within normal limit  He had another recurrent mechanical fall and discussed with the patient and daughter to consider watchman device and he is going to think about this. Echo shows EF of 60 to 65%    2. HTN (hypertension), benign  Blood pressure is stable  Follow low-salt diet    3. Type 2 diabetes mellitus with hypoglycemia and coma ([address]  Follow-up with your PCP  4. Hypoprothrombinemia ([address]  Follow-up with your PCP  5. Falls frequently  I offered to refer the patient to EP to discuss watchman device but patient and daughters are going to think about this to see if they would be interested.       6. Diabetes mellitus due to underlying condition with hyperosmolarity without coma, without long-term current use of insulin ([facility])  Follow-up with your PCP    7.  Valvular heart disease  Mild to moderate tricuspid regurgitation, mild mitral regurgitation and mild pulmonic regurgitation  Asymptomatic and will monitor for now. 8.  Physiologic pericardial effusion without tamponade  Asymptomatic and will monitor          FOLLOW-UP 3 to 6 months      Thank you for allowing me to participate in your patient's care. Please feel free to contact me if you have any questions or concerns.     Eligio Yanes MD  [facility]) Cardiology

## 2023-05-04 ENCOUNTER — OFFICE VISIT (OUTPATIENT)
Dept: NEUROLOGY | Age: 83
End: 2023-05-04
Payer: MEDICARE

## 2023-05-04 VITALS
HEIGHT: 67 IN | HEART RATE: 72 BPM | OXYGEN SATURATION: 99 % | WEIGHT: 190 LBS | DIASTOLIC BLOOD PRESSURE: 84 MMHG | TEMPERATURE: 98 F | SYSTOLIC BLOOD PRESSURE: 172 MMHG | BODY MASS INDEX: 29.82 KG/M2

## 2023-05-04 DIAGNOSIS — E11.42 DIABETIC POLYNEUROPATHY ASSOCIATED WITH TYPE 2 DIABETES MELLITUS (HCC): ICD-10-CM

## 2023-05-04 DIAGNOSIS — G25.0 ESSENTIAL TREMOR: Primary | ICD-10-CM

## 2023-05-04 PROBLEM — R65.10 SIRS (SYSTEMIC INFLAMMATORY RESPONSE SYNDROME) (HCC): Status: RESOLVED | Noted: 2017-12-10 | Resolved: 2023-05-04

## 2023-05-04 PROBLEM — R26.2 UNABLE TO AMBULATE: Status: RESOLVED | Noted: 2021-06-12 | Resolved: 2023-05-04

## 2023-05-04 PROBLEM — R26.2 AMBULATORY DYSFUNCTION: Status: RESOLVED | Noted: 2021-06-12 | Resolved: 2023-05-04

## 2023-05-04 PROBLEM — R29.6 FALLS FREQUENTLY: Status: RESOLVED | Noted: 2021-06-12 | Resolved: 2023-05-04

## 2023-05-04 PROBLEM — U07.1 PNEUMONIA DUE TO COVID-19 VIRUS: Status: RESOLVED | Noted: 2022-05-24 | Resolved: 2023-05-04

## 2023-05-04 PROBLEM — E16.2 HYPOGLYCEMIA: Status: RESOLVED | Noted: 2021-06-12 | Resolved: 2023-05-04

## 2023-05-04 PROBLEM — J20.9 ACUTE BRONCHITIS: Status: RESOLVED | Noted: 2017-12-10 | Resolved: 2023-05-04

## 2023-05-04 PROBLEM — R53.1 GENERAL WEAKNESS: Status: RESOLVED | Noted: 2021-06-12 | Resolved: 2023-05-04

## 2023-05-04 PROBLEM — S22.31XD CLOSED FRACTURE OF ONE RIB OF RIGHT SIDE WITH ROUTINE HEALING: Status: RESOLVED | Noted: 2021-06-12 | Resolved: 2023-05-04

## 2023-05-04 PROBLEM — J12.82 PNEUMONIA DUE TO COVID-19 VIRUS: Status: RESOLVED | Noted: 2022-05-24 | Resolved: 2023-05-04

## 2023-05-04 PROBLEM — E11.9 DM (DIABETES MELLITUS) (HCC): Status: RESOLVED | Noted: 2021-06-13 | Resolved: 2023-05-04

## 2023-05-04 PROCEDURE — 3079F DIAST BP 80-89 MM HG: CPT | Performed by: NURSE PRACTITIONER

## 2023-05-04 PROCEDURE — 3077F SYST BP >= 140 MM HG: CPT | Performed by: NURSE PRACTITIONER

## 2023-05-04 PROCEDURE — 99215 OFFICE O/P EST HI 40 MIN: CPT | Performed by: NURSE PRACTITIONER

## 2023-05-04 PROCEDURE — 1123F ACP DISCUSS/DSCN MKR DOCD: CPT | Performed by: NURSE PRACTITIONER

## 2023-05-04 NOTE — PROGRESS NOTES
There is chronic  compression deformity of the C5 vertebral body. All pertinent labs and images personally reviewed today    Assessment:     Essential tremors: affecting hands, head, and voice. She is not completely satisfied with the control of her tremors on her current primidone dosing, and is unclear if the medication is causing any side effects. There is limited ability to increase this further due to its interaction with her warfarin and due to risk for further falls. I feel she would be a good candidate for [employer] device to improve ADLs and quality of life. Diabetic peripheral neuropathy: of moderate to marked severity on EMG from 2018 and causing a sensory ataxia. Her underling LS radiculopathy, DISH, and RA are likely are adding to flory gait issues.  Diabetes is controlled and now off meds    Plan:     -Begin auth for [employer] device  -Continue primidone 150 mg BID for now  -Control diabetes, fall precautions reviewed    RTO in 6 mos or sooner ERIC Brown - CNP  10:11 AM  5/4/2023

## 2023-05-11 ENCOUNTER — OFFICE VISIT (OUTPATIENT)
Dept: CARDIOLOGY CLINIC | Age: 83
End: 2023-05-11

## 2023-05-11 VITALS
WEIGHT: 189.5 LBS | DIASTOLIC BLOOD PRESSURE: 72 MMHG | HEART RATE: 80 BPM | RESPIRATION RATE: 18 BRPM | SYSTOLIC BLOOD PRESSURE: 138 MMHG | BODY MASS INDEX: 29.74 KG/M2 | HEIGHT: 67 IN

## 2023-05-11 DIAGNOSIS — J41.8 MIXED SIMPLE AND MUCOPURULENT CHRONIC BRONCHITIS (HCC): ICD-10-CM

## 2023-05-11 DIAGNOSIS — E11.42 DIABETIC POLYNEUROPATHY ASSOCIATED WITH TYPE 2 DIABETES MELLITUS (HCC): ICD-10-CM

## 2023-05-11 DIAGNOSIS — I48.0 PAROXYSMAL ATRIAL FIBRILLATION (HCC): Primary | Chronic | ICD-10-CM

## 2023-05-11 DIAGNOSIS — I10 HTN (HYPERTENSION), BENIGN: Chronic | ICD-10-CM

## 2023-05-11 DIAGNOSIS — N18.2 STAGE 2 CHRONIC KIDNEY DISEASE: ICD-10-CM

## 2023-05-11 DIAGNOSIS — R91.8 PULMONARY NODULES: ICD-10-CM

## 2023-05-11 RX ORDER — PRIMIDONE 50 MG/1
TABLET ORAL
COMMUNITY
Start: 2023-05-01

## 2023-05-11 RX ORDER — PHENOL 1.4 %
1 AEROSOL, SPRAY (ML) MUCOUS MEMBRANE DAILY
COMMUNITY

## 2023-05-11 NOTE — PROGRESS NOTES
Out Patient CARDIOLOGY FOLLOW UP    Name: Pablo Lemons    Age: 81 y.o. Date of Service: 5/11/2023      Referring Physician: No admitting provider for patient encounter. Chief Complaint   Patient presents with    Follow-up     6 month ov        History of Present Illness: 81-year-old female with history of atrial fibrillation, diastolic heart failure, COPD, hypertension, chronic kidney disease, diabetes, obesity, and history of LVH. She presented for follow-up visit today with her daughter and report doing well and denies any symptoms. Report her INR has been therapeutic. She will follow-up in 1 year but is advised to call for the visit if symptoms okay advised      Medical History:  Atrial fibrillation initial episode, 2010. Converted spontaneously. Diastolic CHF at that time. AF recurred later in 2010. Started on sotalol. And converted spontaneously. History goiter  Admitted 12/09/2017 with progressive cough and dyspnea  Dx : Pneumonia /bronchitis. COPD. Atrial fibrillation. Chronic OAC (Coumadin). Admission  INR=1.6. Hypertension and LVH.  CKD. DM.  Obesity. BMI 36- 02/2020. Surgical history: ADRIEN/BSO, breast biopsy, vitreous hemorrhage and cataracts  Per EMR, history of aortic valve stenosis and CHF. Apparently a rhythm control strategy has been chronically deployed. Noncompliant with BID dosing of Sotolol and was taken off  medication during hospitalization Duration of recurrent AF and long term monitoring of anti-arrythmic Rx unknown. Records have been requested per Dr. Jona Ramirez office,12/2017. Echo, 12/10/2017. EF biplane = 52%, no chamber dilatation. RVSP 40. AF.   CXR, 12/13/2017. Patchy airspace opacities  in upper lobes bilateral.  Improved aeration at mid lung fields and  bases when compared to prior CXR. Melanie Armstrong      Review of Systems:   Cardiac: As per HPI  General: Denies fever or chills  Pulmonary: As per HPI  HEENT: Denies runny nose  GI: No complaints  : No

## 2023-05-13 PROBLEM — J41.8 MIXED SIMPLE AND MUCOPURULENT CHRONIC BRONCHITIS (HCC): Status: ACTIVE | Noted: 2023-05-13

## 2023-05-13 PROBLEM — N18.2 STAGE 2 CHRONIC KIDNEY DISEASE: Status: ACTIVE | Noted: 2023-05-13

## 2023-05-13 PROBLEM — R01.1 HEART MURMUR: Status: RESOLVED | Noted: 2023-05-13 | Resolved: 2023-05-13

## 2023-05-13 PROBLEM — R01.1 HEART MURMUR: Status: ACTIVE | Noted: 2023-05-13

## 2023-05-18 ENCOUNTER — TELEPHONE (OUTPATIENT)
Dept: CARDIOLOGY CLINIC | Age: 83
End: 2023-05-18

## 2023-05-18 NOTE — TELEPHONE ENCOUNTER
Called to schedule ECHO. Patient was at [facility] [employer] [facility] office; said she will call back.

## 2023-06-05 DIAGNOSIS — J41.8 MIXED SIMPLE AND MUCOPURULENT CHRONIC BRONCHITIS (HCC): ICD-10-CM

## 2023-06-05 DIAGNOSIS — I10 HTN (HYPERTENSION), BENIGN: Chronic | ICD-10-CM

## 2023-06-05 DIAGNOSIS — E11.42 DIABETIC POLYNEUROPATHY ASSOCIATED WITH TYPE 2 DIABETES MELLITUS (HCC): ICD-10-CM

## 2023-06-05 DIAGNOSIS — I48.0 PAROXYSMAL ATRIAL FIBRILLATION (HCC): Chronic | ICD-10-CM

## 2023-06-05 DIAGNOSIS — R91.8 PULMONARY NODULES: ICD-10-CM

## 2023-06-05 RX ORDER — PRIMIDONE 50 MG/1
TABLET ORAL
Qty: 540 TABLET | Refills: 3 | Status: SHIPPED | OUTPATIENT
Start: 2023-06-05 | End: 2023-09-05

## 2023-08-02 NOTE — PROGRESS NOTES
90   Temp 97.3 °F (36.3 °C) (Oral)   Resp 18   Ht 5' 5\" (1.651 m)   Wt 192 lb (87.1 kg)   SpO2 97%   BMI 35.12 kg/m²       HEENT: No pallor, no icterus. C-collar noted  Respiratory:  CTA, good air entry. Cardiovascular: RRR, no murmur. Abdomen: Soft, non-tender, BS noted. Musculoskeletal: No joint pains or joint swelling noted. Neurologic: awake, alert, forgetful     Assessment/Plan:  · COVID-19 infection, currently on steroids, saturating well on room air, continue to follow closely   · A. fib with RVR, heart rate better controlled, monitor, INR 2.5 today, decrease dose of Coumadin to 5 mg, awaiting cardiology evaluation possible C6 fracture, seen by neurosurgery, MRI of the cervical spine recommended  · Leukopenia secondary to COVID-19 infection  · Hypomagnesemia, corrected, check labs in a.m. · Awaiting PT OT evaluation.       Labs:   Recent Labs     05/24/22  1220 05/25/22  0125 05/26/22  0559   WBC 2.6* 4.0* 3.6*   HGB 12.0 12.0 11.1*   HCT 35.3 34.7 33.0*   PLT 88* 92* 112*     Recent Labs     05/24/22  1220 05/25/22  0125 05/26/22  0559    137 135   K 3.9 4.0 4.4  4.4    102 101   CO2 21* 17* 19*   BUN 20 18 29*   CREATININE 1.1* 1.0 1.0   CALCIUM 8.7 8.5* 8.6     Recent Labs     05/24/22  1220 05/25/22  0125 05/26/22  0559   AST 44* 43* 29   ALT 32 29 27   BILITOT 0.5 0.5 0.4   ALKPHOS 107* 100 85     Recent Labs     05/24/22  1220 05/25/22  0125 05/26/22  0854   INR 1.7 1.8 2.5     Recent Labs     05/24/22  1220   CKTOTAL 189*       Medications:  Reviewed    Infusion Medications    sodium chloride       Scheduled Medications    dilTIAZem  360 mg Oral Daily    folic acid  1 mg Oral QAM    glipiZIDE  2.5 mg Oral QAM AC    latanoprost  1 drop Both Eyes Nightly    lisinopril  20 mg Oral Nightly    primidone  150 mg Oral BID    spironolactone  25 mg Oral QAM    Vitamin D  1,000 Units Oral QAM    warfarin  7 mg Oral Nightly    sodium chloride flush  10 mL IntraVENous 2 times per day    dexamethasone  6 mg IntraVENous Q24H     PRN Meds: sodium chloride flush, sodium chloride, promethazine **OR** ondansetron, polyethylene glycol, acetaminophen **OR** acetaminophen, potassium chloride **OR** potassium alternative oral replacement **OR** potassium chloride, magnesium sulfate      Intake/Output Summary (Last 24 hours) at 5/26/2022 1211  Last data filed at 5/26/2022 0552  Gross per 24 hour   Intake 350 ml   Output --   Net 350 ml     · Body mass index is 35.12 kg/m². · Diet  · ADULT DIET; Regular; Low Sodium (2 gm)    · Code Status  · Full Code         Electronically signed by Vic Kimble MD on 5/26/2022 at 12:11 PM  [employer]   Please contact me through perfect serve    NOTE: This report was transcribed using voice recognition software. Every effort was made to ensure accuracy; however, inadvertent computerized transcription errors may be present. Statement Selected

## 2023-08-15 ENCOUNTER — CLINICAL DOCUMENTATION (OUTPATIENT)
Dept: NEUROLOGY | Age: 83
End: 2023-08-15

## 2023-08-15 NOTE — PROGRESS NOTES
Received a message from [facility] representative who stated when they tried to reach out to the patient to begin insurance authorization, she was unaware that I ordered this device and did not want it to be authorized at that time. I will discuss this with her at her office visit.

## 2024-02-16 ENCOUNTER — APPOINTMENT (OUTPATIENT)
Dept: GENERAL RADIOLOGY | Age: 84
End: 2024-02-16
Payer: MEDICARE

## 2024-02-16 ENCOUNTER — APPOINTMENT (OUTPATIENT)
Dept: ULTRASOUND IMAGING | Age: 84
End: 2024-02-16
Payer: MEDICARE

## 2024-02-16 ENCOUNTER — HOSPITAL ENCOUNTER (EMERGENCY)
Age: 84
Discharge: ANOTHER HOSPITAL | End: 2024-02-17
Attending: EMERGENCY MEDICINE | Admitting: INTERNAL MEDICINE
Payer: MEDICARE

## 2024-02-16 DIAGNOSIS — R26.2 INABILITY TO AMBULATE DUE TO KNEE: ICD-10-CM

## 2024-02-16 DIAGNOSIS — R79.1 SUPRATHERAPEUTIC INR: ICD-10-CM

## 2024-02-16 DIAGNOSIS — N17.9 AKI (ACUTE KIDNEY INJURY) (HCC): ICD-10-CM

## 2024-02-16 DIAGNOSIS — I82.442 ACUTE DEEP VEIN THROMBOSIS (DVT) OF TIBIAL VEIN OF LEFT LOWER EXTREMITY (HCC): Primary | ICD-10-CM
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
## 2024-02-16 LAB
ALBUMIN SERPL-MCNC: 4.1 G/DL (ref 3.5–5.2)
ALP SERPL-CCNC: 112 U/L (ref 35–104)
ALT SERPL-CCNC: 13 U/L (ref 0–32)
ANION GAP SERPL CALCULATED.3IONS-SCNC: 13 MMOL/L (ref 7–16)
AST SERPL-CCNC: 19 U/L (ref 0–31)
BACTERIA URNS QL MICRO: ABNORMAL
BASOPHILS # BLD: 0.05 K/UL (ref 0–0.2)
BASOPHILS NFR BLD: 1 % (ref 0–2)
BILIRUB SERPL-MCNC: 0.3 MG/DL (ref 0–1.2)
BILIRUB UR QL STRIP: NEGATIVE
BUN SERPL-MCNC: 38 MG/DL (ref 6–23)
CALCIUM SERPL-MCNC: 9.6 MG/DL (ref 8.6–10.2)
CHLORIDE SERPL-SCNC: 102 MMOL/L (ref 98–107)
CLARITY UR: CLEAR
CO2 SERPL-SCNC: 25 MMOL/L (ref 22–29)
COLOR UR: YELLOW
CREAT SERPL-MCNC: 1.5 MG/DL (ref 0.5–1)
EOSINOPHIL # BLD: 0.16 K/UL (ref 0.05–0.5)
EOSINOPHILS RELATIVE PERCENT: 3 % (ref 0–6)
EPI CELLS #/AREA URNS HPF: ABNORMAL /HPF
ERYTHROCYTE [DISTWIDTH] IN BLOOD BY AUTOMATED COUNT: 14 % (ref 11.5–15)
GFR SERPL CREATININE-BSD FRML MDRD: 34 ML/MIN/1.73M2
GLUCOSE SERPL-MCNC: 100 MG/DL (ref 74–99)
GLUCOSE UR STRIP-MCNC: NEGATIVE MG/DL
HCT VFR BLD AUTO: 36.2 % (ref 34–48)
HGB BLD-MCNC: 12.2 G/DL (ref 11.5–15.5)
HGB UR QL STRIP.AUTO: ABNORMAL
IMM GRANULOCYTES # BLD AUTO: <0.03 K/UL (ref 0–0.58)
IMM GRANULOCYTES NFR BLD: 0 % (ref 0–5)
INR PPP: 3.4
KETONES UR STRIP-MCNC: NEGATIVE MG/DL
LEUKOCYTE ESTERASE UR QL STRIP: ABNORMAL
LYMPHOCYTES NFR BLD: 0.71 K/UL (ref 1.5–4)
LYMPHOCYTES RELATIVE PERCENT: 11 % (ref 20–42)
MCH RBC QN AUTO: 30.1 PG (ref 26–35)
MCHC RBC AUTO-ENTMCNC: 33.7 G/DL (ref 32–34.5)
MCV RBC AUTO: 89.4 FL (ref 80–99.9)
MONOCYTES NFR BLD: 0.46 K/UL (ref 0.1–0.95)
MONOCYTES NFR BLD: 7 % (ref 2–12)
NEUTROPHILS NFR BLD: 78 % (ref 43–80)
NEUTS SEG NFR BLD: 4.82 K/UL (ref 1.8–7.3)
NITRITE UR QL STRIP: POSITIVE
PARTIAL THROMBOPLASTIN TIME: 39.8 SEC (ref 24.5–35.1)
PH UR STRIP: 5.5 [PH] (ref 5–9)
PLATELET # BLD AUTO: 151 K/UL (ref 130–450)
PMV BLD AUTO: 10.1 FL (ref 7–12)
POTASSIUM SERPL-SCNC: 4.3 MMOL/L (ref 3.5–5)
PROT SERPL-MCNC: 6.7 G/DL (ref 6.4–8.3)
PROT UR STRIP-MCNC: 30 MG/DL
PROTHROMBIN TIME: 38.1 SEC (ref 9.3–12.4)
RBC # BLD AUTO: 4.05 M/UL (ref 3.5–5.5)
RBC #/AREA URNS HPF: ABNORMAL /HPF
SODIUM SERPL-SCNC: 140 MMOL/L (ref 132–146)
SP GR UR STRIP: 1.02 (ref 1–1.03)
UROBILINOGEN UR STRIP-ACNC: 0.2 EU/DL (ref 0–1)
WBC #/AREA URNS HPF: ABNORMAL /HPF
WBC OTHER # BLD: 6.2 K/UL (ref 4.5–11.5)

## 2024-02-16 PROCEDURE — 93971 EXTREMITY STUDY: CPT

## 2024-02-16 PROCEDURE — 73562 X-RAY EXAM OF KNEE 3: CPT

## 2024-02-16 PROCEDURE — 81001 URINALYSIS AUTO W/SCOPE: CPT

## 2024-02-16 PROCEDURE — 85025 COMPLETE CBC W/AUTO DIFF WBC: CPT

## 2024-02-16 PROCEDURE — 93005 ELECTROCARDIOGRAM TRACING: CPT | Performed by: NURSE PRACTITIONER

## 2024-02-16 PROCEDURE — 99285 EMERGENCY DEPT VISIT HI MDM: CPT

## 2024-02-16 PROCEDURE — 80053 COMPREHEN METABOLIC PANEL: CPT

## 2024-02-16 PROCEDURE — 73502 X-RAY EXAM HIP UNI 2-3 VIEWS: CPT

## 2024-02-16 PROCEDURE — 85730 THROMBOPLASTIN TIME PARTIAL: CPT

## 2024-02-16 PROCEDURE — 72131 CT LUMBAR SPINE W/O DYE: CPT

## 2024-02-16 PROCEDURE — 85610 PROTHROMBIN TIME: CPT

## 2024-02-16 PROCEDURE — 6370000000 HC RX 637 (ALT 250 FOR IP): Performed by: NURSE PRACTITIONER

## 2024-02-16 RX ORDER — SODIUM CHLORIDE 0.9 % (FLUSH) 0.9 %
5-40 SYRINGE (ML) INJECTION EVERY 12 HOURS SCHEDULED
Status: CANCELLED | OUTPATIENT
Start: 2024-02-16

## 2024-02-16 RX ORDER — SODIUM CHLORIDE 9 MG/ML
INJECTION, SOLUTION INTRAVENOUS PRN
Status: CANCELLED | OUTPATIENT
Start: 2024-02-16

## 2024-02-16 RX ORDER — LATANOPROST 50 UG/ML
1 SOLUTION/ DROPS OPHTHALMIC NIGHTLY
Status: CANCELLED | OUTPATIENT
Start: 2024-02-16

## 2024-02-16 RX ORDER — PHENOL 1.4 %
1 AEROSOL, SPRAY (ML) MUCOUS MEMBRANE DAILY
Status: CANCELLED | OUTPATIENT
Start: 2024-02-17

## 2024-02-16 RX ORDER — SODIUM CHLORIDE 0.9 % (FLUSH) 0.9 %
5-40 SYRINGE (ML) INJECTION PRN
Status: CANCELLED | OUTPATIENT
Start: 2024-02-16

## 2024-02-16 RX ORDER — CHOLECALCIFEROL (VITAMIN D3) 50 MCG
2000 TABLET ORAL EVERY MORNING
Status: CANCELLED | OUTPATIENT
Start: 2024-02-17

## 2024-02-16 RX ORDER — PRIMIDONE 50 MG/1
150 TABLET ORAL EVERY 12 HOURS
Status: CANCELLED | OUTPATIENT
Start: 2024-02-16

## 2024-02-16 RX ORDER — DILTIAZEM HYDROCHLORIDE 120 MG/1
360 CAPSULE, COATED, EXTENDED RELEASE ORAL DAILY
Status: CANCELLED | OUTPATIENT
Start: 2024-02-17

## 2024-02-16 RX ORDER — ONDANSETRON 4 MG/1
4 TABLET, ORALLY DISINTEGRATING ORAL EVERY 8 HOURS PRN
Status: CANCELLED | OUTPATIENT
Start: 2024-02-16

## 2024-02-16 RX ORDER — BISACODYL 5 MG/1
5 TABLET, DELAYED RELEASE ORAL DAILY PRN
Status: CANCELLED | OUTPATIENT
Start: 2024-02-16

## 2024-02-16 RX ORDER — DEXTROSE MONOHYDRATE 100 MG/ML
INJECTION, SOLUTION INTRAVENOUS CONTINUOUS PRN
Status: CANCELLED | OUTPATIENT
Start: 2024-02-16

## 2024-02-16 RX ORDER — FOLIC ACID 1 MG/1
1 TABLET ORAL EVERY MORNING
Status: CANCELLED | OUTPATIENT
Start: 2024-02-17

## 2024-02-16 RX ORDER — ONDANSETRON 2 MG/ML
4 INJECTION INTRAMUSCULAR; INTRAVENOUS EVERY 6 HOURS PRN
Status: CANCELLED | OUTPATIENT
Start: 2024-02-16

## 2024-02-16 RX ORDER — ATORVASTATIN CALCIUM 40 MG/1
40 TABLET, FILM COATED ORAL NIGHTLY
Status: CANCELLED | OUTPATIENT
Start: 2024-02-16

## 2024-02-16 RX ORDER — GLUCAGON 1 MG/ML
1 KIT INJECTION PRN
Status: CANCELLED | OUTPATIENT
Start: 2024-02-16

## 2024-02-16 RX ORDER — ACETAMINOPHEN 325 MG/1
650 TABLET ORAL ONCE
Status: COMPLETED | OUTPATIENT
Start: 2024-02-16 | End: 2024-02-16

## 2024-02-16 RX ADMIN — ACETAMINOPHEN 650 MG: 325 TABLET ORAL at 15:03

## 2024-02-16 ASSESSMENT — PAIN - FUNCTIONAL ASSESSMENT
PAIN_FUNCTIONAL_ASSESSMENT: NONE - DENIES PAIN
PAIN_FUNCTIONAL_ASSESSMENT: NONE - DENIES PAIN
PAIN_FUNCTIONAL_ASSESSMENT: PREVENTS OR INTERFERES SOME ACTIVE ACTIVITIES AND ADLS

## 2024-02-16 ASSESSMENT — PAIN SCALES - GENERAL: PAINLEVEL_OUTOF10: 8

## 2024-02-16 ASSESSMENT — PAIN DESCRIPTION - ORIENTATION: ORIENTATION: LEFT

## 2024-02-16 ASSESSMENT — PAIN DESCRIPTION - LOCATION: LOCATION: LEG

## 2024-02-16 NOTE — ED PROVIDER NOTES
changes seen left knee with no evidence of acute bony abnormality or joint effusion.     Vascular duplex lower extremity venous left    Result Date: 2/16/2024  EXAMINATION: DUPLEX VENOUS ULTRASOUND OF THE LEFT LOWER EXTREMITY 2/16/2024 3:37 pm TECHNIQUE: Duplex ultrasound using B-mode/gray scaled imaging and Doppler spectral analysis and color flow was obtained of the deep venous structures of the left extremity. COMPARISON: None. HISTORY: ORDERING SYSTEM PROVIDED HISTORY: Pain left calf FINDINGS: There is thrombus in the posterior tibial vein. The remaining visualized veins of the left lower extremity are patent and free of echogenic thrombus. The veins demonstrate good compressibility with normal color flow study and spectral analysis.     Thrombus in the posterior tibial vein. Request made to call result to a licensed caregiver at 3:49 p.m.       No results found.    PROCEDURES   Unless otherwise noted below, none     Procedures    CRITICAL CARE TIME   none    PAST MEDICAL HISTORY      has a past medical history of Atrial fibrillation (HCC) (02/01/2010), Cataract, CKD (chronic kidney disease), Diabetes mellitus (HCC) (01/01/1999), Diabetic peripheral neuropathy ([facility]), Diastolic CHF, acute on chronic (HCC) (01/01/2010), DISH (diffuse idiopathic skeletal hyperostosis), Essential tremor, Fatty liver disease, nonalcoholic (01/01/2000), Hypertension (01/01/1996), Lumbar radiculopathy, Mitral regurgitation (01/01/2010), Open-angle glaucoma (01/01/1999), Pulmonary regurgitation (01/01/2010), Rectal bleed (08/23/2012), Rheumatoid arthritis ([facility]), and Vitreous hemorrhage ([facility]).     EMERGENCY DEPARTMENT COURSE and DIFFERENTIAL DIAGNOSIS/MDM:   Vitals:    Vitals:    02/16/24 1421 02/16/24 1431   BP: 139/62    Pulse: 96    Resp: 16    Temp: 98 °F (36.7 °C)    TempSrc: Oral    SpO2: 96%    Weight:  81.6 kg (180 lb)   Height:  1.715 m (5' 7.5\")       Patient was given the following medications:  Medications   acetaminophen

## 2024-02-17 ENCOUNTER — HOSPITAL ENCOUNTER (INPATIENT)
Age: 84
LOS: 5 days | Discharge: NURSING, REHAB OR LONG-TERM CARE | DRG: 300 | End: 2024-02-22
Attending: INTERNAL MEDICINE | Admitting: FAMILY MEDICINE
Payer: MEDICARE

## 2024-02-17 VITALS
HEIGHT: 68 IN | RESPIRATION RATE: 18 BRPM | WEIGHT: 180 LBS | DIASTOLIC BLOOD PRESSURE: 80 MMHG | BODY MASS INDEX: 27.28 KG/M2 | HEART RATE: 60 BPM | SYSTOLIC BLOOD PRESSURE: 154 MMHG | OXYGEN SATURATION: 99 % | TEMPERATURE: 98 F

## 2024-02-17 PROBLEM — I82.4Z2 LOWER LEG DVT (DEEP VENOUS THROMBOEMBOLISM), ACUTE, LEFT (HCC): Status: ACTIVE | Noted: 2024-02-17

## 2024-02-17 LAB
ANION GAP SERPL CALCULATED.3IONS-SCNC: 9 MMOL/L (ref 7–16)
BASOPHILS # BLD: 0.05 K/UL (ref 0–0.2)
BASOPHILS NFR BLD: 1 % (ref 0–2)
BUN SERPL-MCNC: 35 MG/DL (ref 6–23)
CALCIUM SERPL-MCNC: 9.6 MG/DL (ref 8.6–10.2)
CHLORIDE SERPL-SCNC: 103 MMOL/L (ref 98–107)
CO2 SERPL-SCNC: 25 MMOL/L (ref 22–29)
CREAT SERPL-MCNC: 1.2 MG/DL (ref 0.5–1)
EOSINOPHIL # BLD: 0.21 K/UL (ref 0.05–0.5)
EOSINOPHILS RELATIVE PERCENT: 3 % (ref 0–6)
ERYTHROCYTE [DISTWIDTH] IN BLOOD BY AUTOMATED COUNT: 13.7 % (ref 11.5–15)
GFR SERPL CREATININE-BSD FRML MDRD: 48 ML/MIN/1.73M2
GLUCOSE BLD-MCNC: 183 MG/DL (ref 74–99)
GLUCOSE SERPL-MCNC: 149 MG/DL (ref 74–99)
HCT VFR BLD AUTO: 37.4 % (ref 34–48)
HGB BLD-MCNC: 12.5 G/DL (ref 11.5–15.5)
IMM GRANULOCYTES # BLD AUTO: <0.03 K/UL (ref 0–0.58)
IMM GRANULOCYTES NFR BLD: 0 % (ref 0–5)
INR PPP: 3
LYMPHOCYTES NFR BLD: 0.62 K/UL (ref 1.5–4)
LYMPHOCYTES RELATIVE PERCENT: 9 % (ref 20–42)
MCH RBC QN AUTO: 30.3 PG (ref 26–35)
MCHC RBC AUTO-ENTMCNC: 33.4 G/DL (ref 32–34.5)
MCV RBC AUTO: 90.6 FL (ref 80–99.9)
MONOCYTES NFR BLD: 0.49 K/UL (ref 0.1–0.95)
MONOCYTES NFR BLD: 7 % (ref 2–12)
NEUTROPHILS NFR BLD: 80 % (ref 43–80)
NEUTS SEG NFR BLD: 5.59 K/UL (ref 1.8–7.3)
PLATELET # BLD AUTO: 154 K/UL (ref 130–450)
PMV BLD AUTO: 10 FL (ref 7–12)
POTASSIUM SERPL-SCNC: 4.2 MMOL/L (ref 3.5–5)
PROTHROMBIN TIME: 32.5 SEC (ref 9.3–12.4)
RBC # BLD AUTO: 4.13 M/UL (ref 3.5–5.5)
SODIUM SERPL-SCNC: 137 MMOL/L (ref 132–146)
WBC OTHER # BLD: 7 K/UL (ref 4.5–11.5)

## 2024-02-17 PROCEDURE — 87154 CUL TYP ID BLD PTHGN 6+ TRGT: CPT

## 2024-02-17 PROCEDURE — 82962 GLUCOSE BLOOD TEST: CPT

## 2024-02-17 PROCEDURE — 2580000003 HC RX 258: Performed by: NURSE PRACTITIONER

## 2024-02-17 PROCEDURE — 6360000002 HC RX W HCPCS: Performed by: NURSE PRACTITIONER

## 2024-02-17 PROCEDURE — 6360000002 HC RX W HCPCS: Performed by: FAMILY MEDICINE

## 2024-02-17 PROCEDURE — 85025 COMPLETE CBC W/AUTO DIFF WBC: CPT

## 2024-02-17 PROCEDURE — 6370000000 HC RX 637 (ALT 250 FOR IP): Performed by: NURSE PRACTITIONER

## 2024-02-17 PROCEDURE — 87077 CULTURE AEROBIC IDENTIFY: CPT

## 2024-02-17 PROCEDURE — 6370000000 HC RX 637 (ALT 250 FOR IP): Performed by: FAMILY MEDICINE

## 2024-02-17 PROCEDURE — 36415 COLL VENOUS BLD VENIPUNCTURE: CPT

## 2024-02-17 PROCEDURE — 2060000000 HC ICU INTERMEDIATE R&B

## 2024-02-17 PROCEDURE — 85610 PROTHROMBIN TIME: CPT

## 2024-02-17 PROCEDURE — 87040 BLOOD CULTURE FOR BACTERIA: CPT

## 2024-02-17 PROCEDURE — 87076 CULTURE ANAEROBE IDENT EACH: CPT

## 2024-02-17 PROCEDURE — 80048 BASIC METABOLIC PNL TOTAL CA: CPT

## 2024-02-17 RX ORDER — PRIMIDONE 50 MG/1
150 TABLET ORAL 2 TIMES DAILY
Status: DISCONTINUED | OUTPATIENT
Start: 2024-02-17 | End: 2024-02-22 | Stop reason: HOSPADM

## 2024-02-17 RX ORDER — LATANOPROST 50 UG/ML
1 SOLUTION/ DROPS OPHTHALMIC NIGHTLY
Status: DISCONTINUED | OUTPATIENT
Start: 2024-02-17 | End: 2024-02-22 | Stop reason: HOSPADM

## 2024-02-17 RX ORDER — FOLIC ACID 1 MG/1
1 TABLET ORAL EVERY MORNING
Status: DISCONTINUED | OUTPATIENT
Start: 2024-02-17 | End: 2024-02-22 | Stop reason: HOSPADM

## 2024-02-17 RX ORDER — BISACODYL 5 MG/1
5 TABLET, DELAYED RELEASE ORAL DAILY PRN
Status: DISCONTINUED | OUTPATIENT
Start: 2024-02-17 | End: 2024-02-22 | Stop reason: HOSPADM

## 2024-02-17 RX ORDER — SODIUM CHLORIDE 9 MG/ML
INJECTION, SOLUTION INTRAVENOUS PRN
Status: DISCONTINUED | OUTPATIENT
Start: 2024-02-17 | End: 2024-02-22 | Stop reason: HOSPADM

## 2024-02-17 RX ORDER — CALCIUM CARBONATE 500(1250)
1 TABLET ORAL DAILY
Status: DISCONTINUED | OUTPATIENT
Start: 2024-02-17 | End: 2024-02-22 | Stop reason: HOSPADM

## 2024-02-17 RX ORDER — ONDANSETRON 2 MG/ML
4 INJECTION INTRAMUSCULAR; INTRAVENOUS EVERY 6 HOURS PRN
Status: DISCONTINUED | OUTPATIENT
Start: 2024-02-17 | End: 2024-02-22 | Stop reason: HOSPADM

## 2024-02-17 RX ORDER — ATORVASTATIN CALCIUM 40 MG/1
40 TABLET, FILM COATED ORAL NIGHTLY
Status: DISCONTINUED | OUTPATIENT
Start: 2024-02-17 | End: 2024-02-22 | Stop reason: HOSPADM

## 2024-02-17 RX ORDER — DEXTROSE MONOHYDRATE 100 MG/ML
INJECTION, SOLUTION INTRAVENOUS CONTINUOUS PRN
Status: DISCONTINUED | OUTPATIENT
Start: 2024-02-17 | End: 2024-02-22 | Stop reason: HOSPADM

## 2024-02-17 RX ORDER — TRAMADOL HYDROCHLORIDE 50 MG/1
50 TABLET ORAL EVERY 4 HOURS PRN
Status: DISCONTINUED | OUTPATIENT
Start: 2024-02-17 | End: 2024-02-22 | Stop reason: HOSPADM

## 2024-02-17 RX ORDER — DEXAMETHASONE SODIUM PHOSPHATE 10 MG/ML
6 INJECTION INTRAMUSCULAR; INTRAVENOUS EVERY 24 HOURS
Status: DISCONTINUED | OUTPATIENT
Start: 2024-02-17 | End: 2024-02-22 | Stop reason: HOSPADM

## 2024-02-17 RX ORDER — SODIUM CHLORIDE 0.9 % (FLUSH) 0.9 %
5-40 SYRINGE (ML) INJECTION PRN
Status: DISCONTINUED | OUTPATIENT
Start: 2024-02-17 | End: 2024-02-22 | Stop reason: HOSPADM

## 2024-02-17 RX ORDER — HYDROCODONE BITARTRATE AND ACETAMINOPHEN 10; 325 MG/1; MG/1
1 TABLET ORAL EVERY 6 HOURS PRN
Status: DISCONTINUED | OUTPATIENT
Start: 2024-02-17 | End: 2024-02-22 | Stop reason: HOSPADM

## 2024-02-17 RX ORDER — CHOLECALCIFEROL (VITAMIN D3) 50 MCG
2000 TABLET ORAL EVERY MORNING
Status: DISCONTINUED | OUTPATIENT
Start: 2024-02-17 | End: 2024-02-22 | Stop reason: HOSPADM

## 2024-02-17 RX ORDER — ONDANSETRON 4 MG/1
4 TABLET, ORALLY DISINTEGRATING ORAL EVERY 8 HOURS PRN
Status: DISCONTINUED | OUTPATIENT
Start: 2024-02-17 | End: 2024-02-22 | Stop reason: HOSPADM

## 2024-02-17 RX ORDER — SODIUM CHLORIDE 0.9 % (FLUSH) 0.9 %
5-40 SYRINGE (ML) INJECTION EVERY 12 HOURS SCHEDULED
Status: DISCONTINUED | OUTPATIENT
Start: 2024-02-17 | End: 2024-02-22 | Stop reason: HOSPADM

## 2024-02-17 RX ORDER — DILTIAZEM HYDROCHLORIDE 180 MG/1
360 CAPSULE, COATED, EXTENDED RELEASE ORAL DAILY
Status: DISCONTINUED | OUTPATIENT
Start: 2024-02-17 | End: 2024-02-22 | Stop reason: HOSPADM

## 2024-02-17 RX ADMIN — PRIMIDONE 150 MG: 50 TABLET ORAL at 21:06

## 2024-02-17 RX ADMIN — SODIUM CHLORIDE, PRESERVATIVE FREE 10 ML: 5 INJECTION INTRAVENOUS at 21:06

## 2024-02-17 RX ADMIN — PRIMIDONE 150 MG: 50 TABLET ORAL at 09:25

## 2024-02-17 RX ADMIN — DILTIAZEM HYDROCHLORIDE 360 MG: 180 CAPSULE, COATED, EXTENDED RELEASE ORAL at 09:25

## 2024-02-17 RX ADMIN — FOLIC ACID 1 MG: 1 TABLET ORAL at 09:23

## 2024-02-17 RX ADMIN — WATER 1000 MG: 1 INJECTION INTRAMUSCULAR; INTRAVENOUS; SUBCUTANEOUS at 06:39

## 2024-02-17 RX ADMIN — DEXAMETHASONE SODIUM PHOSPHATE 6 MG: 10 INJECTION INTRAMUSCULAR; INTRAVENOUS at 17:36

## 2024-02-17 RX ADMIN — SODIUM CHLORIDE, PRESERVATIVE FREE 10 ML: 5 INJECTION INTRAVENOUS at 09:25

## 2024-02-17 RX ADMIN — ATORVASTATIN CALCIUM 40 MG: 40 TABLET, FILM COATED ORAL at 21:06

## 2024-02-17 RX ADMIN — Medication 2000 UNITS: at 09:23

## 2024-02-17 RX ADMIN — LATANOPROST 1 DROP: 50 SOLUTION OPHTHALMIC at 21:06

## 2024-02-17 RX ADMIN — CALCIUM 500 MG: 500 TABLET ORAL at 09:25

## 2024-02-17 RX ADMIN — APIXABAN 5 MG: 5 TABLET, FILM COATED ORAL at 17:33

## 2024-02-17 ASSESSMENT — PAIN SCALES - GENERAL
PAINLEVEL_OUTOF10: 0
PAINLEVEL_OUTOF10: 0

## 2024-02-17 NOTE — H&P
helps  --she should probably be placed, unsure of her dc plans at this point, but she would benefit from therapy    Medication for other comorbidities continue as appropriate dose adjustment as necessary.    DVT prophylaxis  PT OT  Discharge planning  Case discussed with attending and agreed upon plan of care.      Code status: Full  Requires inpatient level of care      I have spent a total time of 70 minutes of this patient encounter reviewing chart, labs, coordinating care with interdisciplinary teams, face to face encounter with patient, providing counseling/education to patient/family.       Electronically signed by Kristal Gutierrez DO on 2/17/2024 at 12:50 PM

## 2024-02-17 NOTE — CONSULTS
[facility]  Hematology/Oncology  Consult  Dr. Jerrod Ventura      Patient Name: Suki Weir  YOB: 1940  PCP: Camden Hoskins MD   Referring Provider: [facility] / [place] [id number]     Reason for Consultation: No chief complaint on file.       History of Present Illness:  This pt is a very pleasant 82 yo female who presented to the  for L LE pain and fall. She had a minor trauma incident ~1 week ago at a doctors office to her L calf. She is on coumadin for A fib. No CP/SOB/AGUILAR. No bleeding issues. No hx of DVT/PE. CMP with Cr 1.2. CBC WNL. INR 3.4 on admission, now 3.0. US LLE showing a thrombus in the posterior tibial vein. Hematology consulted for DVT    Diagnostic Data:     Past Medical History:   Diagnosis Date    Atrial fibrillation ([facility]) 02/01/2010    converted with  oral    Cataract     right eye    CKD (chronic kidney disease)     Diabetes mellitus ([facility]) 01/01/1999    Diabetic peripheral neuropathy ([facility])     Diastolic CHF, acute on chronic ([facility]) 01/01/2010    DISH (diffuse idiopathic skeletal hyperostosis)     Essential tremor     Fatty liver disease, nonalcoholic 01/01/2000    Hypertension 01/01/1996    Lumbar radiculopathy     Mitral regurgitation 01/01/2010    MILD ; MILD AORTIC STENOSIS    Open-angle glaucoma 01/01/1999    Pulmonary regurgitation 01/01/2010    moderate ;increased P.A. pressure    Rectal bleed 08/23/2012    Rheumatoid arthritis ([facility])     Vitreous hemorrhage ([facility])     left eye--s/p surgery       Patient Active Problem List    Diagnosis Date Noted    HTN (hypertension), benign 08/23/2012    Paroxysmal atrial fibrillation ([facility]) 08/23/2012    Lower leg DVT (deep venous thromboembolism), acute, left ([facility]) 02/17/2024    Acute deep vein thrombosis (DVT) of tibial vein of left lower extremity ([facility]) 02/16/2024    DAVI (acute kidney injury) ([facility]) 02/16/2024    Inability to walk 02/16/2024    Fall 02/16/2024    Supratherapeutic INR 02/16/2024    Mixed simple and

## 2024-02-18 LAB
ANION GAP SERPL CALCULATED.3IONS-SCNC: 10 MMOL/L (ref 7–16)
BASOPHILS # BLD: 0 K/UL (ref 0–0.2)
BASOPHILS NFR BLD: 0 % (ref 0–2)
BUN SERPL-MCNC: 37 MG/DL (ref 6–23)
CALCIUM SERPL-MCNC: 9.4 MG/DL (ref 8.6–10.2)
CHLORIDE SERPL-SCNC: 103 MMOL/L (ref 98–107)
CO2 SERPL-SCNC: 24 MMOL/L (ref 22–29)
CREAT SERPL-MCNC: 1.1 MG/DL (ref 0.5–1)
EKG ATRIAL RATE: 65 BPM
EKG Q-T INTERVAL: 406 MS
EKG QRS DURATION: 72 MS
EKG QTC CALCULATION (BAZETT): 435 MS
EKG R AXIS: 53 DEGREES
EKG T AXIS: 50 DEGREES
EKG VENTRICULAR RATE: 69 BPM
EOSINOPHIL # BLD: 0 K/UL (ref 0.05–0.5)
EOSINOPHILS RELATIVE PERCENT: 0 % (ref 0–6)
ERYTHROCYTE [DISTWIDTH] IN BLOOD BY AUTOMATED COUNT: 13.6 % (ref 11.5–15)
GFR SERPL CREATININE-BSD FRML MDRD: 50 ML/MIN/1.73M2
GLUCOSE BLD-MCNC: 143 MG/DL (ref 74–99)
GLUCOSE BLD-MCNC: 149 MG/DL (ref 74–99)
GLUCOSE BLD-MCNC: 151 MG/DL (ref 74–99)
GLUCOSE BLD-MCNC: 256 MG/DL (ref 74–99)
GLUCOSE SERPL-MCNC: 148 MG/DL (ref 74–99)
HCT VFR BLD AUTO: 36.4 % (ref 34–48)
HGB BLD-MCNC: 12.2 G/DL (ref 11.5–15.5)
LYMPHOCYTES NFR BLD: 0.33 K/UL (ref 1.5–4)
LYMPHOCYTES RELATIVE PERCENT: 6 % (ref 20–42)
MCH RBC QN AUTO: 30.7 PG (ref 26–35)
MCHC RBC AUTO-ENTMCNC: 33.5 G/DL (ref 32–34.5)
MCV RBC AUTO: 91.5 FL (ref 80–99.9)
MONOCYTES NFR BLD: 0.05 K/UL (ref 0.1–0.95)
MONOCYTES NFR BLD: 1 % (ref 2–12)
NEUTROPHILS NFR BLD: 93 % (ref 43–80)
NEUTS SEG NFR BLD: 5.02 K/UL (ref 1.8–7.3)
PLATELET # BLD AUTO: 162 K/UL (ref 130–450)
PMV BLD AUTO: 10.2 FL (ref 7–12)
POTASSIUM SERPL-SCNC: 4.3 MMOL/L (ref 3.5–5)
RBC # BLD AUTO: 3.98 M/UL (ref 3.5–5.5)
RBC # BLD: ABNORMAL 10*6/UL
SODIUM SERPL-SCNC: 137 MMOL/L (ref 132–146)
WBC OTHER # BLD: 5.4 K/UL (ref 4.5–11.5)

## 2024-02-18 PROCEDURE — 82962 GLUCOSE BLOOD TEST: CPT

## 2024-02-18 PROCEDURE — 85025 COMPLETE CBC W/AUTO DIFF WBC: CPT

## 2024-02-18 PROCEDURE — 36415 COLL VENOUS BLD VENIPUNCTURE: CPT

## 2024-02-18 PROCEDURE — 2580000003 HC RX 258: Performed by: NURSE PRACTITIONER

## 2024-02-18 PROCEDURE — 2060000000 HC ICU INTERMEDIATE R&B

## 2024-02-18 PROCEDURE — 6370000000 HC RX 637 (ALT 250 FOR IP): Performed by: FAMILY MEDICINE

## 2024-02-18 PROCEDURE — 6370000000 HC RX 637 (ALT 250 FOR IP): Performed by: NURSE PRACTITIONER

## 2024-02-18 PROCEDURE — 6360000002 HC RX W HCPCS: Performed by: FAMILY MEDICINE

## 2024-02-18 PROCEDURE — 6360000002 HC RX W HCPCS: Performed by: NURSE PRACTITIONER

## 2024-02-18 PROCEDURE — 93010 ELECTROCARDIOGRAM REPORT: CPT | Performed by: INTERNAL MEDICINE

## 2024-02-18 PROCEDURE — 80048 BASIC METABOLIC PNL TOTAL CA: CPT

## 2024-02-18 RX ADMIN — SODIUM CHLORIDE, PRESERVATIVE FREE 10 ML: 5 INJECTION INTRAVENOUS at 09:52

## 2024-02-18 RX ADMIN — LATANOPROST 1 DROP: 50 SOLUTION OPHTHALMIC at 21:10

## 2024-02-18 RX ADMIN — WATER 1000 MG: 1 INJECTION INTRAMUSCULAR; INTRAVENOUS; SUBCUTANEOUS at 06:28

## 2024-02-18 RX ADMIN — ATORVASTATIN CALCIUM 40 MG: 40 TABLET, FILM COATED ORAL at 21:10

## 2024-02-18 RX ADMIN — DILTIAZEM HYDROCHLORIDE 360 MG: 180 CAPSULE, COATED, EXTENDED RELEASE ORAL at 09:53

## 2024-02-18 RX ADMIN — APIXABAN 5 MG: 5 TABLET, FILM COATED ORAL at 06:28

## 2024-02-18 RX ADMIN — APIXABAN 5 MG: 5 TABLET, FILM COATED ORAL at 16:08

## 2024-02-18 RX ADMIN — DEXAMETHASONE SODIUM PHOSPHATE 6 MG: 10 INJECTION INTRAMUSCULAR; INTRAVENOUS at 13:43

## 2024-02-18 RX ADMIN — Medication 2000 UNITS: at 09:52

## 2024-02-18 RX ADMIN — CALCIUM 500 MG: 500 TABLET ORAL at 09:52

## 2024-02-18 RX ADMIN — PRIMIDONE 150 MG: 50 TABLET ORAL at 09:53

## 2024-02-18 RX ADMIN — PRIMIDONE 150 MG: 50 TABLET ORAL at 21:10

## 2024-02-18 RX ADMIN — FOLIC ACID 1 MG: 1 TABLET ORAL at 09:52

## 2024-02-18 RX ADMIN — SODIUM CHLORIDE, PRESERVATIVE FREE 10 ML: 5 INJECTION INTRAVENOUS at 21:10

## 2024-02-18 ASSESSMENT — PAIN SCALES - GENERAL
PAINLEVEL_OUTOF10: 0
PAINLEVEL_OUTOF10: 0

## 2024-02-19 LAB
ANION GAP SERPL CALCULATED.3IONS-SCNC: 10 MMOL/L (ref 7–16)
BASOPHILS # BLD: 0 K/UL (ref 0–0.2)
BASOPHILS NFR BLD: 0 % (ref 0–2)
BUN SERPL-MCNC: 45 MG/DL (ref 6–23)
CALCIUM SERPL-MCNC: 9.5 MG/DL (ref 8.6–10.2)
CHLORIDE SERPL-SCNC: 102 MMOL/L (ref 98–107)
CO2 SERPL-SCNC: 24 MMOL/L (ref 22–29)
CREAT SERPL-MCNC: 1.2 MG/DL (ref 0.5–1)
EOSINOPHIL # BLD: 0 K/UL (ref 0.05–0.5)
EOSINOPHILS RELATIVE PERCENT: 0 % (ref 0–6)
ERYTHROCYTE [DISTWIDTH] IN BLOOD BY AUTOMATED COUNT: 13.7 % (ref 11.5–15)
GFR SERPL CREATININE-BSD FRML MDRD: 46 ML/MIN/1.73M2
GLUCOSE BLD-MCNC: 127 MG/DL (ref 74–99)
GLUCOSE BLD-MCNC: 161 MG/DL (ref 74–99)
GLUCOSE SERPL-MCNC: 157 MG/DL (ref 74–99)
HCT VFR BLD AUTO: 34.4 % (ref 34–48)
HGB BLD-MCNC: 11.4 G/DL (ref 11.5–15.5)
INR PPP: 2.2
LYMPHOCYTES NFR BLD: 0.46 K/UL (ref 1.5–4)
LYMPHOCYTES RELATIVE PERCENT: 5 % (ref 20–42)
MCH RBC QN AUTO: 30.7 PG (ref 26–35)
MCHC RBC AUTO-ENTMCNC: 33.1 G/DL (ref 32–34.5)
MCV RBC AUTO: 92.7 FL (ref 80–99.9)
MONOCYTES NFR BLD: 0.23 K/UL (ref 0.1–0.95)
MONOCYTES NFR BLD: 3 % (ref 2–12)
NEUTROPHILS NFR BLD: 92 % (ref 43–80)
NEUTS SEG NFR BLD: 8.01 K/UL (ref 1.8–7.3)
PLATELET # BLD AUTO: 171 K/UL (ref 130–450)
PMV BLD AUTO: 10.9 FL (ref 7–12)
POTASSIUM SERPL-SCNC: 4.1 MMOL/L (ref 3.5–5)
PROTHROMBIN TIME: 24.5 SEC (ref 9.3–12.4)
RBC # BLD AUTO: 3.71 M/UL (ref 3.5–5.5)
RBC # BLD: NORMAL 10*6/UL
SODIUM SERPL-SCNC: 136 MMOL/L (ref 132–146)
WBC OTHER # BLD: 8.7 K/UL (ref 4.5–11.5)

## 2024-02-19 PROCEDURE — 6360000002 HC RX W HCPCS: Performed by: FAMILY MEDICINE

## 2024-02-19 PROCEDURE — 85610 PROTHROMBIN TIME: CPT

## 2024-02-19 PROCEDURE — 80048 BASIC METABOLIC PNL TOTAL CA: CPT

## 2024-02-19 PROCEDURE — 6370000000 HC RX 637 (ALT 250 FOR IP): Performed by: NURSE PRACTITIONER

## 2024-02-19 PROCEDURE — 6360000002 HC RX W HCPCS: Performed by: NURSE PRACTITIONER

## 2024-02-19 PROCEDURE — 97165 OT EVAL LOW COMPLEX 30 MIN: CPT

## 2024-02-19 PROCEDURE — 82962 GLUCOSE BLOOD TEST: CPT

## 2024-02-19 PROCEDURE — 36415 COLL VENOUS BLD VENIPUNCTURE: CPT

## 2024-02-19 PROCEDURE — 2580000003 HC RX 258: Performed by: NURSE PRACTITIONER

## 2024-02-19 PROCEDURE — 85025 COMPLETE CBC W/AUTO DIFF WBC: CPT

## 2024-02-19 PROCEDURE — 2060000000 HC ICU INTERMEDIATE R&B

## 2024-02-19 PROCEDURE — 97535 SELF CARE MNGMENT TRAINING: CPT

## 2024-02-19 PROCEDURE — 97161 PT EVAL LOW COMPLEX 20 MIN: CPT

## 2024-02-19 PROCEDURE — 6370000000 HC RX 637 (ALT 250 FOR IP): Performed by: FAMILY MEDICINE

## 2024-02-19 RX ADMIN — Medication 2000 UNITS: at 07:54

## 2024-02-19 RX ADMIN — PRIMIDONE 150 MG: 50 TABLET ORAL at 20:30

## 2024-02-19 RX ADMIN — DEXAMETHASONE SODIUM PHOSPHATE 6 MG: 10 INJECTION INTRAMUSCULAR; INTRAVENOUS at 12:49

## 2024-02-19 RX ADMIN — APIXABAN 5 MG: 5 TABLET, FILM COATED ORAL at 16:46

## 2024-02-19 RX ADMIN — SODIUM CHLORIDE, PRESERVATIVE FREE 10 ML: 5 INJECTION INTRAVENOUS at 07:55

## 2024-02-19 RX ADMIN — LATANOPROST 1 DROP: 50 SOLUTION OPHTHALMIC at 20:30

## 2024-02-19 RX ADMIN — FOLIC ACID 1 MG: 1 TABLET ORAL at 07:54

## 2024-02-19 RX ADMIN — ATORVASTATIN CALCIUM 40 MG: 40 TABLET, FILM COATED ORAL at 20:30

## 2024-02-19 RX ADMIN — SODIUM CHLORIDE, PRESERVATIVE FREE 10 ML: 5 INJECTION INTRAVENOUS at 20:30

## 2024-02-19 RX ADMIN — APIXABAN 5 MG: 5 TABLET, FILM COATED ORAL at 05:48

## 2024-02-19 RX ADMIN — PRIMIDONE 150 MG: 50 TABLET ORAL at 07:54

## 2024-02-19 RX ADMIN — WATER 1000 MG: 1 INJECTION INTRAMUSCULAR; INTRAVENOUS; SUBCUTANEOUS at 05:48

## 2024-02-19 RX ADMIN — CALCIUM 500 MG: 500 TABLET ORAL at 07:54

## 2024-02-19 ASSESSMENT — PAIN SCALES - GENERAL
PAINLEVEL_OUTOF10: 0
PAINLEVEL_OUTOF10: 0

## 2024-02-20 LAB
ANION GAP SERPL CALCULATED.3IONS-SCNC: 9 MMOL/L (ref 7–16)
BASOPHILS # BLD: 0.01 K/UL (ref 0–0.2)
BASOPHILS NFR BLD: 0 % (ref 0–2)
BUN SERPL-MCNC: 43 MG/DL (ref 6–23)
CALCIUM SERPL-MCNC: 9.6 MG/DL (ref 8.6–10.2)
CHLORIDE SERPL-SCNC: 102 MMOL/L (ref 98–107)
CO2 SERPL-SCNC: 25 MMOL/L (ref 22–29)
CREAT SERPL-MCNC: 1.2 MG/DL (ref 0.5–1)
EOSINOPHIL # BLD: 0.01 K/UL (ref 0.05–0.5)
EOSINOPHILS RELATIVE PERCENT: 0 % (ref 0–6)
ERYTHROCYTE [DISTWIDTH] IN BLOOD BY AUTOMATED COUNT: 13.5 % (ref 11.5–15)
GFR SERPL CREATININE-BSD FRML MDRD: 46 ML/MIN/1.73M2
GLUCOSE BLD-MCNC: 118 MG/DL (ref 74–99)
GLUCOSE BLD-MCNC: 146 MG/DL (ref 74–99)
GLUCOSE BLD-MCNC: 91 MG/DL (ref 74–99)
GLUCOSE SERPL-MCNC: 96 MG/DL (ref 74–99)
HCT VFR BLD AUTO: 35.5 % (ref 34–48)
HGB BLD-MCNC: 11.7 G/DL (ref 11.5–15.5)
IMM GRANULOCYTES # BLD AUTO: 0.03 K/UL (ref 0–0.58)
IMM GRANULOCYTES NFR BLD: 0 % (ref 0–5)
LYMPHOCYTES NFR BLD: 1.05 K/UL (ref 1.5–4)
LYMPHOCYTES RELATIVE PERCENT: 13 % (ref 20–42)
MCH RBC QN AUTO: 30.5 PG (ref 26–35)
MCHC RBC AUTO-ENTMCNC: 33 G/DL (ref 32–34.5)
MCV RBC AUTO: 92.4 FL (ref 80–99.9)
MONOCYTES NFR BLD: 0.6 K/UL (ref 0.1–0.95)
MONOCYTES NFR BLD: 8 % (ref 2–12)
NEUTROPHILS NFR BLD: 79 % (ref 43–80)
NEUTS SEG NFR BLD: 6.24 K/UL (ref 1.8–7.3)
PLATELET # BLD AUTO: 149 K/UL (ref 130–450)
PMV BLD AUTO: 10.7 FL (ref 7–12)
POTASSIUM SERPL-SCNC: 4.1 MMOL/L (ref 3.5–5)
RBC # BLD AUTO: 3.84 M/UL (ref 3.5–5.5)
SODIUM SERPL-SCNC: 136 MMOL/L (ref 132–146)
WBC OTHER # BLD: 7.9 K/UL (ref 4.5–11.5)

## 2024-02-20 PROCEDURE — 82962 GLUCOSE BLOOD TEST: CPT

## 2024-02-20 PROCEDURE — 85025 COMPLETE CBC W/AUTO DIFF WBC: CPT

## 2024-02-20 PROCEDURE — 2060000000 HC ICU INTERMEDIATE R&B

## 2024-02-20 PROCEDURE — 6360000002 HC RX W HCPCS: Performed by: FAMILY MEDICINE

## 2024-02-20 PROCEDURE — 6370000000 HC RX 637 (ALT 250 FOR IP): Performed by: FAMILY MEDICINE

## 2024-02-20 PROCEDURE — 6360000002 HC RX W HCPCS: Performed by: NURSE PRACTITIONER

## 2024-02-20 PROCEDURE — 6370000000 HC RX 637 (ALT 250 FOR IP): Performed by: NURSE PRACTITIONER

## 2024-02-20 PROCEDURE — 2580000003 HC RX 258: Performed by: NURSE PRACTITIONER

## 2024-02-20 PROCEDURE — 36415 COLL VENOUS BLD VENIPUNCTURE: CPT

## 2024-02-20 PROCEDURE — 80048 BASIC METABOLIC PNL TOTAL CA: CPT

## 2024-02-20 RX ADMIN — ATORVASTATIN CALCIUM 40 MG: 40 TABLET, FILM COATED ORAL at 21:09

## 2024-02-20 RX ADMIN — DEXAMETHASONE SODIUM PHOSPHATE 6 MG: 10 INJECTION INTRAMUSCULAR; INTRAVENOUS at 12:21

## 2024-02-20 RX ADMIN — APIXABAN 5 MG: 5 TABLET, FILM COATED ORAL at 16:03

## 2024-02-20 RX ADMIN — DILTIAZEM HYDROCHLORIDE 360 MG: 180 CAPSULE, COATED, EXTENDED RELEASE ORAL at 09:19

## 2024-02-20 RX ADMIN — APIXABAN 5 MG: 5 TABLET, FILM COATED ORAL at 06:00

## 2024-02-20 RX ADMIN — PRIMIDONE 150 MG: 50 TABLET ORAL at 21:09

## 2024-02-20 RX ADMIN — LATANOPROST 1 DROP: 50 SOLUTION OPHTHALMIC at 21:10

## 2024-02-20 RX ADMIN — Medication 2000 UNITS: at 09:19

## 2024-02-20 RX ADMIN — WATER 1000 MG: 1 INJECTION INTRAMUSCULAR; INTRAVENOUS; SUBCUTANEOUS at 06:00

## 2024-02-20 RX ADMIN — CALCIUM 500 MG: 500 TABLET ORAL at 09:19

## 2024-02-20 RX ADMIN — FOLIC ACID 1 MG: 1 TABLET ORAL at 09:19

## 2024-02-20 RX ADMIN — PRIMIDONE 150 MG: 50 TABLET ORAL at 09:19

## 2024-02-20 RX ADMIN — SODIUM CHLORIDE, PRESERVATIVE FREE 10 ML: 5 INJECTION INTRAVENOUS at 21:13

## 2024-02-20 ASSESSMENT — PAIN SCALES - GENERAL
PAINLEVEL_OUTOF10: 0

## 2024-02-20 NOTE — ACP (ADVANCE CARE PLANNING)
Advance Care Planning   Healthcare Decision Maker:    Primary Decision Maker: CHA FERREIRA - Other - [phone]    Click here to complete Healthcare Decision Makers including selection of the Healthcare Decision Maker Relationship (ie \"Primary\").

## 2024-02-20 NOTE — PLAN OF CARE
Problem: Discharge Planning  Goal: Discharge to home or other facility with appropriate resources  Outcome: Progressing     Problem: Safety - Adult  Goal: Free from fall injury  Outcome: Progressing     Problem: Skin/Tissue Integrity  Goal: Absence of new skin breakdown  Description: 1.  Monitor for areas of redness and/or skin breakdown  2.  Assess vascular access sites hourly  3.  Every 4-6 hours minimum:  Change oxygen saturation probe site  4.  Every 4-6 hours:  If on nasal continuous positive airway pressure, respiratory therapy assess nares and determine need for appliance change or resting period.  Outcome: Progressing     Problem: ABCDS Injury Assessment  Goal: Absence of physical injury  Outcome: Progressing     Problem: Pain  Goal: Verbalizes/displays adequate comfort level or baseline comfort level  Outcome: Progressing     Problem: Chronic Conditions and Co-morbidities  Goal: Patient's chronic conditions and co-morbidity symptoms are monitored and maintained or improved  Outcome: Progressing

## 2024-02-20 NOTE — CARE COORDINATION
Social Work/Discharge Planning:  Met with patient and completed initial assessment.  Explained Social Work role and discussed transition of care/discharge planning.  Patient lives alone in a one story house.  PTA she uses a walker when not at home.  She has a snf history at [facility].  Provided patient with free 30 day [facility] care.  Reviewed therapy score indicating need for rehab and provided patient with snf choice list to review.  Patient states her first snf choice is [facility], [facility], and [facility].  Called Jerald with [facility] and none of her facilities accept patient insurance.  Called Nadira with [facility] and facility has no beds available.  Updated patient and informed her of need for additional snf choices.  Will continue to follow and assist with discharge planning. Electronically signed by AMARILYS Barney on 2/20/2024 at 3:40 PM

## 2024-02-21 LAB
ANION GAP SERPL CALCULATED.3IONS-SCNC: 12 MMOL/L (ref 7–16)
BASOPHILS # BLD: 0 K/UL (ref 0–0.2)
BASOPHILS NFR BLD: 0 % (ref 0–2)
BUN SERPL-MCNC: 43 MG/DL (ref 6–23)
CALCIUM SERPL-MCNC: 9.3 MG/DL (ref 8.6–10.2)
CHLORIDE SERPL-SCNC: 102 MMOL/L (ref 98–107)
CO2 SERPL-SCNC: 23 MMOL/L (ref 22–29)
CREAT SERPL-MCNC: 1.1 MG/DL (ref 0.5–1)
EOSINOPHIL # BLD: 0.01 K/UL (ref 0.05–0.5)
EOSINOPHILS RELATIVE PERCENT: 0 % (ref 0–6)
ERYTHROCYTE [DISTWIDTH] IN BLOOD BY AUTOMATED COUNT: 13.6 % (ref 11.5–15)
GFR SERPL CREATININE-BSD FRML MDRD: 52 ML/MIN/1.73M2
GLUCOSE BLD-MCNC: 102 MG/DL (ref 74–99)
GLUCOSE BLD-MCNC: 137 MG/DL (ref 74–99)
GLUCOSE BLD-MCNC: 137 MG/DL (ref 74–99)
GLUCOSE BLD-MCNC: 170 MG/DL (ref 74–99)
GLUCOSE BLD-MCNC: 199 MG/DL (ref 74–99)
GLUCOSE SERPL-MCNC: 99 MG/DL (ref 74–99)
HCT VFR BLD AUTO: 34.9 % (ref 34–48)
HGB BLD-MCNC: 11.5 G/DL (ref 11.5–15.5)
IMM GRANULOCYTES # BLD AUTO: 0.03 K/UL (ref 0–0.58)
IMM GRANULOCYTES NFR BLD: 0 % (ref 0–5)
LYMPHOCYTES NFR BLD: 0.9 K/UL (ref 1.5–4)
LYMPHOCYTES RELATIVE PERCENT: 12 % (ref 20–42)
MCH RBC QN AUTO: 30 PG (ref 26–35)
MCHC RBC AUTO-ENTMCNC: 33 G/DL (ref 32–34.5)
MCV RBC AUTO: 91.1 FL (ref 80–99.9)
MONOCYTES NFR BLD: 0.6 K/UL (ref 0.1–0.95)
MONOCYTES NFR BLD: 8 % (ref 2–12)
NEUTROPHILS NFR BLD: 80 % (ref 43–80)
NEUTS SEG NFR BLD: 6.1 K/UL (ref 1.8–7.3)
PLATELET # BLD AUTO: 156 K/UL (ref 130–450)
PMV BLD AUTO: 10.5 FL (ref 7–12)
POTASSIUM SERPL-SCNC: 4.2 MMOL/L (ref 3.5–5)
RBC # BLD AUTO: 3.83 M/UL (ref 3.5–5.5)
SODIUM SERPL-SCNC: 137 MMOL/L (ref 132–146)
WBC OTHER # BLD: 7.6 K/UL (ref 4.5–11.5)

## 2024-02-21 PROCEDURE — 80048 BASIC METABOLIC PNL TOTAL CA: CPT

## 2024-02-21 PROCEDURE — 6370000000 HC RX 637 (ALT 250 FOR IP): Performed by: NURSE PRACTITIONER

## 2024-02-21 PROCEDURE — 6370000000 HC RX 637 (ALT 250 FOR IP): Performed by: FAMILY MEDICINE

## 2024-02-21 PROCEDURE — 6360000002 HC RX W HCPCS: Performed by: FAMILY MEDICINE

## 2024-02-21 PROCEDURE — 82962 GLUCOSE BLOOD TEST: CPT

## 2024-02-21 PROCEDURE — 97530 THERAPEUTIC ACTIVITIES: CPT

## 2024-02-21 PROCEDURE — 85025 COMPLETE CBC W/AUTO DIFF WBC: CPT

## 2024-02-21 PROCEDURE — 36415 COLL VENOUS BLD VENIPUNCTURE: CPT

## 2024-02-21 PROCEDURE — 6360000002 HC RX W HCPCS: Performed by: NURSE PRACTITIONER

## 2024-02-21 PROCEDURE — 2580000003 HC RX 258: Performed by: NURSE PRACTITIONER

## 2024-02-21 PROCEDURE — 2060000000 HC ICU INTERMEDIATE R&B

## 2024-02-21 RX ADMIN — APIXABAN 5 MG: 5 TABLET, FILM COATED ORAL at 16:13

## 2024-02-21 RX ADMIN — CALCIUM 500 MG: 500 TABLET ORAL at 09:25

## 2024-02-21 RX ADMIN — SODIUM CHLORIDE, PRESERVATIVE FREE 10 ML: 5 INJECTION INTRAVENOUS at 05:30

## 2024-02-21 RX ADMIN — ATORVASTATIN CALCIUM 40 MG: 40 TABLET, FILM COATED ORAL at 20:25

## 2024-02-21 RX ADMIN — LATANOPROST 1 DROP: 50 SOLUTION OPHTHALMIC at 20:25

## 2024-02-21 RX ADMIN — PRIMIDONE 150 MG: 50 TABLET ORAL at 09:25

## 2024-02-21 RX ADMIN — PRIMIDONE 150 MG: 50 TABLET ORAL at 20:24

## 2024-02-21 RX ADMIN — APIXABAN 5 MG: 5 TABLET, FILM COATED ORAL at 05:29

## 2024-02-21 RX ADMIN — SODIUM CHLORIDE, PRESERVATIVE FREE 10 ML: 5 INJECTION INTRAVENOUS at 12:24

## 2024-02-21 RX ADMIN — DEXAMETHASONE SODIUM PHOSPHATE 6 MG: 10 INJECTION INTRAMUSCULAR; INTRAVENOUS at 12:23

## 2024-02-21 RX ADMIN — Medication 2000 UNITS: at 09:25

## 2024-02-21 RX ADMIN — FOLIC ACID 1 MG: 1 TABLET ORAL at 09:25

## 2024-02-21 RX ADMIN — SODIUM CHLORIDE, PRESERVATIVE FREE 10 ML: 5 INJECTION INTRAVENOUS at 20:24

## 2024-02-21 RX ADMIN — WATER 1000 MG: 1 INJECTION INTRAMUSCULAR; INTRAVENOUS; SUBCUTANEOUS at 05:30

## 2024-02-21 ASSESSMENT — PAIN SCALES - GENERAL
PAINLEVEL_OUTOF10: 0

## 2024-02-21 NOTE — DISCHARGE INSTR - COC
Isolation            No Isolation          Patient Infection Status       Infection Onset Added Last Indicated Last Indicated By Review Planned Expiration Resolved Resolved By    None active    Resolved    COVID-19 22 COVID-19, Rapid   22 Infection                        Nurse Assessment:  Last Vital Signs: BP (!) 155/75   Pulse 57   Temp 97.5 °F (36.4 °C) (Oral)   Resp 18   Ht 1.715 m (5' 7.5\")   Wt 81.5 kg (179 lb 10.8 oz)   SpO2 100%   BMI 27.73 kg/m²     Last documented pain score (0-10 scale): Pain Level: 0  Last Weight:   Wt Readings from Last 1 Encounters:   24 81.5 kg (179 lb 10.8 oz)     Mental Status:  oriented and alert    IV Access:  - None    Nursing Mobility/ADLs:  Walking   Assisted  Transfer  Assisted  Bathing  Assisted  Dressing  Assisted  Toileting  Assisted  Feeding  Independent  Med Admin  Independent  Med Delivery   whole    Wound Care Documentation and Therapy:        Elimination:  Continence:   Bowel: No  Bladder: No  Urinary Catheter: None   Colostomy/Ileostomy/Ileal Conduit: No       Date of Last BM: ***    Intake/Output Summary (Last 24 hours) at 2024 1459  Last data filed at 2024 1322  Gross per 24 hour   Intake 360 ml   Output --   Net 360 ml     I/O last 3 completed shifts:  In: 130 [P.O.:120; I.V.:10]  Out: -     Safety Concerns:     At Risk for Falls    Impairments/Disabilities:      None    Nutrition Therapy:  Current Nutrition Therapy:   - Oral Diet:  Carb Control 4 carbs/meal (1800kcals/day)    Routes of Feeding: Oral  Liquids: Thin Liquids  Daily Fluid Restriction: no  Last Modified Barium Swallow with Video (Video Swallowing Test): not done    Treatments at the Time of Hospital Discharge:   Respiratory Treatments: n/a  Oxygen Therapy:  is not on home oxygen therapy.  Ventilator:    - No ventilator support    Rehab Therapies: Physical Therapy and Occupational Therapy  Weight Bearing Status/Restrictions: No weight bearing

## 2024-02-21 NOTE — CARE COORDINATION
Social Work/Discharge Planning:  Met with patient to follow up on skilled nursing facility choices.  Patient is agreeable to referral to [facility].  Patient requesting for this worker to call her cousin Kristie for additional snf choices.  Called Lori with [facility] and confirmed facility does not accept patient insurance.  Called patient cousin Kristie (ph: [phone]) and provided her with an update.  Kristie states referral can be made to [facility].  Referral made to Elidia with [facility] and she will review patient information.  Referral made to addison Hernandez with [facility] and she will review patient information.  Will continue to follow.  Electronically signed by AMARILYS Barney on 2/21/2024 at 11:42 AM    Addendum:  [facility] L[facility] states facility is out of network.  Will continue to follow.  Electronically signed by AMARILYS Barney on 2/21/2024 at 1:55 PM

## 2024-02-21 NOTE — CARE COORDINATION
Social Work/Discharge Planning:  Received call from patient Jerrod ( of patient cousin Kristie) regarding discharge planning.  Informed him [facility] is reviewing and may be calling Kristie.   emailed skilled nursing facility choice list to Jerrod to review.  Will continue to follow.  Electronically signed by AMARILYS Barney on 2/21/2024 at 1:50 PM    Addendum:  Mary with [facility] states facility can accept patient and will start pre-cert once patient has updated physical therapy noted. Notified therapy.  Provided update to patient and Jerrod.  Will continue to follow and wait for pre-cert.  Electronically signed by AMARILYS Barney on 2/21/2024 at 2:44 PM

## 2024-02-22 VITALS
BODY MASS INDEX: 26.24 KG/M2 | RESPIRATION RATE: 18 BRPM | WEIGHT: 173.1 LBS | TEMPERATURE: 98.6 F | HEIGHT: 68 IN | HEART RATE: 64 BPM | OXYGEN SATURATION: 100 % | SYSTOLIC BLOOD PRESSURE: 167 MMHG | DIASTOLIC BLOOD PRESSURE: 71 MMHG

## 2024-02-22 LAB
ANION GAP SERPL CALCULATED.3IONS-SCNC: 10 MMOL/L (ref 7–16)
BASOPHILS # BLD: 0.01 K/UL (ref 0–0.2)
BASOPHILS NFR BLD: 0 % (ref 0–2)
BUN SERPL-MCNC: 36 MG/DL (ref 6–23)
CALCIUM SERPL-MCNC: 9.4 MG/DL (ref 8.6–10.2)
CHLORIDE SERPL-SCNC: 99 MMOL/L (ref 98–107)
CO2 SERPL-SCNC: 24 MMOL/L (ref 22–29)
CREAT SERPL-MCNC: 1.1 MG/DL (ref 0.5–1)
EOSINOPHIL # BLD: 0.1 K/UL (ref 0.05–0.5)
EOSINOPHILS RELATIVE PERCENT: 1 % (ref 0–6)
ERYTHROCYTE [DISTWIDTH] IN BLOOD BY AUTOMATED COUNT: 13.2 % (ref 11.5–15)
GFR SERPL CREATININE-BSD FRML MDRD: 52 ML/MIN/1.73M2
GLUCOSE BLD-MCNC: 100 MG/DL (ref 74–99)
GLUCOSE BLD-MCNC: 93 MG/DL (ref 74–99)
GLUCOSE SERPL-MCNC: 103 MG/DL (ref 74–99)
HCT VFR BLD AUTO: 36.6 % (ref 34–48)
HGB BLD-MCNC: 12.1 G/DL (ref 11.5–15.5)
IMM GRANULOCYTES # BLD AUTO: <0.03 K/UL (ref 0–0.58)
IMM GRANULOCYTES NFR BLD: 0 % (ref 0–5)
LYMPHOCYTES NFR BLD: 1.4 K/UL (ref 1.5–4)
LYMPHOCYTES RELATIVE PERCENT: 19 % (ref 20–42)
MCH RBC QN AUTO: 30.4 PG (ref 26–35)
MCHC RBC AUTO-ENTMCNC: 33.1 G/DL (ref 32–34.5)
MCV RBC AUTO: 92 FL (ref 80–99.9)
MICROORGANISM SPEC CULT: NORMAL
MONOCYTES NFR BLD: 0.6 K/UL (ref 0.1–0.95)
MONOCYTES NFR BLD: 8 % (ref 2–12)
NEUTROPHILS NFR BLD: 71 % (ref 43–80)
NEUTS SEG NFR BLD: 5.16 K/UL (ref 1.8–7.3)
PLATELET # BLD AUTO: 158 K/UL (ref 130–450)
PMV BLD AUTO: 10.8 FL (ref 7–12)
POTASSIUM SERPL-SCNC: 3.8 MMOL/L (ref 3.5–5)
RBC # BLD AUTO: 3.98 M/UL (ref 3.5–5.5)
SERVICE CMNT-IMP: NORMAL
SODIUM SERPL-SCNC: 133 MMOL/L (ref 132–146)
SPECIMEN DESCRIPTION: NORMAL
WBC OTHER # BLD: 7.3 K/UL (ref 4.5–11.5)

## 2024-02-22 PROCEDURE — 2580000003 HC RX 258: Performed by: NURSE PRACTITIONER

## 2024-02-22 PROCEDURE — 6370000000 HC RX 637 (ALT 250 FOR IP): Performed by: FAMILY MEDICINE

## 2024-02-22 PROCEDURE — 36415 COLL VENOUS BLD VENIPUNCTURE: CPT

## 2024-02-22 PROCEDURE — 80048 BASIC METABOLIC PNL TOTAL CA: CPT

## 2024-02-22 PROCEDURE — 85025 COMPLETE CBC W/AUTO DIFF WBC: CPT

## 2024-02-22 PROCEDURE — 82962 GLUCOSE BLOOD TEST: CPT

## 2024-02-22 PROCEDURE — 6360000002 HC RX W HCPCS: Performed by: FAMILY MEDICINE

## 2024-02-22 PROCEDURE — 6370000000 HC RX 637 (ALT 250 FOR IP): Performed by: NURSE PRACTITIONER

## 2024-02-22 RX ORDER — PREDNISONE 20 MG/1
20 TABLET ORAL 2 TIMES DAILY
Qty: 10 TABLET | Refills: 0 | Status: SHIPPED | OUTPATIENT
Start: 2024-02-22 | End: 2024-02-27

## 2024-02-22 RX ADMIN — Medication 2000 UNITS: at 09:17

## 2024-02-22 RX ADMIN — SODIUM CHLORIDE, PRESERVATIVE FREE 10 ML: 5 INJECTION INTRAVENOUS at 09:11

## 2024-02-22 RX ADMIN — FOLIC ACID 1 MG: 1 TABLET ORAL at 09:11

## 2024-02-22 RX ADMIN — CALCIUM 500 MG: 500 TABLET ORAL at 09:17

## 2024-02-22 RX ADMIN — DEXAMETHASONE SODIUM PHOSPHATE 6 MG: 10 INJECTION INTRAMUSCULAR; INTRAVENOUS at 13:12

## 2024-02-22 RX ADMIN — DILTIAZEM HYDROCHLORIDE 360 MG: 180 CAPSULE, COATED, EXTENDED RELEASE ORAL at 09:18

## 2024-02-22 RX ADMIN — PRIMIDONE 150 MG: 50 TABLET ORAL at 09:17

## 2024-02-22 RX ADMIN — APIXABAN 5 MG: 5 TABLET, FILM COATED ORAL at 05:46

## 2024-02-22 ASSESSMENT — PAIN SCALES - GENERAL
PAINLEVEL_OUTOF10: 0
PAINLEVEL_OUTOF10: 0

## 2024-02-22 NOTE — DISCHARGE SUMMARY
[facility]   Discharge summary   Patient ID:  Suki Weir  [id number]  83 y.o.  1940    Admit date: 2/17/2024    Discharge date and time: 2/22/2024    Admission Diagnoses:   Patient Active Problem List   Diagnosis    HTN (hypertension), benign    Paroxysmal atrial fibrillation (HCC)    L-S radiculopathy    Pulmonary nodules    Essential tremor    Diabetic polyneuropathy associated with type 2 diabetes mellitus (HCC)    Mixed simple and mucopurulent chronic bronchitis (HCC)    Stage 2 chronic kidney disease    Acute deep vein thrombosis (DVT) of tibial vein of left lower extremity (HCC)    DAVI (acute kidney injury) (HCC)    Inability to walk    Fall    Supratherapeutic INR    Lower leg DVT (deep venous thromboembolism), acute, left ([facility])       Discharge Diagnoses: Left lower extremity DVT, failed Coumadin therapy    Consults: Hematology oncology    Procedures: None    Hospital Course:   83-year-old female admitted to telemetry unit with     Acute DVT left lower extremity  Patient is currently anticoagulated with Coumadin  INR-3.4  Consult oncology-anticoagulation recommendations        UTI  Monitor labs-WBC-6.2  Rocephin 1 g every 24 hours  Await cultures     Diabetes Mellitus  -Monitor labs  -ISS glucose control  -Hypoglycemia protocol initiated     Weakness of Left leg  --more likely secondary to lumbar myelopathy from significantly arthritic spine  --will see if decadron helps  --she should probably be placed, unsure of her dc plans at this point, but she would benefit from therapy     2/18/2024  --rocephin for UTI, await urine cultures  --decadron for lumbar myelopathy secondary to significant RA and degenerative arthritis causing stenosis  --pt/ot  --she is anticoag'd on eliquis at this time  --await dc plan     2/19/2024  --rocephin for UTI, await urine cultures  --decadron for lumbar myelopathy secondary to significant RA and degenerative arthritis causing stenosis  --pt/ot  --she is

## 2024-02-22 NOTE — CARE COORDINATION
Social Work/Discharge Planning:  Received pre-cert approval earlier today from Mary Butcher.  Patient updated. Discharge order noted.  Mary will call [facility] to assist with ambulette transport.  [id number] completed.  Will continue to follow.  Electronically signed by AMARILYS Barney on 2/22/2024 at 1:18 PM    Addendum:  Mary Butcher arranged for [facility] to  patient at 4:30pm.  Notified patient and RN of discharge time.  Notified patient Herb (ph: [phone]) of discharge time per patient request.  Electronically signed by AMARILYS Barney on 2/22/2024 at 2:00 PM

## 2024-02-22 NOTE — PROGRESS NOTES
[facility]   Progress note      Subjective:    The patient is awake and alert.  Very talkative. No complaints. Up in chair at bedside. Complains of stiffness of left ankle, but no p ain.  No acute events overnight.    Denies chest pain, angina, SOB     Objective:    BP (!) 115/54   Pulse 50   Temp 97.9 °F (36.6 °C) (Oral)   Resp 18   Ht 1.715 m (5' 7.5\")   Wt 79.4 kg (175 lb)   SpO2 99%   BMI 27.00 kg/m²     No intake/output data recorded.  No intake/output data recorded.    General appearance: NAD, conversant, tremulous  Eyes: Sclerae anicteric, PERRLA  HEENT: AT/NC, MMM  Neck: FROM, supple, no thyromegaly  Lymph: No cervical / supraclavicular lymphadenopathy  Lungs: Clear to auscultation, WOB normal  CV: RRR, systolic murmur present  Abdomen: Soft, non-tender; no masses or HSM, +BS  Extremities: FROM without synovitis.  No clubbing or cyanosis of the hands.  Skin: no rash, induration, lesions, or ulcers  Psych: Calm and cooperative.  Normal judgement and insight.  Normal mood and affect.  Neuro: Alert and interactive, face symmetric, speech fluent.     Recent Labs     02/17/24  1102 02/18/24  0714 02/19/24  0520   WBC 7.0 5.4 8.7   HGB 12.5 12.2 11.4*   HCT 37.4 36.4 34.4    162 171       Recent Labs     02/17/24  1102 02/18/24  0714 02/19/24  0520    137 136   K 4.2 4.3 4.1    103 102   CO2 25 24 24   BUN 35* 37* 45*   CREATININE 1.2* 1.1* 1.2*   CALCIUM 9.6 9.4 9.5       Assessment:    Principal Problem:    Acute deep vein thrombosis (DVT) of tibial vein of left lower extremity (HCC)  Active Problems:    Lower leg DVT (deep venous thromboembolism), acute, left (HCC)  Resolved Problems:    * No resolved hospital problems. *      Plan:    83-year-old female admitted to telemetry unit with     Acute DVT left lower extremity  Patient is currently anticoagulated with Coumadin  INR-3.4  Consult oncology-anticoagulation recommendations        UTI  Monitor 
    [facility]   Progress note      Subjective:    The patient is awake and alert.  Complains of stiffness of left ankle, but no pain. Lying in bed. Did work with therapy shortly before rounds.  No acute events overnight.    Denies chest pain, angina, SOB     Objective:    /63   Pulse 58   Temp 97.5 °F (36.4 °C) (Oral)   Resp 18   Ht 1.715 m (5' 7.5\")   Wt 81.5 kg (179 lb 10.8 oz)   SpO2 100%   BMI 27.73 kg/m²     In: 120 [P.O.:120]  Out: -   In: 120   Out: -     General appearance: NAD, conversant, tremulous  Eyes: Sclerae anicteric, PERRLA  HEENT: AT/NC, MMM  Neck: FROM, supple, no thyromegaly  Lymph: No cervical / supraclavicular lymphadenopathy  Lungs: Clear to auscultation, WOB normal  CV: RRR, systolic murmur present  Abdomen: Soft, non-tender; no masses or HSM, +BS  Extremities: FROM without synovitis.  No clubbing or cyanosis of the hands.  Skin: no rash, induration, lesions, or ulcers  Psych: Calm and cooperative.  Normal judgement and insight.  Normal mood and affect.  Neuro: Alert and interactive, face symmetric, speech fluent.     Recent Labs     02/19/24  0520 02/20/24  0704 02/21/24  0514   WBC 8.7 7.9 7.6   HGB 11.4* 11.7 11.5   HCT 34.4 35.5 34.9    149 156       Recent Labs     02/19/24  0520 02/20/24  0704 02/21/24  0514    136 137   K 4.1 4.1 4.2    102 102   CO2 24 25 23   BUN 45* 43* 43*   CREATININE 1.2* 1.2* 1.1*   CALCIUM 9.5 9.6 9.3       Assessment:    Principal Problem:    Acute deep vein thrombosis (DVT) of tibial vein of left lower extremity (HCC)  Active Problems:    Lower leg DVT (deep venous thromboembolism), acute, left (HCC)  Resolved Problems:    * No resolved hospital problems. *      Plan:    83-year-old female admitted to telemetry unit with     Acute DVT left lower extremity  Patient is currently anticoagulated with Coumadin  INR-3.4  Consult oncology-anticoagulation recommendations        UTI  Monitor labs-WBC-6.2  Rocephin 1 g 
    [facility]   Progress note      Subjective:    The patient is awake and alert.  Very talkative. No complaints. Has not been up at all out of bed.  No acute events overnight.    Denies chest pain, angina, SOB     Objective:    /69   Pulse 78   Temp 97.7 °F (36.5 °C) (Oral)   Resp 16   Wt 79.4 kg (175 lb)   SpO2 98%   BMI 27.00 kg/m²     In: 5 [I.V.:5]  Out: 500   In: 5   Out: 500 [Urine:500]    General appearance: NAD, conversant, tremulous  Eyes: Sclerae anicteric, PERRLA  HEENT: AT/NC, MMM  Neck: FROM, supple, no thyromegaly  Lymph: No cervical / supraclavicular lymphadenopathy  Lungs: Clear to auscultation, WOB normal  CV: RRR, systolic murmur present  Abdomen: Soft, non-tender; no masses or HSM, +BS  Extremities: FROM without synovitis.  No clubbing or cyanosis of the hands.  Skin: no rash, induration, lesions, or ulcers  Psych: Calm and cooperative.  Normal judgement and insight.  Normal mood and affect.  Neuro: Alert and interactive, face symmetric, speech fluent.     Recent Labs     02/16/24  1500 02/17/24  1102 02/18/24  0714   WBC 6.2 7.0 5.4   HGB 12.2 12.5 12.2   HCT 36.2 37.4 36.4    154 162       Recent Labs     02/16/24  1500 02/17/24  1102 02/18/24  0714    137 137   K 4.3 4.2 4.3    103 103   CO2 25 25 24   BUN 38* 35* 37*   CREATININE 1.5* 1.2* 1.1*   CALCIUM 9.6 9.6 9.4       Assessment:    Principal Problem:    Acute deep vein thrombosis (DVT) of tibial vein of left lower extremity (HCC)  Active Problems:    Lower leg DVT (deep venous thromboembolism), acute, left (HCC)  Resolved Problems:    * No resolved hospital problems. *      Plan:    83-year-old female admitted to telemetry unit with     Acute DVT left lower extremity  Patient is currently anticoagulated with Coumadin  INR-3.4  Consult oncology-anticoagulation recommendations        UTI  Monitor labs-WBC-6.2  Rocephin 1 g every 24 hours  Await cultures     Diabetes Mellitus  -Monitor 
4 Eyes Skin Assessment     NAME:  Suki Weir  YOB: 1940  MEDICAL RECORD NUMBER:  [id number]    The patient is being assessed for  Admission    I agree that at least one RN has performed a thorough Head to Toe Skin Assessment on the patient. ALL assessment sites listed below have been assessed.      Areas assessed by both nurses:    Head, Face, Ears, Shoulders, Back, Chest, Arms, Elbows, Hands, Sacrum. Buttock, Coccyx, Ischium, Legs. Feet and Heels, and Under Medical Devices         Does the Patient have a Wound? No noted wound(s)       Martell Prevention initiated by RN: No  Wound Care Orders initiated by RN: No    Pressure Injury (Stage 3,4, Unstageable, DTI, NWPT, and Complex wounds) if present, place Wound referral order by RN under : No    New Ostomies, if present place, Ostomy referral order under : No     Nurse 1 eSignature: Electronically signed by Silvia Michelle RN on 2/17/24 at 2:13 AM EST    **SHARE this note so that the co-signing nurse can place an eSignature**    Nurse 2 eSignature: Electronically signed by Toma Agosto RN on 2/17/24 at 2:14 AM EST   
FELIBERTO smith/ Alfreda regarding positive blood culture for diptheroid like rods.  
FELIBERTO w/ Alfreda CEDENO regarding precert approval and need for d/c order.   
Message sent to Dr Rueda regarding the blood cultures 1/4 gm + cocci clusters   
Messaged Dr Gutierrez regarding fluid restriction order. Ok to d/c.  
Messaged sent to Dr Gutierrez regarding pt HR.   
Physical Therapy  Facility/Department: [address] INTERMEDIATE 1  Physical Therapy Treatment Note    Name: Suki Weir  : 1940  MRN: [id number]  Date of Service: 2024  Patient Diagnosis(es): There were no encounter diagnoses.  Past Medical History:  has a past medical history of Atrial fibrillation (HCC), Cataract, CKD (chronic kidney disease), Diabetes mellitus (HCC), Diabetic peripheral neuropathy (HCC), Diastolic CHF, acute on chronic (HCC), DISH (diffuse idiopathic skeletal hyperostosis), Essential tremor, Fatty liver disease, nonalcoholic, Hypertension, Lumbar radiculopathy, Mitral regurgitation, Open-angle glaucoma, Pulmonary regurgitation, Rectal bleed, Rheumatoid arthritis (HCC), and Vitreous hemorrhage (HCC).  Past Surgical History:  has a past surgical history that includes Hysterectomy (1986); Eye surgery (Left, 2004); and Colonoscopy (,,).    Evaluating Therapist: Julia Westbrook PT    Room #:  0444/0444-A  Diagnosis:  Acute deep vein thrombosis (DVT) of tibial vein of left lower extremity ([facility]) [I82.442]  Lower leg DVT (deep venous thromboembolism), acute, left ([facility]) [I82.4Z2]  PMHx/PSHx:  HTn, Lumbar radiculopathy, HTN, essential tremor, CHF  Precautions:  falls    Social:  Pt lives with alone floor plan 1+1 steps  enter.  Prior to admission independent mostly without device in home. Uses ww at times in home. Does use ww in community.      Initial Evaluation  Date: 24 Treatment  2024    Short Term/ Long Term   Goals   Was pt agreeable to Eval/treatment? yes yes    Does pt have pain?  No complaints    Bed Mobility  Rolling: SBA  Supine to sit: SBA  Sit to supine: NT  Scooting: SBA Sit to supine: Min A LE support independent   Transfers Sit to stand: min assist  Stand to sit: min assist  Stand pivot: min assist Sit <> stand: Min A SBA   Ambulation    15 feet and 30 feet with ww with min assist 40 feet x 2 with WW Min A 100 feet with ww with SBA   Stair 
Physical Therapy  Facility/Department: [address] INTERMEDIATE 1  Physical Therapy Initial Assessment    Name: Suki Weir  : 1940  MRN: [id number]  Date of Service: 2024  Patient Diagnosis(es): There were no encounter diagnoses.  Past Medical History:  has a past medical history of Atrial fibrillation (HCC), Cataract, CKD (chronic kidney disease), Diabetes mellitus (HCC), Diabetic peripheral neuropathy (HCC), Diastolic CHF, acute on chronic (HCC), DISH (diffuse idiopathic skeletal hyperostosis), Essential tremor, Fatty liver disease, nonalcoholic, Hypertension, Lumbar radiculopathy, Mitral regurgitation, Open-angle glaucoma, Pulmonary regurgitation, Rectal bleed, Rheumatoid arthritis (HCC), and Vitreous hemorrhage (HCC).  Past Surgical History:  has a past surgical history that includes Hysterectomy (1986); Eye surgery (Left, 2004); and Colonoscopy (,,).    Evaluating Therapist: Julia Westbrook PT    Room #:  0444/0444-A  Diagnosis:  Acute deep vein thrombosis (DVT) of tibial vein of left lower extremity ([facility]) [I82.442]  Lower leg DVT (deep venous thromboembolism), acute, left ([facility]) [I82.4Z2]  PMHx/PSHx:  HTn, Lumbar radiculopathy, HTN, essential tremor, CHF  Precautions:  falls    Social:  Pt lives with alone floor plan 1+1 steps  enter.  Prior to admission independent mostly without device in home. Uses ww at times in home. Does use ww in community.      Initial Evaluation  Date: 24 Treatment      Short Term/ Long Term   Goals   Was pt agreeable to Eval/treatment? yes     Does pt have pain?      Bed Mobility  Rolling: SBA  Supine to sit: SBA  Sit to supine: NT  Scooting: SBA  independent   Transfers Sit to stand: min assist  Stand to sit: min assist  Stand pivot: min assist  SBA   Ambulation    15 feet and 30 feet with ww with min assist  100 feet with ww with SBA   Stair Negotiation  Ascended and descended  NT   2 steps with 1 rail with SBA   LE strength     3+/5    4/5 
Report called to Tr[facility] ara[facility].  Informed  is for [id number].  Discharge papers faxed to [phone].  
SPIRITUAL HEALTH SERVICES - [facility] Encounter    Name: Suki Weir                  Referral: Routine Visit    Sacraments  Anointed (Last Rites): Yes  Apostolic [place]: No  Confession: No  Communion: Yes     Assessment:  Patient receptive to  visit.      Intervention:   provided spiritual support and sacramental ministry for patient.     Outcome:  Patient expressed gratitude for visit.    Plan:  Chaplains will remain available to offer spiritual and emotional support as needed.      Electronically signed by Chaplain Lorna, on 2/19/2024 at 4:21 PM.  Spiritual Care Department  [facility]  [phone]   
Therapeutic exercise to improve motor endurance, ROM, and functional strength for ADLs/functional transfers  * Therapeutic activities to facilitate/challenge dynamic balance, stand tolerance for increased safety and independence with ADLs  * Positioning to improve skin integrity, interaction with environment and functional independence     Recommended Adaptive Equipment: TBD       Home Living: Lives alone, single family home, 1 story, 2 steps to enter.    Bathroom set-up: Walk-in shower           Equipment owned: Wheeled walker      Prior Level of Function: Pt reports being independent with ADLs and IADLs; ambulated independently but used wheeled walker as needed in the house and for community distances.      Pain Level: Discomfort in L ankle to knee     Cognition: A&O: 3/4; Follows 2-3 step directions              Memory: fair               Sequencing: fair               Problem solving: fair               Judgement/safety: fair                 Functional Assessment: AM-PAC Daily Activity Raw Score: 15/24    Initial Eval Status  Date: 2/19/24    Treatment Status  Date: 2/21/24 STGs = LTGs  Time frame: 10-14 days   Feeding Supervision       Independent    Grooming Contact Guard Assist   Standing at sink       Modified [place]    UB Dressing Minimal Assist    Min A  Modified Randall    LB Dressing Moderate Assist   To don socks seated EOB and thread LEs through briefs while seated on commode. Min A to don over hips while standing supported.       mod A Modified Randall    Bathing Moderate Assist   For safety and functional transfers       Modified Randall    Toileting Moderate Assist   [facility]       Modified Randall    Bed Mobility  Supine to sit: Stand by Assist   Sit to supine:  N/A    Supine to sit SBA  Supine to sit: Independent   Sit to supine: Independent    Functional Transfers Sit to stand: Minimal Assist   Stand to sit: Minimal Assist    
hours  Await cultures     Diabetes Mellitus  -Monitor labs  -ISS glucose control  -Hypoglycemia protocol initiated     Weakness of Left leg  --more likely secondary to lumbar myelopathy from significantly arthritic spine  --will see if decadron helps  --she should probably be placed, unsure of her dc plans at this point, but she would benefit from therapy     2/18/2024  --rocephin for UTI, await urine cultures  --decadron for lumbar myelopathy secondary to significant RA and degenerative arthritis causing stenosis  --pt/ot  --she is anticoag'd on eliquis at this time  --await dc plan    2/19/2024  --rocephin for UTI, await urine cultures  --decadron for lumbar myelopathy secondary to significant RA and degenerative arthritis causing stenosis  --pt/ot  --she is anticoag'd on eliquis at this time  --await dc plan, need therapy evals, plan is for placement apparently    2/20/2024  Continue Rocephin 1 g every 24 hours  Decadron 6 mg every 24 hours  Continue Eliquis  AM-PAC-16/24  Patient is medically stable for discharge once arrangements have been made.      Medication for other comorbidities continue as appropriate dose adjustment as necessary.     DVT prophylaxis  PT OT  Discharge planning  Case discussed with attending and agreed upon plan of care.        Code status: Full  Requires inpatient level of care         I have spent a total time of 25 minutes of this patient encounter reviewing chart, labs, radiological reports coordinating care with interdisciplinary teams, face to face encounter with patient, providing counseling/education to patient/family, and formulating plan.       Eli Bernard, APRN - CNP  7:23 PM  2/20/2024   
training with verbal cues for hand placement to improve safety.     Independent    Functional Mobility CGA/Min A with ww to improve balance to/from bathroom, verbal cues for walker sequence and safety.     Modified [place] with use of ww   Balance Sitting:     Static: fair plus     Dynamic: fair plus   Standing: fair with ww , x 3 episodes of LOB - Min A to correct and prevent fall.     Sitting:     Static: good    Dynamic: good  Standing: good with ww   Activity Tolerance fair    Increase standing tolerance >3 minutes for improved engagement with functional transfers and indep in ADLs     Visual/  Perceptual Glasses: Yes      NA      Hand Dominance:      AROM (PROM) Strength Additional Info:  Goal:   RUE  WFL 4-/5 grossly  good  and wfl FMC/dexterity noted during ADL tasks   Improve overall RUE strength  for participation in functional tasks   LUE WFL 4-/5 grossly  good  and wfl FMC/dexterity noted during ADL tasks   Improve overall LUE strength  for participation in functional tasks     Hearing:  WFL   Sensation:   No c/o numbness or tingling  Tone: WFL   Edema:    Comments: RN cleared patient for OT.   Upon arrival patient in supine.  Therapist facilitated and instructed pt on adapted  techniques & compensatory strategies to improve safety and independence with basic ADLs, bed mobility, functional transfers and mobility to allow pt to achieve highest level of independence and safely.  Pt demonstrated fair understanding of education & follow through.  At end of session, patient was in chair with call light and phone within reach, all lines and tubes intact.  Overall, patient demonstrated  decreased independence and safety during completion of ADL tasks.  Pt would benefit from continued skilled OT to increase safety and independence with completion of ADL tasks and functional mobility for improved quality of life.       Treatment: OT treatment provided this date includes:   Instructions/training on

## 2024-02-25 LAB
ACB COMPLEX DNA BLD POS QL NAA+NON-PROBE: NOT DETECTED
B FRAGILIS DNA BLD POS QL NAA+NON-PROBE: NOT DETECTED
BIOFIRE TEST COMMENT: ABNORMAL
C ALBICANS DNA BLD POS QL NAA+NON-PROBE: NOT DETECTED
C AURIS DNA BLD POS QL NAA+NON-PROBE: NOT DETECTED
C GATTII+NEOFOR DNA BLD POS QL NAA+N-PRB: NOT DETECTED
C GLABRATA DNA BLD POS QL NAA+NON-PROBE: NOT DETECTED
C KRUSEI DNA BLD POS QL NAA+NON-PROBE: NOT DETECTED
C PARAP DNA BLD POS QL NAA+NON-PROBE: NOT DETECTED
C TROPICLS DNA BLD POS QL NAA+NON-PROBE: NOT DETECTED
E CLOAC COMP DNA BLD POS NAA+NON-PROBE: NOT DETECTED
E COLI DNA BLD POS QL NAA+NON-PROBE: NOT DETECTED
E FAECALIS DNA BLD POS QL NAA+NON-PROBE: NOT DETECTED
E FAECIUM DNA BLD POS QL NAA+NON-PROBE: NOT DETECTED
ENTEROBACTERALES DNA BLD POS NAA+N-PRB: NOT DETECTED
GP B STREP DNA BLD POS QL NAA+NON-PROBE: NOT DETECTED
HAEM INFLU DNA BLD POS QL NAA+NON-PROBE: NOT DETECTED
K OXYTOCA DNA BLD POS QL NAA+NON-PROBE: NOT DETECTED
KLEBSIELLA SP DNA BLD POS QL NAA+NON-PRB: NOT DETECTED
KLEBSIELLA SP DNA BLD POS QL NAA+NON-PRB: NOT DETECTED
L MONOCYTOG DNA BLD POS QL NAA+NON-PROBE: NOT DETECTED
MICROORGANISM SPEC CULT: ABNORMAL
MICROORGANISM SPEC CULT: ABNORMAL
MICROORGANISM/AGENT SPEC: ABNORMAL
MICROORGANISM/AGENT SPEC: ABNORMAL
N MEN DNA BLD POS QL NAA+NON-PROBE: NOT DETECTED
P AERUGINOSA DNA BLD POS NAA+NON-PROBE: NOT DETECTED
PROTEUS SP DNA BLD POS QL NAA+NON-PROBE: NOT DETECTED
S AUREUS DNA BLD POS QL NAA+NON-PROBE: NOT DETECTED
S AUREUS+CONS DNA BLD POS NAA+NON-PROBE: DETECTED
S EPIDERMIDIS DNA BLD POS QL NAA+NON-PRB: NOT DETECTED
S LUGDUNENSIS DNA BLD POS QL NAA+NON-PRB: NOT DETECTED
S MALTOPHILIA DNA BLD POS QL NAA+NON-PRB: NOT DETECTED
S MARCESCENS DNA BLD POS NAA+NON-PROBE: NOT DETECTED
S PNEUM DNA BLD POS QL NAA+NON-PROBE: NOT DETECTED
S PYO DNA BLD POS QL NAA+NON-PROBE: NOT DETECTED
SALMONELLA DNA BLD POS QL NAA+NON-PROBE: NOT DETECTED
SERVICE CMNT-IMP: ABNORMAL
SPECIMEN DESCRIPTION: ABNORMAL
STREPTOCOCCUS DNA BLD POS NAA+NON-PROBE: NOT DETECTED

## 2024-03-13 ENCOUNTER — APPOINTMENT (OUTPATIENT)
Dept: GENERAL RADIOLOGY | Age: 84
End: 2024-03-13
Payer: MEDICARE

## 2024-03-13 ENCOUNTER — HOSPITAL ENCOUNTER (EMERGENCY)
Age: 84
Discharge: HOME | End: 2024-03-13
Attending: STUDENT IN AN ORGANIZED HEALTH CARE EDUCATION/TRAINING PROGRAM
Payer: MEDICARE

## 2024-03-13 VITALS
RESPIRATION RATE: 16 BRPM | OXYGEN SATURATION: 97 % | HEART RATE: 70 BPM | SYSTOLIC BLOOD PRESSURE: 111 MMHG | BODY MASS INDEX: 26.46 KG/M2 | TEMPERATURE: 98 F | WEIGHT: 155 LBS | DIASTOLIC BLOOD PRESSURE: 61 MMHG | HEIGHT: 64 IN

## 2024-03-13 DIAGNOSIS — M25.562 ACUTE PAIN OF LEFT KNEE: ICD-10-CM
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
## 2024-03-13 PROCEDURE — 97161 PT EVAL LOW COMPLEX 20 MIN: CPT

## 2024-03-13 PROCEDURE — 97165 OT EVAL LOW COMPLEX 30 MIN: CPT

## 2024-03-13 PROCEDURE — 73562 X-RAY EXAM OF KNEE 3: CPT

## 2024-03-13 PROCEDURE — 99283 EMERGENCY DEPT VISIT LOW MDM: CPT

## 2024-03-13 RX ORDER — DILTIAZEM HYDROCHLORIDE 360 MG/1
360 CAPSULE, EXTENDED RELEASE ORAL EVERY MORNING
COMMUNITY

## 2024-03-13 RX ORDER — PRIMIDONE 50 MG/1
150 TABLET ORAL 2 TIMES DAILY
COMMUNITY

## 2024-03-13 RX ORDER — DULOXETIN HYDROCHLORIDE 30 MG/1
30 CAPSULE, DELAYED RELEASE ORAL EVERY MORNING
COMMUNITY

## 2024-03-13 ASSESSMENT — PAIN - FUNCTIONAL ASSESSMENT: PAIN_FUNCTIONAL_ASSESSMENT: NONE - DENIES PAIN

## 2024-03-13 NOTE — PROGRESS NOTES
Physical Therapy  Facility/Department: [facility] EMERGENCY DEPARTMENT  Physical Therapy Initial Assessment    Name: Suki Weir  : 1940  MRN: [id number]  Date of Service: 3/13/2024    Patient Diagnosis(es): The primary encounter diagnosis was Fall, initial encounter. A diagnosis of Acute pain of left knee was also pertinent to this visit.  Past Medical History:  has a past medical history of Atrial fibrillation (HCC), Cataract, CKD (chronic kidney disease), Diabetes mellitus (HCC), Diabetic peripheral neuropathy (HCC), Diastolic CHF, acute on chronic (HCC), DISH (diffuse idiopathic skeletal hyperostosis), Essential tremor, Fatty liver disease, nonalcoholic, Hypertension, Lumbar radiculopathy, Mitral regurgitation, Open-angle glaucoma, Pulmonary regurgitation, Rectal bleed, Rheumatoid arthritis (HCC), and Vitreous hemorrhage (HCC).  Past Surgical History:  has a past surgical history that includes Hysterectomy (1986); Eye surgery (Left, 2004); and Colonoscopy (,,).      Referring provider:  Justin Hayes DO    PT Order:  PT eval and treat     Evaluating PT:  Elsie Templeton, PT, DPT PT [id number]    Room #:    Diagnosis:  fall, acute pain of left knee  Precautions:  fall risk  Equipment Needs:  none.  Pt owns a walker.    SUBJECTIVE:    Pt lives alone in a 1 story home with 2+1 stairs to enter and 1 rail.  Bed and bath is on first floor.  Pt discharged from rehab and fell getting into her home.  Pt uses a walker for ambulation when in rehab.     OBJECTIVE:   Initial Evaluation  Date: 3/13 Treatment Short Term/ Long Term   Goals   Was pt agreeable to Eval/treatment? yes     Does pt have pain? Left knee pain     Bed Mobility  Rolling: NT  Supine to sit: Min A  Sit to supine: Min A  Scooting: SBA to sitting EOB  SBA   Transfers Sit to stand: Min A  Stand to sit: Min   Stand pivot: NT  SBA to sitting EOB   Ambulation    15 feet with w/w Min A.  Left knee 
Complexity: Low    Time In: 1:43 PM   Time Out: 1:58 PM    Total Treatment Time: 0       Min Units   OT Eval Low 97165  X  1    OT Eval Medium 94136      OT Eval High 21171      OT Re-Eval 88853            ADL/Self Care 91410     Therapeutic Activities 98869       Therapeutic Ex 97522       Orthotic Management 00179       Manual 16127     Neuro Re-Ed 26869       Non-Billable Time        Evaluation Time additionally includes thorough review of current medical information, gathering information on past medical history/social history and prior level of function, interpretation of standardized testing/informal observation of tasks, assessment of data and development of plan of care and goals.        Evaluating OT: Lisa Murphy OTR/L #[id number]

## 2024-03-13 NOTE — ED PROVIDER NOTES
half ago.  She went to go walk into home when her left knee gave out on her; she states this is what happened in the past which is why she Unipen capris to begin with.  Denies hitting her head, no LOC.  She is not anticoagulated.  On exam she has full range of motion, slight discomfort with movement of knee.  Plan for imaging and social work consult.       [BB]      ED Course User Index  [BB] Justin Hayes, DO       Medications - No data to display    New Prescriptions    No medications on file         Counseling:   The emergency provider has spoken with the patient and discussed today’s results, in addition to providing specific details for the plan of care and counseling regarding the diagnosis and prognosis.  Questions are answered at this time and they are agreeable with the plan.       --------------------------------- IMPRESSION AND DISPOSITION ---------------------------------    IMPRESSION  1. Fall, initial encounter    2. Acute pain of left knee        DISPOSITION  Disposition: Discharge to home  Patient condition is stable        NOTE: This report was transcribed using voice recognition software. Every effort was made to ensure accuracy; however, inadvertent computerized transcription errors may be present

## 2024-03-13 NOTE — CARE COORDINATION
Social Work/Transition of Care:    Pt presents due to fall.  Pt is from home.  SW met with pt at bedside introduced self and role.    Pt requested a referral to [facility], Pt stated that she was discharged from the facility at 10:00 this morning after a 2 week stay,  Pt stated she fell getting into the house, pt requesting to return to facility stating \"Can you call [facility] and ask them if I can have my bed back\".  SW placed call to Lia[facility] who is agreeable for pt to return pending insurance authorization.  Facility completed a home assessment prior to pt returning and at the time pt did well but facility felt pt could benefit from additional therapy.  SW to follow and assist with disposition.    Electronically signed by AMARILYS white on 3/13/2024 at 1:02 PM     [facility] to [facility], SW completed [facility] and pts cousin Jerrod at bedside will transport.    Electronically signed by AMARILYS white on 3/13/2024 at 5:20 PM   
23
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
## 2024-04-24 ENCOUNTER — APPOINTMENT (OUTPATIENT)
Dept: CT IMAGING | Age: 84
DRG: 522 | End: 2024-04-24
Payer: MEDICARE

## 2024-04-24 ENCOUNTER — HOSPITAL ENCOUNTER (INPATIENT)
Age: 84
LOS: 7 days | Discharge: NURSING, REHAB OR LONG-TERM CARE | DRG: 522 | End: 2024-05-01
Attending: EMERGENCY MEDICINE | Admitting: INTERNAL MEDICINE
Payer: MEDICARE

## 2024-04-24 ENCOUNTER — APPOINTMENT (OUTPATIENT)
Dept: GENERAL RADIOLOGY | Age: 84
DRG: 522 | End: 2024-04-24
Payer: MEDICARE

## 2024-04-24 DIAGNOSIS — R26.2 INABILITY TO WALK: ICD-10-CM

## 2024-04-24 DIAGNOSIS — S72.002A CLOSED DISPLACED FRACTURE OF LEFT FEMORAL NECK (HCC): Primary | ICD-10-CM

## 2024-04-24 DIAGNOSIS — S09.90XA INJURY OF HEAD, INITIAL ENCOUNTER: ICD-10-CM

## 2024-04-24 DIAGNOSIS — S72.002A CLOSED FRACTURE OF LEFT HIP, INITIAL ENCOUNTER (HCC): ICD-10-CM
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
## 2024-04-24 LAB
ANION GAP SERPL CALCULATED.3IONS-SCNC: 10 MMOL/L (ref 7–16)
BASOPHILS # BLD: 0.03 K/UL (ref 0–0.2)
BASOPHILS NFR BLD: 0 % (ref 0–2)
BUN SERPL-MCNC: 26 MG/DL (ref 6–23)
CALCIUM SERPL-MCNC: 9.3 MG/DL (ref 8.6–10.2)
CHLORIDE SERPL-SCNC: 103 MMOL/L (ref 98–107)
CO2 SERPL-SCNC: 26 MMOL/L (ref 22–29)
CREAT SERPL-MCNC: 1 MG/DL (ref 0.5–1)
EOSINOPHIL # BLD: 0.16 K/UL (ref 0.05–0.5)
EOSINOPHILS RELATIVE PERCENT: 2 % (ref 0–6)
ERYTHROCYTE [DISTWIDTH] IN BLOOD BY AUTOMATED COUNT: 13.9 % (ref 11.5–15)
GFR SERPL CREATININE-BSD FRML MDRD: 55 ML/MIN/1.73M2
GLUCOSE SERPL-MCNC: 149 MG/DL (ref 74–99)
HCT VFR BLD AUTO: 34.5 % (ref 34–48)
HGB BLD-MCNC: 11.5 G/DL (ref 11.5–15.5)
IMM GRANULOCYTES # BLD AUTO: 0.06 K/UL (ref 0–0.58)
IMM GRANULOCYTES NFR BLD: 1 % (ref 0–5)
LYMPHOCYTES NFR BLD: 0.99 K/UL (ref 1.5–4)
LYMPHOCYTES RELATIVE PERCENT: 12 % (ref 20–42)
MCH RBC QN AUTO: 31.1 PG (ref 26–35)
MCHC RBC AUTO-ENTMCNC: 33.3 G/DL (ref 32–34.5)
MCV RBC AUTO: 93.2 FL (ref 80–99.9)
MONOCYTES NFR BLD: 0.53 K/UL (ref 0.1–0.95)
MONOCYTES NFR BLD: 6 % (ref 2–12)
NEUTROPHILS NFR BLD: 79 % (ref 43–80)
NEUTS SEG NFR BLD: 6.71 K/UL (ref 1.8–7.3)
PLATELET # BLD AUTO: 152 K/UL (ref 130–450)
PMV BLD AUTO: 10.2 FL (ref 7–12)
POTASSIUM SERPL-SCNC: 4.3 MMOL/L (ref 3.5–5)
RBC # BLD AUTO: 3.7 M/UL (ref 3.5–5.5)
SODIUM SERPL-SCNC: 139 MMOL/L (ref 132–146)
WBC OTHER # BLD: 8.5 K/UL (ref 4.5–11.5)

## 2024-04-24 PROCEDURE — 2580000003 HC RX 258

## 2024-04-24 PROCEDURE — 96374 THER/PROPH/DIAG INJ IV PUSH: CPT

## 2024-04-24 PROCEDURE — 72125 CT NECK SPINE W/O DYE: CPT

## 2024-04-24 PROCEDURE — 80048 BASIC METABOLIC PNL TOTAL CA: CPT

## 2024-04-24 PROCEDURE — 85025 COMPLETE CBC W/AUTO DIFF WBC: CPT

## 2024-04-24 PROCEDURE — 6360000002 HC RX W HCPCS

## 2024-04-24 PROCEDURE — 99285 EMERGENCY DEPT VISIT HI MDM: CPT

## 2024-04-24 PROCEDURE — 93005 ELECTROCARDIOGRAM TRACING: CPT

## 2024-04-24 PROCEDURE — 70450 CT HEAD/BRAIN W/O DYE: CPT

## 2024-04-24 PROCEDURE — 73502 X-RAY EXAM HIP UNI 2-3 VIEWS: CPT

## 2024-04-24 PROCEDURE — 2060000000 HC ICU INTERMEDIATE R&B

## 2024-04-24 PROCEDURE — 71045 X-RAY EXAM CHEST 1 VIEW: CPT

## 2024-04-24 RX ORDER — FENTANYL CITRATE 50 UG/ML
50 INJECTION, SOLUTION INTRAMUSCULAR; INTRAVENOUS ONCE
Status: COMPLETED | OUTPATIENT
Start: 2024-04-24 | End: 2024-04-24

## 2024-04-24 RX ORDER — SODIUM CHLORIDE 9 MG/ML
INJECTION, SOLUTION INTRAVENOUS ONCE
Status: COMPLETED | OUTPATIENT
Start: 2024-04-24 | End: 2024-04-24

## 2024-04-24 RX ADMIN — FENTANYL CITRATE 50 MCG: 50 INJECTION INTRAMUSCULAR; INTRAVENOUS at 23:20

## 2024-04-24 RX ADMIN — SODIUM CHLORIDE: 9 INJECTION, SOLUTION INTRAVENOUS at 23:17

## 2024-04-24 ASSESSMENT — PAIN SCALES - GENERAL
PAINLEVEL_OUTOF10: 10
PAINLEVEL_OUTOF10: 10

## 2024-04-24 ASSESSMENT — LIFESTYLE VARIABLES
HOW OFTEN DO YOU HAVE A DRINK CONTAINING ALCOHOL: NEVER
HOW MANY STANDARD DRINKS CONTAINING ALCOHOL DO YOU HAVE ON A TYPICAL DAY: PATIENT DOES NOT DRINK

## 2024-04-25 ENCOUNTER — ANESTHESIA EVENT (OUTPATIENT)
Dept: OPERATING ROOM | Age: 84
End: 2024-04-25
Payer: MEDICARE

## 2024-04-25 LAB
ALBUMIN SERPL-MCNC: 4 G/DL (ref 3.5–5.2)
ALP SERPL-CCNC: 93 U/L (ref 35–104)
ALT SERPL-CCNC: 15 U/L (ref 0–32)
ANION GAP SERPL CALCULATED.3IONS-SCNC: 9 MMOL/L (ref 7–16)
AST SERPL-CCNC: 19 U/L (ref 0–31)
BASOPHILS # BLD: 0 K/UL (ref 0–0.2)
BASOPHILS NFR BLD: 0 % (ref 0–2)
BILIRUB SERPL-MCNC: 0.4 MG/DL (ref 0–1.2)
BUN SERPL-MCNC: 25 MG/DL (ref 6–23)
CALCIUM SERPL-MCNC: 9.1 MG/DL (ref 8.6–10.2)
CHLORIDE SERPL-SCNC: 103 MMOL/L (ref 98–107)
CO2 SERPL-SCNC: 24 MMOL/L (ref 22–29)
CREAT SERPL-MCNC: 0.9 MG/DL (ref 0.5–1)
EKG ATRIAL RATE: 83 BPM
EKG Q-T INTERVAL: 346 MS
EKG QRS DURATION: 68 MS
EKG QTC CALCULATION (BAZETT): 414 MS
EKG R AXIS: 89 DEGREES
EKG T AXIS: 34 DEGREES
EKG VENTRICULAR RATE: 86 BPM
EOSINOPHIL # BLD: 0.1 K/UL (ref 0.05–0.5)
EOSINOPHILS RELATIVE PERCENT: 1 % (ref 0–6)
ERYTHROCYTE [DISTWIDTH] IN BLOOD BY AUTOMATED COUNT: 13.7 % (ref 11.5–15)
GFR SERPL CREATININE-BSD FRML MDRD: 65 ML/MIN/1.73M2
GLUCOSE BLD-MCNC: 116 MG/DL (ref 74–99)
GLUCOSE BLD-MCNC: 125 MG/DL (ref 74–99)
GLUCOSE BLD-MCNC: 142 MG/DL (ref 74–99)
GLUCOSE BLD-MCNC: 200 MG/DL (ref 74–99)
GLUCOSE SERPL-MCNC: 201 MG/DL (ref 74–99)
HBA1C MFR BLD: 5.6 % (ref 4–5.6)
HCT VFR BLD AUTO: 34.7 % (ref 34–48)
HGB BLD-MCNC: 11.5 G/DL (ref 11.5–15.5)
LYMPHOCYTES NFR BLD: 0.21 K/UL (ref 1.5–4)
LYMPHOCYTES RELATIVE PERCENT: 2 % (ref 20–42)
MCH RBC QN AUTO: 31.1 PG (ref 26–35)
MCHC RBC AUTO-ENTMCNC: 33.1 G/DL (ref 32–34.5)
MCV RBC AUTO: 93.8 FL (ref 80–99.9)
MONOCYTES NFR BLD: 0.21 K/UL (ref 0.1–0.95)
MONOCYTES NFR BLD: 2 % (ref 2–12)
NEUTROPHILS NFR BLD: 96 % (ref 43–80)
NEUTS SEG NFR BLD: 11.29 K/UL (ref 1.8–7.3)
PLATELET # BLD AUTO: 132 K/UL (ref 130–450)
PMV BLD AUTO: 10.2 FL (ref 7–12)
POTASSIUM SERPL-SCNC: 4.7 MMOL/L (ref 3.5–5)
PROT SERPL-MCNC: 5.9 G/DL (ref 6.4–8.3)
RBC # BLD AUTO: 3.7 M/UL (ref 3.5–5.5)
RBC # BLD: ABNORMAL 10*6/UL
SODIUM SERPL-SCNC: 136 MMOL/L (ref 132–146)
WBC OTHER # BLD: 11.8 K/UL (ref 4.5–11.5)

## 2024-04-25 PROCEDURE — 80053 COMPREHEN METABOLIC PANEL: CPT

## 2024-04-25 PROCEDURE — 93010 ELECTROCARDIOGRAM REPORT: CPT | Performed by: INTERNAL MEDICINE

## 2024-04-25 PROCEDURE — 6360000002 HC RX W HCPCS

## 2024-04-25 PROCEDURE — 82962 GLUCOSE BLOOD TEST: CPT

## 2024-04-25 PROCEDURE — 2060000000 HC ICU INTERMEDIATE R&B

## 2024-04-25 PROCEDURE — 85025 COMPLETE CBC W/AUTO DIFF WBC: CPT

## 2024-04-25 PROCEDURE — 83036 HEMOGLOBIN GLYCOSYLATED A1C: CPT

## 2024-04-25 PROCEDURE — 2580000003 HC RX 258

## 2024-04-25 PROCEDURE — 6370000000 HC RX 637 (ALT 250 FOR IP)

## 2024-04-25 PROCEDURE — 6360000002 HC RX W HCPCS: Performed by: STUDENT IN AN ORGANIZED HEALTH CARE EDUCATION/TRAINING PROGRAM

## 2024-04-25 RX ORDER — PRIMIDONE 50 MG/1
150 TABLET ORAL 2 TIMES DAILY
Status: DISCONTINUED | OUTPATIENT
Start: 2024-04-25 | End: 2024-05-01 | Stop reason: HOSPADM

## 2024-04-25 RX ORDER — POTASSIUM CHLORIDE 7.45 MG/ML
10 INJECTION INTRAVENOUS PRN
Status: DISCONTINUED | OUTPATIENT
Start: 2024-04-25 | End: 2024-05-01 | Stop reason: HOSPADM

## 2024-04-25 RX ORDER — SPIRONOLACTONE 25 MG/1
25 TABLET ORAL EVERY MORNING
Status: DISCONTINUED | OUTPATIENT
Start: 2024-04-25 | End: 2024-05-01 | Stop reason: HOSPADM

## 2024-04-25 RX ORDER — DILTIAZEM HYDROCHLORIDE 180 MG/1
360 CAPSULE, COATED, EXTENDED RELEASE ORAL EVERY MORNING
Status: DISCONTINUED | OUTPATIENT
Start: 2024-04-25 | End: 2024-05-01 | Stop reason: HOSPADM

## 2024-04-25 RX ORDER — INSULIN LISPRO 100 [IU]/ML
0-4 INJECTION, SOLUTION INTRAVENOUS; SUBCUTANEOUS
Status: DISCONTINUED | OUTPATIENT
Start: 2024-04-25 | End: 2024-05-01 | Stop reason: HOSPADM

## 2024-04-25 RX ORDER — ACETAMINOPHEN 325 MG/1
650 TABLET ORAL EVERY 6 HOURS PRN
Status: DISCONTINUED | OUTPATIENT
Start: 2024-04-25 | End: 2024-05-01 | Stop reason: HOSPADM

## 2024-04-25 RX ORDER — CALCIUM CARBONATE 500(1250)
500 TABLET ORAL EVERY MORNING
Status: DISCONTINUED | OUTPATIENT
Start: 2024-04-25 | End: 2024-05-01 | Stop reason: HOSPADM

## 2024-04-25 RX ORDER — ONDANSETRON 4 MG/1
4 TABLET, ORALLY DISINTEGRATING ORAL EVERY 8 HOURS PRN
Status: DISCONTINUED | OUTPATIENT
Start: 2024-04-25 | End: 2024-05-01 | Stop reason: HOSPADM

## 2024-04-25 RX ORDER — MORPHINE SULFATE 4 MG/ML
4 INJECTION, SOLUTION INTRAMUSCULAR; INTRAVENOUS
Status: DISCONTINUED | OUTPATIENT
Start: 2024-04-25 | End: 2024-04-26 | Stop reason: SDUPTHER

## 2024-04-25 RX ORDER — MORPHINE SULFATE 4 MG/ML
4 INJECTION, SOLUTION INTRAMUSCULAR; INTRAVENOUS ONCE
Status: COMPLETED | OUTPATIENT
Start: 2024-04-25 | End: 2024-04-25

## 2024-04-25 RX ORDER — MORPHINE SULFATE 2 MG/ML
2 INJECTION, SOLUTION INTRAMUSCULAR; INTRAVENOUS
Status: DISCONTINUED | OUTPATIENT
Start: 2024-04-25 | End: 2024-04-25

## 2024-04-25 RX ORDER — DEXTROSE MONOHYDRATE 100 MG/ML
INJECTION, SOLUTION INTRAVENOUS CONTINUOUS PRN
Status: DISCONTINUED | OUTPATIENT
Start: 2024-04-25 | End: 2024-05-01 | Stop reason: HOSPADM

## 2024-04-25 RX ORDER — DULOXETIN HYDROCHLORIDE 30 MG/1
30 CAPSULE, DELAYED RELEASE ORAL EVERY MORNING
Status: DISCONTINUED | OUTPATIENT
Start: 2024-04-25 | End: 2024-05-01 | Stop reason: HOSPADM

## 2024-04-25 RX ORDER — TRAMADOL HYDROCHLORIDE 50 MG/1
50 TABLET ORAL EVERY 6 HOURS PRN
Status: DISCONTINUED | OUTPATIENT
Start: 2024-04-25 | End: 2024-05-01 | Stop reason: HOSPADM

## 2024-04-25 RX ORDER — POTASSIUM CHLORIDE 20 MEQ/1
40 TABLET, EXTENDED RELEASE ORAL PRN
Status: DISCONTINUED | OUTPATIENT
Start: 2024-04-25 | End: 2024-05-01 | Stop reason: HOSPADM

## 2024-04-25 RX ORDER — ACETAMINOPHEN 650 MG/1
650 SUPPOSITORY RECTAL EVERY 6 HOURS PRN
Status: DISCONTINUED | OUTPATIENT
Start: 2024-04-25 | End: 2024-05-01 | Stop reason: HOSPADM

## 2024-04-25 RX ORDER — LATANOPROST 50 UG/ML
1 SOLUTION/ DROPS OPHTHALMIC NIGHTLY
Status: DISCONTINUED | OUTPATIENT
Start: 2024-04-25 | End: 2024-05-01 | Stop reason: HOSPADM

## 2024-04-25 RX ORDER — MAGNESIUM SULFATE IN WATER 40 MG/ML
2000 INJECTION, SOLUTION INTRAVENOUS PRN
Status: DISCONTINUED | OUTPATIENT
Start: 2024-04-25 | End: 2024-05-01 | Stop reason: HOSPADM

## 2024-04-25 RX ORDER — GLUCAGON 1 MG/ML
1 KIT INJECTION PRN
Status: DISCONTINUED | OUTPATIENT
Start: 2024-04-25 | End: 2024-05-01 | Stop reason: HOSPADM

## 2024-04-25 RX ORDER — FOLIC ACID 1 MG/1
1 TABLET ORAL NIGHTLY
Status: DISCONTINUED | OUTPATIENT
Start: 2024-04-25 | End: 2024-05-01 | Stop reason: HOSPADM

## 2024-04-25 RX ORDER — SODIUM CHLORIDE 0.9 % (FLUSH) 0.9 %
5-40 SYRINGE (ML) INJECTION EVERY 12 HOURS SCHEDULED
Status: DISCONTINUED | OUTPATIENT
Start: 2024-04-25 | End: 2024-05-01 | Stop reason: HOSPADM

## 2024-04-25 RX ORDER — LISINOPRIL 20 MG/1
20 TABLET ORAL EVERY EVENING
Status: DISCONTINUED | OUTPATIENT
Start: 2024-04-25 | End: 2024-05-01 | Stop reason: HOSPADM

## 2024-04-25 RX ORDER — SODIUM CHLORIDE 9 MG/ML
INJECTION, SOLUTION INTRAVENOUS PRN
Status: DISCONTINUED | OUTPATIENT
Start: 2024-04-25 | End: 2024-04-26 | Stop reason: SDUPTHER

## 2024-04-25 RX ORDER — ATORVASTATIN CALCIUM 40 MG/1
40 TABLET, FILM COATED ORAL NIGHTLY
Status: DISCONTINUED | OUTPATIENT
Start: 2024-04-25 | End: 2024-05-01 | Stop reason: HOSPADM

## 2024-04-25 RX ORDER — ONDANSETRON 2 MG/ML
4 INJECTION INTRAMUSCULAR; INTRAVENOUS EVERY 6 HOURS PRN
Status: DISCONTINUED | OUTPATIENT
Start: 2024-04-25 | End: 2024-05-01 | Stop reason: HOSPADM

## 2024-04-25 RX ORDER — SODIUM CHLORIDE 0.9 % (FLUSH) 0.9 %
10 SYRINGE (ML) INJECTION PRN
Status: DISCONTINUED | OUTPATIENT
Start: 2024-04-25 | End: 2024-05-01 | Stop reason: HOSPADM

## 2024-04-25 RX ORDER — LANOLIN ALCOHOL/MO/W.PET/CERES
3 CREAM (GRAM) TOPICAL NIGHTLY PRN
Status: DISCONTINUED | OUTPATIENT
Start: 2024-04-25 | End: 2024-05-01 | Stop reason: HOSPADM

## 2024-04-25 RX ORDER — CHOLECALCIFEROL (VITAMIN D3) 50 MCG
2000 TABLET ORAL EVERY MORNING
Status: DISCONTINUED | OUTPATIENT
Start: 2024-04-25 | End: 2024-05-01 | Stop reason: HOSPADM

## 2024-04-25 RX ORDER — INSULIN LISPRO 100 [IU]/ML
0-4 INJECTION, SOLUTION INTRAVENOUS; SUBCUTANEOUS NIGHTLY
Status: DISCONTINUED | OUTPATIENT
Start: 2024-04-25 | End: 2024-05-01 | Stop reason: HOSPADM

## 2024-04-25 RX ORDER — POLYETHYLENE GLYCOL 3350 17 G/17G
17 POWDER, FOR SOLUTION ORAL DAILY PRN
Status: DISCONTINUED | OUTPATIENT
Start: 2024-04-25 | End: 2024-05-01 | Stop reason: HOSPADM

## 2024-04-25 RX ADMIN — PRIMIDONE 150 MG: 50 TABLET ORAL at 06:34

## 2024-04-25 RX ADMIN — DULOXETINE HYDROCHLORIDE 30 MG: 30 CAPSULE, DELAYED RELEASE ORAL at 09:47

## 2024-04-25 RX ADMIN — MORPHINE SULFATE 4 MG: 4 INJECTION, SOLUTION INTRAMUSCULAR; INTRAVENOUS at 00:25

## 2024-04-25 RX ADMIN — Medication 3 MG: at 02:07

## 2024-04-25 RX ADMIN — DILTIAZEM HYDROCHLORIDE 360 MG: 180 CAPSULE, COATED, EXTENDED RELEASE ORAL at 09:47

## 2024-04-25 RX ADMIN — LATANOPROST 1 DROP: 50 SOLUTION OPHTHALMIC at 22:59

## 2024-04-25 RX ADMIN — TRAMADOL HYDROCHLORIDE 50 MG: 50 TABLET, COATED ORAL at 15:59

## 2024-04-25 RX ADMIN — SPIRONOLACTONE 25 MG: 25 TABLET ORAL at 09:47

## 2024-04-25 RX ADMIN — LISINOPRIL 20 MG: 20 TABLET ORAL at 22:58

## 2024-04-25 RX ADMIN — FOLIC ACID 1 MG: 1 TABLET ORAL at 22:55

## 2024-04-25 RX ADMIN — SODIUM CHLORIDE, PRESERVATIVE FREE 10 ML: 5 INJECTION INTRAVENOUS at 22:56

## 2024-04-25 RX ADMIN — CALCIUM 500 MG: 500 TABLET ORAL at 09:47

## 2024-04-25 RX ADMIN — ACETAMINOPHEN 650 MG: 325 TABLET ORAL at 09:47

## 2024-04-25 RX ADMIN — Medication 2000 UNITS: at 09:47

## 2024-04-25 RX ADMIN — TRAMADOL HYDROCHLORIDE 50 MG: 50 TABLET, COATED ORAL at 10:04

## 2024-04-25 RX ADMIN — MORPHINE SULFATE 4 MG: 4 INJECTION, SOLUTION INTRAMUSCULAR; INTRAVENOUS at 06:44

## 2024-04-25 RX ADMIN — MORPHINE SULFATE 4 MG: 4 INJECTION, SOLUTION INTRAMUSCULAR; INTRAVENOUS at 04:43

## 2024-04-25 RX ADMIN — ATORVASTATIN CALCIUM 40 MG: 40 TABLET, FILM COATED ORAL at 22:55

## 2024-04-25 RX ADMIN — PRIMIDONE 150 MG: 50 TABLET ORAL at 22:55

## 2024-04-25 RX ADMIN — TRAMADOL HYDROCHLORIDE 50 MG: 50 TABLET, COATED ORAL at 02:07

## 2024-04-25 RX ADMIN — TRAMADOL HYDROCHLORIDE 50 MG: 50 TABLET, COATED ORAL at 22:48

## 2024-04-25 ASSESSMENT — PAIN DESCRIPTION - ORIENTATION
ORIENTATION: RIGHT
ORIENTATION: LEFT
ORIENTATION: RIGHT
ORIENTATION: LEFT
ORIENTATION: LEFT

## 2024-04-25 ASSESSMENT — PAIN DESCRIPTION - LOCATION
LOCATION: HIP

## 2024-04-25 ASSESSMENT — PAIN SCALES - GENERAL
PAINLEVEL_OUTOF10: 10
PAINLEVEL_OUTOF10: 9
PAINLEVEL_OUTOF10: 9
PAINLEVEL_OUTOF10: 10
PAINLEVEL_OUTOF10: 7

## 2024-04-25 ASSESSMENT — PAIN - FUNCTIONAL ASSESSMENT: PAIN_FUNCTIONAL_ASSESSMENT: ACTIVITIES ARE NOT PREVENTED

## 2024-04-25 ASSESSMENT — PAIN DESCRIPTION - DESCRIPTORS
DESCRIPTORS: ACHING;SORE;THROBBING
DESCRIPTORS: DISCOMFORT;ACHING

## 2024-04-25 NOTE — H&P
[facility]  History and Physical      CHIEF COMPLAINT:    Chief Complaint   Patient presents with    Fall     Pt reports slipping on the floor and hitting left hip. Denies hitting her head, no LOC, takes blood thinners.     Hip Pain     Left sided hip pain         Patient of Camden Hoskins MD presents with:  Fall from standing, initial encounter    History of Present Illness:   Patient is an 83-year-old female with a past medical history of A-fib, cataracts, CKD, diabetes, CHF, lumbar radiculopathy, pulmonary regurgitation, rheumatoid arthritis.  Patient presented to the ED with complaints of left hip pain after a mechanical fall.  Patient admits that she was in the kitchen she turned around to check something she lost her balance and fell to her left hip.  Patient was on the floor for approximately 20 minutes.  Patient contacted EMS.  Patient unsure if she has hit her head she is on Eliquis.  ER workup revealed left femoral neck fracture.  Orthopedic surgery's been consulted and patient is admitted to telemetry unit for further treatment.      REVIEW OF SYSTEMS:  Pertinent negatives are above in HPI.  10 point ROS otherwise negative.      Past Medical History:   Diagnosis Date    Atrial fibrillation (HCC) 02/01/2010    converted with  oral    Cataract     right eye    CKD (chronic kidney disease)     Diabetes mellitus (HCC) 01/01/1999    Diabetic peripheral neuropathy (HCC)     Diastolic CHF, acute on chronic (HCC) 01/01/2010    DISH (diffuse idiopathic skeletal hyperostosis)     Essential tremor     Fatty liver disease, nonalcoholic 01/01/2000    Hypertension 01/01/1996    Lumbar radiculopathy     Mitral regurgitation 01/01/2010    MILD ; MILD AORTIC STENOSIS    Open-angle glaucoma 01/01/1999    Pulmonary regurgitation 01/01/2010    moderate ;increased P.A. pressure    Rectal bleed 08/23/2012    Rheumatoid arthritis (HCC)     Vitreous hemorrhage (HCC)     left eye--s/p surgery         Past

## 2024-04-25 NOTE — ANESTHESIA PRE PROCEDURE
tremor  Diabetic neuropathy  Glaucoma  Ambulatory dysfunction - uses a walker  DISH (diffuse idiopathic skeletal hyperostosis)  Vitreous hemorrhage GI/Hepatic/Renal:   (+) liver disease (Steatosis):, renal disease (Stage 2 CKD:  GFR 70): CRI and ARF     (-) hepatitis      ROS comment: Rectal bleeding.   Endo/Other:    (+) DiabetesType II DM, no interval change, blood dyscrasia (Eliquis LD 4/25; Hbg 11.4 & Platelets 117K): anticoagulation therapy, anemia and thrombocytopenia, arthritis (Radiculopathy): OA..          Pt had no PAT visit       Abdominal:   (+) obese          Vascular:   + DVT.  - PE.      Other Findings: Tremor  Exophthalmos        TTE 9/2021:  Summary   Technically difficult examination.   Left ventricular size is grossly normal.   Ejection fraction is visually estimated at 60 to 65%.   Normal right ventricular size and function.   Mild mitral regurgitation is present.   Mild to moderate tricuspid regurgitation.   RVSP is 38 mmHg.   Mild pulmonic regurgitation present.   Small pericardial effusion without tamponade     Anesthesia Plan      general     ASA 4     (Modified RSI with HOB at 30 degrees RT  Pre-oxygenation x 3 minutes  Glidescope  20mg Ketamine and 30mg Esmolol  PONV prophylaxis)  Induction: intravenous.    MIPS: Postoperative opioids intended and Prophylactic antiemetics administered.  Anesthetic plan and risks discussed with patient and Unable to obtain due to emergent nature.      Plan discussed with CRNA.              Chart reviewed per routine by Olga Chang DO.  Above represents information available via shared medical record including previous anesthesia history, drug and allergy history.  This does not includes physical examination of patient.  Confirmation of above and final plan per Day of Surgery (DOS) anesthesiologist.     Keira Chang DO   4/25/2024    DOS STAFF ADDENDUM:    Patient seen and chart reviewed on DOS.  No interval change in history or exam.  I agree with the

## 2024-04-25 NOTE — ED PROVIDER NOTES
Department of Emergency Medicine     Written by: Smith Lechuga MD  Patient Name: Suki Weir  Admit Date: 2024 10:35 PM  MRN: [id number]                   : 1940    HPI  Chief Complaint   Patient presents with    Fall     Pt reports slipping on the floor and hitting left hip. Denies hitting her head, no LOC, takes blood thinners.     Hip Pain     Left sided hip pain        Suki Weir is a 83 y.o. female that presents to the ED with concerns for mechanical fall and left-sided hip pain.  The patient says that she was in the kitchen she turned around to \"check something\", she lost balance and fell to the left hip.  She was on the floor for approximately 20 minutes.  She had a fall and hunger on her neck, therefore was able to call EMS, who presented to the house to bring her to the hospital.  She has evidence of shortening and external rotation of the left lower extremity.  She is unsure whether she hit her head or not, she is on Eliquis now which is newly prescribed, she was previously on Xarelto for atrial fibrillation.  She denies any neck pain.  No chest pain shortness of breath abdominal pain.    Review of systems:  Pertinent positives and negatives mentioned in the HPI/MDM.    Physical Exam  Constitutional:       General: She is not in acute distress.     Appearance: Normal appearance.   Eyes:      Extraocular Movements: Extraocular movements intact.      Pupils: Pupils are equal, round, and reactive to light.   Cardiovascular:      Rate and Rhythm: Normal rate and regular rhythm.   Pulmonary:      Effort: Pulmonary effort is normal. No respiratory distress.   Abdominal:      Palpations: Abdomen is soft.      Tenderness: There is no abdominal tenderness.   Musculoskeletal:         General: Tenderness and signs of injury present.      Comments: External rotation and shortening of left lower extremity   Skin:     Capillary Refill: Capillary refill takes less than 2 seconds.      Findings: No

## 2024-04-25 NOTE — CONSULTS
Department of Orthopedic Surgery  Attending Consult Note          Reason for Consult: Left Hip Pain    HISTORY OF PRESENT ILLNESS:       Patient is a 83 y.o. female who presents with hip pain after a fall.  Patient fell while she was in the kitchen when she turned and lost her balance and fell onto the left hip region.  Patient was on the ground for 20 minutes.  Patient denies any pain to the hip before the fall.  Previous Hip pain -  no.  Anticoagulation -Eliquis.  Patient currently lives at home the patient is community Ambulator without assist   Pt lives at home. Patient has a significant history of A-fib,.Denies numbness/tingling/paresthesias.  Denies any other orthopedic complaints at this time.       Past Medical History:        Diagnosis Date    Atrial fibrillation (HCC) 02/01/2010    converted with  oral    Cataract     right eye    CKD (chronic kidney disease)     Diabetes mellitus (HCC) 01/01/1999    Diabetic peripheral neuropathy (HCC)     Diastolic CHF, acute on chronic (HCC) 01/01/2010    DISH (diffuse idiopathic skeletal hyperostosis)     Essential tremor     Fatty liver disease, nonalcoholic 01/01/2000    Hypertension 01/01/1996    Lumbar radiculopathy     Mitral regurgitation 01/01/2010    MILD ; MILD AORTIC STENOSIS    Open-angle glaucoma 01/01/1999    Pulmonary regurgitation 01/01/2010    moderate ;increased P.A. pressure    Rectal bleed 08/23/2012    Rheumatoid arthritis (HCC)     Vitreous hemorrhage (HCC)     left eye--s/p surgery     Past Surgical History:        Procedure Laterality Date    COLONOSCOPY  1995,2005,2008    BENIGN POLYPS ;INT  HEMORRHOIDS    EYE SURGERY Left 01/01/2004    VITREOUS  HEMORRHAGE    HYSTERECTOMY (CERVIX STATUS UNKNOWN)  01/01/1986    ADRIEN-BSO     Current Medications:   Current Facility-Administered Medications: [Held by provider] apixaban (ELIQUIS) tablet 5 mg, 5 mg, Oral, BID  atorvastatin (LIPITOR) tablet 40 mg, 40 mg, Oral, Nightly  calcium elemental (OSCAL) tablet

## 2024-04-25 NOTE — ED NOTES
..ED to Inpatient Handoff Report    Notified Mee that electronic handoff available and patient ready for transport to room 744.    Safety Risks: Risk of falls    Patient in Restraints: no    Constant Observer or Patient : no    Telemetry Monitoring Ordered: No          Order to transfer to unit without monitor: NO    Last MEWS: 1 Time completed: 0020    Deterioration Index: 24.37    Vitals:    04/24/24 2243 04/24/24 2246 04/24/24 2356 04/25/24 0020   BP:  (!) 155/113 (!) 181/80 (!) 181/85   Pulse: 87  84 81   Resp: 16   16   Temp: 97.8 °F (36.6 °C)   97.7 °F (36.5 °C)   TempSrc: Oral   Oral   SpO2: 100%   100%   Weight:  70.3 kg (155 lb)     Height:  1.626 m (5' 4\")         Opportunity for questions and clarification was provided.

## 2024-04-25 NOTE — ACP (ADVANCE CARE PLANNING)
Advance Care Planning   Healthcare Decision Maker:    Primary Decision Maker: CHA FERREIRA - [phone]    Primary Decision Maker: Camron Flowers - [phone]    Click here to complete Healthcare Decision Makers including selection of the Healthcare Decision Maker Relationship (ie \"Primary\").

## 2024-04-25 NOTE — CARE COORDINATION
Met with patient and cousin, Kristie about diagnosis and discharge plan of care. Pt admit for left hip fracture after mechanical fall at home. Recently discharged from [facility] beginning of month. Ortho consult. Pt for OR tomorrow. Eliquis on hold. Pt lives alone in ranch home. Pt has wheeled walker, rollator, wheelchair. Pt just became active with waiver program and aides to come Mon-Frid 9-12 pm. Active with [employer]. Pt has life alert. PCP is Dr Hoskins. Receptive to [facility]. [facility] will not take back d/t they do not feel she has a safe discharge plan. Will discuss choices with pt/family-jerri

## 2024-04-26 ENCOUNTER — ANESTHESIA (OUTPATIENT)
Dept: OPERATING ROOM | Age: 84
End: 2024-04-26
Payer: MEDICARE

## 2024-04-26 ENCOUNTER — APPOINTMENT (OUTPATIENT)
Dept: GENERAL RADIOLOGY | Age: 84
DRG: 522 | End: 2024-04-26
Payer: MEDICARE

## 2024-04-26 LAB
ABO + RH BLD: NORMAL
ALBUMIN SERPL-MCNC: 3.7 G/DL (ref 3.5–5.2)
ALP SERPL-CCNC: 86 U/L (ref 35–104)
ALT SERPL-CCNC: 13 U/L (ref 0–32)
ANION GAP SERPL CALCULATED.3IONS-SCNC: 8 MMOL/L (ref 7–16)
ARM BAND NUMBER: NORMAL
AST SERPL-CCNC: 17 U/L (ref 0–31)
BASOPHILS # BLD: 0.02 K/UL (ref 0–0.2)
BASOPHILS NFR BLD: 0 % (ref 0–2)
BILIRUB SERPL-MCNC: 0.6 MG/DL (ref 0–1.2)
BLOOD BANK SAMPLE EXPIRATION: NORMAL
BLOOD GROUP ANTIBODIES SERPL: NEGATIVE
BUN SERPL-MCNC: 19 MG/DL (ref 6–23)
CALCIUM SERPL-MCNC: 9.1 MG/DL (ref 8.6–10.2)
CHLORIDE SERPL-SCNC: 101 MMOL/L (ref 98–107)
CO2 SERPL-SCNC: 24 MMOL/L (ref 22–29)
CREAT SERPL-MCNC: 0.8 MG/DL (ref 0.5–1)
EOSINOPHIL # BLD: 0.19 K/UL (ref 0.05–0.5)
EOSINOPHILS RELATIVE PERCENT: 2 % (ref 0–6)
ERYTHROCYTE [DISTWIDTH] IN BLOOD BY AUTOMATED COUNT: 13.8 % (ref 11.5–15)
GFR SERPL CREATININE-BSD FRML MDRD: 70 ML/MIN/1.73M2
GLUCOSE BLD-MCNC: 138 MG/DL (ref 74–99)
GLUCOSE BLD-MCNC: 140 MG/DL (ref 74–99)
GLUCOSE BLD-MCNC: 143 MG/DL (ref 74–99)
GLUCOSE BLD-MCNC: 143 MG/DL (ref 74–99)
GLUCOSE SERPL-MCNC: 143 MG/DL (ref 74–99)
HCT VFR BLD AUTO: 34.3 % (ref 34–48)
HGB BLD-MCNC: 11.3 G/DL (ref 11.5–15.5)
IMM GRANULOCYTES # BLD AUTO: 0.04 K/UL (ref 0–0.58)
IMM GRANULOCYTES NFR BLD: 0 % (ref 0–5)
LYMPHOCYTES NFR BLD: 0.44 K/UL (ref 1.5–4)
LYMPHOCYTES RELATIVE PERCENT: 5 % (ref 20–42)
MCH RBC QN AUTO: 30.9 PG (ref 26–35)
MCHC RBC AUTO-ENTMCNC: 32.9 G/DL (ref 32–34.5)
MCV RBC AUTO: 93.7 FL (ref 80–99.9)
MONOCYTES NFR BLD: 0.58 K/UL (ref 0.1–0.95)
MONOCYTES NFR BLD: 6 % (ref 2–12)
NEUTROPHILS NFR BLD: 87 % (ref 43–80)
NEUTS SEG NFR BLD: 8.5 K/UL (ref 1.8–7.3)
PLATELET # BLD AUTO: 117 K/UL (ref 130–450)
PMV BLD AUTO: 9.9 FL (ref 7–12)
POTASSIUM SERPL-SCNC: 4.6 MMOL/L (ref 3.5–5)
PROT SERPL-MCNC: 5.7 G/DL (ref 6.4–8.3)
RBC # BLD AUTO: 3.66 M/UL (ref 3.5–5.5)
RBC # BLD: ABNORMAL 10*6/UL
SODIUM SERPL-SCNC: 133 MMOL/L (ref 132–146)
WBC OTHER # BLD: 9.8 K/UL (ref 4.5–11.5)

## 2024-04-26 PROCEDURE — 0SRS0JZ REPLACEMENT OF LEFT HIP JOINT, FEMORAL SURFACE WITH SYNTHETIC SUBSTITUTE, OPEN APPROACH: ICD-10-PCS | Performed by: STUDENT IN AN ORGANIZED HEALTH CARE EDUCATION/TRAINING PROGRAM

## 2024-04-26 PROCEDURE — 82962 GLUCOSE BLOOD TEST: CPT

## 2024-04-26 PROCEDURE — 6370000000 HC RX 637 (ALT 250 FOR IP): Performed by: ANESTHESIOLOGY

## 2024-04-26 PROCEDURE — 2709999900 HC NON-CHARGEABLE SUPPLY: Performed by: STUDENT IN AN ORGANIZED HEALTH CARE EDUCATION/TRAINING PROGRAM

## 2024-04-26 PROCEDURE — 86900 BLOOD TYPING SEROLOGIC ABO: CPT

## 2024-04-26 PROCEDURE — 6370000000 HC RX 637 (ALT 250 FOR IP): Performed by: STUDENT IN AN ORGANIZED HEALTH CARE EDUCATION/TRAINING PROGRAM

## 2024-04-26 PROCEDURE — 86850 RBC ANTIBODY SCREEN: CPT

## 2024-04-26 PROCEDURE — C1776 JOINT DEVICE (IMPLANTABLE): HCPCS | Performed by: STUDENT IN AN ORGANIZED HEALTH CARE EDUCATION/TRAINING PROGRAM

## 2024-04-26 PROCEDURE — 2580000003 HC RX 258

## 2024-04-26 PROCEDURE — A4216 STERILE WATER/SALINE, 10 ML: HCPCS | Performed by: ANESTHESIOLOGY

## 2024-04-26 PROCEDURE — 2500000003 HC RX 250 WO HCPCS: Performed by: STUDENT IN AN ORGANIZED HEALTH CARE EDUCATION/TRAINING PROGRAM

## 2024-04-26 PROCEDURE — 2500000003 HC RX 250 WO HCPCS

## 2024-04-26 PROCEDURE — 2580000003 HC RX 258: Performed by: STUDENT IN AN ORGANIZED HEALTH CARE EDUCATION/TRAINING PROGRAM

## 2024-04-26 PROCEDURE — 6360000002 HC RX W HCPCS

## 2024-04-26 PROCEDURE — 6360000002 HC RX W HCPCS: Performed by: STUDENT IN AN ORGANIZED HEALTH CARE EDUCATION/TRAINING PROGRAM

## 2024-04-26 PROCEDURE — 88305 TISSUE EXAM BY PATHOLOGIST: CPT

## 2024-04-26 PROCEDURE — 2500000003 HC RX 250 WO HCPCS: Performed by: ANESTHESIOLOGY

## 2024-04-26 PROCEDURE — 85025 COMPLETE CBC W/AUTO DIFF WBC: CPT

## 2024-04-26 PROCEDURE — 3600000004 HC SURGERY LEVEL 4 BASE: Performed by: STUDENT IN AN ORGANIZED HEALTH CARE EDUCATION/TRAINING PROGRAM

## 2024-04-26 PROCEDURE — 72170 X-RAY EXAM OF PELVIS: CPT

## 2024-04-26 PROCEDURE — 86901 BLOOD TYPING SEROLOGIC RH(D): CPT

## 2024-04-26 PROCEDURE — 2720000010 HC SURG SUPPLY STERILE: Performed by: STUDENT IN AN ORGANIZED HEALTH CARE EDUCATION/TRAINING PROGRAM

## 2024-04-26 PROCEDURE — 6370000000 HC RX 637 (ALT 250 FOR IP)

## 2024-04-26 PROCEDURE — 2580000003 HC RX 258: Performed by: ANESTHESIOLOGY

## 2024-04-26 PROCEDURE — 73501 X-RAY EXAM HIP UNI 1 VIEW: CPT

## 2024-04-26 PROCEDURE — 3700000000 HC ANESTHESIA ATTENDED CARE: Performed by: STUDENT IN AN ORGANIZED HEALTH CARE EDUCATION/TRAINING PROGRAM

## 2024-04-26 PROCEDURE — 6360000002 HC RX W HCPCS: Performed by: FAMILY MEDICINE

## 2024-04-26 PROCEDURE — 3700000001 HC ADD 15 MINUTES (ANESTHESIA): Performed by: STUDENT IN AN ORGANIZED HEALTH CARE EDUCATION/TRAINING PROGRAM

## 2024-04-26 PROCEDURE — 3600000014 HC SURGERY LEVEL 4 ADDTL 15MIN: Performed by: STUDENT IN AN ORGANIZED HEALTH CARE EDUCATION/TRAINING PROGRAM

## 2024-04-26 PROCEDURE — 7100000000 HC PACU RECOVERY - FIRST 15 MIN: Performed by: STUDENT IN AN ORGANIZED HEALTH CARE EDUCATION/TRAINING PROGRAM

## 2024-04-26 PROCEDURE — 88311 DECALCIFY TISSUE: CPT

## 2024-04-26 PROCEDURE — 80053 COMPREHEN METABOLIC PANEL: CPT

## 2024-04-26 PROCEDURE — 1200000000 HC SEMI PRIVATE

## 2024-04-26 PROCEDURE — 7100000001 HC PACU RECOVERY - ADDTL 15 MIN: Performed by: STUDENT IN AN ORGANIZED HEALTH CARE EDUCATION/TRAINING PROGRAM

## 2024-04-26 DEVICE — ARTICUL/EZE FEMORAL HEAD DIAMETER 28MM +1.5 12/14 TAPER
Type: IMPLANTABLE DEVICE | Site: HIP | Status: FUNCTIONAL
Brand: ARTICUL/EZE

## 2024-04-26 DEVICE — ACTIS DUOFIX HIP PROSTHESIS (FEMORAL STEM 12/14 TAPER CEMENTLESS SIZE 7 STD COLLAR)  CE
Type: IMPLANTABLE DEVICE | Site: HIP | Status: FUNCTIONAL
Brand: ACTIS

## 2024-04-26 DEVICE — SELF CENTERING BI-POLAR HEAD 28MM ID 48MM OD
Type: IMPLANTABLE DEVICE | Site: HIP | Status: FUNCTIONAL
Brand: SELF CENTERING

## 2024-04-26 RX ORDER — FENTANYL CITRATE 50 UG/ML
25 INJECTION, SOLUTION INTRAMUSCULAR; INTRAVENOUS EVERY 5 MIN PRN
Status: DISCONTINUED | OUTPATIENT
Start: 2024-04-26 | End: 2024-04-26 | Stop reason: HOSPADM

## 2024-04-26 RX ORDER — VANCOMYCIN HYDROCHLORIDE 1 G/20ML
INJECTION, POWDER, LYOPHILIZED, FOR SOLUTION INTRAVENOUS PRN
Status: DISCONTINUED | OUTPATIENT
Start: 2024-04-26 | End: 2024-04-26 | Stop reason: ALTCHOICE

## 2024-04-26 RX ORDER — SODIUM CHLORIDE 0.9 % (FLUSH) 0.9 %
5-40 SYRINGE (ML) INJECTION EVERY 12 HOURS SCHEDULED
Status: DISCONTINUED | OUTPATIENT
Start: 2024-04-26 | End: 2024-05-01 | Stop reason: HOSPADM

## 2024-04-26 RX ORDER — ESMOLOL HYDROCHLORIDE 10 MG/ML
INJECTION INTRAVENOUS PRN
Status: DISCONTINUED | OUTPATIENT
Start: 2024-04-26 | End: 2024-04-26 | Stop reason: SDUPTHER

## 2024-04-26 RX ORDER — ONDANSETRON 2 MG/ML
INJECTION INTRAMUSCULAR; INTRAVENOUS PRN
Status: DISCONTINUED | OUTPATIENT
Start: 2024-04-26 | End: 2024-04-26 | Stop reason: SDUPTHER

## 2024-04-26 RX ORDER — SODIUM CHLORIDE 0.9 % (FLUSH) 0.9 %
5-40 SYRINGE (ML) INJECTION EVERY 12 HOURS SCHEDULED
Status: DISCONTINUED | OUTPATIENT
Start: 2024-04-26 | End: 2024-04-26 | Stop reason: HOSPADM

## 2024-04-26 RX ORDER — PROCHLORPERAZINE EDISYLATE 5 MG/ML
5 INJECTION INTRAMUSCULAR; INTRAVENOUS
Status: DISCONTINUED | OUTPATIENT
Start: 2024-04-26 | End: 2024-04-26 | Stop reason: HOSPADM

## 2024-04-26 RX ORDER — SODIUM CHLORIDE 9 MG/ML
INJECTION, SOLUTION INTRAVENOUS CONTINUOUS PRN
Status: DISCONTINUED | OUTPATIENT
Start: 2024-04-26 | End: 2024-04-26 | Stop reason: SDUPTHER

## 2024-04-26 RX ORDER — LABETALOL HYDROCHLORIDE 5 MG/ML
5 INJECTION, SOLUTION INTRAVENOUS
Status: DISCONTINUED | OUTPATIENT
Start: 2024-04-26 | End: 2024-04-26 | Stop reason: HOSPADM

## 2024-04-26 RX ORDER — FENTANYL CITRATE 50 UG/ML
INJECTION, SOLUTION INTRAMUSCULAR; INTRAVENOUS PRN
Status: DISCONTINUED | OUTPATIENT
Start: 2024-04-26 | End: 2024-04-26 | Stop reason: SDUPTHER

## 2024-04-26 RX ORDER — SODIUM CHLORIDE 9 MG/ML
INJECTION, SOLUTION INTRAVENOUS PRN
Status: DISCONTINUED | OUTPATIENT
Start: 2024-04-26 | End: 2024-04-26 | Stop reason: HOSPADM

## 2024-04-26 RX ORDER — KETAMINE HYDROCHLORIDE 10 MG/ML
INJECTION, SOLUTION INTRAMUSCULAR; INTRAVENOUS PRN
Status: DISCONTINUED | OUTPATIENT
Start: 2024-04-26 | End: 2024-04-26 | Stop reason: SDUPTHER

## 2024-04-26 RX ORDER — SODIUM CHLORIDE 9 MG/ML
INJECTION, SOLUTION INTRAVENOUS PRN
Status: DISCONTINUED | OUTPATIENT
Start: 2024-04-26 | End: 2024-05-01 | Stop reason: HOSPADM

## 2024-04-26 RX ORDER — LIDOCAINE HYDROCHLORIDE 20 MG/ML
INJECTION, SOLUTION EPIDURAL; INFILTRATION; INTRACAUDAL; PERINEURAL PRN
Status: DISCONTINUED | OUTPATIENT
Start: 2024-04-26 | End: 2024-04-26 | Stop reason: SDUPTHER

## 2024-04-26 RX ORDER — DEXAMETHASONE SODIUM PHOSPHATE 4 MG/ML
INJECTION, SOLUTION INTRA-ARTICULAR; INTRALESIONAL; INTRAMUSCULAR; INTRAVENOUS; SOFT TISSUE PRN
Status: DISCONTINUED | OUTPATIENT
Start: 2024-04-26 | End: 2024-04-26 | Stop reason: SDUPTHER

## 2024-04-26 RX ORDER — ROCURONIUM BROMIDE 10 MG/ML
INJECTION, SOLUTION INTRAVENOUS PRN
Status: DISCONTINUED | OUTPATIENT
Start: 2024-04-26 | End: 2024-04-26 | Stop reason: SDUPTHER

## 2024-04-26 RX ORDER — MEPERIDINE HYDROCHLORIDE 25 MG/ML
12.5 INJECTION INTRAMUSCULAR; INTRAVENOUS; SUBCUTANEOUS ONCE
Status: DISCONTINUED | OUTPATIENT
Start: 2024-04-26 | End: 2024-04-26 | Stop reason: HOSPADM

## 2024-04-26 RX ORDER — DIPHENHYDRAMINE HYDROCHLORIDE 50 MG/ML
12.5 INJECTION INTRAMUSCULAR; INTRAVENOUS
Status: DISCONTINUED | OUTPATIENT
Start: 2024-04-26 | End: 2024-04-26 | Stop reason: HOSPADM

## 2024-04-26 RX ORDER — ACETAMINOPHEN 500 MG
1000 TABLET ORAL ONCE
Status: COMPLETED | OUTPATIENT
Start: 2024-04-26 | End: 2024-04-26

## 2024-04-26 RX ORDER — TRANEXAMIC ACID 10 MG/ML
1000 INJECTION, SOLUTION INTRAVENOUS SEE ADMIN INSTRUCTIONS
Status: DISCONTINUED | OUTPATIENT
Start: 2024-04-26 | End: 2024-04-26 | Stop reason: HOSPADM

## 2024-04-26 RX ORDER — SODIUM CHLORIDE 9 MG/ML
INJECTION, SOLUTION INTRAVENOUS CONTINUOUS
Status: ACTIVE | OUTPATIENT
Start: 2024-04-26 | End: 2024-04-27

## 2024-04-26 RX ORDER — PROPOFOL 10 MG/ML
INJECTION, EMULSION INTRAVENOUS PRN
Status: DISCONTINUED | OUTPATIENT
Start: 2024-04-26 | End: 2024-04-26 | Stop reason: SDUPTHER

## 2024-04-26 RX ORDER — HYDRALAZINE HYDROCHLORIDE 20 MG/ML
5 INJECTION INTRAMUSCULAR; INTRAVENOUS
Status: DISCONTINUED | OUTPATIENT
Start: 2024-04-26 | End: 2024-04-26 | Stop reason: HOSPADM

## 2024-04-26 RX ORDER — NALOXONE HYDROCHLORIDE 0.4 MG/ML
INJECTION, SOLUTION INTRAMUSCULAR; INTRAVENOUS; SUBCUTANEOUS PRN
Status: DISCONTINUED | OUTPATIENT
Start: 2024-04-26 | End: 2024-04-26 | Stop reason: HOSPADM

## 2024-04-26 RX ORDER — SODIUM CHLORIDE 0.9 % (FLUSH) 0.9 %
5-40 SYRINGE (ML) INJECTION PRN
Status: DISCONTINUED | OUTPATIENT
Start: 2024-04-26 | End: 2024-04-26 | Stop reason: HOSPADM

## 2024-04-26 RX ORDER — ACETAMINOPHEN 325 MG/1
650 TABLET ORAL EVERY 6 HOURS
Status: DISCONTINUED | OUTPATIENT
Start: 2024-04-26 | End: 2024-05-01 | Stop reason: HOSPADM

## 2024-04-26 RX ORDER — SODIUM CHLORIDE 0.9 % (FLUSH) 0.9 %
5-40 SYRINGE (ML) INJECTION PRN
Status: DISCONTINUED | OUTPATIENT
Start: 2024-04-26 | End: 2024-05-01 | Stop reason: HOSPADM

## 2024-04-26 RX ADMIN — DEXAMETHASONE SODIUM PHOSPHATE 10 MG: 4 INJECTION, SOLUTION INTRAMUSCULAR; INTRAVENOUS at 15:43

## 2024-04-26 RX ADMIN — ONDANSETRON 4 MG: 2 INJECTION INTRAMUSCULAR; INTRAVENOUS at 15:43

## 2024-04-26 RX ADMIN — SPIRONOLACTONE 25 MG: 25 TABLET ORAL at 08:46

## 2024-04-26 RX ADMIN — LISINOPRIL 20 MG: 20 TABLET ORAL at 19:10

## 2024-04-26 RX ADMIN — LATANOPROST 1 DROP: 50 SOLUTION OPHTHALMIC at 19:11

## 2024-04-26 RX ADMIN — TRAMADOL HYDROCHLORIDE 50 MG: 50 TABLET, COATED ORAL at 08:46

## 2024-04-26 RX ADMIN — PROPOFOL 130 MG: 10 INJECTION, EMULSION INTRAVENOUS at 15:40

## 2024-04-26 RX ADMIN — FENTANYL CITRATE 50 MCG: 50 INJECTION, SOLUTION INTRAMUSCULAR; INTRAVENOUS at 16:12

## 2024-04-26 RX ADMIN — SODIUM CHLORIDE: 9 INJECTION, SOLUTION INTRAVENOUS at 18:58

## 2024-04-26 RX ADMIN — LIDOCAINE HYDROCHLORIDE 100 MG: 20 INJECTION, SOLUTION EPIDURAL; INFILTRATION; INTRACAUDAL; PERINEURAL at 15:40

## 2024-04-26 RX ADMIN — ROCURONIUM BROMIDE 10 MG: 10 INJECTION, SOLUTION INTRAVENOUS at 16:22

## 2024-04-26 RX ADMIN — SODIUM CHLORIDE, PRESERVATIVE FREE 10 ML: 5 INJECTION INTRAVENOUS at 08:46

## 2024-04-26 RX ADMIN — WATER 2000 MG: 1 INJECTION INTRAMUSCULAR; INTRAVENOUS; SUBCUTANEOUS at 15:43

## 2024-04-26 RX ADMIN — FENTANYL CITRATE 50 MCG: 50 INJECTION, SOLUTION INTRAMUSCULAR; INTRAVENOUS at 15:40

## 2024-04-26 RX ADMIN — DULOXETINE HYDROCHLORIDE 30 MG: 30 CAPSULE, DELAYED RELEASE ORAL at 08:45

## 2024-04-26 RX ADMIN — SUGAMMADEX 300 MG: 100 INJECTION, SOLUTION INTRAVENOUS at 17:10

## 2024-04-26 RX ADMIN — FOLIC ACID 1 MG: 1 TABLET ORAL at 19:10

## 2024-04-26 RX ADMIN — ESMOLOL HYDROCHLORIDE 20 MG: 10 INJECTION, SOLUTION INTRAVENOUS at 15:44

## 2024-04-26 RX ADMIN — HYDROMORPHONE HYDROCHLORIDE 1 MG: 1 INJECTION, SOLUTION INTRAMUSCULAR; INTRAVENOUS; SUBCUTANEOUS at 03:48

## 2024-04-26 RX ADMIN — PRIMIDONE 150 MG: 50 TABLET ORAL at 08:45

## 2024-04-26 RX ADMIN — ACETAMINOPHEN 1000 MG: 500 TABLET ORAL at 15:05

## 2024-04-26 RX ADMIN — DILTIAZEM HYDROCHLORIDE 360 MG: 180 CAPSULE, COATED, EXTENDED RELEASE ORAL at 08:45

## 2024-04-26 RX ADMIN — FAMOTIDINE 20 MG: 10 INJECTION, SOLUTION INTRAVENOUS at 15:05

## 2024-04-26 RX ADMIN — ROCURONIUM BROMIDE 40 MG: 10 INJECTION, SOLUTION INTRAVENOUS at 15:40

## 2024-04-26 RX ADMIN — SODIUM CHLORIDE: 9 INJECTION, SOLUTION INTRAVENOUS at 14:40

## 2024-04-26 RX ADMIN — ATORVASTATIN CALCIUM 40 MG: 40 TABLET, FILM COATED ORAL at 19:10

## 2024-04-26 RX ADMIN — TRANEXAMIC ACID 1000 MG: 100 INJECTION, SOLUTION INTRAVENOUS at 15:43

## 2024-04-26 RX ADMIN — ACETAMINOPHEN 650 MG: 325 TABLET ORAL at 19:10

## 2024-04-26 RX ADMIN — KETAMINE HYDROCHLORIDE 20 MG: 10 INJECTION INTRAMUSCULAR; INTRAVENOUS at 15:40

## 2024-04-26 ASSESSMENT — PAIN - FUNCTIONAL ASSESSMENT
PAIN_FUNCTIONAL_ASSESSMENT: 0-10
PAIN_FUNCTIONAL_ASSESSMENT: 0-10
PAIN_FUNCTIONAL_ASSESSMENT: PREVENTS OR INTERFERES SOME ACTIVE ACTIVITIES AND ADLS
PAIN_FUNCTIONAL_ASSESSMENT: PREVENTS OR INTERFERES SOME ACTIVE ACTIVITIES AND ADLS
PAIN_FUNCTIONAL_ASSESSMENT: ACTIVITIES ARE NOT PREVENTED

## 2024-04-26 ASSESSMENT — LIFESTYLE VARIABLES: SMOKING_STATUS: 0

## 2024-04-26 ASSESSMENT — PAIN SCALES - GENERAL
PAINLEVEL_OUTOF10: 0
PAINLEVEL_OUTOF10: 9
PAINLEVEL_OUTOF10: 0
PAINLEVEL_OUTOF10: 0

## 2024-04-26 ASSESSMENT — PAIN DESCRIPTION - LOCATION: LOCATION: HIP

## 2024-04-26 ASSESSMENT — PAIN DESCRIPTION - DESCRIPTORS: DESCRIPTORS: ACHING;DULL;THROBBING

## 2024-04-26 ASSESSMENT — PAIN DESCRIPTION - ORIENTATION: ORIENTATION: LEFT

## 2024-04-26 NOTE — PATIENT CARE CONFERENCE
[facility] Quality Flow/Interdisciplinary Rounds Progress Note        Quality Flow Rounds held on April 26, 2024    Disciplines Attending:  Bedside Nurse, , , and Nursing Unit Leadership    Suki Weir was admitted on 4/24/2024 10:35 PM    Anticipated Discharge Date:       Disposition:    Martell Score:  Martell Scale Score: 15    Readmission Risk              Risk of Unplanned Readmission:  16           Discussed patient goal for the day, patient clinical progression, and barriers to discharge.  The following Goal(s) of the Day/Commitment(s) have been identified:  for left hip today add on at 3pm. Discharge plan is [facility] and will need auth. Manage post op pain and safety      Rachel Smith RN  April 26, 2024

## 2024-04-26 NOTE — CARE COORDINATION
Updated plan of care. Pt NPO for OR today. [facility] declined referral. Referral called to Deanna for [facility]ero or possible [facility]. Will need to see post surgery and pending therapies. Pt will not be able to discharge over weekend. Need auth for facility. Will follow-mjo

## 2024-04-26 NOTE — ANESTHESIA POSTPROCEDURE EVALUATION
Department of Anesthesiology  Postprocedure Note    Patient: Suki Weir  MRN: [id number]  YOB: 1940  Date of evaluation: 4/26/2024    Procedure Summary       Date: 04/26/24 Room / Location: [address]    Anesthesia Start: 1523 Anesthesia Stop: 1722    Procedure: LEFT HIP HEMIARTHROPLASTY (Left: Hip) Diagnosis:       Closed fracture of left hip, initial encounter ([facility])      (Closed fracture of left hip, initial encounter ([facility]) [S72.002A])    Surgeons: Robert Sutton DO Responsible Provider: Connor Espino DO    Anesthesia Type: general ASA Status: 4            Anesthesia Type: No value filed.    Jas Phase I: Jas Score: 9    Jas Phase II:      Anesthesia Post Evaluation    Patient location during evaluation: bedside  Patient participation: complete - patient participated  Level of consciousness: awake  Pain score: 3  Airway patency: patent  Nausea & Vomiting: no vomiting and no nausea  Cardiovascular status: hemodynamically stable  Respiratory status: acceptable  Hydration status: stable  Comments: Seen and examined.  Progressing as expected.  No questions or concerns.  Multimodal analgesia pain management approach  Pain management: adequate    No notable events documented.

## 2024-04-26 NOTE — PLAN OF CARE
Problem: Discharge Planning  Goal: Discharge to home or other facility with appropriate resources  4/26/2024 0947 by Renee Yap RN  Outcome: Progressing  4/26/2024 0158 by Leeanna Nelson RN  Outcome: Progressing     Problem: Pain  Goal: Verbalizes/displays adequate comfort level or baseline comfort level  4/26/2024 0947 by Renee Yap RN  Outcome: Progressing  4/26/2024 0158 by Leeanna Nelson RN  Outcome: Progressing     Problem: Skin/Tissue Integrity  Goal: Absence of new skin breakdown  Description: 1.  Monitor for areas of redness and/or skin breakdown  2.  Assess vascular access sites hourly  3.  Every 4-6 hours minimum:  Change oxygen saturation probe site  4.  Every 4-6 hours:  If on nasal continuous positive airway pressure, respiratory therapy assess nares and determine need for appliance change or resting period.  4/26/2024 0947 by Renee Yap RN  Outcome: Progressing  4/26/2024 0158 by Leeanna Nelson RN  Outcome: Progressing     Problem: Safety - Adult  Goal: Free from fall injury  4/26/2024 0947 by Renee Yap RN  Outcome: Progressing  4/26/2024 0158 by Leeanna Nelson RN  Outcome: Progressing     Problem: ABCDS Injury Assessment  Goal: Absence of physical injury  4/26/2024 0947 by Renee Yap RN  Outcome: Progressing  4/26/2024 0158 by Leeanna Nelson RN  Outcome: Progressing     Problem: Chronic Conditions and Co-morbidities  Goal: Patient's chronic conditions and co-morbidity symptoms are monitored and maintained or improved  4/26/2024 0947 by Renee Yap RN  Outcome: Progressing  4/26/2024 0158 by Leeanna Nelson RN  Outcome: Progressing

## 2024-04-26 NOTE — OP NOTE
Operative Note      Patient: Suki Weir  YOB: 1940  MRN: [id number]    Date of Procedure: 4/26/2024    Pre-Op Diagnosis Codes:     * Closed fracture of left hip, initial encounter ([facility]) [S72.002A]    Post-Op Diagnosis: Same       Procedure(s):  LEFT HIP HEMIARTHROPLASTY    Surgeon(s):  Robert Sutton DO    Assistant:   Zoran Moralez PA-C    Anesthesia: General    Estimated Blood Loss (mL): less than 50     Complications: None    Specimens:   ID Type Source Tests Collected by Time Destination   A : left hip bone Bone Bone SURGICAL PATHOLOGY Robert Sutton DO 4/26/2024 1651        Implants:  Implant Name Type Inv. Item Serial No.  Lot No. LRB No. Used Action   HEAD BPLR OD48MM [id number] FEM HIP SELF CNTR - [id number]  HEAD BPLR OD48MM [id number] FEM HIP SELF CNTR  [facility] [employer]S- [id number] Left 1 Implanted   STEM FEM SZ 7 L109MM 12/14 TAPR STD OFFSET HIP DUOFIX CLLRD - [id number]  STEM FEM SZ 7 L109MM 12/14 TAPR STD OFFSET HIP DUOFIX CLLRD  [facility] [employer]S-WD [id number] Left 1 Implanted   HEAD FEM [id number] NK L+1.5MM HIP MTL ON MTL 12/14 TAPR - [id number]  HEAD FEM [id number] NK L+1.5MM HIP MTL ON MTL 12/14 TAPR  [facility] [employer]S-WD [id number] Left 1 Implanted         Drains: * No LDAs found *    Findings:  Infection Present At Time Of Surgery (PATOS) (choose all levels that have infection present):  No infection present  Other Findings: Left femoral neck fracture      Operative Course:  Outside the operating suite, the patient was seen and identified. The operative site was marked and identified as appropriate by the patient, staff, surgeon, and Anesthesia. The patient was taken into the operating room, placed on the operative table, and placed under the appropriate amount of sedation under the care of the anesthesia team. The patient was placed into a lateral decubitus position. All bony prominences and neurovascular structures were well padded and

## 2024-04-26 NOTE — DISCHARGE INSTRUCTIONS
[facility] Orthopedic Surgery  [address] , [address]  [place] [address]    Or    [address] [place] [address]    Dr. Robert Sutton, DO    Orthopaedics Discharge Instructions   Weight bearing Status - Weight bearing as tolerated - on left lower Extremity  Pain medication Per Prescriptions  Contact Office for Medication Refill-  [phone]  Office can refill pain med every 7 days  If patient discharging to facility then pain control will be continued per facility physician  Ice to operative/injured site for 15-30 minutes of each hour for next 5 days    Recommend that you continue to ice the area 2-3 times per day after this   Elevate operative/injured limb on 2 pillows at home  Goal is to have limb above the heart if able  Continue DVT Prophylaxis (blood clot prevention) as Prescribed: Elliquis  Wound care - Keep Aquacel dressing in place until postoperative day #7. Can just peel off.  If wound is clean & dry, may leave open to air. If not, keep covered with dry dressing until wound is dry     Follow Up in Office in 10-14 days. Call Office to Confirm Appointment time and Location - [phone]    Call the office at [phone] for directions or with any questions.  Watch for these significant complications.  Call physician if they or any other problems occur:  Fever over 101°, redness, swelling or warmth at the operative site  Unrelieved nausea    Foul smelling or cloudy drainage at the operative site   Unrelieved pain    Blood soaked dressing. (Some oozing may be normal)     Numb, pale, blue, cold or tingling extremity

## 2024-04-27 LAB
ALBUMIN SERPL-MCNC: 3.6 G/DL (ref 3.5–5.2)
ALP SERPL-CCNC: 79 U/L (ref 35–104)
ALT SERPL-CCNC: 8 U/L (ref 0–32)
ANION GAP SERPL CALCULATED.3IONS-SCNC: 13 MMOL/L (ref 7–16)
AST SERPL-CCNC: 17 U/L (ref 0–31)
BASOPHILS # BLD: 0.01 K/UL (ref 0–0.2)
BASOPHILS NFR BLD: 0 % (ref 0–2)
BILIRUB SERPL-MCNC: 0.2 MG/DL (ref 0–1.2)
BUN SERPL-MCNC: 34 MG/DL (ref 6–23)
CALCIUM SERPL-MCNC: 8.6 MG/DL (ref 8.6–10.2)
CHLORIDE SERPL-SCNC: 104 MMOL/L (ref 98–107)
CO2 SERPL-SCNC: 20 MMOL/L (ref 22–29)
CREAT SERPL-MCNC: 1.1 MG/DL (ref 0.5–1)
EOSINOPHIL # BLD: 0.01 K/UL (ref 0.05–0.5)
EOSINOPHILS RELATIVE PERCENT: 0 % (ref 0–6)
ERYTHROCYTE [DISTWIDTH] IN BLOOD BY AUTOMATED COUNT: 14 % (ref 11.5–15)
GFR SERPL CREATININE-BSD FRML MDRD: 49 ML/MIN/1.73M2
GLUCOSE BLD-MCNC: 161 MG/DL (ref 74–99)
GLUCOSE SERPL-MCNC: 150 MG/DL (ref 74–99)
HCT VFR BLD AUTO: 32.1 % (ref 34–48)
HGB BLD-MCNC: 10.6 G/DL (ref 11.5–15.5)
IMM GRANULOCYTES # BLD AUTO: 0.08 K/UL (ref 0–0.58)
IMM GRANULOCYTES NFR BLD: 1 % (ref 0–5)
LYMPHOCYTES NFR BLD: 0.5 K/UL (ref 1.5–4)
LYMPHOCYTES RELATIVE PERCENT: 4 % (ref 20–42)
MCH RBC QN AUTO: 31.1 PG (ref 26–35)
MCHC RBC AUTO-ENTMCNC: 33 G/DL (ref 32–34.5)
MCV RBC AUTO: 94.1 FL (ref 80–99.9)
MONOCYTES NFR BLD: 0.86 K/UL (ref 0.1–0.95)
MONOCYTES NFR BLD: 7 % (ref 2–12)
NEUTROPHILS NFR BLD: 88 % (ref 43–80)
NEUTS SEG NFR BLD: 10.58 K/UL (ref 1.8–7.3)
PLATELET # BLD AUTO: 148 K/UL (ref 130–450)
PMV BLD AUTO: 10.2 FL (ref 7–12)
POTASSIUM SERPL-SCNC: 3.9 MMOL/L (ref 3.5–5)
PROT SERPL-MCNC: 5.6 G/DL (ref 6.4–8.3)
RBC # BLD AUTO: 3.41 M/UL (ref 3.5–5.5)
RBC # BLD: ABNORMAL 10*6/UL
SODIUM SERPL-SCNC: 137 MMOL/L (ref 132–146)
WBC # BLD: ABNORMAL 10*3/UL
WBC OTHER # BLD: 12 K/UL (ref 4.5–11.5)

## 2024-04-27 PROCEDURE — 80053 COMPREHEN METABOLIC PANEL: CPT

## 2024-04-27 PROCEDURE — 2580000003 HC RX 258: Performed by: STUDENT IN AN ORGANIZED HEALTH CARE EDUCATION/TRAINING PROGRAM

## 2024-04-27 PROCEDURE — 82962 GLUCOSE BLOOD TEST: CPT

## 2024-04-27 PROCEDURE — 6370000000 HC RX 637 (ALT 250 FOR IP): Performed by: STUDENT IN AN ORGANIZED HEALTH CARE EDUCATION/TRAINING PROGRAM

## 2024-04-27 PROCEDURE — 6360000002 HC RX W HCPCS: Performed by: STUDENT IN AN ORGANIZED HEALTH CARE EDUCATION/TRAINING PROGRAM

## 2024-04-27 PROCEDURE — 97161 PT EVAL LOW COMPLEX 20 MIN: CPT

## 2024-04-27 PROCEDURE — 6370000000 HC RX 637 (ALT 250 FOR IP)

## 2024-04-27 PROCEDURE — 97165 OT EVAL LOW COMPLEX 30 MIN: CPT

## 2024-04-27 PROCEDURE — 1200000000 HC SEMI PRIVATE

## 2024-04-27 PROCEDURE — 85025 COMPLETE CBC W/AUTO DIFF WBC: CPT

## 2024-04-27 PROCEDURE — 97530 THERAPEUTIC ACTIVITIES: CPT

## 2024-04-27 RX ADMIN — PRIMIDONE 150 MG: 50 TABLET ORAL at 09:01

## 2024-04-27 RX ADMIN — LATANOPROST 1 DROP: 50 SOLUTION OPHTHALMIC at 20:41

## 2024-04-27 RX ADMIN — SPIRONOLACTONE 25 MG: 25 TABLET ORAL at 09:01

## 2024-04-27 RX ADMIN — TRAMADOL HYDROCHLORIDE 50 MG: 50 TABLET, COATED ORAL at 23:15

## 2024-04-27 RX ADMIN — LISINOPRIL 20 MG: 20 TABLET ORAL at 20:40

## 2024-04-27 RX ADMIN — PRIMIDONE 150 MG: 50 TABLET ORAL at 20:39

## 2024-04-27 RX ADMIN — FOLIC ACID 1 MG: 1 TABLET ORAL at 20:39

## 2024-04-27 RX ADMIN — WATER 2000 MG: 1 INJECTION INTRAMUSCULAR; INTRAVENOUS; SUBCUTANEOUS at 06:45

## 2024-04-27 RX ADMIN — WATER 2000 MG: 1 INJECTION INTRAMUSCULAR; INTRAVENOUS; SUBCUTANEOUS at 00:00

## 2024-04-27 RX ADMIN — TRAMADOL HYDROCHLORIDE 50 MG: 50 TABLET, COATED ORAL at 06:53

## 2024-04-27 RX ADMIN — ACETAMINOPHEN 650 MG: 325 TABLET ORAL at 04:56

## 2024-04-27 RX ADMIN — ACETAMINOPHEN 650 MG: 325 TABLET ORAL at 12:04

## 2024-04-27 RX ADMIN — DULOXETINE HYDROCHLORIDE 30 MG: 30 CAPSULE, DELAYED RELEASE ORAL at 09:01

## 2024-04-27 RX ADMIN — CALCIUM 500 MG: 500 TABLET ORAL at 09:01

## 2024-04-27 RX ADMIN — APIXABAN 5 MG: 5 TABLET, FILM COATED ORAL at 12:04

## 2024-04-27 RX ADMIN — Medication 2000 UNITS: at 09:01

## 2024-04-27 RX ADMIN — PRIMIDONE 150 MG: 50 TABLET ORAL at 00:00

## 2024-04-27 RX ADMIN — ATORVASTATIN CALCIUM 40 MG: 40 TABLET, FILM COATED ORAL at 20:39

## 2024-04-27 RX ADMIN — APIXABAN 5 MG: 5 TABLET, FILM COATED ORAL at 20:39

## 2024-04-27 RX ADMIN — DILTIAZEM HYDROCHLORIDE 360 MG: 180 CAPSULE, COATED, EXTENDED RELEASE ORAL at 09:01

## 2024-04-27 ASSESSMENT — PAIN DESCRIPTION - PAIN TYPE: TYPE: SURGICAL PAIN

## 2024-04-27 ASSESSMENT — PAIN SCALES - GENERAL
PAINLEVEL_OUTOF10: 7
PAINLEVEL_OUTOF10: 8
PAINLEVEL_OUTOF10: 0

## 2024-04-27 ASSESSMENT — PAIN DESCRIPTION - ONSET: ONSET: ON-GOING

## 2024-04-27 ASSESSMENT — PAIN DESCRIPTION - DESCRIPTORS
DESCRIPTORS: ACHING;DISCOMFORT;SHARP
DESCRIPTORS: ACHING;DISCOMFORT;SORE

## 2024-04-27 ASSESSMENT — PAIN SCALES - WONG BAKER: WONGBAKER_NUMERICALRESPONSE: NO HURT

## 2024-04-27 ASSESSMENT — PAIN DESCRIPTION - FREQUENCY: FREQUENCY: CONTINUOUS

## 2024-04-27 ASSESSMENT — PAIN DESCRIPTION - ORIENTATION
ORIENTATION: LEFT
ORIENTATION: LEFT

## 2024-04-27 ASSESSMENT — PAIN DESCRIPTION - LOCATION
LOCATION: HIP
LOCATION: HIP

## 2024-04-27 ASSESSMENT — PAIN - FUNCTIONAL ASSESSMENT: PAIN_FUNCTIONAL_ASSESSMENT: PREVENTS OR INTERFERES SOME ACTIVE ACTIVITIES AND ADLS

## 2024-04-28 LAB
ALBUMIN SERPL-MCNC: 3.3 G/DL (ref 3.5–5.2)
ALP SERPL-CCNC: 68 U/L (ref 35–104)
ALT SERPL-CCNC: <5 U/L (ref 0–32)
ANION GAP SERPL CALCULATED.3IONS-SCNC: 8 MMOL/L (ref 7–16)
AST SERPL-CCNC: 15 U/L (ref 0–31)
BASOPHILS # BLD: 0.02 K/UL (ref 0–0.2)
BASOPHILS NFR BLD: 0 % (ref 0–2)
BILIRUB SERPL-MCNC: 0.2 MG/DL (ref 0–1.2)
BILIRUB UR QL STRIP: NEGATIVE
BUN SERPL-MCNC: 32 MG/DL (ref 6–23)
CALCIUM SERPL-MCNC: 8.6 MG/DL (ref 8.6–10.2)
CHLORIDE SERPL-SCNC: 106 MMOL/L (ref 98–107)
CLARITY UR: CLEAR
CO2 SERPL-SCNC: 23 MMOL/L (ref 22–29)
COLOR UR: YELLOW
CREAT SERPL-MCNC: 1.1 MG/DL (ref 0.5–1)
EOSINOPHIL # BLD: 0.22 K/UL (ref 0.05–0.5)
EOSINOPHILS RELATIVE PERCENT: 3 % (ref 0–6)
ERYTHROCYTE [DISTWIDTH] IN BLOOD BY AUTOMATED COUNT: 14.1 % (ref 11.5–15)
GFR SERPL CREATININE-BSD FRML MDRD: 52 ML/MIN/1.73M2
GLUCOSE BLD-MCNC: 108 MG/DL (ref 74–99)
GLUCOSE BLD-MCNC: 159 MG/DL (ref 74–99)
GLUCOSE BLD-MCNC: 180 MG/DL (ref 74–99)
GLUCOSE BLD-MCNC: 187 MG/DL (ref 74–99)
GLUCOSE SERPL-MCNC: 111 MG/DL (ref 74–99)
GLUCOSE UR STRIP-MCNC: NEGATIVE MG/DL
HCT VFR BLD AUTO: 29.2 % (ref 34–48)
HGB BLD-MCNC: 9.6 G/DL (ref 11.5–15.5)
HGB UR QL STRIP.AUTO: NEGATIVE
IMM GRANULOCYTES # BLD AUTO: 0.04 K/UL (ref 0–0.58)
IMM GRANULOCYTES NFR BLD: 1 % (ref 0–5)
KETONES UR STRIP-MCNC: NEGATIVE MG/DL
LEUKOCYTE ESTERASE UR QL STRIP: ABNORMAL
LYMPHOCYTES NFR BLD: 0.62 K/UL (ref 1.5–4)
LYMPHOCYTES RELATIVE PERCENT: 8 % (ref 20–42)
MCH RBC QN AUTO: 30.9 PG (ref 26–35)
MCHC RBC AUTO-ENTMCNC: 32.9 G/DL (ref 32–34.5)
MCV RBC AUTO: 93.9 FL (ref 80–99.9)
MONOCYTES NFR BLD: 0.61 K/UL (ref 0.1–0.95)
MONOCYTES NFR BLD: 8 % (ref 2–12)
NEUTROPHILS NFR BLD: 81 % (ref 43–80)
NEUTS SEG NFR BLD: 6.38 K/UL (ref 1.8–7.3)
NITRITE UR QL STRIP: NEGATIVE
PH UR STRIP: 5.5 [PH] (ref 5–9)
PLATELET # BLD AUTO: 112 K/UL (ref 130–450)
PMV BLD AUTO: 10.5 FL (ref 7–12)
POTASSIUM SERPL-SCNC: 3.9 MMOL/L (ref 3.5–5)
PROT SERPL-MCNC: 5.4 G/DL (ref 6.4–8.3)
PROT UR STRIP-MCNC: ABNORMAL MG/DL
RBC # BLD AUTO: 3.11 M/UL (ref 3.5–5.5)
RBC #/AREA URNS HPF: NORMAL /HPF
SODIUM SERPL-SCNC: 137 MMOL/L (ref 132–146)
SP GR UR STRIP: 1.02 (ref 1–1.03)
UROBILINOGEN UR STRIP-ACNC: 0.2 EU/DL (ref 0–1)
WBC #/AREA URNS HPF: NORMAL /HPF
WBC OTHER # BLD: 7.9 K/UL (ref 4.5–11.5)

## 2024-04-28 PROCEDURE — 81001 URINALYSIS AUTO W/SCOPE: CPT

## 2024-04-28 PROCEDURE — 97530 THERAPEUTIC ACTIVITIES: CPT

## 2024-04-28 PROCEDURE — 6370000000 HC RX 637 (ALT 250 FOR IP)

## 2024-04-28 PROCEDURE — 6370000000 HC RX 637 (ALT 250 FOR IP): Performed by: STUDENT IN AN ORGANIZED HEALTH CARE EDUCATION/TRAINING PROGRAM

## 2024-04-28 PROCEDURE — 82962 GLUCOSE BLOOD TEST: CPT

## 2024-04-28 PROCEDURE — 85025 COMPLETE CBC W/AUTO DIFF WBC: CPT

## 2024-04-28 PROCEDURE — 97116 GAIT TRAINING THERAPY: CPT

## 2024-04-28 PROCEDURE — 1200000000 HC SEMI PRIVATE

## 2024-04-28 PROCEDURE — 80053 COMPREHEN METABOLIC PANEL: CPT

## 2024-04-28 PROCEDURE — 87086 URINE CULTURE/COLONY COUNT: CPT

## 2024-04-28 RX ORDER — OXYCODONE HYDROCHLORIDE 5 MG/1
5 TABLET ORAL EVERY 6 HOURS PRN
Qty: 28 TABLET | Refills: 0 | Status: SHIPPED | OUTPATIENT
Start: 2024-04-28 | End: 2024-05-05

## 2024-04-28 RX ADMIN — DULOXETINE HYDROCHLORIDE 30 MG: 30 CAPSULE, DELAYED RELEASE ORAL at 09:26

## 2024-04-28 RX ADMIN — SPIRONOLACTONE 25 MG: 25 TABLET ORAL at 09:25

## 2024-04-28 RX ADMIN — FOLIC ACID 1 MG: 1 TABLET ORAL at 21:11

## 2024-04-28 RX ADMIN — ACETAMINOPHEN 650 MG: 325 TABLET ORAL at 11:45

## 2024-04-28 RX ADMIN — LATANOPROST 1 DROP: 50 SOLUTION OPHTHALMIC at 21:15

## 2024-04-28 RX ADMIN — APIXABAN 5 MG: 5 TABLET, FILM COATED ORAL at 09:26

## 2024-04-28 RX ADMIN — DILTIAZEM HYDROCHLORIDE 360 MG: 180 CAPSULE, COATED, EXTENDED RELEASE ORAL at 09:26

## 2024-04-28 RX ADMIN — Medication 2000 UNITS: at 09:26

## 2024-04-28 RX ADMIN — ATORVASTATIN CALCIUM 40 MG: 40 TABLET, FILM COATED ORAL at 21:11

## 2024-04-28 RX ADMIN — ACETAMINOPHEN 650 MG: 325 TABLET ORAL at 21:11

## 2024-04-28 RX ADMIN — APIXABAN 5 MG: 5 TABLET, FILM COATED ORAL at 21:11

## 2024-04-28 RX ADMIN — PRIMIDONE 150 MG: 50 TABLET ORAL at 09:27

## 2024-04-28 RX ADMIN — ACETAMINOPHEN 650 MG: 325 TABLET ORAL at 18:59

## 2024-04-28 RX ADMIN — CALCIUM 500 MG: 500 TABLET ORAL at 09:25

## 2024-04-28 RX ADMIN — ACETAMINOPHEN 650 MG: 325 TABLET ORAL at 05:31

## 2024-04-28 RX ADMIN — LISINOPRIL 20 MG: 20 TABLET ORAL at 18:59

## 2024-04-28 RX ADMIN — PRIMIDONE 150 MG: 50 TABLET ORAL at 21:11

## 2024-04-28 ASSESSMENT — PAIN SCALES - GENERAL: PAINLEVEL_OUTOF10: 5

## 2024-04-29 LAB
ALBUMIN SERPL-MCNC: 3.2 G/DL (ref 3.5–5.2)
ALP SERPL-CCNC: 66 U/L (ref 35–104)
ALT SERPL-CCNC: <5 U/L (ref 0–32)
ANION GAP SERPL CALCULATED.3IONS-SCNC: 8 MMOL/L (ref 7–16)
AST SERPL-CCNC: 12 U/L (ref 0–31)
BASOPHILS # BLD: 0.02 K/UL (ref 0–0.2)
BASOPHILS NFR BLD: 0 % (ref 0–2)
BILIRUB SERPL-MCNC: 0.3 MG/DL (ref 0–1.2)
BUN SERPL-MCNC: 25 MG/DL (ref 6–23)
CALCIUM SERPL-MCNC: 8.7 MG/DL (ref 8.6–10.2)
CHLORIDE SERPL-SCNC: 105 MMOL/L (ref 98–107)
CO2 SERPL-SCNC: 23 MMOL/L (ref 22–29)
CREAT SERPL-MCNC: 0.8 MG/DL (ref 0.5–1)
EOSINOPHIL # BLD: 0.21 K/UL (ref 0.05–0.5)
EOSINOPHILS RELATIVE PERCENT: 3 % (ref 0–6)
ERYTHROCYTE [DISTWIDTH] IN BLOOD BY AUTOMATED COUNT: 14.1 % (ref 11.5–15)
GFR SERPL CREATININE-BSD FRML MDRD: 76 ML/MIN/1.73M2
GLUCOSE BLD-MCNC: 121 MG/DL (ref 74–99)
GLUCOSE BLD-MCNC: 149 MG/DL (ref 74–99)
GLUCOSE BLD-MCNC: 155 MG/DL (ref 74–99)
GLUCOSE BLD-MCNC: 209 MG/DL (ref 74–99)
GLUCOSE SERPL-MCNC: 109 MG/DL (ref 74–99)
HCT VFR BLD AUTO: 28.1 % (ref 34–48)
HGB BLD-MCNC: 9.4 G/DL (ref 11.5–15.5)
IMM GRANULOCYTES # BLD AUTO: 0.03 K/UL (ref 0–0.58)
IMM GRANULOCYTES NFR BLD: 1 % (ref 0–5)
LYMPHOCYTES NFR BLD: 0.6 K/UL (ref 1.5–4)
LYMPHOCYTES RELATIVE PERCENT: 9 % (ref 20–42)
MCH RBC QN AUTO: 31.9 PG (ref 26–35)
MCHC RBC AUTO-ENTMCNC: 33.5 G/DL (ref 32–34.5)
MCV RBC AUTO: 95.3 FL (ref 80–99.9)
MICROORGANISM SPEC CULT: ABNORMAL
MONOCYTES NFR BLD: 0.64 K/UL (ref 0.1–0.95)
MONOCYTES NFR BLD: 10 % (ref 2–12)
NEUTROPHILS NFR BLD: 77 % (ref 43–80)
NEUTS SEG NFR BLD: 5.07 K/UL (ref 1.8–7.3)
PLATELET # BLD AUTO: 120 K/UL (ref 130–450)
PMV BLD AUTO: 10.6 FL (ref 7–12)
POTASSIUM SERPL-SCNC: 3.9 MMOL/L (ref 3.5–5)
PROT SERPL-MCNC: 5.3 G/DL (ref 6.4–8.3)
RBC # BLD AUTO: 2.95 M/UL (ref 3.5–5.5)
SODIUM SERPL-SCNC: 136 MMOL/L (ref 132–146)
SPECIMEN DESCRIPTION: ABNORMAL
WBC OTHER # BLD: 6.6 K/UL (ref 4.5–11.5)

## 2024-04-29 PROCEDURE — 6370000000 HC RX 637 (ALT 250 FOR IP)

## 2024-04-29 PROCEDURE — 97110 THERAPEUTIC EXERCISES: CPT

## 2024-04-29 PROCEDURE — 6370000000 HC RX 637 (ALT 250 FOR IP): Performed by: STUDENT IN AN ORGANIZED HEALTH CARE EDUCATION/TRAINING PROGRAM

## 2024-04-29 PROCEDURE — 97530 THERAPEUTIC ACTIVITIES: CPT

## 2024-04-29 PROCEDURE — 85025 COMPLETE CBC W/AUTO DIFF WBC: CPT

## 2024-04-29 PROCEDURE — 82962 GLUCOSE BLOOD TEST: CPT

## 2024-04-29 PROCEDURE — 80053 COMPREHEN METABOLIC PANEL: CPT

## 2024-04-29 PROCEDURE — 36415 COLL VENOUS BLD VENIPUNCTURE: CPT

## 2024-04-29 PROCEDURE — 1200000000 HC SEMI PRIVATE

## 2024-04-29 RX ADMIN — TRAMADOL HYDROCHLORIDE 50 MG: 50 TABLET, COATED ORAL at 10:07

## 2024-04-29 RX ADMIN — LISINOPRIL 20 MG: 20 TABLET ORAL at 17:49

## 2024-04-29 RX ADMIN — ATORVASTATIN CALCIUM 40 MG: 40 TABLET, FILM COATED ORAL at 21:36

## 2024-04-29 RX ADMIN — FOLIC ACID 1 MG: 1 TABLET ORAL at 21:36

## 2024-04-29 RX ADMIN — DILTIAZEM HYDROCHLORIDE 360 MG: 180 CAPSULE, COATED, EXTENDED RELEASE ORAL at 10:08

## 2024-04-29 RX ADMIN — LATANOPROST 1 DROP: 50 SOLUTION OPHTHALMIC at 21:36

## 2024-04-29 RX ADMIN — APIXABAN 5 MG: 5 TABLET, FILM COATED ORAL at 10:07

## 2024-04-29 RX ADMIN — PRIMIDONE 150 MG: 50 TABLET ORAL at 21:36

## 2024-04-29 RX ADMIN — ACETAMINOPHEN 650 MG: 325 TABLET ORAL at 21:48

## 2024-04-29 RX ADMIN — ACETAMINOPHEN 650 MG: 325 TABLET ORAL at 10:15

## 2024-04-29 RX ADMIN — ACETAMINOPHEN 650 MG: 325 TABLET ORAL at 17:49

## 2024-04-29 RX ADMIN — APIXABAN 5 MG: 5 TABLET, FILM COATED ORAL at 21:36

## 2024-04-29 RX ADMIN — ACETAMINOPHEN 650 MG: 325 TABLET ORAL at 05:59

## 2024-04-29 RX ADMIN — SPIRONOLACTONE 25 MG: 25 TABLET ORAL at 10:08

## 2024-04-29 RX ADMIN — CALCIUM 500 MG: 500 TABLET ORAL at 10:07

## 2024-04-29 RX ADMIN — DULOXETINE HYDROCHLORIDE 30 MG: 30 CAPSULE, DELAYED RELEASE ORAL at 10:07

## 2024-04-29 RX ADMIN — PRIMIDONE 150 MG: 50 TABLET ORAL at 10:08

## 2024-04-29 RX ADMIN — Medication 2000 UNITS: at 10:07

## 2024-04-29 ASSESSMENT — PAIN - FUNCTIONAL ASSESSMENT: PAIN_FUNCTIONAL_ASSESSMENT: ACTIVITIES ARE NOT PREVENTED

## 2024-04-29 ASSESSMENT — PAIN DESCRIPTION - ORIENTATION: ORIENTATION: LEFT

## 2024-04-29 ASSESSMENT — PAIN SCALES - GENERAL
PAINLEVEL_OUTOF10: 7
PAINLEVEL_OUTOF10: 0
PAINLEVEL_OUTOF10: 1

## 2024-04-29 ASSESSMENT — PAIN DESCRIPTION - FREQUENCY: FREQUENCY: INTERMITTENT

## 2024-04-29 ASSESSMENT — PAIN DESCRIPTION - LOCATION: LOCATION: HIP

## 2024-04-29 ASSESSMENT — PAIN DESCRIPTION - DESCRIPTORS: DESCRIPTORS: ACHING

## 2024-04-29 ASSESSMENT — PAIN DESCRIPTION - PAIN TYPE: TYPE: SURGICAL PAIN

## 2024-04-29 ASSESSMENT — PAIN DESCRIPTION - ONSET: ONSET: GRADUAL

## 2024-04-29 NOTE — DISCHARGE INSTR - COC
H&P: No change in H&P    PHYSICIAN SIGNATURE:  Electronically signed by ERIC Jimenez CNP on 5/1/24 at 11:32 AM EDT

## 2024-04-29 NOTE — CARE COORDINATION
Updated plan of care. POD#3 left hip hemiarthroplasty. Therapies working with patient. Accepted and margaret Hernandez and pre-cert initiated. 7000 and wheelchair transport forms done, ESTRELLA initiated. Await pre-cert-mjo

## 2024-04-29 NOTE — PATIENT CARE CONFERENCE
[facility] Quality Flow/Interdisciplinary Rounds Progress Note        Quality Flow Rounds held on April 29, 2024    Disciplines Attending:  Bedside Nurse, , , and Nursing Unit Leadership    Suki Weir was admitted on 4/24/2024 10:35 PM    Anticipated Discharge Date:       Disposition:    Martell Score:  Martell Scale Score: 17    Readmission Risk              Risk of Unplanned Readmission:  20           Discussed patient goal for the day, patient clinical progression, and barriers to discharge.  The following Goal(s) of the Day/Commitment(s) have been identified:  awaiting precert to nabila. Manage pain and safety      Rachel Smith RN  April 29, 2024

## 2024-04-29 NOTE — PLAN OF CARE
Problem: Discharge Planning  Goal: Discharge to home or other facility with appropriate resources  4/28/2024 2351 by Bandar Hill, RN  Outcome: Progressing     Problem: Pain  Goal: Verbalizes/displays adequate comfort level or baseline comfort level  4/28/2024 2351 by Bandar Hill, RN  Outcome: Progressing     Problem: Skin/Tissue Integrity  Goal: Absence of new skin breakdown  Description: 1.  Monitor for areas of redness and/or skin breakdown  2.  Assess vascular access sites hourly  3.  Every 4-6 hours minimum:  Change oxygen saturation probe site  4.  Every 4-6 hours:  If on nasal continuous positive airway pressure, respiratory therapy assess nares and determine need for appliance change or resting period.  4/28/2024 2351 by Bandar Hill, RN  Outcome: Progressing     Problem: Safety - Adult  Goal: Free from fall injury  Outcome: Progressing     Problem: ABCDS Injury Assessment  Goal: Absence of physical injury  Outcome: Progressing     Problem: Chronic Conditions and Co-morbidities  Goal: Patient's chronic conditions and co-morbidity symptoms are monitored and maintained or improved  Outcome: Progressing

## 2024-04-30 LAB
ALBUMIN SERPL-MCNC: 3.1 G/DL (ref 3.5–5.2)
ALP SERPL-CCNC: 69 U/L (ref 35–104)
ALT SERPL-CCNC: 6 U/L (ref 0–32)
ANION GAP SERPL CALCULATED.3IONS-SCNC: 7 MMOL/L (ref 7–16)
AST SERPL-CCNC: 14 U/L (ref 0–31)
BASOPHILS # BLD: 0.02 K/UL (ref 0–0.2)
BASOPHILS NFR BLD: 0 % (ref 0–2)
BILIRUB SERPL-MCNC: 0.4 MG/DL (ref 0–1.2)
BUN SERPL-MCNC: 25 MG/DL (ref 6–23)
CALCIUM SERPL-MCNC: 9 MG/DL (ref 8.6–10.2)
CHLORIDE SERPL-SCNC: 104 MMOL/L (ref 98–107)
CO2 SERPL-SCNC: 25 MMOL/L (ref 22–29)
CREAT SERPL-MCNC: 0.9 MG/DL (ref 0.5–1)
EOSINOPHIL # BLD: 0.23 K/UL (ref 0.05–0.5)
EOSINOPHILS RELATIVE PERCENT: 3 % (ref 0–6)
ERYTHROCYTE [DISTWIDTH] IN BLOOD BY AUTOMATED COUNT: 14 % (ref 11.5–15)
GFR SERPL CREATININE-BSD FRML MDRD: 62 ML/MIN/1.73M2
GLUCOSE BLD-MCNC: 121 MG/DL (ref 74–99)
GLUCOSE BLD-MCNC: 178 MG/DL (ref 74–99)
GLUCOSE BLD-MCNC: 180 MG/DL (ref 74–99)
GLUCOSE BLD-MCNC: 186 MG/DL (ref 74–99)
GLUCOSE SERPL-MCNC: 126 MG/DL (ref 74–99)
HCT VFR BLD AUTO: 28.9 % (ref 34–48)
HGB BLD-MCNC: 9.6 G/DL (ref 11.5–15.5)
IMM GRANULOCYTES # BLD AUTO: 0.04 K/UL (ref 0–0.58)
IMM GRANULOCYTES NFR BLD: 1 % (ref 0–5)
LYMPHOCYTES NFR BLD: 0.81 K/UL (ref 1.5–4)
LYMPHOCYTES RELATIVE PERCENT: 11 % (ref 20–42)
MCH RBC QN AUTO: 30.9 PG (ref 26–35)
MCHC RBC AUTO-ENTMCNC: 33.2 G/DL (ref 32–34.5)
MCV RBC AUTO: 92.9 FL (ref 80–99.9)
MONOCYTES NFR BLD: 0.66 K/UL (ref 0.1–0.95)
MONOCYTES NFR BLD: 9 % (ref 2–12)
NEUTROPHILS NFR BLD: 76 % (ref 43–80)
NEUTS SEG NFR BLD: 5.57 K/UL (ref 1.8–7.3)
PLATELET # BLD AUTO: 127 K/UL (ref 130–450)
PMV BLD AUTO: 10.1 FL (ref 7–12)
POTASSIUM SERPL-SCNC: 4 MMOL/L (ref 3.5–5)
PROT SERPL-MCNC: 5.3 G/DL (ref 6.4–8.3)
RBC # BLD AUTO: 3.11 M/UL (ref 3.5–5.5)
SODIUM SERPL-SCNC: 136 MMOL/L (ref 132–146)
WBC OTHER # BLD: 7.3 K/UL (ref 4.5–11.5)

## 2024-04-30 PROCEDURE — 85025 COMPLETE CBC W/AUTO DIFF WBC: CPT

## 2024-04-30 PROCEDURE — 80053 COMPREHEN METABOLIC PANEL: CPT

## 2024-04-30 PROCEDURE — 82962 GLUCOSE BLOOD TEST: CPT

## 2024-04-30 PROCEDURE — 97530 THERAPEUTIC ACTIVITIES: CPT

## 2024-04-30 PROCEDURE — 6370000000 HC RX 637 (ALT 250 FOR IP): Performed by: STUDENT IN AN ORGANIZED HEALTH CARE EDUCATION/TRAINING PROGRAM

## 2024-04-30 PROCEDURE — 6370000000 HC RX 637 (ALT 250 FOR IP)

## 2024-04-30 PROCEDURE — 36415 COLL VENOUS BLD VENIPUNCTURE: CPT

## 2024-04-30 PROCEDURE — 1200000000 HC SEMI PRIVATE

## 2024-04-30 RX ORDER — HYDRALAZINE HYDROCHLORIDE 20 MG/ML
5 INJECTION INTRAMUSCULAR; INTRAVENOUS EVERY 6 HOURS PRN
Status: DISCONTINUED | OUTPATIENT
Start: 2024-04-30 | End: 2024-05-01 | Stop reason: HOSPADM

## 2024-04-30 RX ORDER — TRAMADOL HYDROCHLORIDE 50 MG/1
50 TABLET ORAL EVERY 6 HOURS PRN
Qty: 12 TABLET | Refills: 0 | Status: SHIPPED | OUTPATIENT
Start: 2024-04-30 | End: 2024-05-03

## 2024-04-30 RX ADMIN — DULOXETINE HYDROCHLORIDE 30 MG: 30 CAPSULE, DELAYED RELEASE ORAL at 11:00

## 2024-04-30 RX ADMIN — DILTIAZEM HYDROCHLORIDE 360 MG: 180 CAPSULE, COATED, EXTENDED RELEASE ORAL at 11:00

## 2024-04-30 RX ADMIN — LISINOPRIL 20 MG: 20 TABLET ORAL at 20:33

## 2024-04-30 RX ADMIN — SPIRONOLACTONE 25 MG: 25 TABLET ORAL at 11:00

## 2024-04-30 RX ADMIN — APIXABAN 5 MG: 5 TABLET, FILM COATED ORAL at 11:00

## 2024-04-30 RX ADMIN — FOLIC ACID 1 MG: 1 TABLET ORAL at 20:34

## 2024-04-30 RX ADMIN — ACETAMINOPHEN 650 MG: 325 TABLET ORAL at 11:03

## 2024-04-30 RX ADMIN — TRAMADOL HYDROCHLORIDE 50 MG: 50 TABLET, COATED ORAL at 20:34

## 2024-04-30 RX ADMIN — Medication 2000 UNITS: at 11:00

## 2024-04-30 RX ADMIN — APIXABAN 5 MG: 5 TABLET, FILM COATED ORAL at 20:33

## 2024-04-30 RX ADMIN — LATANOPROST 1 DROP: 50 SOLUTION OPHTHALMIC at 20:36

## 2024-04-30 RX ADMIN — PRIMIDONE 150 MG: 50 TABLET ORAL at 11:00

## 2024-04-30 RX ADMIN — ATORVASTATIN CALCIUM 40 MG: 40 TABLET, FILM COATED ORAL at 20:34

## 2024-04-30 RX ADMIN — CALCIUM 500 MG: 500 TABLET ORAL at 11:00

## 2024-04-30 RX ADMIN — PRIMIDONE 150 MG: 50 TABLET ORAL at 20:33

## 2024-04-30 ASSESSMENT — PAIN DESCRIPTION - ONSET: ONSET: ON-GOING

## 2024-04-30 ASSESSMENT — PAIN - FUNCTIONAL ASSESSMENT: PAIN_FUNCTIONAL_ASSESSMENT: PREVENTS OR INTERFERES SOME ACTIVE ACTIVITIES AND ADLS

## 2024-04-30 ASSESSMENT — PAIN SCALES - GENERAL
PAINLEVEL_OUTOF10: 1
PAINLEVEL_OUTOF10: 7
PAINLEVEL_OUTOF10: 0

## 2024-04-30 ASSESSMENT — PAIN DESCRIPTION - PAIN TYPE: TYPE: SURGICAL PAIN

## 2024-04-30 ASSESSMENT — PAIN SCALES - WONG BAKER: WONGBAKER_NUMERICALRESPONSE: HURTS A LITTLE BIT

## 2024-04-30 ASSESSMENT — PAIN DESCRIPTION - DESCRIPTORS: DESCRIPTORS: ACHING;DISCOMFORT;SHARP

## 2024-04-30 ASSESSMENT — PAIN DESCRIPTION - FREQUENCY: FREQUENCY: CONTINUOUS

## 2024-04-30 ASSESSMENT — PAIN DESCRIPTION - ORIENTATION: ORIENTATION: LEFT

## 2024-04-30 ASSESSMENT — PAIN DESCRIPTION - LOCATION: LOCATION: HIP

## 2024-04-30 NOTE — PLAN OF CARE
Problem: Discharge Planning  Goal: Discharge to home or other facility with appropriate resources  4/30/2024 0046 by Bandar Hill RN  Outcome: Progressing     Problem: Pain  Goal: Verbalizes/displays adequate comfort level or baseline comfort level  4/30/2024 0046 by Bandar Hill RN  Outcome: Progressing     Problem: Skin/Tissue Integrity  Goal: Absence of new skin breakdown  Description: 1.  Monitor for areas of redness and/or skin breakdown  2.  Assess vascular access sites hourly  3.  Every 4-6 hours minimum:  Change oxygen saturation probe site  4.  Every 4-6 hours:  If on nasal continuous positive airway pressure, respiratory therapy assess nares and determine need for appliance change or resting period.  4/30/2024 0046 by Bandar Hill RN  Outcome: Progressing     Problem: Safety - Adult  Goal: Free from fall injury  4/30/2024 0046 by Bandar Hill RN  Outcome: Progressing     Problem: ABCDS Injury Assessment  Goal: Absence of physical injury  4/30/2024 0046 by Bandar Hill RN  Outcome: Progressing     Problem: Chronic Conditions and Co-morbidities  Goal: Patient's chronic conditions and co-morbidity symptoms are monitored and maintained or improved  4/30/2024 0046 by Bandar Hill RN  Outcome: Progressing

## 2024-05-01 VITALS
BODY MASS INDEX: 26.46 KG/M2 | SYSTOLIC BLOOD PRESSURE: 167 MMHG | TEMPERATURE: 98.1 F | RESPIRATION RATE: 16 BRPM | OXYGEN SATURATION: 100 % | HEIGHT: 64 IN | WEIGHT: 155 LBS | DIASTOLIC BLOOD PRESSURE: 69 MMHG | HEART RATE: 70 BPM

## 2024-05-01 LAB
ALBUMIN SERPL-MCNC: 3.2 G/DL (ref 3.5–5.2)
ALP SERPL-CCNC: 70 U/L (ref 35–104)
ALT SERPL-CCNC: 8 U/L (ref 0–32)
ANION GAP SERPL CALCULATED.3IONS-SCNC: 7 MMOL/L (ref 7–16)
AST SERPL-CCNC: 14 U/L (ref 0–31)
BASOPHILS # BLD: 0.02 K/UL (ref 0–0.2)
BASOPHILS NFR BLD: 0 % (ref 0–2)
BILIRUB SERPL-MCNC: 0.4 MG/DL (ref 0–1.2)
BUN SERPL-MCNC: 22 MG/DL (ref 6–23)
CALCIUM SERPL-MCNC: 8.8 MG/DL (ref 8.6–10.2)
CHLORIDE SERPL-SCNC: 104 MMOL/L (ref 98–107)
CO2 SERPL-SCNC: 26 MMOL/L (ref 22–29)
CREAT SERPL-MCNC: 0.9 MG/DL (ref 0.5–1)
EOSINOPHIL # BLD: 0.2 K/UL (ref 0.05–0.5)
EOSINOPHILS RELATIVE PERCENT: 3 % (ref 0–6)
ERYTHROCYTE [DISTWIDTH] IN BLOOD BY AUTOMATED COUNT: 14 % (ref 11.5–15)
GFR, ESTIMATED: 68 ML/MIN/1.73M2
GLUCOSE BLD-MCNC: 122 MG/DL (ref 74–99)
GLUCOSE BLD-MCNC: 149 MG/DL (ref 74–99)
GLUCOSE SERPL-MCNC: 110 MG/DL (ref 74–99)
HCT VFR BLD AUTO: 27.3 % (ref 34–48)
HGB BLD-MCNC: 9.3 G/DL (ref 11.5–15.5)
IMM GRANULOCYTES # BLD AUTO: 0.03 K/UL (ref 0–0.58)
IMM GRANULOCYTES NFR BLD: 1 % (ref 0–5)
LYMPHOCYTES NFR BLD: 1.01 K/UL (ref 1.5–4)
LYMPHOCYTES RELATIVE PERCENT: 16 % (ref 20–42)
MCH RBC QN AUTO: 31.1 PG (ref 26–35)
MCHC RBC AUTO-ENTMCNC: 34.1 G/DL (ref 32–34.5)
MCV RBC AUTO: 91.3 FL (ref 80–99.9)
MONOCYTES NFR BLD: 0.64 K/UL (ref 0.1–0.95)
MONOCYTES NFR BLD: 10 % (ref 2–12)
NEUTROPHILS NFR BLD: 70 % (ref 43–80)
NEUTS SEG NFR BLD: 4.52 K/UL (ref 1.8–7.3)
PLATELET # BLD AUTO: 128 K/UL (ref 130–450)
PMV BLD AUTO: 10 FL (ref 7–12)
POTASSIUM SERPL-SCNC: 4.2 MMOL/L (ref 3.5–5)
PROT SERPL-MCNC: 5.4 G/DL (ref 6.4–8.3)
RBC # BLD AUTO: 2.99 M/UL (ref 3.5–5.5)
SODIUM SERPL-SCNC: 137 MMOL/L (ref 132–146)
SURGICAL PATHOLOGY REPORT: NORMAL
WBC OTHER # BLD: 6.4 K/UL (ref 4.5–11.5)

## 2024-05-01 PROCEDURE — 82962 GLUCOSE BLOOD TEST: CPT

## 2024-05-01 PROCEDURE — 6370000000 HC RX 637 (ALT 250 FOR IP)

## 2024-05-01 PROCEDURE — 36415 COLL VENOUS BLD VENIPUNCTURE: CPT

## 2024-05-01 PROCEDURE — 6370000000 HC RX 637 (ALT 250 FOR IP): Performed by: STUDENT IN AN ORGANIZED HEALTH CARE EDUCATION/TRAINING PROGRAM

## 2024-05-01 PROCEDURE — 80053 COMPREHEN METABOLIC PANEL: CPT

## 2024-05-01 PROCEDURE — 85025 COMPLETE CBC W/AUTO DIFF WBC: CPT

## 2024-05-01 RX ORDER — BENZONATATE 100 MG/1
100 CAPSULE ORAL 3 TIMES DAILY PRN
Status: DISCONTINUED | OUTPATIENT
Start: 2024-05-01 | End: 2024-05-01 | Stop reason: HOSPADM

## 2024-05-01 RX ADMIN — ACETAMINOPHEN 650 MG: 325 TABLET ORAL at 11:12

## 2024-05-01 RX ADMIN — APIXABAN 5 MG: 5 TABLET, FILM COATED ORAL at 09:46

## 2024-05-01 RX ADMIN — CALCIUM 500 MG: 500 TABLET ORAL at 09:46

## 2024-05-01 RX ADMIN — Medication 2000 UNITS: at 09:47

## 2024-05-01 RX ADMIN — PRIMIDONE 150 MG: 50 TABLET ORAL at 09:46

## 2024-05-01 RX ADMIN — DILTIAZEM HYDROCHLORIDE 360 MG: 180 CAPSULE, COATED, EXTENDED RELEASE ORAL at 09:47

## 2024-05-01 RX ADMIN — SPIRONOLACTONE 25 MG: 25 TABLET ORAL at 09:46

## 2024-05-01 RX ADMIN — ACETAMINOPHEN 650 MG: 325 TABLET ORAL at 00:46

## 2024-05-01 RX ADMIN — DULOXETINE HYDROCHLORIDE 30 MG: 30 CAPSULE, DELAYED RELEASE ORAL at 09:46

## 2024-05-01 RX ADMIN — ACETAMINOPHEN 650 MG: 325 TABLET ORAL at 05:19

## 2024-05-01 NOTE — PROGRESS NOTES
[facility]   Progress note      Subjective:    Patient sitting up in chair without complaints  Awaiting skilled nursing facility  Tolerating p.o. intake  Denies chest pain, shortness of breath    Objective:    BP (!) 155/74   Pulse 72   Temp 98.1 °F (36.7 °C) (Oral)   Resp 16   Ht 1.626 m (5' 4\")   Wt 70.3 kg (155 lb)   SpO2 100%   BMI 26.61 kg/m²     In: -   Out: 1300   In: -   Out: 1300 [Urine:1300]    General appearance: NAD, conversant  Eyes: Sclerae anicteric, PERRLA  HEENT: AT/NC, MMM  Neck: FROM, supple, no thyromegaly  Lymph: No cervical / supraclavicular lymphadenopathy  Lungs: Clear to auscultation, WOB normal  CV: irregular, systolic murmur present  Abdomen: Soft, non-tender; no masses or HSM, +BS  Extremities: Left extremity postop  Skin: no rash, induration, lesions, or ulcers  Psych: Calm and cooperative.  Normal judgement and insight.  Normal mood and affect.  Neuro: Alert and interactive, face symmetric, speech fluent.     Recent Labs     04/27/24  1106 04/28/24  0310 04/29/24  0357   WBC 12.0* 7.9 6.6   HGB 10.6* 9.6* 9.4*   HCT 32.1* 29.2* 28.1*    112* 120*       Recent Labs     04/27/24  1106 04/28/24  0310 04/29/24  0357    137 136   K 3.9 3.9 3.9    106 105   CO2 20* 23 23   BUN 34* 32* 25*   CREATININE 1.1* 1.1* 0.8   CALCIUM 8.6 8.6 8.7       Assessment:    Principal Problem:    Fall from standing, initial encounter  Resolved Problems:    * No resolved hospital problems. *      Plan:    83-year-old female admitted to telemetry unit with     Left femoral neck fracture  Pain control  Hold anticoagulation  Consult orthopedic surgery  Encourage ISP  Bowel regimen  Keep patient n.p.o. until seen by orthopedic surgery     Diabetes Mellitus  -Monitor labs  -ISS glucose control  -Hypoglycemia protocol initiated     Hypertension  Resume home medications with parameters  Monitor every 4 hours     Atrial fibrillation  --rate controlled  --on anticoagulation, 
    [facility]   Progress note      Subjective:    She feels well and denies any acute complaints  Awaiting skilled nursing facility  Tolerating p.o. intake      Objective:    BP (!) 154/79   Pulse 75   Temp 98.3 °F (36.8 °C) (Oral)   Resp 16   Ht 1.626 m (5' 4\")   Wt 70.3 kg (155 lb)   SpO2 100%   BMI 26.61 kg/m²     No intake/output data recorded.  No intake/output data recorded.    General appearance: NAD, conversant  Eyes: Sclerae anicteric, PERRLA  HEENT: AT/NC, MMM  Neck: FROM, supple, no thyromegaly  Lymph: No cervical / supraclavicular lymphadenopathy  Lungs: Clear to auscultation, WOB normal  CV: irregular, systolic murmur present  Abdomen: Soft, non-tender; no masses or HSM, +BS  Extremities: Left extremity postop  Skin: no rash, induration, lesions, or ulcers  Psych: Calm and cooperative.  Normal judgement and insight.  Normal mood and affect.  Neuro: Alert and interactive, face symmetric, speech fluent.     Recent Labs     04/26/24  0336 04/27/24  1106 04/28/24  0310   WBC 9.8 12.0* 7.9   HGB 11.3* 10.6* 9.6*   HCT 34.3 32.1* 29.2*   * 148 112*       Recent Labs     04/26/24  0336 04/27/24  1106 04/28/24  0310    137 137   K 4.6 3.9 3.9    104 106   CO2 24 20* 23   BUN 19 34* 32*   CREATININE 0.8 1.1* 1.1*   CALCIUM 9.1 8.6 8.6       Assessment:    Principal Problem:    Fall from standing, initial encounter  Resolved Problems:    * No resolved hospital problems. *      Plan:    83-year-old female admitted to telemetry unit with     Left femoral neck fracture  Pain control  Hold anticoagulation  Consult orthopedic surgery  Encourage ISP  Bowel regimen  Keep patient n.p.o. until seen by orthopedic surgery     Diabetes Mellitus  -Monitor labs  -ISS glucose control  -Hypoglycemia protocol initiated     Hypertension  Resume home medications with parameters  Monitor every 4 hours     Atrial fibrillation  --rate controlled  --on anticoagulation, but held in anticipation 
    [facility]   Progress note      Subjective:    Patient sitting up in chair without complaints  Awaiting skilled nursing facility  Tolerating p.o. intake  Denies chest pain, shortness of breath    Objective:    BP (!) 175/83   Pulse 80   Temp 98.2 °F (36.8 °C) (Oral)   Resp 16   Ht 1.626 m (5' 4\")   Wt 70.3 kg (155 lb)   SpO2 100%   BMI 26.61 kg/m²     In: -   Out: 800   In: -   Out: 800 [Urine:800]    General appearance: NAD, conversant  Eyes: Sclerae anicteric, PERRLA  HEENT: AT/NC, MMM  Neck: FROM, supple, no thyromegaly  Lymph: No cervical / supraclavicular lymphadenopathy  Lungs: Clear to auscultation, WOB normal  CV: irregular, systolic murmur present  Abdomen: Soft, non-tender; no masses or HSM, +BS  Extremities: Left extremity postop  Skin: no rash, induration, lesions, or ulcers  Psych: Calm and cooperative.  Normal judgement and insight.  Normal mood and affect.  Neuro: Alert and interactive, face symmetric, speech fluent.     Recent Labs     04/28/24  0310 04/29/24  0357 04/30/24  0421   WBC 7.9 6.6 7.3   HGB 9.6* 9.4* 9.6*   HCT 29.2* 28.1* 28.9*   * 120* 127*       Recent Labs     04/28/24  0310 04/29/24  0357 04/30/24  0421    136 136   K 3.9 3.9 4.0    105 104   CO2 23 23 25   BUN 32* 25* 25*   CREATININE 1.1* 0.8 0.9   CALCIUM 8.6 8.7 9.0       Assessment:    Principal Problem:    Fall from standing, initial encounter  Resolved Problems:    * No resolved hospital problems. *      Plan:    83-year-old female admitted to telemetry unit with     Left femoral neck fracture  Pain control  Hold anticoagulation  Consult orthopedic surgery  Encourage ISP  Bowel regimen  Keep patient n.p.o. until seen by orthopedic surgery     Diabetes Mellitus  -Monitor labs  -ISS glucose control  -Hypoglycemia protocol initiated     Hypertension  Resume home medications with parameters  Monitor every 4 hours     Atrial fibrillation  --rate controlled  --on anticoagulation, but 
    [facility]   Progress note      Subjective:    The patient is awake and alert, but confused, states she wasn't supposed to have surgery until Wednesday. Nursing reports some confusion as well. No family present.    No acute events overnight.    Denies chest pain, angina, SOB     Objective:    BP (!) 164/71   Pulse 92   Temp 98.4 °F (36.9 °C) (Oral)   Resp 18   Ht 1.626 m (5' 4\")   Wt 70.3 kg (155 lb)   SpO2 98%   BMI 26.61 kg/m²     In: -   Out: 1100   In: -   Out: 1100 [Urine:1100]    General appearance: NAD, conversant  Eyes: Sclerae anicteric, PERRLA  HEENT: AT/NC, MMM  Neck: FROM, supple, no thyromegaly  Lymph: No cervical / supraclavicular lymphadenopathy  Lungs: Clear to auscultation, WOB normal  CV: irregular, systolic murmur present  Abdomen: Soft, non-tender; no masses or HSM, +BS  Extremities: FROM without synovitis.  No clubbing or cyanosis of the hands.  Skin: no rash, induration, lesions, or ulcers  Psych: Calm and cooperative.  Normal judgement and insight.  Normal mood and affect.  Neuro: Alert and interactive, face symmetric, speech fluent.     Recent Labs     04/24/24 2314 04/25/24  0515 04/26/24  0336   WBC 8.5 11.8* 9.8   HGB 11.5 11.5 11.3*   HCT 34.5 34.7 34.3    132 117*       Recent Labs     04/24/24  2314 04/25/24  0515 04/26/24  0336    136 133   K 4.3 4.7 4.6    103 101   CO2 26 24 24   BUN 26* 25* 19   CREATININE 1.0 0.9 0.8   CALCIUM 9.3 9.1 9.1       Assessment:    Principal Problem:    Fall from standing, initial encounter  Resolved Problems:    * No resolved hospital problems. *      Plan:    83-year-old female admitted to telemetry unit with     Left femoral neck fracture  Pain control  Hold anticoagulation  Consult orthopedic surgery  Encourage ISP  Bowel regimen  Keep patient n.p.o. until seen by orthopedic surgery     Diabetes Mellitus  -Monitor labs  -ISS glucose control  -Hypoglycemia protocol initiated     Hypertension  Resume home 
  P Quality Flow/Interdisciplinary Rounds Progress Note        Quality Flow Rounds held on April 25, 2024    Disciplines Attending:  Bedside Nurse, , , and Nursing Unit Leadership    Suki Weir was admitted on 4/24/2024 10:35 PM    Anticipated Discharge Date:       Disposition:    Martell Score:  Martell Scale Score: 15    Readmission Risk              Risk of Unplanned Readmission:  17           Discussed patient goal for the day, patient clinical progression, and barriers to discharge.  The following Goal(s) of the Day/Commitment(s) have been identified:   Discharge Planning      Lala Mustafa RN  April 25, 2024        
  P Quality Flow/Interdisciplinary Rounds Progress Note        Quality Flow Rounds held on April 30, 2024    Disciplines Attending:  Bedside Nurse, , , and Nursing Unit Leadership    Suki Weir was admitted on 4/24/2024 10:35 PM    Anticipated Discharge Date:       Disposition:    Martell Score:  Martell Scale Score: 18    Readmission Risk              Risk of Unplanned Readmission:  21           Discussed patient goal for the day, patient clinical progression, and barriers to discharge.  The following Goal(s) of the Day/Commitment(s) have been identified:   Discharge planning      Rajni Cameron RN  April 30, 2024        
1740 x-rays done post op  in pacu of left hip  
1810 report called to 7 [facility] rn and called out to waiting room to have family members go to [id number]  And pt is connected to the cmr and is in their view  
4 Eyes Skin Assessment     NAME:  Suki Weir  YOB: 1940  MEDICAL RECORD NUMBER:  [id number]    The patient is being assessed for  Admission    I agree that at least one RN has performed a thorough Head to Toe Skin Assessment on the patient. ALL assessment sites listed below have been assessed.      Areas assessed by both nurses:    Head, Face, Ears, Shoulders, Back, Chest, Arms, Elbows, Hands, Sacrum. Buttock, Coccyx, Ischium, Legs. Feet and Heels, and Under Medical Devices         Does the Patient have a Wound? No   Martell Prevention initiated by RN: Yes  Wound Care Orders initiated by RN: No    Pressure Injury (Stage 3,4, Unstageable, DTI, NWPT, and Complex wounds) if present, place Wound referral order by RN under : No    New Ostomies, if present place, Ostomy referral order under : No     Nurse 1 eSignature: Electronically signed by Mee Limon RN on 4/25/24 at 2:47 AM EDT    **SHARE this note so that the co-signing nurse can place an eSignature**    Nurse 2 eSignature: Electronically signed by PATRICK STEPHENS RN on 4/25/24 at 3:38 AM EDT  
Call Dr Ruiz via [facility] concerning pt having occasional non productive cough. Awaiting callback.  
Daily Treat    Evaluating PT:  Baltazar Dewey PT, RRT, CEAS     Room #:  0744/0744-A  Diagnosis:  S/P Fall with LT femoral neck Fx  Pertinent PMHx/PSHx:  See PMH  Procedure/Surgery:  LEFT HIP HEMIARTHROPLASTY   Precautions:  WBAT,,falls, universal hip   Equipment Needs:  WC for mobility and WW with assisted mobility     SUBJECTIVE:     Pt lives with ?/confusion in a ? story home with ? stairs and ? rail to enter.  Highly confused during carolyn.  Pt ambulated with ? PTA.     OBJECTIVE:    Initial Evaluation  Date: 4/27/24 Treatment  4/30/24 Short Term/ Long Term   Goals   Was pt agreeable to Eval/treatment? Yes Yes      Does pt have pain? TINY/confusion LT hip       Bed Mobility  Rolling: Mod  Supine to sit: Mod/max  Sit to supine: Max x 2  Scooting: Mod Supine to sit; Mod A  Scooting: Mod A seated to EOB Mod x 2-1 assistance   Transfers Sit to stand: Mod x 2  Stand to sit: Mod x 2  Stand pivot: Mod x 2 Sit <> stand; Min A  Mod x 1, Min x 2-1   Ambulation   15 feet with WW Min A x 2 2/2 safety  35 x 15 feet with WW Min A for balance  30 feet with WW Min to CGA    Stair negotiation: ascended and descended  NA  NA Rehab to address if stairs home and return home.    ROM Decreased LT hip, knee flex 95*/pain,    LT hip as reagan   MMT LT hip TINY/confusion, no standing hip/knee buckle   LT hip LE   AM-PAC 6 Clicks 13/24 14/24      Pt is alert, following instruction  Balance: poor dynamic with WW    Pt performed therapeutic exercise of the following: NT    Patient education/treatment  Pt was educated on UE usage for transfer safety, gait mechanics for posture/sequence/increased base of support, assist with bed mobility as needed.    Patient response to education:   Pt verbalized understanding Pt demonstrated skill Pt requires further education in this area   yes With instruction yes     ASSESSMENT:   Comments: Pt found in bed, assisted to EOB, sat EOB SBA for balance. Gait slow, more consistent and less laboring this session, step 
Department of Orthopedic Surgery  Progress Note    Patient seen and examined. Pain controlled. No new complaints. Denies N/V.  Denies chest pain, shortness of breath, calf pain, dizziness/lightheadedness., Patient tolerating Diet well.  + Flatulence, - BM , patient is able to stand to go to the bathroom this morning.      VITALS:  BP (!) 167/87   Pulse 91   Temp 98.2 °F (36.8 °C) (Oral)   Resp 16   Ht 1.626 m (5' 4\")   Wt 70.3 kg (155 lb)   SpO2 99%   BMI 26.61 kg/m²     GENERAL: healthy, alert, oriented, and no distress  MUSCULOSKELETAL:   left lower extremity:  Dressing C/D/I  Compartments soft and compressible, calf non-tender  Palpable dorsalis pedis and posterior tibialis pulse, brisk cap refill to toes, foot warm and perfused  Sensation intact to light touch in sural/deep peroneal/superficial peroneal/saphenous/posterior tibial nerve distributions to foot/ankle.  Demonstrates active ankle plantar/dorsiflexion/great toe extension    CBC:   Lab Results   Component Value Date/Time    WBC 9.8 04/26/2024 03:36 AM    HGB 11.3 04/26/2024 03:36 AM    HCT 34.3 04/26/2024 03:36 AM     04/26/2024 03:36 AM       Preop H/H -9/34    X-rays of the pelvis and left hip demonstrating a left hip hemiarthroplasty with stable alignment and no hardware complications.    ASSESSMENT  4/24/2024 S/P left hip hemiarthroplasty    PLAN  Weight bearing as tolerated LLE  24 hour abx coverage  Deep venous thrombosis prophylaxis -will restart patient's Eliquis, early mobilization , PCDs  PT/OT - Gaiting training and ROM exercises  Pain Control: IV and PO , Wean to Oral meds as tolerated  Monitor H&H  D/C Planning - Pending SW/CM recs    Electronically signed by Robert Sutton DO on 4/27/24 at 10:12 AM EDT      
Department of Orthopedic Surgery  Progress Note    Patient seen and examined. Pain controlled. No new complaints. Denies N/V.  Denies chest pain, shortness of breath, calf pain, dizziness/lightheadedness., Patient tolerating Diet well.  Patient ambulated 15 feet with physical therapy yesterday, only mild pain to the left hip today.      VITALS:  BP (!) 154/79   Pulse 75   Temp 98.3 °F (36.8 °C) (Oral)   Resp 16   Ht 1.626 m (5' 4\")   Wt 70.3 kg (155 lb)   SpO2 100%   BMI 26.61 kg/m²     GENERAL: healthy, alert, oriented, and no distress  MUSCULOSKELETAL:   left lower extremity:  Dressing C/D/I  Compartments soft and compressible, calf non-tender  Palpable dorsalis pedis and posterior tibialis pulse, brisk cap refill to toes, foot warm and perfused  Sensation intact to light touch in sural/deep peroneal/superficial peroneal/saphenous/posterior tibial nerve distributions to foot/ankle.  Demonstrates active ankle plantar/dorsiflexion/great toe extension    CBC:   Lab Results   Component Value Date/Time    WBC 7.9 04/28/2024 03:10 AM    HGB 9.6 04/28/2024 03:10 AM    HCT 29.2 04/28/2024 03:10 AM     04/28/2024 03:10 AM       Preop H/H -11/34    Previous X-rays of the pelvis and left hip demonstrating a left hip hemiarthroplasty with stable alignment and no hardware complications.    ASSESSMENT  4/24/2024 S/P left hip hemiarthroplasty  Acute blood loss anemia secondary to hip fracture and surgical procedure    PLAN  Weight bearing as tolerated LLE  Deep venous thrombosis prophylaxis -Eliquis, early mobilization , PCDs  PT/OT - Gaiting training and ROM exercises  Pain Control: IV and PO , Wean to Oral meds as tolerated  Monitor H&H  D/C Planning - Pending SW/CM recs, discharge pending placement  Patient is okay for discharge per orthopedic standpoint.  Patient will follow-up in 2 weeks in office for postoperative follow-up.    Electronically signed by Robert Sutton DO on 4/28/24 at 9:52 AM EDT         
Message sent to   Did he receive consult for left hip fracture      HE DID RECEIVE CONSULT  
Occupational Therapy  OCCUPATIONAL THERAPY INITIAL EVALUATION  [facility]  [address] [place], [place]    Date: 2024     Patient Name: Suki Weir  MRN: [id number]  : 1940  Room: [address] [place] [address]    Evaluating OT: Josee Mackey, OTR/L - [id number]    Referring Provider: Robert Sutton DO   Specific Provider Orders/Date: \"OT eval and treat\" - 2024    Diagnosis: Injury of head, initial encounter [S09.90XA]  Fall, initial encounter [W19.XXXA]  Fall from standing, initial encounter [W19.XXXA]  Closed displaced fracture of left femoral neck (HCC) [S72.002A]    Surgery: Patient underwent L hip hemiarthroplasty on 2024.  Pertinent Medical History: A fib, CKD, DM, neuropathy, CHF, HTN, lumbar radiculopathy, RA     Precautions: fall risk, WBAT LLE, posterolateral hip precautions unless otherwise clarified (abductor pillow present in room, discussed with nursing who was contacting surgeon for clarification on precautions)    Assessment of Current Deficits:    [x] Functional mobility   [x]ADLs  [x] Strength               []Cognition   [x] Functional transfers   [x] IADLs         [x] Safety Awareness   [x]Endurance   [] Fine Coordination              [x] Balance      [] Vision/perception   []Sensation    []Gross Motor Coordination  [] ROM  [] Delirium                   [] Motor Control     OT PLAN OF CARE   OT POC is based on physician orders, patient diagnosis, and results of clinical assessment.  Frequency/Duration 2-5 days/week for 2 weeks PRN   Specific OT Treatment Interventions to Include:   * Instruction/training on adapted ADL techniques and AE recommendations to increase functional independence within precautions       * Training on energy conservation strategies, correct breathing pattern and techniques to improve independence/tolerance for self-care routine  * Functional transfer/mobility training/DME recommendations for increased 
Physical Therapy    PT order received and medical chart reviewed 4/26. Plan is for OR today for L hip hemiarthroplasty. Will re-attempt at a later time/date. Thank you.    Rod Robles, PT, DPT  [id number]       
Physical Therapy  Facility/Department: [address] MED SURG    Name: Suki Weir  : 1940  MRN: [id number]  Date of Service: 2024    PT consult was received and eval was attempted.  After reviewing pt's chart x-rays showed Left femoral neck fracture. Pt is awaiting ortho consult/plan/weight bearing recommendations.  Will re-attempt PT eval another time pending ortho recommendations.    Tray Schaffer Jr., P.T.  License Number: [id number]    
SPIRITUAL HEALTH SERVICES - [facility] Encounter    Name: Suki Weir                  Referral: Routine Visit    Sacraments  Anointed (Last Rites): Yes  Apostolic [place]: No  Confession: No  Communion: No     Assessment:  Patient receptive to  visit.      Intervention:   provided spiritual support and sacramental ministry for patient.     Outcome:  Patient expressed gratitude for visit.    Plan:  Chaplains will remain available to offer spiritual and emotional support as needed.      Electronically signed by Chaplain Lorna, on 4/26/2024 at 4:18 PM.  Spiritual Care Department  [facility]  [phone]   
While rounding found the patient to be asleep, appeared peaceful. Silent prayer was said and prayer card left in the room.  remains available for support.  
  Comments: Pt found in bed, exercise performed. Pt assisted to EOB, sat EOB SBA for balance. Gait very slow, inconsistent and laboring, step to pattern displayed throughout no loss of balance/shortness of breath/dizziness noted. Pt fatigued after activity.  Pt was left in a bedside chair with call light in reach, waffle cushion in place    Chair/bed alarm: chiar active    Time in 0815   Time out 0840   Total Treatment Time 25 minutes   CPT codes:     Therapeutic activities 22580 13 minutes   Therapeutic exercises 62591 12 minutes       Pt is making fair progress toward established Physical Therapy goals.  Continue with physical therapy current plan of care.    Alan Christensen PTA   License Number: [id number]                            
anticoagulation, but held in anticipation of surgical procedure, resume post op when ok w/ surgical team  --tele monitoring  --keep an eye on mag and K     THE PATIENT IS AN 84 YO F NEEDING SURGICAL LEFT HIP REPAIR W/ HEMIARTHROPLASTY. PLAN IS TO PROCEED TO OR 4/26/2024 AND SHE WILL BE MADE NPO AFTER MIDNIGHT THIS EVENING, OK FOR DIET AT THIS TIME. ANTICOAGS HELD. THE PATIENT HAS NO PAST MEDICAL HX OF CARDIAC ISCHEMIA. REVIEWED PAST ECHO WITH NORMAL EF. HAS HX OF DIASTOLIC HEART FAILURE. CXR AND LABS AT THIS TIME ARE STABLE. SHE HAS KNOWN A FIB AND IS RATE CONTROLLED AND COMPLIANT WITH ANTICOAGULATION. SHE HAS HAD NO PREVIOUS ISSUES WITH ANESTHESIA, DENIES PROLONGED BLEEDING AT BASELINE, DENIES DELAYED WOUND HEALING. AT THIS TIME, THE PATIENT IS CONSIDERED LOWER RISK FOR THIS SURGERY AND IS CLEARED BY MEDICINE TO PROCEED.      4/26/2024  --npo to proceed to OR today with orthopedic surgery, her family is aware despite what she keeps saying  --holding anticoags, resume post operatively when ok 2/ ortho  --dc planning is for [facility] most likely, pt has been through this before-->appreciate case management effort on this  --labs and vitals stable  --control pain  --post op weight bearing and wound care will be per the  orthopedic team  --check UA for confusion    4/27/2024  Status post left hip hemiarthroplasty 4/26/2024  Tolerated procedure well  Continue pain control  Blood pressures are slightly elevated-may be secondary to pain-on multiple antihypertensives-will continue for now without changing doses until pain is under better control  Await physicalTherapy evaluation  Will likely require subacute rehab  If able to go over the weekend, from medical standpoint she is okay for discharge  Check labs in a.m.    Medication for other comorbidities continue as appropriate dose adjustment as necessary.     DVT prophylaxis  PT OT  Discharge planning        Code status: Full  Requires inpatient level of care      I have spent a 
increased safety with functional transfers/mobility and ADLs  * Therapeutic exercise to improve motor endurance, ROM, and functional strength for ADLs/functional transfers  * Therapeutic activities to facilitate/challenge dynamic balance, stand tolerance for increased safety and independence with ADLs  * Therapeutic activities to facilitate gross/fine motor skills for increased independence with ADLs  * Neuro-muscular re-education: facilitation of righting/equilibrium reactions, midline orientation, scapular stability/mobility, normalization of muscle tone, and facilitation of volitional active controled movement     Recommended Adaptive Equipment: TBD      Home Living: Patient lives alone in a 1 floor home with 2+1 steps and 1 rail to enter.  Bathroom Setup: walk in shower  Equipment Owned: FWW     Prior Level of Function (PLOF): Patient is a questionable historian, reports she has been to/from rehab and hospital a lot recently, however reporting she was independent with ADLs and was using FWW for functional mobility. Patient does report frequent falls     Pain Level: Patient reports discomfort in L hip, did not rate   Cognition: Patient alert and oriented x 2, pleasant, cooperative, able to follow simple directions  Memory: fair-  Sequencing: fair-  Problem Solving: fair-  Judgement/Safety: fair-     Functional Assessment:                  AM-PAC Daily Activity Raw Score: 15/24    Initial Eval Status  Date: 4/27/2024 Treatment Status  Date: 4/29/24 Short Term Goals = Long Term Goals   Feeding setup   independent   Grooming SBA, seated   independent   UB Dressing MIN A Min A  independent    LB Dressing MAX A  max A to don brief MIN A - with use of AE, as needed/appropriate   Bathing MOD A   MIN A - with use of AE/DME, as needed/appropriate   Toileting MAX A  max A. Pure wick MIN A   Bed Mobility  Supine-to-Sit: MOD A to bring trunk to midline, good initiation  Sit-to-Supine: MOD A to lift BLE  supine to sit mod A 
to education:   Pt verbalized understanding Pt demonstrated skill Pt requires further education in this area   Y N Review daily     ASSESSMENT:    Comments:  In bed on arrival in no distress.  Willing and wanting to get OOB into chair.  Transfer to EOB was max x 2 assisted and to centralize herself on EOB.  Sitting balances requiring Mod assistance with B/L hand-bed self kick standing; sit bal improved to Min @ few min on EOB.  Narrow to zero TIARRA prior to transfer to stand and when asked to widen base, she indicated cannot do and I needed to max/dep widen base.  First attempt to stand from bed with with immediate loss of the placed widened TIARRA with a aborted attempt 2/2 this.  Placed LE again to inc TIARRA for transfer and again lost the TIARRA and went into a zero TIARRA with high RT trunk listing requiring dep/max x 2 assist to keep vertical.  Third attempt to stand and mobilize was with a slight + TIARRA but we now kept her vertical and @ first 2-3 steps she did widen TIARRA enough to allow some mobility.  Amb with WW with constant mod assisted x 2 with 3rd posterior follow support x 20-25' in room.  Assist required to guide WW increasing need for turns.  Needs a 2-3 person assist for mobility at this time.  Transferred into a hip chair with mod x 2-1 assist.  There-ex was performed with assist to guide hip and knee.  Improved knee flexion today vs prior day.    Footrest and pillow topper for LE comfort.  Waffle cushion and chair alarm in line.  Tray to RT side and height adjusted.  Very appreciative for care.     Treatment:  Patient practiced and was instructed in the following treatment:    Bed mob  Transfers  Amb  There-ex  Education    Pt was left + with call light left by patient.    Chair/bed alarm: +    PLAN:    Pt is making + progress toward established Physical Therapy goals.  Continue with physical therapy current plan of care.    Total Treatment Time  25+ minutes     Evaluation Time includes thorough review of 
minutes    Baltazar Dewey PT, RRT, CEAS        
  TOTAL TIMED TREATMENT 24 2       Gina KHAN/SULY [id number]

## 2024-05-01 NOTE — DISCHARGE SUMMARY
for exam:->fall Decision Support Exception - unselect if not a suspected or confirmed emergency medical condition->Emergency Medical Condition (MA) FINDINGS: BONES/ALIGNMENT: There is no acute fracture or traumatic malalignment. DEGENERATIVE CHANGES: Multilevel degenerative changes. SOFT TISSUES: There is no prevertebral soft tissue swelling. MISCELLANEOUS: Left posterior fossa 2.2 cm probable calcified meningioma.     1. No acute fracture or traumatic malalignment of the cervical spine. 2. Multilevel degenerative changes. 3. Left posterior fossa 2.2 cm probable calcified meningioma.     CT HEAD WO CONTRAST    Result Date: 4/24/2024  EXAMINATION: CT OF THE HEAD WITHOUT CONTRAST  4/24/2024 11:14 pm TECHNIQUE: CT of the head was performed without the administration of intravenous contrast. Automated exposure control, iterative reconstruction, and/or weight based adjustment of the mA/kV was utilized to reduce the radiation dose to as low as reasonably achievable. COMPARISON: 05/28/2022. HISTORY: ORDERING SYSTEM PROVIDED HISTORY: fall, TECHNOLOGIST PROVIDED HISTORY: Has a \"code stroke\" or \"stroke alert\" been called?->No Reason for exam:->fall, Decision Support Exception - unselect if not a suspected or confirmed emergency medical condition->Emergency Medical Condition (MA) FINDINGS: BRAIN/VENTRICLES: There is no acute intracranial hemorrhage, mass effect or midline shift.  No abnormal extra-axial fluid collection.  The gray-white differentiation is maintained without evidence of an acute infarct.  There is no evidence of hydrocephalus. ORBITS: The visualized portion of the orbits demonstrate no acute abnormality. SINUSES: The visualized paranasal sinuses and mastoid air cells demonstrate no acute abnormality. SOFT TISSUES/SKULL:  No acute abnormality of the visualized skull or soft tissues.  Along the posterior aspect left temporal lobe there is a persistent calcified extra-axial density measuring 19 x 24 mm having the

## 2024-05-01 NOTE — PLAN OF CARE
Problem: Discharge Planning  Goal: Discharge to home or other facility with appropriate resources  5/1/2024 1123 by Rachel Smith, RN  Outcome: Completed     Problem: Pain  Goal: Verbalizes/displays adequate comfort level or baseline comfort level  5/1/2024 1123 by Rachel Smith, RN  Outcome: Completed     Problem: Skin/Tissue Integrity  Goal: Absence of new skin breakdown  Description: 1.  Monitor for areas of redness and/or skin breakdown  2.  Assess vascular access sites hourly  3.  Every 4-6 hours minimum:  Change oxygen saturation probe site  4.  Every 4-6 hours:  If on nasal continuous positive airway pressure, respiratory therapy assess nares and determine need for appliance change or resting period.  5/1/2024 1123 by Rachel Smith, RN  Outcome: Completed     Problem: Safety - Adult  Goal: Free from fall injury  5/1/2024 1123 by Rachel Smith, RN  Outcome: Completed     Problem: ABCDS Injury Assessment  Goal: Absence of physical injury  5/1/2024 1123 by Rachel Smith, RN  Outcome: Completed     Problem: Chronic Conditions and Co-morbidities  Goal: Patient's chronic conditions and co-morbidity symptoms are monitored and maintained or improved  5/1/2024 1123 by Rachel Smith, RN  Outcome: Completed

## 2024-05-01 NOTE — PLAN OF CARE
Problem: Discharge Planning  Goal: Discharge to home or other facility with appropriate resources  5/1/2024 0135 by Bandar Hill, RN  Outcome: Progressing     Problem: Pain  Goal: Verbalizes/displays adequate comfort level or baseline comfort level  5/1/2024 0135 by Bandar Hill, RN  Outcome: Progressing     Problem: Skin/Tissue Integrity  Goal: Absence of new skin breakdown  Description: 1.  Monitor for areas of redness and/or skin breakdown  2.  Assess vascular access sites hourly  3.  Every 4-6 hours minimum:  Change oxygen saturation probe site  4.  Every 4-6 hours:  If on nasal continuous positive airway pressure, respiratory therapy assess nares and determine need for appliance change or resting period.  5/1/2024 0135 by Bandar Hill, RN  Outcome: Progressing     Problem: Safety - Adult  Goal: Free from fall injury  5/1/2024 0135 by Bandar Hill, RN  Outcome: Progressing     Problem: ABCDS Injury Assessment  Goal: Absence of physical injury  5/1/2024 0135 by Bandar Hill, RN  Outcome: Progressing

## 2024-05-23 PROBLEM — I82.4Z2 LOWER LEG DVT (DEEP VENOUS THROMBOEMBOLISM), ACUTE, LEFT (HCC): Status: RESOLVED | Noted: 2024-02-17 | Resolved: 2024-05-23

## 2024-05-23 PROBLEM — D32.0 BENIGN MENINGIOMA OF BRAIN (HCC): Status: ACTIVE | Noted: 2024-05-23

## 2024-05-23 PROBLEM — J41.8 MIXED SIMPLE AND MUCOPURULENT CHRONIC BRONCHITIS (HCC): Status: RESOLVED | Noted: 2023-05-13 | Resolved: 2024-05-23

## 2024-05-23 PROBLEM — N17.9 AKI (ACUTE KIDNEY INJURY) (HCC): Status: RESOLVED | Noted: 2024-02-16 | Resolved: 2024-05-23

## 2024-05-23 PROBLEM — I82.442 ACUTE DEEP VEIN THROMBOSIS (DVT) OF TIBIAL VEIN OF LEFT LOWER EXTREMITY (HCC): Status: RESOLVED | Noted: 2024-02-16 | Resolved: 2024-05-23
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
## 2024-05-23 PROBLEM — R26.2 INABILITY TO WALK: Status: RESOLVED | Noted: 2024-02-16 | Resolved: 2024-05-23

## 2024-05-23 PROBLEM — R79.1 SUPRATHERAPEUTIC INR: Status: RESOLVED | Noted: 2024-02-16 | Resolved: 2024-05-23

## 2024-06-24 ENCOUNTER — HOSPITAL ENCOUNTER (OUTPATIENT)
Dept: MRI IMAGING | Age: 84
Discharge: HOME | End: 2024-06-26
Payer: MEDICARE

## 2024-06-24 DIAGNOSIS — R63.1 POLYDIPSIA: ICD-10-CM

## 2024-06-24 DIAGNOSIS — R29.898 DEFICIENCIES OF LIMBS: ICD-10-CM

## 2024-06-24 PROCEDURE — 72148 MRI LUMBAR SPINE W/O DYE: CPT

## 2024-07-31 ENCOUNTER — HOSPITAL ENCOUNTER (OUTPATIENT)
Dept: CARDIOLOGY | Age: 84
Discharge: HOME | End: 2024-08-02
Attending: INTERNAL MEDICINE
Payer: MEDICARE

## 2024-07-31 VITALS
BODY MASS INDEX: 27.31 KG/M2 | WEIGHT: 160 LBS | HEIGHT: 64 IN | DIASTOLIC BLOOD PRESSURE: 45 MMHG | SYSTOLIC BLOOD PRESSURE: 115 MMHG

## 2024-07-31 DIAGNOSIS — J41.8 MIXED SIMPLE AND MUCOPURULENT CHRONIC BRONCHITIS (HCC): ICD-10-CM

## 2024-07-31 DIAGNOSIS — R91.8 PULMONARY NODULES: ICD-10-CM

## 2024-07-31 DIAGNOSIS — I10 HTN (HYPERTENSION), BENIGN: Chronic | ICD-10-CM

## 2024-07-31 DIAGNOSIS — I48.0 PAROXYSMAL ATRIAL FIBRILLATION (HCC): Chronic | ICD-10-CM

## 2024-07-31 DIAGNOSIS — E11.42 DIABETIC POLYNEUROPATHY ASSOCIATED WITH TYPE 2 DIABETES MELLITUS (HCC): ICD-10-CM

## 2024-07-31 LAB
ECHO AO ASC DIAM: 2.3 CM
ECHO AO ASCENDING AORTA INDEX: 1.29 CM/M2
ECHO AV AREA PEAK VELOCITY: 1.2 CM2
ECHO AV AREA PLAN/BSA: 0.62 CM2/M2
ECHO AV AREA PLAN: 1.1 CM2
ECHO AV AREA VTI: 1.3 CM2
ECHO AV AREA/BSA PEAK VELOCITY: 0.7 CM2/M2
ECHO AV AREA/BSA VTI: 0.7 CM2/M2
ECHO AV CUSP MM: 1.6 CM
ECHO AV MEAN GRADIENT: 10 MMHG
ECHO AV MEAN VELOCITY: 1.4 M/S
ECHO AV PEAK GRADIENT: 18 MMHG
ECHO AV PEAK VELOCITY: 2.1 M/S
ECHO AV VELOCITY RATIO: 0.43
ECHO AV VTI: 54.4 CM
ECHO BSA: 1.81 M2
ECHO EST RA PRESSURE: 3 MMHG
ECHO LA DIAMETER INDEX: 2.7 CM/M2
ECHO LA DIAMETER: 4.8 CM
ECHO LA VOL A-L A2C: 127 ML (ref 22–52)
ECHO LA VOL A-L A4C: 98 ML (ref 22–52)
ECHO LA VOL MOD A2C: 119 ML (ref 22–52)
ECHO LA VOL MOD A4C: 89 ML (ref 22–52)
ECHO LA VOLUME AREA LENGTH: 114 ML
ECHO LA VOLUME INDEX A-L A2C: 71 ML/M2 (ref 16–34)
ECHO LA VOLUME INDEX A-L A4C: 55 ML/M2 (ref 16–34)
ECHO LA VOLUME INDEX AREA LENGTH: 64 ML/M2 (ref 16–34)
ECHO LA VOLUME INDEX MOD A2C: 67 ML/M2 (ref 16–34)
ECHO LA VOLUME INDEX MOD A4C: 50 ML/M2 (ref 16–34)
ECHO LV EDV A2C: 58 ML
ECHO LV EDV A4C: 82 ML
ECHO LV EDV BP: 71 ML (ref 56–104)
ECHO LV EDV INDEX A4C: 46 ML/M2
ECHO LV EDV INDEX BP: 40 ML/M2
ECHO LV EDV NDEX A2C: 33 ML/M2
ECHO LV EJECTION FRACTION A2C: 48 %
ECHO LV EJECTION FRACTION A4C: 54 %
ECHO LV EJECTION FRACTION BIPLANE: 51 % (ref 55–100)
ECHO LV ESV A2C: 30 ML
ECHO LV ESV A4C: 37 ML
ECHO LV ESV BP: 35 ML (ref 19–49)
ECHO LV ESV INDEX A2C: 17 ML/M2
ECHO LV ESV INDEX A4C: 21 ML/M2
ECHO LV ESV INDEX BP: 20 ML/M2
ECHO LV FRACTIONAL SHORTENING: 36 % (ref 28–44)
ECHO LV INTERNAL DIMENSION DIASTOLE INDEX: 2.47 CM/M2
ECHO LV INTERNAL DIMENSION DIASTOLIC: 4.4 CM (ref 3.9–5.3)
ECHO LV INTERNAL DIMENSION SYSTOLIC INDEX: 1.57 CM/M2
ECHO LV INTERNAL DIMENSION SYSTOLIC: 2.8 CM
ECHO LV ISOVOLUMETRIC RELAXATION TIME (IVRT): 48.4 MS
ECHO LV IVSD: 1.2 CM (ref 0.6–0.9)
ECHO LV IVSS: 1.5 CM
ECHO LV MASS 2D: 191.3 G (ref 67–162)
ECHO LV MASS INDEX 2D: 107.5 G/M2 (ref 43–95)
ECHO LV POSTERIOR WALL DIASTOLIC: 1.2 CM (ref 0.6–0.9)
ECHO LV POSTERIOR WALL SYSTOLIC: 1.4 CM
ECHO LV RELATIVE WALL THICKNESS RATIO: 0.55
ECHO LVOT AREA: 2.8 CM2
ECHO LVOT AV VTI INDEX: 0.46
ECHO LVOT DIAM: 1.9 CM
ECHO LVOT MEAN GRADIENT: 2 MMHG
ECHO LVOT PEAK GRADIENT: 4 MMHG
ECHO LVOT PEAK VELOCITY: 0.9 M/S
ECHO LVOT STROKE VOLUME INDEX: 39.5 ML/M2
ECHO LVOT SV: 70.3 ML
ECHO LVOT VTI: 24.8 CM
ECHO MV AREA PHT: 5.5 CM2
ECHO MV AREA VTI: 3.2 CM2
ECHO MV E DECELERATION TIME (DT): 116.8 MS
ECHO MV LVOT VTI INDEX: 0.89
ECHO MV MAX VELOCITY: 1.3 M/S
ECHO MV MEAN GRADIENT: 2 MMHG
ECHO MV MEAN VELOCITY: 0.6 M/S
ECHO MV PEAK GRADIENT: 6 MMHG
ECHO MV PRESSURE HALF TIME (PHT): 40.2 MS
ECHO MV VTI: 22 CM
ECHO PULMONARY ARTERY END DIASTOLIC PRESSURE: 6 MMHG
ECHO PV MAX VELOCITY: 0.8 M/S
ECHO PV MEAN GRADIENT: 1 MMHG
ECHO PV MEAN VELOCITY: 0.5 M/S
ECHO PV PEAK GRADIENT: 2 MMHG
ECHO PV REGURGITANT MAX VELOCITY: 1.2 M/S
ECHO PV VTI: 16.6 CM
ECHO PVEIN PEAK D VELOCITY: 0.8 M/S
ECHO PVEIN PEAK S VELOCITY: 0.6 M/S
ECHO PVEIN S/D RATIO: 0.8
ECHO RIGHT VENTRICULAR SYSTOLIC PRESSURE (RVSP): 30 MMHG
ECHO RV INTERNAL DIMENSION: 2.6 CM
ECHO RV TAPSE: 1.9 CM (ref 1.7–?)
ECHO TV REGURGITANT MAX VELOCITY: 2.58 M/S
ECHO TV REGURGITANT PEAK GRADIENT: 27 MMHG

## 2024-07-31 PROCEDURE — C8929 TTE W OR WO FOL WCON,DOPPLER: HCPCS

## 2024-07-31 PROCEDURE — 6360000004 HC RX CONTRAST MEDICATION: Performed by: INTERNAL MEDICINE

## 2024-07-31 RX ADMIN — PERFLUTREN 1.5 ML: 6.52 INJECTION, SUSPENSION INTRAVENOUS at 10:08

## 2024-08-02 NOTE — CARE COORDINATION
Social Work:    Deric Robles at [facility] accepted Mrs. Weir's referral.    Electronically signed by AMARILYS Agrawal on 6/14/2021 at 2:42 PM
Social work:    Social service met with Mrs. Dioni Jang, explained social service/ roles, and discussed discharge planning. Mrs. Dioni Jang resides alone in a ranch home. She was independent with ADL's and requested a wheeled walker for home. Mrs. Dioni Jang has been in [facility] snf and has used their [address]. Upon discharge from [facility] she is agreeable to [address]. Choices for [address] & DME were provided and Mrs. Dioni Jang has no preference for DME but prefers [facility]. Social work called/faxed a referral to [employer] and [facility] [address] for discharge home today.     Electronically signed by AMARILYS Casarez on 6/14/2021 at 11:01 AM
English

## 2024-08-15 ENCOUNTER — TELEPHONE (OUTPATIENT)
Dept: CARDIOLOGY CLINIC | Age: 84
End: 2024-08-15

## 2024-08-15 NOTE — TELEPHONE ENCOUNTER
----- Message from Dr. Patricio Mckeon MD sent at 8/15/2024  8:02 AM EDT -----  Heart is pumping fine.  Details will be discussed during follow-up visit.  There is moderate heart valve disease that we discussed during follow-up visit.  The ventricular wall is also thick and we need good blood pressure control

## 2024-08-15 NOTE — TELEPHONE ENCOUNTER
Spoke to Antoinette nurse notified of patient's Echo results per Dr. Mckeon faxed note to Antoinette at [phone].

## 2024-09-03 NOTE — PROGRESS NOTES
Out Patient CARDIOLOGY FOLLOW UP    Name: Elzbieta Patel    Age: [de-identified] y.o. Date of Service: 9/29/2021      Referring Physician: No admitting provider for patient encounter. Chief Complaint   Patient presents with    1 Year Follow Up        History of Present Illness: 52-year-old female with history of atrial fibrillation, diastolic heart failure, COPD, hypertension, chronic kidney disease, diabetes, obesity, and history of LVH. She presented for follow-up visit today with her daughter. She reports overall doing well but recently had a mechanical fall. She report at around the time of the mechanical fall somehow she has trouble getting up and was found to have significant low blood sugar and subsequently she was hospitalized. She presents for follow-up visit and reports she is undergoing colonoscopy and other than that she denies chest pain, dyspnea on exertion, fatigue with activities or any other complaint. Due to the recent fall and history of LVH and comorbid conditions I have requested transthoracic echocardiogram for further evaluation and to guide therapy. Her Coumadin is being managed by her PCP and she will work with her PCP regarding Coumadin management prior to and following colonoscopy. Medical History:  1. Atrial fibrillation initial episode, 2010.  Converted spontaneously.  Diastolic CHF at that time.  AF recurred later in 2010.  Started on sotalol.  And converted spontaneously. 2. History goiter  3. Admitted 12/09/2017 with progressive cough and dyspnea  Dx : Pneumonia /bronchitis. 4. COPD. 5. Atrial fibrillation. Chronic OAC (Coumadin). Admission  INR=1.6. 6. Hypertension and LVH. 7. CKD. 8. DM.  9. Obesity.  BMI 36- 02/2020. 10. Surgical history: ADRIEN/BSO, breast biopsy, vitreous hemorrhage and cataracts  11. Per EMR, history of aortic valve stenosis and CHF. 12. Apparently a rhythm control strategy has been chronically deployed.  Noncompliant with BID dosing of Sotolol and was taken off  medication during hospitalization Duration of recurrent AF and long term monitoring of anti-arrythmic Rx unknown. Records have been requested per Dr. Orly Elder office,12/2017. 13. Echo, 12/10/2017. EF biplane = 52%, no chamber dilatation. RVSP 40. AF. 14. CXR, 12/13/2017.  Patchy airspace opacities  in upper lobes bilateral.  Improved aeration at mid lung fields and  bases when compared to prior CXR. .     Review of Systems:   Cardiac: As per HPI  General: Denies fever or chills  Pulmonary: As per HPI  HEENT: Denies runny nose  GI: No complaints  : No complaints  Endocrine: Denies night sweats  Musculoskeletal: No complaints  Skin: Dry skin  Neuro: No complaints  Psych: Denies depression    Past Medical History:  Past Medical History:   Diagnosis Date    Atrial fibrillation ([address] 02/2010    converted with  oral    Atrial fibrillation ([address] 05/2011    declined conversion; rate control only    Diabetes mellitus ([address] 7166    Diastolic CHF, acute on chronic ([address] 7879    Diastolic dysfunction 1205    STAGE I ;     Fatty liver disease, nonalcoholic 5234    Hypertension 1996    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 A. pressure    Rectal bleed 8/23/2012       Past Surgical History:  Past Surgical History:   Procedure Laterality Date    CATARACT REMOVAL  2004    BILATERAL    COLONOSCOPY  9921,[id number]    BENIGN POLYPS ;INT  HEMORRHOIDS    EYE SURGERY  2004    VITREOUS  HEMORRHAGE    HYST[facility]  [facility]    [facility]       Family History:  History reviewed. No pertinent family history. Social History:  Social History     Socioeconomic History    Marital status:       Spouse name: Not on file    Number of children: Not on file    Years of education: Not on file    Highest education level: Not on file   Occupational History    Not on file   Tobacco Use    Smoking status: Former Smoker     Packs/day: 1.00     Years: 45.00     Pack years: 45.00     Types: Cigarettes    Smokeless tobacco: Former User     Quit date: 3/23/1993   Vaping Use    Vaping Use: Never used   Substance and Sexual Activity    Alcohol use: No    Drug use: No    Sexual activity: Not on file   Other Topics Concern    Not on file   Social History Narrative    Not on file     Social Determinants of Health     Financial Resource Strain:     Difficulty of Paying Living Expenses:    Food Insecurity:     Worried About Running Out of Food in the Last Year:     [address] in the Last Year:    Transportation Needs:     Lack of Transportation (Medical):  Lack of Transportation (Non-Medical):    Physical Activity:     Days of Exercise per Week:     Minutes of Exercise per Session:    Stress:     Feeling of Stress :    Social Connections:     Frequency of Communication with Friends and Family:     Frequency of Social Gatherings with Friends and Family:     Attends Mandaen Services:     Active Member of Clubs or Organizations:     Attends Club or Organization Meetings:     Marital Status:    Intimate Partner Violence:     Fear of Current or Ex-Partner:     Emotionally Abused:     Physically Abused:     Sexually Abused: Allergies:  No Known Allergies    Home Medications:  Prior to Admission medications    Medication Sig Start Date End Date Taking?  Authorizing Provider   primidone (MYSOLINE) 50 MG tablet Take 2 tablets by mouth 3 times daily 8/3/21  Yes ERIC Perez NP   methotrexate (RHEUMATREX) 2.5 MG chemo tablet Take 2.5 mg by mouth once a week 8 tablets on Friday   Yes Historical Provider, MD   folic acid (FOLVITE) 1 MG tablet Take 1 mg by mouth daily   Yes Historical Provider, MD   warfarin (JANTOVEN) 1 MG tablet Take 1 mg by mouth daily   Yes Historical Provider, MD   warfarin (JANTOVEN) 5 MG tablet Take 6 mg by mouth 7 mg total   Yes Historical Provider, MD   glimepiride (AMARYL) 4 MG tablet Take 1 tablet by mouth 2 times daily (before meals)  Patient taking differently: Take 1 mg by mouth daily  12/15/17  Yes Luis Armando Oquendo MD   diltiazem (CARDIZEM CD) 360 MG extended release capsule Take 1 capsule by mouth daily 12/16/17  Yes Luis Armando Oquendo MD   latanoprost (XALATAN) 0.005 % ophthalmic solution Place 1 drop into both eyes nightly   Yes Historical Provider, MD   lisinopril (PRINIVIL;ZESTRIL) 20 MG tablet Take 20 mg by mouth nightly. Yes Historical Provider, MD   spironolactone (ALDACTONE) 25 MG tablet Take 25 mg by mouth daily. Yes Historical Provider, MD   Vitamin D (CHOLECALCIFEROL) 1000 UNITS CAPS capsule Take 2,000 Units by mouth daily    Yes Historical Provider, MD   docusate sodium (COLACE) 100 MG capsule Take 100 mg by mouth as needed   Patient not taking: Reported on 9/29/2021    Historical Provider, MD       Current Medications:  Current Outpatient Medications   Medication Sig Dispense Refill    primidone (MYSOLINE) 50 MG tablet Take 2 tablets by mouth 3 times daily 180 tablet 5    methotrexate (RHEUMATREX) 2.5 MG chemo tablet Take 2.5 mg by mouth once a week 8 tablets on Friday      folic acid (FOLVITE) 1 MG tablet Take 1 mg by mouth daily      warfarin (JANTOVEN) 1 MG tablet Take 1 mg by mouth daily      warfarin (JANTOVEN) 5 MG tablet Take 6 mg by mouth 7 mg total      glimepiride (AMARYL) 4 MG tablet Take 1 tablet by mouth 2 times daily (before meals) (Patient taking differently: Take 1 mg by mouth daily ) 30 tablet 3    diltiazem (CARDIZEM CD) 360 MG extended release capsule Take 1 capsule by mouth daily 30 capsule 3    latanoprost (XALATAN) 0.005 % ophthalmic solution Place 1 drop into both eyes nightly      lisinopril (PRINIVIL;ZESTRIL) 20 MG tablet Take 20 mg by mouth nightly.         spironolactone (ALDACTONE) 25 MG tablet Take 25 mg by mouth daily.  Vitamin D (CHOLECALCIFEROL) 1000 UNITS CAPS capsule Take 2,000 Units by mouth daily       docusate sodium (COLACE) 100 MG capsule Take 100 mg by mouth as needed  (Patient not taking: Reported on 9/29/2021)       Current Facility-Administered Medications   Medication Dose Route Frequency Provider Last Rate Last Admin    perflutren lipid microspheres (DEFINITY) injection 1.65 mg  1.5 mL IntraVENous ONCE PRN Patricio Mckeon MD             Physical Exam:  /72   Pulse 93   Resp 18   Ht 5' 7\" (1.702 m)   Wt 216 lb (98 kg)   BMI 33.83 kg/m²   Wt Readings from Last 3 Encounters:   09/29/21 216 lb (98 kg)   06/13/21 224 lb 8 oz (101.8 kg)   03/23/21 225 lb (102.1 kg)       Appearance: Alert and oriented x3 not in acute distress. Skin: Dry skin  Head: Atraumatic  Eyes: Intact extraocular muscles   ENMT: Mucous membranes are moist  Neck: Supple  Lungs: Clear to auscultation  Cardiac: Normal S1 and S2  Abdomen: Protuberant  Extremities: Intact range of motion  Neurologic: No focal neurological deficits  Peripheral Pulses: 2+ peripheral pulses    Intake/Output:  No intake or output data in the 24 hours ending 09/29/21 1532  @[facility]@    Laboratory Tests:  No results for input(s): NA, K, CL, CO2, BUN, CREATININE, GLUCOSE, CALCIUM in the last 72 hours. Lab Results   Component Value Date    MG 1.8 08/26/2012     No results for input(s): ALKPHOS, ALT, AST, PROT, BILITOT, BILIDIR, LABALBU in the last 72 hours. No results for input(s): WBC, RBC, HGB, HCT, MCV, MCH, MCHC, RDW, PLT, MPV in the last 72 hours. Lab Results   Component Value Date    TROPONINI <0.01 08/24/2012     No results for input(s): CKTOTAL, CKMB, CKMBINDEX, TROPHS in the last 72 hours.   Lab Results   Component Value Date    INR 2.1 06/13/2021    INR 4.1 12/15/2017    INR 5.3 (HH) 12/14/2017    PROTIME 23.1 (H) 06/13/2021    PROTIME 48.1 (H) 12/15/2017    PROTIME 62.6 (H) 12/14/2017     Lab Results [facility])  Follow-up with your PCP  4. Hypoprothrombinemia ([facility]  Follow-up with your PCP  5. Falls frequently  Will obtain transthoracic echocardiogram  6. Diabetes mellitus due to underlying condition with hyperosmolarity without coma, without long-term current use of insulin ([facility]  Follow-up with your PCP          FOLLOW-UP 3 months      Thank you for allowing me to participate in your patient's care. Please feel free to contact me if you have any questions or concerns.     Arcelia Ramos MD  [facility]) Cardiology CHRIST [place]- she feels much improved, recommending allergy/immunology work up, resources provided. Script sent to pharmacy

## 2024-12-30 ENCOUNTER — ANESTHESIA (OUTPATIENT)
Dept: OPERATING ROOM | Age: 84
End: 2024-12-30
Payer: MEDICARE

## 2024-12-30 ENCOUNTER — APPOINTMENT (OUTPATIENT)
Dept: GENERAL RADIOLOGY | Age: 84
End: 2024-12-30
Payer: MEDICARE

## 2024-12-30 ENCOUNTER — APPOINTMENT (OUTPATIENT)
Dept: CT IMAGING | Age: 84
End: 2024-12-30
Payer: MEDICARE

## 2024-12-30 ENCOUNTER — HOSPITAL ENCOUNTER (INPATIENT)
Age: 84
LOS: 4 days | Discharge: OTHER INSTITUTION | End: 2025-01-03
Attending: EMERGENCY MEDICINE | Admitting: INTERNAL MEDICINE
Payer: MEDICARE

## 2024-12-30 ENCOUNTER — ANESTHESIA EVENT (OUTPATIENT)
Dept: OPERATING ROOM | Age: 84
End: 2024-12-30
Payer: MEDICARE

## 2024-12-30 DIAGNOSIS — N17.9 ACUTE RENAL FAILURE, UNSPECIFIED ACUTE RENAL FAILURE TYPE (HCC): ICD-10-CM

## 2024-12-30 DIAGNOSIS — K92.2 GASTROINTESTINAL HEMORRHAGE, UNSPECIFIED GASTROINTESTINAL HEMORRHAGE TYPE: ICD-10-CM

## 2024-12-30 DIAGNOSIS — N20.0 KIDNEY STONE: Primary | ICD-10-CM

## 2024-12-30 DIAGNOSIS — N20.1 RIGHT URETERAL CALCULUS: ICD-10-CM

## 2024-12-30 DIAGNOSIS — R79.89 ELEVATED TROPONIN: ICD-10-CM

## 2024-12-30 DIAGNOSIS — I21.4 NSTEMI (NON-ST ELEVATED MYOCARDIAL INFARCTION) (HCC): ICD-10-CM
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
## 2024-12-30 LAB
ABO + RH BLD: NORMAL
ALBUMIN SERPL-MCNC: 3.1 G/DL (ref 3.5–5.2)
ALP SERPL-CCNC: 59 U/L (ref 35–104)
ALT SERPL-CCNC: 12 U/L (ref 0–32)
ANION GAP SERPL CALCULATED.3IONS-SCNC: 20 MMOL/L (ref 7–16)
ARM BAND NUMBER: NORMAL
AST SERPL-CCNC: 21 U/L (ref 0–31)
B.E.: -6.9 MMOL/L (ref -3–3)
BASOPHILS # BLD: 0 K/UL (ref 0–0.2)
BASOPHILS NFR BLD: 0 % (ref 0–2)
BILIRUB SERPL-MCNC: 0.5 MG/DL (ref 0–1.2)
BLOOD BANK SAMPLE EXPIRATION: NORMAL
BLOOD GROUP ANTIBODIES SERPL: NEGATIVE
BUN SERPL-MCNC: 55 MG/DL (ref 6–23)
CALCIUM SERPL-MCNC: 8.9 MG/DL (ref 8.6–10.2)
CHLORIDE SERPL-SCNC: 97 MMOL/L (ref 98–107)
CO2 SERPL-SCNC: 16 MMOL/L (ref 22–29)
COHB: 0.3 % (ref 0–1.5)
CREAT SERPL-MCNC: 2.7 MG/DL (ref 0.5–1)
CRITICAL: ABNORMAL
DATE ANALYZED: ABNORMAL
DATE OF COLLECTION: ABNORMAL
EKG ATRIAL RATE: 159 BPM
EKG Q-T INTERVAL: 356 MS
EKG QRS DURATION: 66 MS
EKG QTC CALCULATION (BAZETT): 449 MS
EKG R AXIS: 55 DEGREES
EKG T AXIS: 18 DEGREES
EKG VENTRICULAR RATE: 96 BPM
EOSINOPHIL # BLD: 0 K/UL (ref 0.05–0.5)
EOSINOPHILS RELATIVE PERCENT: 0 % (ref 0–6)
ERYTHROCYTE [DISTWIDTH] IN BLOOD BY AUTOMATED COUNT: 14.9 % (ref 11.5–15)
FLUAV RNA RESP QL NAA+PROBE: NOT DETECTED
FLUBV RNA RESP QL NAA+PROBE: NOT DETECTED
GFR, ESTIMATED: 17 ML/MIN/1.73M2
GLUCOSE SERPL-MCNC: 129 MG/DL (ref 74–99)
HCO3: 15.8 MMOL/L (ref 22–26)
HCT VFR BLD AUTO: 23.6 % (ref 34–48)
HCT VFR BLD AUTO: 27.4 % (ref 34–48)
HGB BLD-MCNC: 7.8 G/DL (ref 11.5–15.5)
HGB BLD-MCNC: 9 G/DL (ref 11.5–15.5)
HHB: 4.4 % (ref 0–5)
INR PPP: 1.7
LAB: ABNORMAL
LACTATE BLDV-SCNC: 2.7 MMOL/L (ref 0.5–1.9)
LACTATE BLDV-SCNC: 2.8 MMOL/L (ref 0.5–1.9)
LACTATE BLDV-SCNC: 4.3 MMOL/L (ref 0.5–2.2)
LIPASE SERPL-CCNC: 5 U/L (ref 13–60)
LYMPHOCYTES NFR BLD: 0.16 K/UL (ref 1.5–4)
LYMPHOCYTES RELATIVE PERCENT: 1 % (ref 20–42)
Lab: 1636
MCH RBC QN AUTO: 31.6 PG (ref 26–35)
MCHC RBC AUTO-ENTMCNC: 32.8 G/DL (ref 32–34.5)
MCV RBC AUTO: 96.1 FL (ref 80–99.9)
METHB: 0.5 % (ref 0–1.5)
MODE: ABNORMAL
MONOCYTES NFR BLD: 0.95 K/UL (ref 0.1–0.95)
MONOCYTES NFR BLD: 5 % (ref 2–12)
NEUTROPHILS NFR BLD: 94 % (ref 43–80)
NEUTS SEG NFR BLD: 17.19 K/UL (ref 1.8–7.3)
O2 SATURATION: 95.6 % (ref 92–98.5)
O2HB: 94.8 % (ref 94–97)
OPERATOR ID: 1741
PATIENT TEMP: 37 C
PCO2: 24.4 MMHG (ref 35–45)
PH BLOOD GAS: 7.43 (ref 7.35–7.45)
PLATELET # BLD AUTO: 75 K/UL (ref 130–450)
PLATELET CONFIRMATION: NORMAL
PMV BLD AUTO: 11.2 FL (ref 7–12)
PO2: 82.7 MMHG (ref 75–100)
POTASSIUM SERPL-SCNC: 4.2 MMOL/L (ref 3.5–5)
PROT SERPL-MCNC: 5.2 G/DL (ref 6.4–8.3)
PROTHROMBIN TIME: 18.3 SEC (ref 9.3–12.4)
RBC # BLD AUTO: 2.85 M/UL (ref 3.5–5.5)
RBC # BLD: ABNORMAL 10*6/UL
SARS-COV-2 RNA RESP QL NAA+PROBE: NOT DETECTED
SODIUM SERPL-SCNC: 133 MMOL/L (ref 132–146)
SOURCE, BLOOD GAS: ABNORMAL
SOURCE: NORMAL
SPECIMEN DESCRIPTION: NORMAL
THB: 11.5 G/DL (ref 11.5–16.5)
TIME ANALYZED: 1649
TROPONIN I SERPL HS-MCNC: 261 NG/L (ref 0–9)
TROPONIN I SERPL HS-MCNC: 269 NG/L (ref 0–9)
TROPONIN I SERPL HS-MCNC: 329 NG/L (ref 0–9)
WBC OTHER # BLD: 18.3 K/UL (ref 4.5–11.5)

## 2024-12-30 PROCEDURE — 84484 ASSAY OF TROPONIN QUANT: CPT

## 2024-12-30 PROCEDURE — 80053 COMPREHEN METABOLIC PANEL: CPT

## 2024-12-30 PROCEDURE — BT1D1ZZ FLUOROSCOPY OF RIGHT KIDNEY, URETER AND BLADDER USING LOW OSMOLAR CONTRAST: ICD-10-PCS | Performed by: STUDENT IN AN ORGANIZED HEALTH CARE EDUCATION/TRAINING PROGRAM

## 2024-12-30 PROCEDURE — 86850 RBC ANTIBODY SCREEN: CPT

## 2024-12-30 PROCEDURE — 85025 COMPLETE CBC W/AUTO DIFF WBC: CPT

## 2024-12-30 PROCEDURE — 87077 CULTURE AEROBIC IDENTIFY: CPT

## 2024-12-30 PROCEDURE — 86900 BLOOD TYPING SEROLOGIC ABO: CPT

## 2024-12-30 PROCEDURE — 99222 1ST HOSP IP/OBS MODERATE 55: CPT | Performed by: SURGERY

## 2024-12-30 PROCEDURE — 2580000003 HC RX 258: Performed by: ANESTHESIOLOGIST ASSISTANT

## 2024-12-30 PROCEDURE — 2500000003 HC RX 250 WO HCPCS: Performed by: ANESTHESIOLOGIST ASSISTANT

## 2024-12-30 PROCEDURE — 2580000003 HC RX 258

## 2024-12-30 PROCEDURE — 86901 BLOOD TYPING SEROLOGIC RH(D): CPT

## 2024-12-30 PROCEDURE — 72125 CT NECK SPINE W/O DYE: CPT

## 2024-12-30 PROCEDURE — 71045 X-RAY EXAM CHEST 1 VIEW: CPT

## 2024-12-30 PROCEDURE — 36600 WITHDRAWAL OF ARTERIAL BLOOD: CPT

## 2024-12-30 PROCEDURE — 93010 ELECTROCARDIOGRAM REPORT: CPT | Performed by: INTERNAL MEDICINE

## 2024-12-30 PROCEDURE — 99285 EMERGENCY DEPT VISIT HI MDM: CPT

## 2024-12-30 PROCEDURE — 2500000003 HC RX 250 WO HCPCS

## 2024-12-30 PROCEDURE — 82805 BLOOD GASES W/O2 SATURATION: CPT

## 2024-12-30 PROCEDURE — 83690 ASSAY OF LIPASE: CPT

## 2024-12-30 PROCEDURE — C1758 CATHETER, URETERAL: HCPCS | Performed by: STUDENT IN AN ORGANIZED HEALTH CARE EDUCATION/TRAINING PROGRAM

## 2024-12-30 PROCEDURE — 93005 ELECTROCARDIOGRAM TRACING: CPT

## 2024-12-30 PROCEDURE — 0T768DZ DILATION OF RIGHT URETER WITH INTRALUMINAL DEVICE, VIA NATURAL OR ARTIFICIAL OPENING ENDOSCOPIC: ICD-10-PCS | Performed by: STUDENT IN AN ORGANIZED HEALTH CARE EDUCATION/TRAINING PROGRAM

## 2024-12-30 PROCEDURE — 7100000001 HC PACU RECOVERY - ADDTL 15 MIN: Performed by: STUDENT IN AN ORGANIZED HEALTH CARE EDUCATION/TRAINING PROGRAM

## 2024-12-30 PROCEDURE — 6360000002 HC RX W HCPCS: Performed by: ANESTHESIOLOGIST ASSISTANT

## 2024-12-30 PROCEDURE — 96365 THER/PROPH/DIAG IV INF INIT: CPT

## 2024-12-30 PROCEDURE — 2060000000 HC ICU INTERMEDIATE R&B

## 2024-12-30 PROCEDURE — 3700000000 HC ANESTHESIA ATTENDED CARE: Performed by: STUDENT IN AN ORGANIZED HEALTH CARE EDUCATION/TRAINING PROGRAM

## 2024-12-30 PROCEDURE — 70450 CT HEAD/BRAIN W/O DYE: CPT

## 2024-12-30 PROCEDURE — 3600000006 HC SURGERY LEVEL 6 BASE: Performed by: STUDENT IN AN ORGANIZED HEALTH CARE EDUCATION/TRAINING PROGRAM

## 2024-12-30 PROCEDURE — 85018 HEMOGLOBIN: CPT

## 2024-12-30 PROCEDURE — 96375 TX/PRO/DX INJ NEW DRUG ADDON: CPT

## 2024-12-30 PROCEDURE — 85610 PROTHROMBIN TIME: CPT

## 2024-12-30 PROCEDURE — 73562 X-RAY EXAM OF KNEE 3: CPT

## 2024-12-30 PROCEDURE — 2709999900 HC NON-CHARGEABLE SUPPLY: Performed by: STUDENT IN AN ORGANIZED HEALTH CARE EDUCATION/TRAINING PROGRAM

## 2024-12-30 PROCEDURE — 83605 ASSAY OF LACTIC ACID: CPT

## 2024-12-30 PROCEDURE — 7100000000 HC PACU RECOVERY - FIRST 15 MIN: Performed by: STUDENT IN AN ORGANIZED HEALTH CARE EDUCATION/TRAINING PROGRAM

## 2024-12-30 PROCEDURE — 85014 HEMATOCRIT: CPT

## 2024-12-30 PROCEDURE — 87086 URINE CULTURE/COLONY COUNT: CPT

## 2024-12-30 PROCEDURE — 74176 CT ABD & PELVIS W/O CONTRAST: CPT

## 2024-12-30 PROCEDURE — 87636 SARSCOV2 & INF A&B AMP PRB: CPT

## 2024-12-30 PROCEDURE — 3600000016 HC SURGERY LEVEL 6 ADDTL 15MIN: Performed by: STUDENT IN AN ORGANIZED HEALTH CARE EDUCATION/TRAINING PROGRAM

## 2024-12-30 PROCEDURE — 74420 UROGRAPHY RTRGR +-KUB: CPT

## 2024-12-30 PROCEDURE — 87150 DNA/RNA AMPLIFIED PROBE: CPT

## 2024-12-30 PROCEDURE — 81001 URINALYSIS AUTO W/SCOPE: CPT

## 2024-12-30 PROCEDURE — 96361 HYDRATE IV INFUSION ADD-ON: CPT

## 2024-12-30 PROCEDURE — 3700000001 HC ADD 15 MINUTES (ANESTHESIA): Performed by: STUDENT IN AN ORGANIZED HEALTH CARE EDUCATION/TRAINING PROGRAM

## 2024-12-30 PROCEDURE — 87040 BLOOD CULTURE FOR BACTERIA: CPT

## 2024-12-30 PROCEDURE — 6360000002 HC RX W HCPCS

## 2024-12-30 RX ORDER — LIDOCAINE HYDROCHLORIDE 20 MG/ML
INJECTION, SOLUTION INTRAVENOUS
Status: DISCONTINUED | OUTPATIENT
Start: 2024-12-30 | End: 2024-12-30 | Stop reason: SDUPTHER

## 2024-12-30 RX ORDER — SODIUM CHLORIDE 0.9 % (FLUSH) 0.9 %
5-40 SYRINGE (ML) INJECTION EVERY 12 HOURS SCHEDULED
Status: DISCONTINUED | OUTPATIENT
Start: 2024-12-30 | End: 2025-01-03 | Stop reason: HOSPADM

## 2024-12-30 RX ORDER — FOLIC ACID 1 MG/1
1 TABLET ORAL NIGHTLY
Status: DISCONTINUED | OUTPATIENT
Start: 2024-12-30 | End: 2025-01-03 | Stop reason: HOSPADM

## 2024-12-30 RX ORDER — ACETAMINOPHEN 325 MG/1
650 TABLET ORAL EVERY 6 HOURS PRN
Status: DISCONTINUED | OUTPATIENT
Start: 2024-12-30 | End: 2025-01-03 | Stop reason: HOSPADM

## 2024-12-30 RX ORDER — PHENYLEPHRINE HCL IN 0.9% NACL 1 MG/10 ML
SYRINGE (ML) INTRAVENOUS
Status: DISCONTINUED | OUTPATIENT
Start: 2024-12-30 | End: 2024-12-30 | Stop reason: SDUPTHER

## 2024-12-30 RX ORDER — ETOMIDATE 2 MG/ML
INJECTION INTRAVENOUS
Status: DISCONTINUED | OUTPATIENT
Start: 2024-12-30 | End: 2024-12-30 | Stop reason: SDUPTHER

## 2024-12-30 RX ORDER — POLYETHYLENE GLYCOL 3350 17 G/17G
17 POWDER, FOR SOLUTION ORAL 2 TIMES DAILY
Status: DISCONTINUED | OUTPATIENT
Start: 2024-12-30 | End: 2025-01-03 | Stop reason: HOSPADM

## 2024-12-30 RX ORDER — POTASSIUM CHLORIDE 7.45 MG/ML
10 INJECTION INTRAVENOUS PRN
Status: DISCONTINUED | OUTPATIENT
Start: 2024-12-30 | End: 2025-01-03 | Stop reason: HOSPADM

## 2024-12-30 RX ORDER — PANTOPRAZOLE SODIUM 40 MG/10ML
80 INJECTION, POWDER, LYOPHILIZED, FOR SOLUTION INTRAVENOUS ONCE
Status: COMPLETED | OUTPATIENT
Start: 2024-12-30 | End: 2024-12-30

## 2024-12-30 RX ORDER — MAGNESIUM SULFATE IN WATER 40 MG/ML
2000 INJECTION, SOLUTION INTRAVENOUS PRN
Status: DISCONTINUED | OUTPATIENT
Start: 2024-12-30 | End: 2025-01-03 | Stop reason: HOSPADM

## 2024-12-30 RX ORDER — SODIUM CHLORIDE 9 MG/ML
INJECTION, SOLUTION INTRAVENOUS
Status: DISCONTINUED | OUTPATIENT
Start: 2024-12-30 | End: 2024-12-30 | Stop reason: SDUPTHER

## 2024-12-30 RX ORDER — SODIUM CHLORIDE 9 MG/ML
INJECTION, SOLUTION INTRAVENOUS CONTINUOUS
Status: ACTIVE | OUTPATIENT
Start: 2024-12-30 | End: 2024-12-31

## 2024-12-30 RX ORDER — LATANOPROST 50 UG/ML
1 SOLUTION/ DROPS OPHTHALMIC NIGHTLY
Status: DISCONTINUED | OUTPATIENT
Start: 2024-12-30 | End: 2025-01-03 | Stop reason: HOSPADM

## 2024-12-30 RX ORDER — PRIMIDONE 50 MG/1
150 TABLET ORAL 2 TIMES DAILY
Status: DISCONTINUED | OUTPATIENT
Start: 2024-12-30 | End: 2025-01-03 | Stop reason: HOSPADM

## 2024-12-30 RX ORDER — 0.9 % SODIUM CHLORIDE 0.9 %
1000 INTRAVENOUS SOLUTION INTRAVENOUS ONCE
Status: COMPLETED | OUTPATIENT
Start: 2024-12-30 | End: 2024-12-30

## 2024-12-30 RX ORDER — PANTOPRAZOLE SODIUM 40 MG/1
40 TABLET, DELAYED RELEASE ORAL
Status: DISCONTINUED | OUTPATIENT
Start: 2024-12-31 | End: 2025-01-03 | Stop reason: HOSPADM

## 2024-12-30 RX ORDER — CEFAZOLIN SODIUM 1 G/3ML
INJECTION, POWDER, FOR SOLUTION INTRAMUSCULAR; INTRAVENOUS
Status: DISCONTINUED | OUTPATIENT
Start: 2024-12-30 | End: 2024-12-30 | Stop reason: SDUPTHER

## 2024-12-30 RX ORDER — ATORVASTATIN CALCIUM 40 MG/1
40 TABLET, FILM COATED ORAL NIGHTLY
Status: DISCONTINUED | OUTPATIENT
Start: 2024-12-30 | End: 2025-01-03 | Stop reason: HOSPADM

## 2024-12-30 RX ORDER — PROPOFOL 10 MG/ML
INJECTION, EMULSION INTRAVENOUS
Status: DISCONTINUED | OUTPATIENT
Start: 2024-12-30 | End: 2024-12-30 | Stop reason: SDUPTHER

## 2024-12-30 RX ORDER — ACETAMINOPHEN 650 MG/1
650 SUPPOSITORY RECTAL EVERY 6 HOURS PRN
Status: DISCONTINUED | OUTPATIENT
Start: 2024-12-30 | End: 2025-01-03 | Stop reason: HOSPADM

## 2024-12-30 RX ORDER — SENNOSIDES A AND B 8.6 MG/1
1 TABLET, FILM COATED ORAL NIGHTLY
Status: DISCONTINUED | OUTPATIENT
Start: 2024-12-30 | End: 2025-01-03 | Stop reason: HOSPADM

## 2024-12-30 RX ORDER — POTASSIUM CHLORIDE 1500 MG/1
40 TABLET, EXTENDED RELEASE ORAL PRN
Status: DISCONTINUED | OUTPATIENT
Start: 2024-12-30 | End: 2025-01-03 | Stop reason: HOSPADM

## 2024-12-30 RX ORDER — 0.9 % SODIUM CHLORIDE 0.9 %
500 INTRAVENOUS SOLUTION INTRAVENOUS ONCE
Status: COMPLETED | OUTPATIENT
Start: 2024-12-30 | End: 2024-12-30

## 2024-12-30 RX ORDER — POLYETHYLENE GLYCOL 3350 17 G/17G
17 POWDER, FOR SOLUTION ORAL DAILY PRN
Status: DISCONTINUED | OUTPATIENT
Start: 2024-12-30 | End: 2024-12-31

## 2024-12-30 RX ORDER — ONDANSETRON 2 MG/ML
4 INJECTION INTRAMUSCULAR; INTRAVENOUS EVERY 6 HOURS PRN
Status: DISCONTINUED | OUTPATIENT
Start: 2024-12-30 | End: 2025-01-03 | Stop reason: HOSPADM

## 2024-12-30 RX ORDER — SODIUM CHLORIDE 9 MG/ML
INJECTION, SOLUTION INTRAVENOUS PRN
Status: DISCONTINUED | OUTPATIENT
Start: 2024-12-30 | End: 2025-01-03 | Stop reason: HOSPADM

## 2024-12-30 RX ORDER — VASOPRESSIN 20 [USP'U]/ML
INJECTION, SOLUTION INTRAVENOUS
Status: DISCONTINUED | OUTPATIENT
Start: 2024-12-30 | End: 2024-12-30 | Stop reason: SDUPTHER

## 2024-12-30 RX ORDER — FENTANYL CITRATE 50 UG/ML
INJECTION, SOLUTION INTRAMUSCULAR; INTRAVENOUS
Status: DISCONTINUED | OUTPATIENT
Start: 2024-12-30 | End: 2024-12-30 | Stop reason: SDUPTHER

## 2024-12-30 RX ORDER — ONDANSETRON 4 MG/1
4 TABLET, ORALLY DISINTEGRATING ORAL EVERY 8 HOURS PRN
Status: DISCONTINUED | OUTPATIENT
Start: 2024-12-30 | End: 2025-01-03 | Stop reason: HOSPADM

## 2024-12-30 RX ORDER — SODIUM CHLORIDE 0.9 % (FLUSH) 0.9 %
5-40 SYRINGE (ML) INJECTION PRN
Status: DISCONTINUED | OUTPATIENT
Start: 2024-12-30 | End: 2025-01-03 | Stop reason: HOSPADM

## 2024-12-30 RX ADMIN — ETOMIDATE 6 MG: 2 INJECTION, SOLUTION INTRAVENOUS at 18:25

## 2024-12-30 RX ADMIN — Medication 150 MCG: at 18:34

## 2024-12-30 RX ADMIN — SODIUM CHLORIDE 1000 ML: 9 INJECTION, SOLUTION INTRAVENOUS at 13:54

## 2024-12-30 RX ADMIN — VASOPRESSIN 1 UNITS: 20 INJECTION INTRAVENOUS at 18:59

## 2024-12-30 RX ADMIN — SODIUM CHLORIDE: 9 INJECTION, SOLUTION INTRAVENOUS at 18:15

## 2024-12-30 RX ADMIN — PROPOFOL 10 MG: 10 INJECTION, EMULSION INTRAVENOUS at 18:26

## 2024-12-30 RX ADMIN — PROPOFOL 10 MG: 10 INJECTION, EMULSION INTRAVENOUS at 18:36

## 2024-12-30 RX ADMIN — CEFAZOLIN 2 G: 1 INJECTION, POWDER, FOR SOLUTION INTRAMUSCULAR; INTRAVENOUS at 18:28

## 2024-12-30 RX ADMIN — SODIUM CHLORIDE, PRESERVATIVE FREE 10 ML: 5 INJECTION INTRAVENOUS at 10:10

## 2024-12-30 RX ADMIN — ETOMIDATE 4 MG: 2 INJECTION, SOLUTION INTRAVENOUS at 18:28

## 2024-12-30 RX ADMIN — Medication 200 MCG: at 18:39

## 2024-12-30 RX ADMIN — CEFEPIME 2000 MG: 2 INJECTION, POWDER, FOR SOLUTION INTRAVENOUS at 12:17

## 2024-12-30 RX ADMIN — LIDOCAINE HYDROCHLORIDE 60 MG: 20 INJECTION, SOLUTION INTRAVENOUS at 18:25

## 2024-12-30 RX ADMIN — Medication 100 MCG: at 18:33

## 2024-12-30 RX ADMIN — Medication 200 MCG: at 18:44

## 2024-12-30 RX ADMIN — PROPOFOL 10 MG: 10 INJECTION, EMULSION INTRAVENOUS at 18:30

## 2024-12-30 RX ADMIN — PANTOPRAZOLE SODIUM 80 MG: 40 INJECTION, POWDER, FOR SOLUTION INTRAVENOUS at 10:11

## 2024-12-30 RX ADMIN — FENTANYL CITRATE 25 MCG: 50 INJECTION, SOLUTION INTRAMUSCULAR; INTRAVENOUS at 18:26

## 2024-12-30 RX ADMIN — SODIUM CHLORIDE 500 ML: 9 INJECTION, SOLUTION INTRAVENOUS at 12:15

## 2024-12-30 RX ADMIN — Medication 200 MCG: at 18:49

## 2024-12-30 RX ADMIN — SODIUM CHLORIDE 1000 ML: 9 INJECTION, SOLUTION INTRAVENOUS at 10:10

## 2024-12-30 RX ADMIN — Medication 150 MCG: at 18:25

## 2024-12-30 RX ADMIN — PROPOFOL 10 MG: 10 INJECTION, EMULSION INTRAVENOUS at 18:41

## 2024-12-30 ASSESSMENT — LIFESTYLE VARIABLES: SMOKING_STATUS: 0

## 2024-12-30 ASSESSMENT — PAIN - FUNCTIONAL ASSESSMENT
PAIN_FUNCTIONAL_ASSESSMENT: NONE - DENIES PAIN
PAIN_FUNCTIONAL_ASSESSMENT: NONE - DENIES PAIN

## 2024-12-30 NOTE — ED PROVIDER NOTES
patient rate 96 bpm.  No acute ST-T wave changes compared prior EKG on 4/24/2024.  QTc 414 MS. [VG]   1554 Case discussed with Dr. Reynoso, internal medicine, who agrees to admit the patient. [VG]   1605 Case discussed with Dr. Sotelo, critical care, who felt that the patient may be okay on the floor at this time. [VG]   1713 Case discussed with Dr. Hamm, urology, who will take the patient to the OR. [VG]      ED Course User Index  [VG] Lauri Orr MD          Medical Decision Making  Fall, no obvious acute traumatic pathology from the fall.  I am concerned for possible gastrointestinal bleeding as she has dried coffee-ground emesis on her mouth, has a hemoglobin of 9 which is stable but has an elevated lactic acid and elevated BUN concerning for possible GI bleed.  Unfortunately her troponin is elevated as well and her EKG does not show ST segment elevation and she denies chest pain.  This may all be demand ischemia.  Unfortunately she also has acute renal failure.  There was a consult placed to critical care to discuss the patient, intensivist recommends that since she currently does not require vasopressors she should be admitted to an intermediate level of care.  General surgery was also consulted. Urology consulted for kidney stone with concerns for septic stone as well.    Problems Addressed:  Acute renal failure, unspecified acute renal failure type (HCC): acute illness or injury  Fall, initial encounter: acute illness or injury  Gastrointestinal hemorrhage, unspecified gastrointestinal hemorrhage type: acute illness or injury  NSTEMI (non-ST elevated myocardial infarction) (HCC): acute illness or injury    Amount and/or Complexity of Data Reviewed  External Data Reviewed: notes.  Labs: ordered. Decision-making details documented in ED Course.  Radiology: ordered and independent interpretation performed. Decision-making details documented in ED Course.     Details: The following tests were interpreted by  me: Xray left knee    The following tests were interpreted by me: CXR - no  pneumothorax    - no obvious fracture or dislocation. Final radiologist interpretation agrees with my interpretation.     ECG/medicine tests: ordered and independent interpretation performed. Decision-making details documented in ED Course.     Details: EKG Interpretation  Interpreted by emergency department physician, Kd Layne MD    12/30/24  Time: 1015    Rhythm: atrial fibrillation - controlled  Rate: normal  Axis: normal  Conduction: normal  ST Segments: no acute change  T Waves: no acute change    Clinical Impression: atrial fibrillation - controlled  Comparison to Old EKG Stable from prior EKG        Risk  OTC drugs.  Prescription drug management.  Decision regarding hospitalization.                 CONSULTS:   IP CONSULT TO INTERNAL MEDICINE  IP CONSULT TO CRITICAL CARE  IP CONSULT TO CRITICAL CARE  IP CONSULT TO GENERAL SURGERY  IP CONSULT TO UROLOGY            PROCEDURES   Unless otherwise noted below, none      CRITICAL CARE TIME (.cct)   CRITICAL CARE:  Please note that the withdrawal or failure to initiate urgent interventions for this patient would likely result in a life threatening deterioration or permanent disability.      Accordingly this patient received 30 minutes of critical care time, including coordination of care, and direct bedside care and excluding separately billable procedures.        IKd MD, am the primary provider of record      FINAL IMPRESSION      1. Kidney stone    2. NSTEMI (non-ST elevated myocardial infarction) (HCC)    3. Fall, initial encounter    4. Gastrointestinal hemorrhage, unspecified gastrointestinal hemorrhage type    5. Acute renal failure, unspecified acute renal failure type (HCC)    6. Right ureteral calculus          DISPOSITION/PLAN     DISPOSITION Admitted 12/30/2024 04:23:18 PM      PATIENT REFERRED TO:  Jonnathan Major MD  [facility]

## 2024-12-30 NOTE — CONSULTS
INPATIENT CONSULTATION RECORD FOR  12/30/2024      [facility]  [address].  [place] [id number] [phone]                                                                                                                         REASON FOR CONSULTATION:      Right ureteral calculus, Sepsis    HISTORY OF PRESENT ILLNESS:      The patient is a 84 y.o. female patient who presents with presents from nursing facility with sepsis. She feel at the nursing facility.   Denies fevers   No history of stones.   Denies any gross hematuria.   Does have some weakness.   She does have a lactic acid of 2.8   She complains of right flank pain.   Her pressures have been soft with tachycardia in the ER.   She did not have a fevers.   Denies any nausea or emesis.       Past Medical History:   Diagnosis Date    Atrial fibrillation (HCC) 02/01/2010    converted with  oral    Cataract     right eye    CKD (chronic kidney disease)     Diabetes mellitus (HCC) 01/01/1999    Diabetic peripheral neuropathy (HCC)     Diastolic CHF, acute on chronic (HCC) 01/01/2010    DISH (diffuse idiopathic skeletal hyperostosis)     Essential tremor     Fatty liver disease, nonalcoholic 01/01/2000    Hypertension 01/01/1996    Lumbar radiculopathy     Mitral regurgitation 01/01/2010    MILD ; MILD AORTIC STENOSIS    Open-angle glaucoma 01/01/1999    Pulmonary regurgitation 01/01/2010    moderate ;increased P.A. pressure    Rectal bleed 08/23/2012    Rheumatoid arthritis (HCC)     Vitreous hemorrhage (HCC)     left eye--s/p surgery         Past Surgical History:   Procedure Laterality Date    COLONOSCOPY  1995,2005,2008    BENIGN POLYPS ;INT  HEMORRHOIDS    EYE SURGERY Left 01/01/2004    VITREOUS  HEMORRHAGE    HIP SURGERY Left 4/26/2024    LEFT HIP HEMIARTHROPLASTY performed by Robert Sutton DO at [facility] OR    HYSTERECTOMY (CERVIX STATUS UNKNOWN)  01/01/1986    ADRIEN-BSO       Medications Prior to Admission:    Medications Prior to

## 2024-12-30 NOTE — ANESTHESIA POSTPROCEDURE EVALUATION
Department of Anesthesiology  Postprocedure Note    Patient: Suki Weir  MRN: [id number]  YOB: 1940  Date of evaluation: 12/30/2024    Procedure Summary       Date: 12/30/24 Room / Location: [address]    Anesthesia Start:  Anesthesia Stop:     Procedure: CYSTOSCOPY RETROGRADE PYELOGRAM RIGHT STENT INSERTION (Right) Diagnosis:       Right ureteral calculus      (Right ureteral calculus [N20.1])    Surgeons: Torres Hamm MD Responsible Provider:     Anesthesia Type: MAC ASA Status: 4            Anesthesia Type: No value filed.    Jas Phase I:      Jas Phase II:      Anesthesia Post Evaluation    Patient location during evaluation: PACU  Patient participation: complete - patient participated  Level of consciousness: awake  Pain score: 3  Airway patency: patent  Nausea & Vomiting: no nausea and no vomiting  Cardiovascular status: blood pressure returned to baseline  Respiratory status: acceptable  Hydration status: euvolemic    No notable events documented.

## 2024-12-30 NOTE — ANESTHESIA PRE PROCEDURE
Department of Anesthesiology  Preprocedure Note       Name:  Suki Weir   Age:  84 y.o.  :  1940                                          MRN:  [id number]         Date:  2024      Surgeon: Surgeon(s):  Torres Hamm MD    Procedure: Procedure(s):  CYSTOSCOPY RETROGRADE PYELOGRAM RIGHT STENT INSERTION    Medications prior to admission:   Prior to Admission medications    Medication Sig Start Date End Date Taking? Authorizing Provider   acetaminophen (TYLENOL) 325 MG tablet Take 2 tablets by mouth every 6 hours as needed for Pain    ProviderPradeep MD   dilTIAZem (DILACOR XR) 180 MG extended release capsule Take 1 capsule by mouth daily    ProviderPradeep MD   magnesium hydroxide (MILK OF MAGNESIA) 400 MG/5ML suspension Take by mouth daily as needed for Constipation    ProviderPradeep MD   primidone (MYSOLINE) 50 MG tablet Take 3 tablets by mouth in the morning and at bedtime    Pradeep Mckeon MD   apixaban (ELIQUIS) 5 MG TABS tablet Take 1 tablet by mouth 2 times daily for 170 doses 24  Alfreda Gayle, APRN - CNP   atorvastatin (LIPITOR) 40 MG tablet Take 1 tablet by mouth nightly    ProviderPradeep MD   calcium carbonate 600 MG TABS tablet Take 1 tablet by mouth every morning    Pradeep Mckeon MD   folic acid (FOLVITE) 1 MG tablet Take 1 tablet by mouth nightly    ProviderPradeep MD   latanoprost (XALATAN) 0.005 % ophthalmic solution Place 1 drop into both eyes nightly    ProviderPradeep MD   lisinopril (PRINIVIL;ZESTRIL) 20 MG tablet Take 1 tablet by mouth every evening    ProviderPradeep MD   spironolactone (ALDACTONE) 25 MG tablet Take 1 tablet by mouth every morning    Pradeep Mckeon MD   vitamin D (CHOLECALCIFEROL) 50 MCG (2000) TABS tablet Take 1 tablet by mouth every morning    ProviderPradeep MD       Current medications:    Current Facility-Administered Medications   Medication Dose Route Frequency Provider

## 2024-12-30 NOTE — ED PROVIDER NOTES
Topics    Alcohol use: No    Drug use: No       SCREENINGS                         CIWA Assessment  BP: (!) 86/57  Pulse: (!) 114           PHYSICAL EXAM  1 or more Elements     ED Triage Vitals [12/30/24 0929]   BP Systolic BP Percentile Diastolic BP Percentile Temp Temp src Pulse Respirations SpO2   (!) 80/42 -- -- 97.8 °F (36.6 °C) -- (!) 105 16 96 %      Height Weight         -- --                      Constitutional/General: Alert and oriented x3  Head: Normocephalic and atraumatic  Eyes: PERRL, EOMI, conjunctiva normal, sclera non icteric  ENT: Dried blood seen in the patient's oropharynx handling secretions, no trismus, no asymmetry of the posterior oropharynx or uvular edema  Neck: Supple, full ROM, no stridor, no meningeal signs  Respiratory: Lungs clear to auscultation bilaterally, no wheezes, rales, or rhonchi. Not in respiratory distress  Cardiovascular:  Regular rate. Regular rhythm. 2+ distal pulses. Equal extremity pulses.   Chest: No chest wall tenderness  GI:  Abdomen Soft, Non tender, Non distended.  No rebound, guarding, or rigidity.  Musculoskeletal: There is no cervical, thoracic, or lumbar spinal tenderness with no bony step-offs or obvious deformities.  Patient does have some superficial abrasions to the bilateral knees.  No overt tenderness to palpation.  Neurovasc intact in all 4 extremities.  Moves all extremities x 4. Warm and well perfused, no clubbing, no cyanosis, no edema. Capillary refill <3 seconds  Integument: skin warm and dry. No rashes.   Neurologic: GCS 15, no focal deficits            DIAGNOSTIC RESULTS   LABS:    Labs Reviewed   CBC WITH AUTO DIFFERENTIAL - Abnormal; Notable for the following components:       Result Value    WBC 18.3 (*)     RBC 2.85 (*)     Hemoglobin 9.0 (*)     Hematocrit 27.4 (*)     Platelets 75 (*)     Neutrophils % 94 (*)     Lymphocytes % 1 (*)     Neutrophils Absolute 17.19 (*)     Lymphocytes Absolute 0.16 (*)     Eosinophils Absolute 0.00 (*)      UROLOGY        I am the Primary Clinician of Record.    FINAL IMPRESSION      1. Kidney stone    2. NSTEMI (non-ST elevated myocardial infarction) (HCC)    3. Fall, initial encounter    4. Gastrointestinal hemorrhage, unspecified gastrointestinal hemorrhage type    5. Acute renal failure, unspecified acute renal failure type (HCC)          DISPOSITION/PLAN     DISPOSITION Admitted 12/30/2024 04:23:18 PM      PATIENT REFERRED TO:  No follow-up provider specified.    DISCHARGE MEDICATIONS:  New Prescriptions    No medications on file       DISCONTINUED MEDICATIONS:  Discontinued Medications    No medications on file              Patient was seen and evaluated by myself and my attending Saad Reynoso DO. Assessment and Plan discussed with attending provider, please see attestation for final plan of care.     (Please note that portions of this note were completed with a voice recognition program.  Efforts were made to edit the dictations but occasionally words are mis-transcribed.)    Lauri Orr MD (electronically signed)

## 2024-12-30 NOTE — CONSULTS
General Surgery   Consult Note      Patient's Name/Date of Birth: Suki Weir / 1940    Date: December 30, 2024     PCP: Camden Hoskins MD     Reason for consult:: C/f GIB     HPI:   Suki Weir is a 84 y.o. female who presents after falling during her facility.  She says she has been had intake to eat since Thursday.  When she came in here she was found to have nephrolithiasis on her right side.  She was also hypotensive most likely secondary to urosepsis.  Per ED, she had coffee-ground emesis when she first came in.  Currently, she has no abdominal pain.  She denies any nausea or vomiting.  She denies ever having hematemesis.  She says she is not aware that she is having any blood in her stool or any melena.  Patient denies any dysuria. She denies any hematuria.  Patient has a medical history of A-fib, CKD, diabetes, hypertension, rheumatoid A-fib on Eliquis.  She has no abdominal surgical history.  Patient was colonoscopy was in 2012 and was found to have hemorrhoids.  She has not received an EGD before.  CTAP with nephrolithiasis on the right side.     Patient Active Problem List   Diagnosis    HTN (hypertension), benign    Paroxysmal atrial fibrillation (HCC)    L-S radiculopathy    Pulmonary nodules    Essential tremor    Diabetic polyneuropathy associated with type 2 diabetes mellitus (HCC)    Stage 2 chronic kidney disease    Benign meningioma of brain (HCC)    NSTEMI (non-ST elevated myocardial infarction) (HCC)       No Known Allergies    Past Medical History:   Diagnosis Date    Atrial fibrillation (HCC) 02/01/2010    converted with  oral    Cataract     right eye    CKD (chronic kidney disease)     Diabetes mellitus (HCC) 01/01/1999    Diabetic peripheral neuropathy (HCC)     Diastolic CHF, acute on chronic (HCC) 01/01/2010    DISH (diffuse idiopathic skeletal hyperostosis)     Essential tremor     Fatty liver disease, nonalcoholic 01/01/2000    Hypertension 01/01/1996    Lumbar radiculopathy

## 2024-12-30 NOTE — OP NOTE
opposite retrograde pyelogram was negative for any stones, filling defects or hydronephrosis.     0.035 wire was placed into the right ureter and was seen to terminate in the kidney  There was a large amount of pus that came from the right kidney      A  6 [place] x 26 cm double-J ureteral stent was passed over the wire, with good curl seen in the kidney as well as in the bladder. The bladder was drained.   The patient was awakened from anesthesia and taken to recovery in stable condition.     PLAN;     Pt will need definitive treatment of their stone in 2-4 weeks.       Electronically signed by Torres Hamm MD on 12/30/2024 at 6:45 PM

## 2024-12-31 ENCOUNTER — APPOINTMENT (OUTPATIENT)
Age: 84
End: 2024-12-31
Payer: MEDICARE

## 2024-12-31 PROBLEM — K92.2 GASTROINTESTINAL HEMORRHAGE: Status: ACTIVE | Noted: 2024-12-31

## 2024-12-31 PROBLEM — A41.9 SEPSIS (HCC): Status: ACTIVE | Noted: 2024-12-31

## 2024-12-31 PROBLEM — N20.1 RIGHT URETERAL CALCULUS: Status: ACTIVE | Noted: 2024-12-31

## 2024-12-31 PROBLEM — N17.9 ACUTE RENAL FAILURE (HCC): Status: ACTIVE | Noted: 2024-12-31
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
## 2024-12-31 LAB
ANION GAP SERPL CALCULATED.3IONS-SCNC: 15 MMOL/L (ref 7–16)
BUN SERPL-MCNC: 64 MG/DL (ref 6–23)
CALCIUM SERPL-MCNC: 8.4 MG/DL (ref 8.6–10.2)
CHLORIDE SERPL-SCNC: 106 MMOL/L (ref 98–107)
CO2 SERPL-SCNC: 18 MMOL/L (ref 22–29)
CREAT SERPL-MCNC: 2.7 MG/DL (ref 0.5–1)
CREAT UR-MCNC: 124.1 MG/DL (ref 29–226)
ERYTHROCYTE [DISTWIDTH] IN BLOOD BY AUTOMATED COUNT: 15 % (ref 11.5–15)
GFR, ESTIMATED: 17 ML/MIN/1.73M2
GLUCOSE SERPL-MCNC: 86 MG/DL (ref 74–99)
HCT VFR BLD AUTO: 23.2 % (ref 34–48)
HGB BLD-MCNC: 7.8 G/DL (ref 11.5–15.5)
LACTATE BLDV-SCNC: 1.2 MMOL/L (ref 0.5–2.2)
MCH RBC QN AUTO: 31.8 PG (ref 26–35)
MCHC RBC AUTO-ENTMCNC: 33.6 G/DL (ref 32–34.5)
MCV RBC AUTO: 94.7 FL (ref 80–99.9)
PLATELET CONFIRMATION: NORMAL
PLATELET, FLUORESCENCE: 64 K/UL (ref 130–450)
PMV BLD AUTO: 11.6 FL (ref 7–12)
POTASSIUM SERPL-SCNC: 4.8 MMOL/L (ref 3.5–5)
RBC # BLD AUTO: 2.45 M/UL (ref 3.5–5.5)
SODIUM SERPL-SCNC: 139 MMOL/L (ref 132–146)
SODIUM UR-SCNC: 48 MMOL/L
UUN UR-MCNC: 523 MG/DL (ref 800–1666)
WBC OTHER # BLD: 10.5 K/UL (ref 4.5–11.5)

## 2024-12-31 PROCEDURE — 6360000002 HC RX W HCPCS: Performed by: INTERNAL MEDICINE

## 2024-12-31 PROCEDURE — 82570 ASSAY OF URINE CREATININE: CPT

## 2024-12-31 PROCEDURE — 84540 ASSAY OF URINE/UREA-N: CPT

## 2024-12-31 PROCEDURE — 2060000000 HC ICU INTERMEDIATE R&B

## 2024-12-31 PROCEDURE — 83605 ASSAY OF LACTIC ACID: CPT

## 2024-12-31 PROCEDURE — 6370000000 HC RX 637 (ALT 250 FOR IP)

## 2024-12-31 PROCEDURE — 2500000003 HC RX 250 WO HCPCS: Performed by: INTERNAL MEDICINE

## 2024-12-31 PROCEDURE — 85027 COMPLETE CBC AUTOMATED: CPT

## 2024-12-31 PROCEDURE — 36415 COLL VENOUS BLD VENIPUNCTURE: CPT

## 2024-12-31 PROCEDURE — 84300 ASSAY OF URINE SODIUM: CPT

## 2024-12-31 PROCEDURE — 99232 SBSQ HOSP IP/OBS MODERATE 35: CPT | Performed by: SURGERY

## 2024-12-31 PROCEDURE — 97165 OT EVAL LOW COMPLEX 30 MIN: CPT

## 2024-12-31 PROCEDURE — 99223 1ST HOSP IP/OBS HIGH 75: CPT | Performed by: INTERNAL MEDICINE

## 2024-12-31 PROCEDURE — 2500000003 HC RX 250 WO HCPCS

## 2024-12-31 PROCEDURE — 97535 SELF CARE MNGMENT TRAINING: CPT

## 2024-12-31 PROCEDURE — 80048 BASIC METABOLIC PNL TOTAL CA: CPT

## 2024-12-31 PROCEDURE — 6370000000 HC RX 637 (ALT 250 FOR IP): Performed by: INTERNAL MEDICINE

## 2024-12-31 RX ORDER — SODIUM BICARBONATE 650 MG/1
650 TABLET ORAL 2 TIMES DAILY
Status: DISCONTINUED | OUTPATIENT
Start: 2024-12-31 | End: 2025-01-03 | Stop reason: HOSPADM

## 2024-12-31 RX ADMIN — POLYETHYLENE GLYCOL 3350 17 G: 17 POWDER, FOR SOLUTION ORAL at 23:02

## 2024-12-31 RX ADMIN — SODIUM CHLORIDE, PRESERVATIVE FREE 10 ML: 5 INJECTION INTRAVENOUS at 23:02

## 2024-12-31 RX ADMIN — PANTOPRAZOLE SODIUM 40 MG: 40 TABLET, DELAYED RELEASE ORAL at 16:18

## 2024-12-31 RX ADMIN — PRIMIDONE 150 MG: 50 TABLET ORAL at 23:01

## 2024-12-31 RX ADMIN — SODIUM CHLORIDE, PRESERVATIVE FREE 10 ML: 5 INJECTION INTRAVENOUS at 09:34

## 2024-12-31 RX ADMIN — LATANOPROST 1 DROP: 50 SOLUTION OPHTHALMIC at 23:00

## 2024-12-31 RX ADMIN — PANTOPRAZOLE SODIUM 40 MG: 40 TABLET, DELAYED RELEASE ORAL at 06:25

## 2024-12-31 RX ADMIN — SODIUM CHLORIDE, PRESERVATIVE FREE 20 ML: 5 INJECTION INTRAVENOUS at 09:33

## 2024-12-31 RX ADMIN — SODIUM BICARBONATE 650 MG: 650 TABLET ORAL at 14:08

## 2024-12-31 RX ADMIN — FOLIC ACID 1 MG: 1 TABLET ORAL at 23:16

## 2024-12-31 RX ADMIN — WATER 1000 MG: 1 INJECTION INTRAMUSCULAR; INTRAVENOUS; SUBCUTANEOUS at 06:25

## 2024-12-31 RX ADMIN — SODIUM BICARBONATE 650 MG: 650 TABLET ORAL at 23:01

## 2024-12-31 RX ADMIN — ATORVASTATIN CALCIUM 40 MG: 40 TABLET, FILM COATED ORAL at 23:01

## 2024-12-31 RX ADMIN — PRIMIDONE 150 MG: 50 TABLET ORAL at 09:32

## 2024-12-31 RX ADMIN — POLYETHYLENE GLYCOL 3350 17 G: 17 POWDER, FOR SOLUTION ORAL at 09:32

## 2024-12-31 RX ADMIN — WATER 1000 MG: 1 INJECTION INTRAMUSCULAR; INTRAVENOUS; SUBCUTANEOUS at 22:59

## 2024-12-31 RX ADMIN — SENNOSIDES 8.6 MG: 8.6 TABLET, FILM COATED ORAL at 23:01

## 2024-12-31 NOTE — PROGRESS NOTES
SUBJECTIVE:  Afebrile  Fee;s better since stent  Creatinine 2.7    OBJECTIVE:    BP (!) 95/57   Pulse 94   Temp 97.3 °F (36.3 °C) (Temporal)   Resp 15   SpO2 99%     PHYSICAL EXAMINATION:  Skin: dry, without rashes  Respirations: non-labored, intact  Abdomen: soft, non-tender, non-distended      Lab Results   Component Value Date    WBC 10.5 12/31/2024    HGB 7.8 (L) 12/31/2024    HCT 23.2 (L) 12/31/2024    MCV 94.7 12/31/2024    PLT 75 (L) 12/30/2024       Lab Results   Component Value Date    CREATININE 2.7 (H) 12/31/2024       No results found for: \"PSA\"    REVIEW OF SYSTEMS:    @LeximROS@    PAST FAMILY HISTORY:    Family History   Problem Relation Age of Onset    Heart Disease Mother     Diabetes Maternal Aunt      PAST SOCIAL HISTORY:    Social History     Socioeconomic History    Marital status:    Tobacco Use    Smoking status: Former     Current packs/day: 1.00     Average packs/day: 1 pack/day for 45.0 years (45.0 ttl pk-yrs)     Types: Cigarettes    Smokeless tobacco: Former     Quit date: 3/23/1993   Vaping Use    Vaping status: Never Used   Substance and Sexual Activity    Alcohol use: No    Drug use: No     Social Determinants of Health     Food Insecurity: No Food Insecurity (12/31/2024)    Hunger Vital Sign     Worried About Running Out of Food in the Last Year: Never true     Ran Out of Food in the Last Year: Never true   Transportation Needs: No Transportation Needs (12/31/2024)    PRAPARE - Transportation     Lack of Transportation (Medical): No     Lack of Transportation (Non-Medical): No   Housing Stability: Low Risk  (12/31/2024)    Housing Stability Vital Sign     Unable to Pay for Housing in the Last Year: No     Number of Times Moved in the Last Year: 1     Homeless in the Last Year: No       Scheduled Meds:   sodium chloride flush  5-40 mL IntraVENous 2 times per day    atorvastatin  40 mg Oral Nightly

## 2024-12-31 NOTE — CONSULTS
bladder;Bag not on floor;Lack of dependent loop in tubing;Drainage bag less than half full 12/31/24 1408   Status Draining 12/31/24 1408   Output (mL) 400 mL 12/31/24 1408     Airway:        DIAGNOSTIC RESULTS   RADIOLOGY:   FL RETROGRADE PYELOGRAM W WO KUB    Result Date: 12/30/2024  Intraprocedural fluoroscopic spot images as above.  See separate procedure report for more information.     CT ABDOMEN PELVIS WO CONTRAST Additional Contrast? None    Result Date: 12/30/2024  3 mm proximal right ureteral calculus with mild associated hydroureteronephrosis and perinephric/periureteral fat stranding. Bilateral nephrolithiasis. Small bilateral pleural effusions.     CT CERVICAL SPINE WO CONTRAST    Result Date: 12/30/2024  No acute abnormality of the cervical spine.     CT HEAD WO CONTRAST    Result Date: 12/30/2024  1. No acute intracranial abnormality. 2. Global parenchymal volume loss with chronic microvascular ischemic changes. 3. Prominent calcified extra-axial mass in the posterior fossa on the left consistent with the meningioma.     XR KNEE RIGHT (3 VIEWS)    Result Date: 12/30/2024  1. No acute bony abnormalities. 2. Mild osteoarthritis. 3. Subtle intra-articular calcification.     XR KNEE LEFT (3 VIEWS)    Result Date: 12/30/2024  Moderate degenerative changes seen throughout the left knee with no acute bony abnormality. Small joint effusion.     XR CHEST PORTABLE    Result Date: 12/30/2024  Cardiomegaly with underlying chronic changes seen throughout the lung fields bilaterally. Some atelectatic changes seen at the lung bases right greater than left with trace right pleural effusion.     LABS  Recent Labs     12/30/24  0959 12/30/24  1603 12/31/24  0446   WBC 18.3*  --  10.5   HGB 9.0* 7.8* 7.8*   HCT 27.4* 23.6* 23.2*   MCV 96.1  --  94.7   PLT 75*  --   --      Recent Labs     12/30/24  0959 12/31/24  0446    139   K 4.2 4.8   CL 97* 106   CO2 16* 18*   BUN 55* 64*   CREATININE 2.7* 2.7*   LABGLOM 17*

## 2024-12-31 NOTE — PROGRESS NOTES
4 Eyes Skin Assessment     NAME:  Suki Weir  YOB: 1940  MEDICAL RECORD NUMBER:  [id number]    The patient is being assessed for  Admission    I agree that at least one RN has performed a thorough Head to Toe Skin Assessment on the patient. ALL assessment sites listed below have been assessed.      Areas assessed by both nurses:    Head, Face, Ears, Shoulders, Back, Chest, Arms, Elbows, Hands, Sacrum. Buttock, Coccyx, Ischium, Legs. Feet and Heels, and Under Medical Devices         Does the Patient have a Wound? No noted wound(s)       Martell Prevention initiated by RN: Yes  Wound Care Orders initiated by RN: No    Pressure Injury (Stage 3,4, Unstageable, DTI, NWPT, and Complex wounds) if present, place Wound referral order by RN under : No    New Ostomies, if present place, Ostomy referral order under : No     Nurse 1 eSignature: Electronically signed by Debi Amaya RN on 12/31/24 at 8:00 AM EST    **SHARE this note so that the co-signing nurse can place an eSignature**    Nurse 2 eSignature: {Esignature:[id number]}

## 2024-12-31 NOTE — CONSULTS
[facility]  Nephrology Consult Note    Patient's Name: Suki Weir    Reason for Consult: DAVI    Chief Complaint: Fall  History Obtained From:  patient, past medical records, and EMR    History of Present Illness:    Suki Weir is a 84 y.o. female with a past medical history of CKD, diabetes mellitus, essential tremor, and rheumatoid arthritis.  She presented to the ED on 12/30 for concerns of fall.  Vital signs on 12/30 includes temperature 97.8, respirations 16, pulse 105, BP 80/42, and she was 96% SpO2.  Lab data on 12/30 includes CO2 16, BUN 55, creatinine 2.7, anion gap 20, lactic acid 4.3, albumin 3.1, WBCs 18.3 K, and hemoglobin 9.  She had a chest x-ray on 12/30 which showed cardiomegaly with underlying chronic changes seen throughout the lung fields bilaterally, some atelectatic changes seen at the lung bases right greater than left with trace right pleural effusion.  She had an x-ray of her left knee on 12/30 which showed moderate degenerative changes seen throughout the left knee with no acute bony abnormality, small joint effusion.  X-ray of her right knee showed no acute bony abnormality, mild osteoarthritis.  CT head from 12/30 showed no acute intracranial abnormality.  She had a CT abdomen/pelvis on 12/30 which showed 3 mm proximal right ureteral calculus with mild associated hydroureteronephrosis and perinephric/periureteral fat stranding, bilateral nephrolithiasis.  She underwent cystoscopy, pyelogram right stent insertion on 12/30.  Cardiology has been consulted to see the patient for concerns of elevated troponin.  Nephrology has been consulted to see the patient for concerns of DAVI.  She has a baseline creatinine of 0.8-0.9 mg/dL.  At present, patient was seen and examined.  She explained that she came in because she fell out of bed and hit her knee and that she could not get up.  She denies any nausea or vomiting.  She denies any chest pain or shortness of breath.  She denies  answers questions appropriately    Data:    Recent Labs     12/30/24  0959 12/30/24  1603 12/31/24  0446   WBC 18.3*  --  10.5   HGB 9.0* 7.8* 7.8*   HCT 27.4* 23.6* 23.2*   MCV 96.1  --  94.7   PLT 75*  --   --        Recent Labs     12/30/24  0959 12/31/24  0446    139   K 4.2 4.8   CL 97* 106   CO2 16* 18*   CREATININE 2.7* 2.7*   BUN 55* 64*   LABGLOM 17* 17*   GLUCOSE 129* 86   CALCIUM 8.9 8.4*       Vit D, 25-Hydroxy   Date Value Ref Range Status   03/14/2024 66.4 30 - 100 ng/mL Final     Comment:         < 20 ng/mL - Vitamin D Deficiency   20 - 30 ng/mL - Vitamin D Insufficiency  30 - 100 ng/mL - Vitamin D sufficiency     > 100 ng/mL - Vitamin D Toxicity         No results found for: \"PTH\"    Recent Labs     12/30/24  0959   ALT 12   AST 21   ALKPHOS 59   BILITOT 0.5       No results for input(s): \"LABALBU\" in the last 72 hours.    Ferritin   Date Value Ref Range Status   05/25/2022 667 ng/mL Final     Comment:     FERRITIN Reference Ranges:  Adult Males   20 - 60 years:    30 - 400 ng/mL  Adult females 17 - 60 years:    13 - 150 ng/mL  Adults greater than 60 years:   no established reference range  Pediatrics:                     no established reference range       Iron   Date Value Ref Range Status   08/24/2012 56 50 - 170 mcg/dL Final     TIBC   Date Value Ref Range Status   08/24/2012 348 250 - 450 mcg/dL Final       No results found for: \"[id number]\"    No results found for: \"FOLATE\"    Lab Results   Component Value Date/Time    COLORU Yellow 04/28/2024 06:30 AM    NITRU NEGATIVE 04/28/2024 06:30 AM    GLUCOSEU NEGATIVE 04/28/2024 06:30 AM    KETUA NEGATIVE 04/28/2024 06:30 AM    UROBILINOGEN 0.2 04/28/2024 06:30 AM    BILIRUBINUR NEGATIVE 04/28/2024 06:30 AM       Lab Results   Component Value Date/Time    PROTEIN 5.2 (L) 12/30/2024 09:59 AM       No components found for: \"URIC\"    No results found for: \"LIPIDPAN\"    Assessment and Plans:    DAVI stage III  DAVI presumed likely multifactorial: With

## 2024-12-31 NOTE — PROGRESS NOTES
This nurse was notified via phone pt has positive blood culture results of 2 out of 4 gram negative rods. This nurse phone Dr. Reynoso's answering service per the # provided on perfect serve. Message left regarding the above.   Electronically signed by Nancy Kat RN on 12/31/2024 at 8:04 AM

## 2024-12-31 NOTE — PLAN OF CARE
Problem: Chronic Conditions and Co-morbidities  Goal: Patient's chronic conditions and co-morbidity symptoms are monitored and maintained or improved  12/31/2024 0948 by Nancy Kat RN  Outcome: Progressing  12/31/2024 0327 by Debi Amaya RN  Outcome: Progressing     Problem: Discharge Planning  Goal: Discharge to home or other facility with appropriate resources  12/31/2024 0948 by Nancy Kat RN  Outcome: Progressing  12/31/2024 0327 by Debi Amaya RN  Outcome: Progressing     Problem: Safety - Adult  Goal: Free from fall injury  Outcome: Progressing  Flowsheets (Taken 12/31/2024 0947)  Free From Fall Injury: Instruct family/caregiver on patient safety     Problem: Skin/Tissue Integrity  Goal: Absence of new skin breakdown  Description: 1.  Monitor for areas of redness and/or skin breakdown  2.  Assess vascular access sites hourly  3.  Every 4-6 hours minimum:  Change oxygen saturation probe site  4.  Every 4-6 hours:  If on nasal continuous positive airway pressure, respiratory therapy assess nares and determine need for appliance change or resting period.  Outcome: Progressing

## 2024-12-31 NOTE — H&P
[facility]              [address] [place] [address]                           HISTORY & PHYSICAL      PATIENT NAME: MARIANN KILLIAN               : 1940  MED REC NO: [id number]                        ROOM: [facility]  ACCOUNT NO: [id number]                       ADMIT DATE: 2024  PROVIDER: Saad Reynoso DO      PRIMARY CARE PHYSICIAN:  Camden Hoskins MD    CHIEF COMPLAINT:  Weakness.    HISTORY OF PRESENT ILLNESS:  The patient is an 84-year-old  female who presented to the emergency room.  She slid out of bed.  She lives at assisted living at [facility] and they sent her to the emergency room for evaluation.  Over the past week, she has had decreased oral intake, increasing weakness.  Diagnostic evaluation in the emergency room revealed significant abnormalities including BUN 55, creatinine 2.7.  Lactic acid 4.3.  Troponin 329.  Hemoglobin 9.0, WBC 18.3, platelets 75,000.  Flu and COVID were negative.  EKG, atrial fibrillation.  A CT of abdomen revealed a 3 mm proximal right ureteral calculus with mild associated hydroureteronephrosis and perinephric and periureteral fat stranding, bilateral nephrolithiasis, small bilateral pleural effusions.  The patient was taken for emergent cysto, retro pyelogram, right stent insertion per Dr. Hamm on 2024, and admitted for further evaluation and treatment.  Blood cultures since admission have been positive.    MEDICATIONS PRIOR TO ADMISSION:  Tylenol, Eliquis, Lipitor, calcium carbonate, Dilacor, folic acid, Xalatan eye drops, lisinopril, Milk of Mag, Mysoline, Aldactone, vitamin D.    PAST MEDICAL HISTORY:  Atrial fibrillation, chronic Eliquis therapy, elevated cholesterol, hypertension, glaucoma, tremor.    PAST SURGICAL HISTORY:  Left eye vitreous hemorrhage surgery, left hip hemiarthroplasty, ADRIEN-BSO.    REVIEW OF SYSTEMS:  Remarkable for above-stated chief

## 2024-12-31 NOTE — PROGRESS NOTES
OCCUPATIONAL THERAPY INITIAL EVALUATION    [facility]  [address], [place]      Date:2024                                                  Patient Name: Suki Weir  MRN: [id number]  : 1940  Room: [address] [place] [address]A    Evaluating OT: Nila Monroe MOT, OTR/L  # [id number]    Referring Provider:  Saad Reynoso DO   Specific Provider Orders:  \"OT Eval and Treat\"  24    Diagnosis: Kidney stone [N20.0]  NSTEMI (non-ST elevated myocardial infarction) (HCC) [I21.4]  Fall, initial encounter [W19.XXXA]  Acute renal failure, unspecified acute renal failure type (HCC) [N17.9]  Gastrointestinal hemorrhage, unspecified gastrointestinal hemorrhage type [K92.2]    Pt was admitted after falling out of bed at [facility] Blood Cultures, Hypotensive r/t Urosepsis - underwent Urgent procedure    Pertinent Medical History:  Pt has a past medical history of Atrial fibrillation (HCC), Cataract, CKD (chronic kidney disease), Diabetes mellitus (HCC), Diabetic peripheral neuropathy (HCC), Diastolic CHF, acute on chronic (HCC), DISH (diffuse idiopathic skeletal hyperostosis), Essential tremor, Fatty liver disease, nonalcoholic, Hypertension, Lumbar radiculopathy, Mitral regurgitation, Open-angle glaucoma, Pulmonary regurgitation, Rectal bleed, Rheumatoid arthritis (HCC), and Vitreous hemorrhage (HCC).,  has a past surgical history that includes Hysterectomy (1986); Eye surgery (Left, 2004); Colonoscopy (,,); hip surgery (Left, 2024); and Wound debridement (Right, 2024).    Surgeries this admission: 24:   CYSTOSCOPY RETROGRADE PYELOGRAM RIGHT STENT INSERTION       Precautions:  Fall Risk  Matute Catheter    Assessment of current deficits   [x] Functional mobility  [x]ADLs  [x] Strength               []Cognition   [x] Functional transfers   [x] IADLs         [x] Safety Awareness   [x]Endurance   [] Fine Coordination

## 2024-12-31 NOTE — ACP (ADVANCE CARE PLANNING)
Advance Care Planning   Healthcare Decision Maker:    Primary Decision Maker: CHA FERREIRA - [phone]    Primary Decision Maker: Jerrod Flowers - [phone]    Click here to complete Healthcare Decision Makers including selection of the Healthcare Decision Maker Relationship (ie \"Primary\").

## 2024-12-31 NOTE — PROGRESS NOTES
.General Surgery   Daily Progress Note      Patient's Name/Date of Birth: Suki Weir / 1940    Date: December 31, 2024       Subjective: Doing well overall. Able to eat a little bit after cystoscopy yesterday. No signs of bleeding overnight.     Objective:  BP 93/72   Pulse 90   Temp 97.9 °F (36.6 °C) (Oral)   Resp 16   SpO2 100%   Labs:  Recent Labs     12/30/24  0959 12/30/24  1603 12/31/24  0446   WBC 18.3*  --  10.5   HGB 9.0* 7.8* 7.8*   HCT 27.4* 23.6* 23.2*     Recent Labs     12/30/24  0959      K 4.2   CL 97*   CO2 16*   BUN 55*   CREATININE 2.7*     Recent Labs     12/30/24  0959   ALKPHOS 59   ALT 12   AST 21   BILITOT 0.5   LIPASE 5*         General appearance: NAD  Head: NCAT, PERRL, EOMI, pink conjunctiva  Neck: supple, no masses  Lungs: symmetric chest rise, no audible wheezes  Heart: warm throughout  Abdomen: soft, non-distended, non-tender to palpation throughout  Skin: no visible lesions  Extremities: extremities normal, atraumatic, no cyanosis or edema      Assessment/Plan:  Suki Weir is a 84 y.o. female with chronic anemia, now with  urosepsis s/p cystoscopy and stent insertion 12/30     - Ok for regular diet from our POV   - PPI   - Bowel regimen for constipation - need be to continued as OP as patient is manually disimpacting herself   - Monitor H&H  - No plans for inpatient scopes, f/u OP for OP EGD       Shaniqua Hopper DO  General Surgery Resident    [facility]   ATTENDING PHYSICIAN PROGRESS NOTE     I have personally examined the patient, personally reviewed the record, and personally discussed the case with the resident. I have personally reviewed all relevant labs and imaging data. I am actively managing this patient's medications.  Please refer to the resident's note. I agree with the assessment and plan with the following corrections/additions. The following summarizes my clinical findings and independent assessment.     CC: anemia    Patient  denies abdominal pain; nausea/vomiting; heartburn or indigestion.  Denies seeing any blood in her stool.    Asleep but arousable  Follows commands  Heart: Regular  Lungs: Fairly clear  Abdomen: Soft; bowel sounds active; nontender/nondistended  Skin: Warm/dry    Labs personally reviewed--BUN 64; creatinine 2.7; WBC 10.5; hemoglobin 7.8; platelets 64    Patient Active Problem List    Diagnosis Date Noted    HTN (hypertension), benign 08/23/2012    Paroxysmal atrial fibrillation (HCC) 08/23/2012    NSTEMI (non-ST elevated myocardial infarction) ([facility]) 12/30/2024    Kidney stone 12/30/2024    Benign meningioma of brain ([facility]) 05/23/2024    Stage 2 chronic kidney disease 05/13/2023    Essential tremor 05/04/2023    Diabetic polyneuropathy associated with type 2 diabetes mellitus ([facility]) 05/04/2023    Pulmonary nodules 06/12/2021    L-S radiculopathy      Acute on chronic anemia--monitor H/H; transfuse for hemoglobin less than 7  Acute kidney injury--status post cystoscopy with stent placement  Diet as tolerated  Bowel regimen  Dispo Per primary service    Braeden Young MD, FACS  12/31/2024  8:38 AM

## 2024-12-31 NOTE — CONSULTS
Inpatient Cardiology Consultation      Reason for Consult:  Elevated troponin    Consulting Physician: Dr. Holguin    Requesting Physician:  Dr. Reynoso     Date of Consultation: 12/31/2024    History of Present Illness:      Ms. Weir is an 84 year old lady who is followed at our practice by Dr. Mckeon.  She has a lengthy past medical history including persistent atrial fibrillation; pericardial effusion; hypertension; hyperlipidemia; type 2 diabetes mellitus; and chronic kidney disease.   She resides in assisted living facility and was sent to the emergency room on December 30 after suffering a mechanical fall while reaching for a can of soda.  She denies loss of consciousness.  On arrival she had what appeared to be coffee-ground emesis in her mouth and tongue although she denied vomiting.  Blood pressure was 80/42, pulse 105, respirations 16, SpO2 96% on room air, and temperature 97.8.  White blood cell count was 18.3, hemoglobin 9.0, hematocrit 27.4, BUN, 55, creatinine 2.7, lactic acid 4.3 >>2.7;  chest x-ray was read as showing cardiomegaly with underlying chronic changes throughout the lung fields and trace right pleural effusion and CT abdomen and pelvis of right ureteral calculus with mild hydroureteronephrosis and perinephric stranding.  She was treated with cefepime, Protonix, and IV fluids.  She was seen by urology and underwent cystoscopy with retrograde pyelogram and right ureteral stent placement.  Blood cultures have returned showing gram-negative rods and ID consult is pending.  Cardiology consult was requested for elevated troponin 329>> 269>> 261.    At this time she is sitting up in bed and appears ill but in no acute distress.  She is primarily wheelchair-bound and at this capacity denies any chest discomfort or shortness of breath.  She also denies orthopnea, PND, palpitations, dizziness, syncope, or lower extremity edema.  She does report poor appetite over the last week.  She has been  tremors.  EXT: No bilateral lower extremity edema  Skin: warm, dry, intact.  Good turgor.  Psych: A&O x 3, normal behavior, not anxious.    Patient seen and examined.  Chart, labs & data reviewed.    A:  Elevated troponin in the setting of acute on chronic renal insufficiency, profound anemia, and urosepsis with hypotension.  Permanent atrial fibrillation  Remote small pericardial effusion.  Resolved.  Hypertension  Hypercholesterolemia  Diabetes  Tremor  Frequent falls resulting in injuries resulting in wheelchair-bound.  DVT      Rec:  With the atrial fibrillation, renal insufficiency, and elevated troponin I would recommend maintaining a hemoglobin greater than 8.  I will defer the anemia management to others.  Continue treating the comorbidities per others.  Resume Eliquis when okay with others from a bleeding/anemia standpoint.  Continue heart rate and blood pressure control.  Echocardiogram  DNR CCA.    Electronically signed by Ethan Holguin DO on 12/31/2024 at 8:38 PM    Note: This report was completed using computerized voice recognition software. Every effort has been made to ensure accuracy, however; and invert and computerized transcription errors may be present.

## 2024-12-31 NOTE — CONSULTS
This note is to close out the Critical care consult. Patient was not admitted to ICU.   Atiya Oliveros, DO

## 2024-12-31 NOTE — CARE COORDINATION
Transition of Care: met with pt at bedside to discuss transition of care. Pt from [facility] at [facility]. Therapy evals ordered to assess if [facility] is needed. Spoke to Andrea Barron accepted at [facility]. Pt agreeable if needed. Pt admitted with fall, AMS. Urosepsis S/P Cystoscopy with stent insertion. ID consulted for positive blood cultures. Cardiology consulted for elevated Troponin. Echo ordred. CM will follow(TF)        CARLTON Castanon,RN  Case Management  [phone]          Case Management Assessment  Initial Evaluation    Date/Time of Evaluation: 12/31/2024 11:16 AM  Assessment Completed by: RAVINDER MATTSON RN    If patient is discharged prior to next notation, then this note serves as note for discharge by case management.    Patient Name: Suki Weir                   YOB: 1940  Diagnosis: Kidney stone [N20.0]  NSTEMI (non-ST elevated myocardial infarction) (HCC) [I21.4]  Fall, initial encounter [W19.XXXA]  Acute renal failure, unspecified acute renal failure type (HCC) [N17.9]  Gastrointestinal hemorrhage, unspecified gastrointestinal hemorrhage type [K92.2]                   Date / Time: 12/30/2024  9:36 AM    Patient Admission Status: Inpatient   Readmission Risk (Low < 19, Mod (19-27), High > 27): Readmission Risk Score: 19.6    Current PCP: Camden Hoskins MD  PCP verified by CM? Yes    Chart Reviewed: Yes      History Provided by: Patient  Patient Orientation: Alert and Oriented    Patient Cognition: Alert    Hospitalization in the last 30 days (Readmission):  No    If yes, Readmission Assessment in  Navigator will be completed.    Advance Directives:      Code Status: DNR-CCA   Patient's Primary Decision Maker is: Legal Next of Kin    Primary Decision Maker: CHA FERREIRA - Other - [phone]    Primary Decision Maker: Jerrod Flowers Child - [phone]    Discharge Planning:    Patient lives with: Other (Comment) Type of Home: Skilled Nursing

## 2025-01-01 LAB
ANION GAP SERPL CALCULATED.3IONS-SCNC: 13 MMOL/L (ref 7–16)
BUN SERPL-MCNC: 62 MG/DL (ref 6–23)
CALCIUM SERPL-MCNC: 8.6 MG/DL (ref 8.6–10.2)
CHLORIDE SERPL-SCNC: 106 MMOL/L (ref 98–107)
CO2 SERPL-SCNC: 17 MMOL/L (ref 22–29)
CREAT SERPL-MCNC: 2.1 MG/DL (ref 0.5–1)
ERYTHROCYTE [DISTWIDTH] IN BLOOD BY AUTOMATED COUNT: 14.7 % (ref 11.5–15)
FERRITIN SERPL-MCNC: 243 NG/ML
FOLATE SERPL-MCNC: >20 NG/ML (ref 4.8–24.2)
GFR, ESTIMATED: 22 ML/MIN/1.73M2
GLUCOSE SERPL-MCNC: 85 MG/DL (ref 74–99)
HCT VFR BLD AUTO: 23.8 % (ref 34–48)
HGB BLD-MCNC: 8.1 G/DL (ref 11.5–15.5)
IRON SATN MFR SERPL: 12 % (ref 15–50)
IRON SERPL-MCNC: 16 UG/DL (ref 37–145)
MAGNESIUM SERPL-MCNC: 1.6 MG/DL (ref 1.6–2.6)
MCH RBC QN AUTO: 31.6 PG (ref 26–35)
MCHC RBC AUTO-ENTMCNC: 34 G/DL (ref 32–34.5)
MCV RBC AUTO: 93 FL (ref 80–99.9)
MICROORGANISM SPEC CULT: ABNORMAL
PHOSPHATE SERPL-MCNC: 3.2 MG/DL (ref 2.5–4.5)
PLATELET CONFIRMATION: NORMAL
PLATELET, FLUORESCENCE: 64 K/UL (ref 130–450)
PMV BLD AUTO: 12.2 FL (ref 7–12)
POTASSIUM SERPL-SCNC: 4.3 MMOL/L (ref 3.5–5)
RBC # BLD AUTO: 2.56 M/UL (ref 3.5–5.5)
SERVICE CMNT-IMP: ABNORMAL
SODIUM SERPL-SCNC: 136 MMOL/L (ref 132–146)
SPECIMEN DESCRIPTION: ABNORMAL
TIBC SERPL-MCNC: 136 UG/DL (ref 250–450)
VIT B12 SERPL-MCNC: 436 PG/ML (ref 211–946)
WBC OTHER # BLD: 8.4 K/UL (ref 4.5–11.5)

## 2025-01-01 PROCEDURE — 80048 BASIC METABOLIC PNL TOTAL CA: CPT

## 2025-01-01 PROCEDURE — 82746 ASSAY OF FOLIC ACID SERUM: CPT

## 2025-01-01 PROCEDURE — 83540 ASSAY OF IRON: CPT

## 2025-01-01 PROCEDURE — 6370000000 HC RX 637 (ALT 250 FOR IP): Performed by: INTERNAL MEDICINE

## 2025-01-01 PROCEDURE — 82728 ASSAY OF FERRITIN: CPT

## 2025-01-01 PROCEDURE — 2500000003 HC RX 250 WO HCPCS: Performed by: INTERNAL MEDICINE

## 2025-01-01 PROCEDURE — 2060000000 HC ICU INTERMEDIATE R&B

## 2025-01-01 PROCEDURE — 82607 VITAMIN B-12: CPT

## 2025-01-01 PROCEDURE — 83735 ASSAY OF MAGNESIUM: CPT

## 2025-01-01 PROCEDURE — 2500000003 HC RX 250 WO HCPCS

## 2025-01-01 PROCEDURE — 85027 COMPLETE CBC AUTOMATED: CPT

## 2025-01-01 PROCEDURE — 36415 COLL VENOUS BLD VENIPUNCTURE: CPT

## 2025-01-01 PROCEDURE — 84100 ASSAY OF PHOSPHORUS: CPT

## 2025-01-01 PROCEDURE — 6370000000 HC RX 637 (ALT 250 FOR IP)

## 2025-01-01 PROCEDURE — 6360000002 HC RX W HCPCS: Performed by: INTERNAL MEDICINE

## 2025-01-01 PROCEDURE — 83550 IRON BINDING TEST: CPT

## 2025-01-01 RX ADMIN — ATORVASTATIN CALCIUM 40 MG: 40 TABLET, FILM COATED ORAL at 22:28

## 2025-01-01 RX ADMIN — PANTOPRAZOLE SODIUM 40 MG: 40 TABLET, DELAYED RELEASE ORAL at 15:33

## 2025-01-01 RX ADMIN — SODIUM CHLORIDE, PRESERVATIVE FREE 10 ML: 5 INJECTION INTRAVENOUS at 09:28

## 2025-01-01 RX ADMIN — SODIUM BICARBONATE 650 MG: 650 TABLET ORAL at 22:28

## 2025-01-01 RX ADMIN — FOLIC ACID 1 MG: 1 TABLET ORAL at 22:28

## 2025-01-01 RX ADMIN — PRIMIDONE 150 MG: 50 TABLET ORAL at 09:27

## 2025-01-01 RX ADMIN — SENNOSIDES 8.6 MG: 8.6 TABLET, FILM COATED ORAL at 22:27

## 2025-01-01 RX ADMIN — SODIUM CHLORIDE, PRESERVATIVE FREE 10 ML: 5 INJECTION INTRAVENOUS at 09:27

## 2025-01-01 RX ADMIN — LATANOPROST 1 DROP: 50 SOLUTION OPHTHALMIC at 22:28

## 2025-01-01 RX ADMIN — WATER 2000 MG: 1 INJECTION INTRAMUSCULAR; INTRAVENOUS; SUBCUTANEOUS at 15:30

## 2025-01-01 RX ADMIN — SODIUM CHLORIDE, PRESERVATIVE FREE 10 ML: 5 INJECTION INTRAVENOUS at 22:28

## 2025-01-01 RX ADMIN — SODIUM BICARBONATE 650 MG: 650 TABLET ORAL at 09:27

## 2025-01-01 RX ADMIN — PRIMIDONE 150 MG: 50 TABLET ORAL at 22:28

## 2025-01-01 RX ADMIN — PANTOPRAZOLE SODIUM 40 MG: 40 TABLET, DELAYED RELEASE ORAL at 09:26

## 2025-01-01 RX ADMIN — ACETAMINOPHEN 650 MG: 325 TABLET ORAL at 22:27

## 2025-01-01 ASSESSMENT — PAIN DESCRIPTION - DESCRIPTORS: DESCRIPTORS: ACHING

## 2025-01-01 ASSESSMENT — PAIN SCALES - GENERAL: PAINLEVEL_OUTOF10: 5

## 2025-01-01 NOTE — PROGRESS NOTES
[facility]    Subjective:  pt alert conversive      Current Facility-Administered Medications:     sodium bicarbonate tablet 650 mg, 650 mg, Oral, BID, Esperanza Tate, APRN - CNP, 650 mg at 01/01/25 0927    sodium chloride flush 0.9 % injection 5-40 mL, 5-40 mL, IntraVENous, 2 times per day, Lauri Orr MD, 10 mL at 01/01/25 0927    sodium chloride flush 0.9 % injection 5-40 mL, 5-40 mL, IntraVENous, PRN, Lauri Orr MD    0.9 % sodium chloride infusion, , IntraVENous, PRN, Lauri Orr MD    atorvastatin (LIPITOR) tablet 40 mg, 40 mg, Oral, Nightly, Saad Reynoso DO, 40 mg at 12/31/24 2301    folic acid (FOLVITE) tablet 1 mg, 1 mg, Oral, Nightly, Saad Reynoso DO, 1 mg at 12/31/24 2316    latanoprost (XALATAN) 0.005 % ophthalmic solution 1 drop, 1 drop, Both Eyes, Nightly, Saad Reynoso DO, 1 drop at 12/31/24 2300    primidone (MYSOLINE) tablet 150 mg, 150 mg, Oral, BID, Saad Reynoso DO, 150 mg at 01/01/25 0927    sodium chloride flush 0.9 % injection 5-40 mL, 5-40 mL, IntraVENous, 2 times per day, Saad Reynoso DO, 10 mL at 01/01/25 0928    sodium chloride flush 0.9 % injection 5-40 mL, 5-40 mL, IntraVENous, PRN, Saad Reynoso,     0.9 % sodium chloride infusion, , IntraVENous, PRN, Saad Reynoso, DO    potassium chloride (KLOR-CON M) extended release tablet 40 mEq, 40 mEq, Oral, PRN **OR** potassium bicarb-citric acid (EFFER-K) effervescent tablet 40 mEq, 40 mEq, Oral, PRN **OR** potassium chloride 10 mEq/100 mL IVPB (Peripheral Line), 10 mEq, IntraVENous, PRN, Saad Reynoso DO    magnesium sulfate 2000 mg in 50 mL IVPB premix, 2,000 mg, IntraVENous, PRN, Saad Reynoso, DO    ondansetron (ZOFRAN-ODT) disintegrating tablet 4 mg, 4 mg, Oral, Q8H PRN **OR** ondansetron (ZOFRAN) injection 4 mg, 4 mg, IntraVENous, Q6H PRN, Saad Reynoso, DO    acetaminophen (TYLENOL) tablet 650 mg, 650 mg, Oral, Q6H PRN **OR** acetaminophen (TYLENOL) suppository 650

## 2025-01-01 NOTE — PROGRESS NOTES
[facility]  [facility]  Progress Note    Chief complain:    Chief Complaint   Patient presents with    Fall     Pt sent in from [facility] at [address] for a fall. Pt reports bilateral knee pain. Abrasions to bilateral knees. Pt denies loc.         SUBJECTIVE:       Patient is awake, alert , tolerating antibiotics well     ROS:  Constitutional:  denies fever , chills   Cardiovascular: denies chest pain or palpitation   Gastrointestinal: . Denies abdominal pain, diarrhea, no nausea or vomiting  Genitourinary:      Denies  dysuria or burning micturition  Musculoskeletal: no aches or pain   Allergic/Immunologic:  No rash or itching     OBJECTIVE:    Vitals:    12/31/24 2007 12/31/24 2317 01/01/25 0926 01/01/25 1133   BP: (!) 104/52 121/71 114/66 127/65   Pulse: (!) 111 (!) 103 98 (!) 102   Resp: 29 15 16 18   Temp: 98.4 °F (36.9 °C) 98.4 °F (36.9 °C) 97.9 °F (36.6 °C) 97.8 °F (36.6 °C)   TempSrc: Temporal Temporal Temporal Temporal   SpO2: 97% 97% 96% 97%       General Appearance:    Awake, alert , no acute distress.   Head:    Normocephalic, atraumatic   Eyes:    No pallor, no icterus,   Ears:    No obvious deformity or drainage.   Nose:   No nasal drainage   Throat:   Mucosa moist, no oral thrush   Neck:   Supple, no lymphadenopathy   Back:     no CVA tenderness   Lungs:     Clear to auscultation bilaterally, no wheeze    Heart:    Regular rate and rhythm, no murmur, rub or gallop   Abdomen:     Soft, non-tender, bowel sounds present    Extremities:   No edema, no cyanosis ,no open wound, no erythema,     no tenderness,    Pulses:   Dorsalis pedis palpable    Skin:   no rashes or lesions                           sodium bicarbonate  650 mg Oral BID    sodium chloride flush  5-40 mL IntraVENous 2 times per day    atorvastatin  40 mg Oral Nightly    folic acid  1 mg Oral Nightly    latanoprost  1 drop Both Eyes Nightly    primidone  150 mg Oral BID    sodium chloride flush  5-40 mL  follow. Abnormal  P              Component    Specimen Description .BLOOD RIGHT P   Special Requests      P   Direct Exam     DIRECT GRAM STAIN FROM BOTTLE: GRAM NEGATIVE RODS Previous panic on this admission, call not needed per  SOP. P      Culture ESCHERICHIA COLI REFER TO BC FROM 12/30 AT 0959 FOR SUSCEPTIBILITY RESULTS Abnormal  P            ASSESSMENT:      84-year-old nursing home resident admitted after a fall and on further workup she was found to have gram-negative ramana bacteremia  Patient is started on ceftriaxone 2 g IV daily while awaiting the cultures to finalize  Source of infection is probably bilateral nephrolithiasis for which she also underwent a procedure by urologist      PLAN:   continue radha Reeves MD, FACP  1/1/2025  12:59 PM

## 2025-01-01 NOTE — PROGRESS NOTES
[facility]  [address] [id number]  [phone]  FAX [phone]      CHIEF UROLOGIC COMPLAINT: Ureteral stone, UTI    HISTORY OF PRESENT ILLNESS:  Patient without new complaints.  Good appetite.  Feels improved today.    REVIEW OF SYSTEMS:   Respiratory: negative for cough and hemoptysis  Cardiovascular: negative for chest pain and dyspnea  Gastrointestinal: negative for abdominal pain, diarrhea, nausea and vomiting  Derm: negative for rash and skin lesion(s)  Neurological: negative for seizures and tremors  Endocrine: negative for diabetic symptoms including polydipsia and polyuria  : As above in the HPI, otherwise negative  All other systems negative    PAST FAMILY HISTORY:    Family History   Problem Relation Age of Onset    Heart Disease Mother     Diabetes Maternal Aunt      PAST SOCIAL HISTORY:    Social History     Socioeconomic History    Marital status:    Tobacco Use    Smoking status: Former     Current packs/day: 1.00     Average packs/day: 1 pack/day for 45.0 years (45.0 ttl pk-yrs)     Types: Cigarettes    Smokeless tobacco: Former     Quit date: 3/23/1993   Vaping Use    Vaping status: Never Used   Substance and Sexual Activity    Alcohol use: No    Drug use: No     Social Determinants of Health     Food Insecurity: No Food Insecurity (12/31/2024)    Hunger Vital Sign     Worried About Running Out of Food in the Last Year: Never true     Ran Out of Food in the Last Year: Never true   Transportation Needs: No Transportation Needs (12/31/2024)    PRAPARE - Transportation     Lack of Transportation (Medical): No     Lack of Transportation (Non-Medical): No   Housing Stability: Low Risk  (12/31/2024)    Housing Stability Vital Sign     Unable to Pay for Housing in the Last Year: No     Number of Times Moved in the Last Year: 1     Homeless in the Last Year: No       Scheduled Meds:   cefTRIAXone (ROCEPHIN) IV  2,000 mg IntraVENous Q24H     BACTERIAL SUSCEPTIBILITY PANEL KHOA Final    levofloxacin Sensitive <=0.12 BACTERIAL SUSCEPTIBILITY PANEL KHOA Final    meropenem Sensitive <=0.25 BACTERIAL SUSCEPTIBILITY PANEL KHOA Final    nitrofurantoin Sensitive <=16 BACTERIAL SUSCEPTIBILITY PANEL KHOA Final    piperacillin-tazobactam Sensitive <=4 BACTERIAL SUSCEPTIBILITY PANEL KHOA Final    trimethoprim-sulfamethoxazole Sensitive <=20 BACTERIAL SUSCEPTIBILITY PANEL KHOA               Lab Results   Component Value Date    LABURIN Growth not present 05/29/2022    LABURIN  05/24/2022     10 to 100,000 CFU/mL  Mixed man isolated. Further workup and sensitivity testing  is not routinely indicated and will not be performed.  Mixed man isolated includes:  Mixed gram positive organisms      LABURIN Growth not present 12/15/2017       Lab Results   Component Value Date    BC 5 Days- no growth 12/09/2017       Lab Results   Component Value Date    BLOODCULT2 5 Days- no growth 12/09/2017       PHYSICAL EXAMINATION:  Skin dry, without rashes  Respirations non-labored, intact  Abdomen soft, non-tender, non-distended  Alert and oriented x3  Matute draining yellow urine      ASSESSMENT AND PLAN:  1.  Ureteral stone.  Status post stent insertion 12-30-24.  Will need definitive stone management after UTI corrected.  Currently on Rocephin.  Will defer definitive antibiotic management to primary service.    Kaleb Dover MD  1/1/2025  2:00 PM

## 2025-01-01 NOTE — PROGRESS NOTES
[facility]  Nephrology Consult Note    Patient's Name: Suki Weir    Reason for Consult: DAVI    Chief Complaint: Fall  History Obtained From:  patient, past medical records, and EMR    History of Present Illness:    Suki Weir is a 84 y.o. female with a past medical history of CKD, diabetes mellitus, essential tremor, and rheumatoid arthritis.  She presented to the ED on 12/30 for concerns of fall.  Vital signs on 12/30 includes temperature 97.8, respirations 16, pulse 105, BP 80/42, and she was 96% SpO2.  Lab data on 12/30 includes CO2 16, BUN 55, creatinine 2.7, anion gap 20, lactic acid 4.3, albumin 3.1, WBCs 18.3 K, and hemoglobin 9.  She had a chest x-ray on 12/30 which showed cardiomegaly with underlying chronic changes seen throughout the lung fields bilaterally, some atelectatic changes seen at the lung bases right greater than left with trace right pleural effusion.  She had an x-ray of her left knee on 12/30 which showed moderate degenerative changes seen throughout the left knee with no acute bony abnormality, small joint effusion.  X-ray of her right knee showed no acute bony abnormality, mild osteoarthritis.  CT head from 12/30 showed no acute intracranial abnormality.  She had a CT abdomen/pelvis on 12/30 which showed 3 mm proximal right ureteral calculus with mild associated hydroureteronephrosis and perinephric/periureteral fat stranding, bilateral nephrolithiasis.  She underwent cystoscopy, pyelogram right stent insertion on 12/30.  Cardiology has been consulted to see the patient for concerns of elevated troponin.  Nephrology has been consulted to see the patient for concerns of DAVI.  She has a baseline creatinine of 0.8-0.9 mg/dL.  At present, patient was seen and examined.  She explained that she came in because she fell out of bed and hit her knee and that she could not get up.  She denies any nausea or vomiting.  She denies any chest pain or shortness of breath.  She denies

## 2025-01-02 ENCOUNTER — APPOINTMENT (OUTPATIENT)
Age: 85
End: 2025-01-02
Payer: MEDICARE

## 2025-01-02 LAB
ACB COMPLEX DNA BLD POS QL NAA+NON-PROBE: NOT DETECTED
AMORPH SED URNS QL MICRO: PRESENT
ANION GAP SERPL CALCULATED.3IONS-SCNC: 13 MMOL/L (ref 7–16)
B FRAGILIS DNA BLD POS QL NAA+NON-PROBE: NOT DETECTED
BACTERIA URNS QL MICRO: ABNORMAL
BILIRUB UR QL STRIP: NEGATIVE
BIOFIRE TEST COMMENT: ABNORMAL
BLACTX-M ISLT/SPM QL: NOT DETECTED
BLAIMP ISLT/SPM QL: NOT DETECTED
BLAKPC ISLT/SPM QL: NOT DETECTED
BLAOXA-48-LIKE ISLT/SPM QL: NOT DETECTED
BLAVIM ISLT/SPM QL: NOT DETECTED
BUN SERPL-MCNC: 54 MG/DL (ref 6–23)
C ALBICANS DNA BLD POS QL NAA+NON-PROBE: NOT DETECTED
C AURIS DNA BLD POS QL NAA+NON-PROBE: NOT DETECTED
C GATTII+NEOFOR DNA BLD POS QL NAA+N-PRB: NOT DETECTED
C GLABRATA DNA BLD POS QL NAA+NON-PROBE: NOT DETECTED
C KRUSEI DNA BLD POS QL NAA+NON-PROBE: NOT DETECTED
C PARAP DNA BLD POS QL NAA+NON-PROBE: NOT DETECTED
C TROPICLS DNA BLD POS QL NAA+NON-PROBE: NOT DETECTED
CALCIUM SERPL-MCNC: 8.5 MG/DL (ref 8.6–10.2)
CHLORIDE SERPL-SCNC: 108 MMOL/L (ref 98–107)
CLARITY UR: ABNORMAL
CO2 SERPL-SCNC: 18 MMOL/L (ref 22–29)
COLISTIN RES MCR-1 ISLT/SPM QL: NOT DETECTED
COLOR UR: ABNORMAL
CREAT SERPL-MCNC: 1.7 MG/DL (ref 0.5–1)
E CLOAC COMP DNA BLD POS NAA+NON-PROBE: NOT DETECTED
E COLI DNA BLD POS QL NAA+NON-PROBE: DETECTED
E FAECALIS DNA BLD POS QL NAA+NON-PROBE: NOT DETECTED
E FAECIUM DNA BLD POS QL NAA+NON-PROBE: NOT DETECTED
ECHO AV AREA PEAK VELOCITY: 1 CM2
ECHO AV AREA VTI: 1.2 CM2
ECHO AV CUSP MM: 1.3 CM
ECHO AV MEAN GRADIENT: 13 MMHG
ECHO AV MEAN VELOCITY: 1.8 M/S
ECHO AV PEAK GRADIENT: 25 MMHG
ECHO AV PEAK VELOCITY: 2.5 M/S
ECHO AV VELOCITY RATIO: 0.32
ECHO AV VTI: 45.7 CM
ECHO EST RA PRESSURE: 3 MMHG
ECHO LA DIAMETER: 4.8 CM
ECHO LA VOL A-L A2C: 117 ML (ref 22–52)
ECHO LA VOL A-L A4C: 109 ML (ref 22–52)
ECHO LA VOL BP: 105 ML (ref 22–52)
ECHO LA VOL MOD A2C: 113 ML (ref 22–52)
ECHO LA VOL MOD A4C: 96 ML (ref 22–52)
ECHO LA VOLUME AREA LENGTH: 114 ML
ECHO LV EF PHYSICIAN: 65 %
ECHO LV FRACTIONAL SHORTENING: 39 % (ref 28–44)
ECHO LV INTERNAL DIMENSION DIASTOLIC: 4.4 CM (ref 3.9–5.3)
ECHO LV INTERNAL DIMENSION SYSTOLIC: 2.7 CM
ECHO LV IVSD: 1.3 CM (ref 0.6–0.9)
ECHO LV MASS 2D: 215.1 G (ref 67–162)
ECHO LV POSTERIOR WALL DIASTOLIC: 1.3 CM (ref 0.6–0.9)
ECHO LV RELATIVE WALL THICKNESS RATIO: 0.59
ECHO LVOT AREA: 3.1 CM2
ECHO LVOT AV VTI INDEX: 0.39
ECHO LVOT DIAM: 2 CM
ECHO LVOT MEAN GRADIENT: 2 MMHG
ECHO LVOT PEAK GRADIENT: 3 MMHG
ECHO LVOT PEAK VELOCITY: 0.8 M/S
ECHO LVOT SV: 55.3 ML
ECHO LVOT VTI: 17.6 CM
ECHO MV "A" WAVE DURATION: 129.4 MSEC
ECHO MV A VELOCITY: 0.28 M/S
ECHO MV AREA PHT: 9.6 CM2
ECHO MV AREA VTI: 2.3 CM2
ECHO MV E DECELERATION TIME (DT): 116.5 MS
ECHO MV E VELOCITY: 1.05 M/S
ECHO MV E/A RATIO: 3.75
ECHO MV LVOT VTI INDEX: 1.39
ECHO MV MAX VELOCITY: 1.4 M/S
ECHO MV MEAN GRADIENT: 4 MMHG
ECHO MV MEAN VELOCITY: 0.9 M/S
ECHO MV PEAK GRADIENT: 8 MMHG
ECHO MV PRESSURE HALF TIME (PHT): 22.8 MS
ECHO MV VTI: 24.4 CM
ECHO PULMONARY ARTERY END DIASTOLIC PRESSURE: 11 MMHG
ECHO PV MAX VELOCITY: 1 M/S
ECHO PV MEAN GRADIENT: 2 MMHG
ECHO PV MEAN VELOCITY: 0.6 M/S
ECHO PV PEAK GRADIENT: 4 MMHG
ECHO PV REGURGITANT MAX VELOCITY: 1.6 M/S
ECHO PV VTI: 15.2 CM
ECHO RIGHT VENTRICULAR SYSTOLIC PRESSURE (RVSP): 47 MMHG
ECHO RV INTERNAL DIMENSION: 3.3 CM
ECHO RV LONGITUDINAL DIMENSION: 5.7 CM
ECHO RV MID DIMENSION: 2.5 CM
ECHO TV REGURGITANT MAX VELOCITY: 3.33 M/S
ECHO TV REGURGITANT PEAK GRADIENT: 44 MMHG
ENTEROBACTERALES DNA BLD POS NAA+N-PRB: DETECTED
ERYTHROCYTE [DISTWIDTH] IN BLOOD BY AUTOMATED COUNT: 14.6 % (ref 11.5–15)
GFR, ESTIMATED: 29 ML/MIN/1.73M2
GLUCOSE SERPL-MCNC: 91 MG/DL (ref 74–99)
GLUCOSE UR STRIP-MCNC: NEGATIVE MG/DL
GP B STREP DNA BLD POS QL NAA+NON-PROBE: NOT DETECTED
HAEM INFLU DNA BLD POS QL NAA+NON-PROBE: NOT DETECTED
HCT VFR BLD AUTO: 24.1 % (ref 34–48)
HGB BLD-MCNC: 8.2 G/DL (ref 11.5–15.5)
HGB UR QL STRIP.AUTO: ABNORMAL
K OXYTOCA DNA BLD POS QL NAA+NON-PROBE: NOT DETECTED
KETONES UR STRIP-MCNC: NEGATIVE MG/DL
KLEBSIELLA SP DNA BLD POS QL NAA+NON-PRB: NOT DETECTED
KLEBSIELLA SP DNA BLD POS QL NAA+NON-PRB: NOT DETECTED
L MONOCYTOG DNA BLD POS QL NAA+NON-PROBE: NOT DETECTED
LEUKOCYTE ESTERASE UR QL STRIP: ABNORMAL
MAGNESIUM SERPL-MCNC: 1.7 MG/DL (ref 1.6–2.6)
MCH RBC QN AUTO: 32 PG (ref 26–35)
MCHC RBC AUTO-ENTMCNC: 34 G/DL (ref 32–34.5)
MCV RBC AUTO: 94.1 FL (ref 80–99.9)
MICROORGANISM SPEC CULT: ABNORMAL
MICROORGANISM SPEC CULT: ABNORMAL
MICROORGANISM/AGENT SPEC: ABNORMAL
MICROORGANISM/AGENT SPEC: ABNORMAL
N MEN DNA BLD POS QL NAA+NON-PROBE: NOT DETECTED
NITRITE UR QL STRIP: NEGATIVE
P AERUGINOSA DNA BLD POS NAA+NON-PROBE: NOT DETECTED
PH UR STRIP: 5.5 [PH] (ref 5–9)
PHOSPHATE SERPL-MCNC: 3.3 MG/DL (ref 2.5–4.5)
PLATELET CONFIRMATION: NORMAL
PLATELET, FLUORESCENCE: 65 K/UL (ref 130–450)
PMV BLD AUTO: 12 FL (ref 7–12)
POTASSIUM SERPL-SCNC: 4 MMOL/L (ref 3.5–5)
PROT UR STRIP-MCNC: >=300 MG/DL
PROTEUS SP DNA BLD POS QL NAA+NON-PROBE: NOT DETECTED
RBC # BLD AUTO: 2.56 M/UL (ref 3.5–5.5)
RBC #/AREA URNS HPF: ABNORMAL /HPF
RESISTANT GENE NDM BY PCR: NOT DETECTED
S AUREUS DNA BLD POS QL NAA+NON-PROBE: NOT DETECTED
S AUREUS+CONS DNA BLD POS NAA+NON-PROBE: NOT DETECTED
S EPIDERMIDIS DNA BLD POS QL NAA+NON-PRB: NOT DETECTED
S LUGDUNENSIS DNA BLD POS QL NAA+NON-PRB: NOT DETECTED
S MALTOPHILIA DNA BLD POS QL NAA+NON-PRB: NOT DETECTED
S MARCESCENS DNA BLD POS NAA+NON-PROBE: NOT DETECTED
S PNEUM DNA BLD POS QL NAA+NON-PROBE: NOT DETECTED
S PYO DNA BLD POS QL NAA+NON-PROBE: NOT DETECTED
SALMONELLA DNA BLD POS QL NAA+NON-PROBE: NOT DETECTED
SERVICE CMNT-IMP: ABNORMAL
SERVICE CMNT-IMP: ABNORMAL
SODIUM SERPL-SCNC: 139 MMOL/L (ref 132–146)
SP GR UR STRIP: 1.02 (ref 1–1.03)
SPECIMEN DESCRIPTION: ABNORMAL
SPECIMEN DESCRIPTION: ABNORMAL
STREPTOCOCCUS DNA BLD POS NAA+NON-PROBE: NOT DETECTED
UROBILINOGEN UR STRIP-ACNC: 0.2 EU/DL (ref 0–1)
WBC #/AREA URNS HPF: ABNORMAL /HPF
WBC OTHER # BLD: 7.1 K/UL (ref 4.5–11.5)

## 2025-01-02 PROCEDURE — 2500000003 HC RX 250 WO HCPCS

## 2025-01-02 PROCEDURE — 6370000000 HC RX 637 (ALT 250 FOR IP)

## 2025-01-02 PROCEDURE — 85027 COMPLETE CBC AUTOMATED: CPT

## 2025-01-02 PROCEDURE — 97535 SELF CARE MNGMENT TRAINING: CPT

## 2025-01-02 PROCEDURE — 84100 ASSAY OF PHOSPHORUS: CPT

## 2025-01-02 PROCEDURE — 2500000003 HC RX 250 WO HCPCS: Performed by: INTERNAL MEDICINE

## 2025-01-02 PROCEDURE — 93306 TTE W/DOPPLER COMPLETE: CPT

## 2025-01-02 PROCEDURE — 36415 COLL VENOUS BLD VENIPUNCTURE: CPT

## 2025-01-02 PROCEDURE — 6370000000 HC RX 637 (ALT 250 FOR IP): Performed by: INTERNAL MEDICINE

## 2025-01-02 PROCEDURE — 93306 TTE W/DOPPLER COMPLETE: CPT | Performed by: INTERNAL MEDICINE

## 2025-01-02 PROCEDURE — 6360000002 HC RX W HCPCS: Performed by: INTERNAL MEDICINE

## 2025-01-02 PROCEDURE — 80048 BASIC METABOLIC PNL TOTAL CA: CPT

## 2025-01-02 PROCEDURE — 2060000000 HC ICU INTERMEDIATE R&B

## 2025-01-02 PROCEDURE — 97530 THERAPEUTIC ACTIVITIES: CPT

## 2025-01-02 PROCEDURE — 97161 PT EVAL LOW COMPLEX 20 MIN: CPT

## 2025-01-02 PROCEDURE — 83735 ASSAY OF MAGNESIUM: CPT

## 2025-01-02 RX ADMIN — FOLIC ACID 1 MG: 1 TABLET ORAL at 20:24

## 2025-01-02 RX ADMIN — PANTOPRAZOLE SODIUM 40 MG: 40 TABLET, DELAYED RELEASE ORAL at 16:09

## 2025-01-02 RX ADMIN — PANTOPRAZOLE SODIUM 40 MG: 40 TABLET, DELAYED RELEASE ORAL at 06:06

## 2025-01-02 RX ADMIN — ATORVASTATIN CALCIUM 40 MG: 40 TABLET, FILM COATED ORAL at 20:24

## 2025-01-02 RX ADMIN — SODIUM BICARBONATE 650 MG: 650 TABLET ORAL at 08:55

## 2025-01-02 RX ADMIN — LATANOPROST 1 DROP: 50 SOLUTION OPHTHALMIC at 20:25

## 2025-01-02 RX ADMIN — SODIUM CHLORIDE, PRESERVATIVE FREE 10 ML: 5 INJECTION INTRAVENOUS at 08:56

## 2025-01-02 RX ADMIN — WATER 2000 MG: 1 INJECTION INTRAMUSCULAR; INTRAVENOUS; SUBCUTANEOUS at 13:47

## 2025-01-02 RX ADMIN — SODIUM CHLORIDE, PRESERVATIVE FREE 10 ML: 5 INJECTION INTRAVENOUS at 20:36

## 2025-01-02 RX ADMIN — PRIMIDONE 150 MG: 50 TABLET ORAL at 08:56

## 2025-01-02 RX ADMIN — PRIMIDONE 150 MG: 50 TABLET ORAL at 20:24

## 2025-01-02 RX ADMIN — SENNOSIDES 8.6 MG: 8.6 TABLET, FILM COATED ORAL at 20:24

## 2025-01-02 RX ADMIN — SODIUM CHLORIDE, PRESERVATIVE FREE 10 ML: 5 INJECTION INTRAVENOUS at 08:55

## 2025-01-02 RX ADMIN — SODIUM BICARBONATE 650 MG: 650 TABLET ORAL at 20:24

## 2025-01-02 NOTE — PROGRESS NOTES
Patient received the Sacrament of the Anointing of the Sick by Father Archie Mckay on 12/31/2024.    If additional support is requested or needed please reach out to Spiritual Health ([id number]).    Chap. Noah Chan MDIV, BCC

## 2025-01-02 NOTE — PLAN OF CARE
Problem: Chronic Conditions and Co-morbidities  Goal: Patient's chronic conditions and co-morbidity symptoms are monitored and maintained or improved  1/2/2025 0911 by Latanya Oliver, RN  Outcome: Progressing  1/2/2025 0432 by Maru Hand RN  Outcome: Progressing     Problem: Discharge Planning  Goal: Discharge to home or other facility with appropriate resources  1/2/2025 0911 by Latanya Oliver, RN  Outcome: Progressing  1/2/2025 0432 by Maru Hand RN  Outcome: Progressing     Problem: Safety - Adult  Goal: Free from fall injury  1/2/2025 0911 by Latanya Oliver, RN  Outcome: Progressing  1/2/2025 0432 by Maru Hand RN  Outcome: Progressing     Problem: Skin/Tissue Integrity  Goal: Absence of new skin breakdown  Description: 1.  Monitor for areas of redness and/or skin breakdown  2.  Assess vascular access sites hourly  3.  Every 4-6 hours minimum:  Change oxygen saturation probe site  4.  Every 4-6 hours:  If on nasal continuous positive airway pressure, respiratory therapy assess nares and determine need for appliance change or resting period.  1/2/2025 0911 by Latanya Oliver, RN  Outcome: Progressing  1/2/2025 0432 by Maru Hand RN  Outcome: Progressing

## 2025-01-02 NOTE — PROGRESS NOTES
[facility]  [facility]  Progress Note      C/C : E coli bacteremia, UTI     Denies fever or chills  Denies dysuria  Reports back pain  Afebrile         Current Facility-Administered Medications   Medication Dose Route Frequency Provider Last Rate Last Admin    cefTRIAXone (ROCEPHIN) 2,000 mg in sterile water 20 mL IV syringe  2,000 mg IntraVENous Q24H Jg Reeves MD   2,000 mg at 01/01/25 1530    sodium bicarbonate tablet 650 mg  650 mg Oral BID Esperanza Tate APRN - CNP   650 mg at 01/02/25 0855    sodium chloride flush 0.9 % injection 5-40 mL  5-40 mL IntraVENous 2 times per day Lauri Orr MD   10 mL at 01/02/25 0856    sodium chloride flush 0.9 % injection 5-40 mL  5-40 mL IntraVENous PRN Lauri Orr MD        0.9 % sodium chloride infusion   IntraVENous PRN Lauri Orr MD        atorvastatin (LIPITOR) tablet 40 mg  40 mg Oral Nightly Saad Reynoso DO   40 mg at 01/01/25 2228    folic acid (FOLVITE) tablet 1 mg  1 mg Oral Nightly Saad Reynoso, DO   1 mg at 01/01/25 2228    latanoprost (XALATAN) 0.005 % ophthalmic solution 1 drop  1 drop Both Eyes Nightly Saad Reynoso DO   1 drop at 01/01/25 2228    primidone (MYSOLINE) tablet 150 mg  150 mg Oral BID Saad Reynoso DO   150 mg at 01/02/25 0856    sodium chloride flush 0.9 % injection 5-40 mL  5-40 mL IntraVENous 2 times per day Saad Reynoso,    10 mL at 01/02/25 0855    sodium chloride flush 0.9 % injection 5-40 mL  5-40 mL IntraVENous PRN Saad Reynoso DO        0.9 % sodium chloride infusion   IntraVENous PRN Saad Reynoso DO        potassium chloride (KLOR-CON M) extended release tablet 40 mEq  40 mEq Oral PRN Saad Reynoso DO        Or    potassium bicarb-citric acid (EFFER-K) effervescent tablet 40 mEq  40 mEq Oral PRN Saad Reynoso DO        Or    potassium chloride 10 mEq/100 mL IVPB (Peripheral Line)  10 mEq IntraVENous PRN Saad Reynoso DO

## 2025-01-02 NOTE — PROGRESS NOTES
Pt is very pleasant. She had a temp last night of 99.1. Tylenol was given, temp now is 97.8. Urine is yellow, and pt is A&OX4. Pt asked for a pepsi, and she received a pepsi and said \"this is a happy new year\".

## 2025-01-02 NOTE — PROGRESS NOTES
Independent     ROM BUE:  Defer to OT  BLE:  WFL     Strength BUE:  Defer to OT  BLE:  4/5  Improve 1 MMT   Balance Sitting EOB:  Harriett  Dynamic Standing:  ModA x 2   Sitting EOB:  Independent    Dynamic Standing:  Modified Independent       Pt is A & O x 4  Sensation:  WNL  Edema:  WNL    Vitals:    HR 80  Spo2 97%     Therapeutic Exercises:  Functional mobility    Patient education  Pt educated on role of PT    Patient response to education:   Pt verbalized understanding Pt demonstrated skill Pt requires further education in this area   x x x     ASSESSMENT:    Conditions Requiring Skilled Therapeutic Intervention:    [x]Decreased strength     []Decreased ROM  [x]Decreased functional mobility  [x]Decreased balance   [x]Decreased endurance   []Decreased posture  []Decreased sensation  [x]Decreased coordination   []Decreased vision  [x]Decreased safety awareness   []Increased pain       Comments:  Pt agreeable to PT evaluation. Pt presents with strength, balance, and endurance deficits.   Pt requiring cues and assistance with all mobility. Ambulation distance limited by fatigue. Pt declined to trial WW this date. Patient would benefit from continued skilled PT to maximize functional mobility independence.   RN updated      Treatment:  Patient practiced and was instructed in the following treatment:    Bed mobility- verbal cues to facilitate independence  Functional transfers-Verbal cues for proper positioning and sequencing to perform transfers safely with maximum independence.   Gait training- verbal cues to increase step length/ height bilaterally    Pt's/ family goals   1. Get better    Prognosis is good for reaching above PT goals.    Patient and or family understand(s) diagnosis, prognosis, and plan of care.  yes    PHYSICAL THERAPY PLAN OF CARE:    PT POC is established based on physician order and patient diagnosis     Referring provider/PT Order:    12/31/24 1115  PT eval and treat  Start:  12/31/24 1115,    End:  12/31/24 1115,   ONE TIME,   Standing Count:  1 Occurrences,   R         Saad Reynoso,      Diagnosis:  Kidney stone [N20.0]  NSTEMI (non-ST elevated myocardial infarction) (HCC) [I21.4]  Fall, initial encounter [W19.XXXA]  Acute renal failure, unspecified acute renal failure type (HCC) [N17.9]  Gastrointestinal hemorrhage, unspecified gastrointestinal hemorrhage type [K92.2]  Specific instructions for next treatment:  Functional mobility    Current Treatment Recommendations:     [x] Strengthening to improve independence with functional mobility   [x] ROM to improve independence with functional mobility   [x] Balance Training to improve static/dynamic balance and to reduce fall risk  [x] Endurance Training to improve activity tolerance during functional mobility   [x] Transfer Training to improve safety and independence with all functional transfers   [x] Gait Training to improve gait mechanics, endurance and assess need for appropriate assistive device  [x] Stair Training in preparation for safe discharge home and/or into the community   [x] Positioning to prevent skin breakdown and contractures  [x] Safety and Education Training   [x] Patient/Caregiver Education   [x] HEP  [] Other     PT long term treatment goals are located in above grid    Frequency of treatments: 2-5x/week x 1-2 weeks.    Time in  0810  Time out  0835    Total Treatment Time  8 minutes     Evaluation Time includes thorough review of current medical information, gathering information on past medical history/social history and prior level of function, completion of standardized testing/informal observation of tasks, assessment of data and education on plan of care and goals.    CPT codes:  [x] Low Complexity PT evaluation 36471  [] Moderate Complexity PT evaluation 38816  [] High Complexity PT evaluation 14019  [] PT Re-evaluation 89829  [] Gait training 33403 0 minutes  [] Manual therapy 87660 0 minutes  [x] Therapeutic activities

## 2025-01-02 NOTE — DISCHARGE INSTR - COC
Continuity of Care Form    Patient Name: Suki Weir   :  1940  MRN:  [id number]    Admit date:  2024  Discharge date:  ***    Code Status Order: DNR-CCA   Advance Directives:   Advance Care Flowsheet Documentation             Admitting Physician:  Saad Reynoso DO  PCP: Camden Hoskins MD    Discharging Nurse: ***  Discharging Hospital Unit/Room#: 4503/4503-A  Discharging Unit Phone Number: ***    Emergency Contact:   Extended Emergency Contact Information  Primary Emergency Contact: Rosita Greene  Address: [address], [address] [place] [address] of [facility]  Home Phone: [phone]  Work Phone: [phone]  Mobile Phone: [phone]  Relation: Other  Preferred language: English   needed? No  Secondary Emergency Contact: CHA FERREIRA  Home Phone: [phone]  Mobile Phone: [phone]  Relation: Other   needed? No    Past Surgical History:  Past Surgical History:   Procedure Laterality Date    COLONOSCOPY  ,,    BENIGN POLYPS ;INT  HEMORRHOIDS    DEBRIDEMENT Right 2024    CYSTOSCOPY RETROGRADE PYELOGRAM RIGHT STENT INSERTION performed by Torres Hamm MD at [facility]    EYE SURGERY Left 2004    VITREOUS  HEMORRHAGE    HIP SURGERY Left 2024    LEFT HIP HEMIARTHROPLASTY performed by Robert Sutton DO at [facility] OR    HYSTERECTOMY (CERVIX STATUS UNKNOWN)  1986    ADRIEN-BSO       Immunization History:   Immunization History   Administered Date(s) Administered    COVID-19, PFIZER PURPLE top, DILUTE for use, (age 12 y+), 30mcg/0.3mL 2021, 2021       Active Problems:  Patient Active Problem List   Diagnosis Code    HTN (hypertension), benign I10    Paroxysmal atrial fibrillation (HCC) I48.0    L-S radiculopathy M54.17    Pulmonary nodules R91.8    Essential tremor G25.0    Diabetic polyneuropathy associated with type 2 diabetes mellitus (HCC) E11.42    Stage 2 chronic kidney disease N18.2    Benign

## 2025-01-02 NOTE — PROGRESS NOTES
Q6H PRN, Saad Reynoso, DO    acetaminophen (TYLENOL) tablet 650 mg, 650 mg, Oral, Q6H PRN, 650 mg at 01/01/25 2227 **OR** acetaminophen (TYLENOL) suppository 650 mg, 650 mg, Rectal, Q6H PRN, Saad Reynoso, DO    polyethylene glycol (GLYCOLAX) packet 17 g, 17 g, Oral, BID, Kiwan, Badereddin, DO, 17 g at 12/31/24 2302    senna (SENOKOT) tablet 8.6 mg, 1 tablet, Oral, Nightly, Kiwan, Badereddin, DO, 8.6 mg at 01/01/25 2227    pantoprazole (PROTONIX) tablet 40 mg, 40 mg, Oral, BID AC, Kiwan, Badereddin, DO, 40 mg at 01/02/25 0606    Objective:    /70   Pulse 98   Temp 98.1 °F (36.7 °C) (Temporal)   Resp 18   SpO2 97%     Heart:  irreg  Lungs:  ctab  Abd: + bs soft nontender  Extrem:  min edema legs    CBC with Differential:    Lab Results   Component Value Date/Time    WBC 7.1 01/02/2025 04:16 AM    RBC 2.56 01/02/2025 04:16 AM    HGB 8.2 01/02/2025 04:16 AM    HCT 24.1 01/02/2025 04:16 AM    PLT 75 12/30/2024 09:59 AM    MCV 94.1 01/02/2025 04:16 AM    MCH 32.0 01/02/2025 04:16 AM    MCHC 34.0 01/02/2025 04:16 AM    RDW 14.6 01/02/2025 04:16 AM    NRBC 0.0 04/01/2024 06:00 AM    LYMPHOPCT 1 12/30/2024 09:59 AM    LYMPHOPCT 15.1 04/01/2024 06:00 AM    MONOPCT 5 12/30/2024 09:59 AM    MONOPCT 8.3 04/01/2024 06:00 AM    EOSPCT 0 12/30/2024 09:59 AM    EOSPCT 2.5 04/01/2024 06:00 AM    BASOPCT 0 12/30/2024 09:59 AM    BASOPCT 0.4 04/01/2024 06:00 AM    MONOSABS 0.95 12/30/2024 09:59 AM    LYMPHSABS 0.16 12/30/2024 09:59 AM    EOSABS 0.00 12/30/2024 09:59 AM    BASOSABS 0.00 12/30/2024 09:59 AM     CMP:    Lab Results   Component Value Date/Time     01/02/2025 04:16 AM    K 4.0 01/02/2025 04:16 AM    K 4.1 06/02/2022 05:00 AM     01/02/2025 04:16 AM    CO2 18 01/02/2025 04:16 AM    BUN 54 01/02/2025 04:16 AM    CREATININE 1.7 01/02/2025 04:16 AM    GFRAA > 60 04/01/2024 06:00 AM    LABGLOM 29 01/02/2025 04:16 AM    LABGLOM 62 04/30/2024 04:21 AM    GLUCOSE 91 01/02/2025 04:16 AM    GLUCOSE 91  04/01/2024 06:00 AM    CALCIUM 8.5 01/02/2025 04:16 AM    BILITOT 0.5 12/30/2024 09:59 AM    ALKPHOS 59 12/30/2024 09:59 AM    AST 21 12/30/2024 09:59 AM    ALT 12 12/30/2024 09:59 AM     Warfarin PT/INR:    Lab Results   Component Value Date    INR 1.7 12/30/2024    INR 2.2 02/19/2024    INR 3.0 02/17/2024    PROTIME 18.3 (H) 12/30/2024    PROTIME 24.5 (H) 02/19/2024    PROTIME 32.5 (H) 02/17/2024       Assessment:    Principal Problem:    Sepsis (HCC)  Active Problems:    NSTEMI (non-ST elevated myocardial infarction) (HCC)    Kidney stone    Gastrointestinal hemorrhage    Right ureteral calculus    Fall    Acute renal failure (HCC)  Resolved Problems:    * No resolved hospital problems. *      Plan:  Cont ivf pt ot eval increase activity dc planning        Saad Reynoso DO  9:11 AM  1/2/2025

## 2025-01-02 NOTE — PROGRESS NOTES
[facility]  Nephrology Progress Note    Patient's Name: Suki Weir    History of Present Illness from 12/31 Consult Note:    \"Suki Weir is a 84 y.o. female with a past medical history of CKD, diabetes mellitus, essential tremor, and rheumatoid arthritis.  She presented to the ED on 12/30 for concerns of fall.  Vital signs on 12/30 includes temperature 97.8, respirations 16, pulse 105, BP 80/42, and she was 96% SpO2.  Lab data on 12/30 includes CO2 16, BUN 55, creatinine 2.7, anion gap 20, lactic acid 4.3, albumin 3.1, WBCs 18.3 K, and hemoglobin 9.  She had a chest x-ray on 12/30 which showed cardiomegaly with underlying chronic changes seen throughout the lung fields bilaterally, some atelectatic changes seen at the lung bases right greater than left with trace right pleural effusion.  She had an x-ray of her left knee on 12/30 which showed moderate degenerative changes seen throughout the left knee with no acute bony abnormality, small joint effusion.  X-ray of her right knee showed no acute bony abnormality, mild osteoarthritis.  CT head from 12/30 showed no acute intracranial abnormality.  She had a CT abdomen/pelvis on 12/30 which showed 3 mm proximal right ureteral calculus with mild associated hydroureteronephrosis and perinephric/periureteral fat stranding, bilateral nephrolithiasis.  She underwent cystoscopy, pyelogram right stent insertion on 12/30.  Cardiology has been consulted to see the patient for concerns of elevated troponin.  Nephrology has been consulted to see the patient for concerns of DAVI.  She has a baseline creatinine of 0.8-0.9 mg/dL.  At present, patient was seen and examined.  She explained that she came in because she fell out of bed and hit her knee and that she could not get up.  She denies any nausea or vomiting.  She denies any chest pain or shortness of breath.  She denies NSAIDs.  She notes that she lost her appetite for approximately 4 to 5  days.\"    Subjective:    1/2/2025: Patient was seen and examined.  She is sitting in chair and reports that she feels good.  She denies any nausea or vomiting.  She reports that her appetite is okay today    PMH:    Past Medical History:   Diagnosis Date    Atrial fibrillation (HCC) 02/01/2010    converted with  oral    Cataract     right eye    CKD (chronic kidney disease)     Diabetes mellitus (HCC) 01/01/1999    Diabetic peripheral neuropathy (HCC)     Diastolic CHF, acute on chronic (HCC) 01/01/2010    DISH (diffuse idiopathic skeletal hyperostosis)     Essential tremor     Fatty liver disease, nonalcoholic 01/01/2000    Hypertension 01/01/1996    Lumbar radiculopathy     Mitral regurgitation 01/01/2010    MILD ; MILD AORTIC STENOSIS    Open-angle glaucoma 01/01/1999    Pulmonary regurgitation 01/01/2010    moderate ;increased P.A. pressure    Rectal bleed 08/23/2012    Rheumatoid arthritis (HCC)     Vitreous hemorrhage (HCC)     left eye--s/p surgery       Past Surgical History:   Procedure Laterality Date    COLONOSCOPY  1995,2005,2008    BENIGN POLYPS ;INT  HEMORRHOIDS    DEBRIDEMENT Right 12/30/2024    CYSTOSCOPY RETROGRADE PYELOGRAM RIGHT STENT INSERTION performed by Torres Hamm MD at [facility]    EYE SURGERY Left 01/01/2004    VITREOUS  HEMORRHAGE    HIP SURGERY Left 4/26/2024    LEFT HIP HEMIARTHROPLASTY performed by Robert Sutton DO at [facility] OR    HYSTERECTOMY (CERVIX STATUS UNKNOWN)  01/01/1986    [facility]       Patient Active Problem List   Diagnosis    HTN (hypertension), benign    Paroxysmal atrial fibrillation (HCC)    L-S radiculopathy    Pulmonary nodules    Essential tremor    Diabetic polyneuropathy associated with type 2 diabetes mellitus (HCC)    Stage 2 chronic kidney disease    Benign meningioma of brain (HCC)    NSTEMI (non-ST elevated myocardial infarction) (HCC)    Kidney stone    Gastrointestinal hemorrhage    Right ureteral calculus    Fall    Acute renal failure (HCC)    Sepsis (HCC)

## 2025-01-02 NOTE — CARE COORDINATION
CM Update: Pt worked with PT this AM with [facility] [address] 10/24. Pt updated she will need to dc to [facility] prior to returning to assisted living, she is agreeable. Left message for Deanna to initiate precert. ESTRELLA/destination completed. Pasrr and ambulette form on soft chart (TF)        SAMMY CastanonN,RN  Case Management  [phone]

## 2025-01-02 NOTE — PROGRESS NOTES
Spiritual Health History and Assessment/Progress Note  [facility]    Initial Encounter,  ,  ,      Name: Suki Weir MRN: [id number]    Age: 84 y.o.     Sex: female   Language: English   Jew: Taoism   Sepsis (HCC)     Date: 1/2/2025                           Spiritual Assessment began in [address] PICU        Referral/Consult From: Rounding   Encounter Overview/Reason: Initial Encounter  Service Provided For: Patient    Guerda, Belief, Meaning:   Patient identifies as spiritual and is connected with a guerda tradition or spiritual practice  Family/Friends No family/friends present      Importance and Influence:  Patient has spiritual/personal beliefs that influence decisions regarding their health  Family/Friends No family/friends present    Community:  Patient is connected with a spiritual community and feels well-supported. Support system includes: Children and Extended family  Family/Friends No family/friends present    Assessment and Plan of Care:     Patient Interventions include: Facilitated expression of thoughts and feelings, Explored spiritual coping/struggle/distress, and Engaged in theological reflection  Family/Friends Interventions include: No family/friends present    Patient Plan of Care: Spiritual Care available upon further referral  Family/Friends Plan of Care: No family/friends present    Electronically signed by MICHAEL LEWIS on 1/2/2025 at 11:09 AM

## 2025-01-02 NOTE — PROGRESS NOTES
OCCUPATIONAL THERAPY TREATMENT NOTE  [facility] [address] [place], [place]      Date:2025  Patient Name: Suki Weir  MRN: [id number]  : 1940  Room: [address] [place] [address]-A     Per OT Eval:   Evaluating OT: Nila Monroe, MADONNA, OTR/L  # [id number]     Referring Provider:  Sada Reynoso DO   Specific Provider Orders:  \"OT Eval and Treat\"  24     Diagnosis: Kidney stone [N20.0]  NSTEMI (non-ST elevated myocardial infarction) (HCC) [I21.4]  Fall, initial encounter [W19.XXXA]  Acute renal failure, unspecified acute renal failure type (HCC) [N17.9]  Gastrointestinal hemorrhage, unspecified gastrointestinal hemorrhage type [K92.2]     Pt was admitted after falling out of bed at [facility] Blood Cultures, Hypotensive r/t Urosepsis - underwent Urgent procedure     Pertinent Medical History:  Pt has a past medical history of Atrial fibrillation (HCC), Cataract, CKD (chronic kidney disease), Diabetes mellitus (HCC), Diabetic peripheral neuropathy (HCC), Diastolic CHF, acute on chronic (HCC), DISH (diffuse idiopathic skeletal hyperostosis), Essential tremor, Fatty liver disease, nonalcoholic, Hypertension, Lumbar radiculopathy, Mitral regurgitation, Open-angle glaucoma, Pulmonary regurgitation, Rectal bleed, Rheumatoid arthritis (HCC), and Vitreous hemorrhage (HCC).,  has a past surgical history that includes Hysterectomy (1986); Eye surgery (Left, 2004); Colonoscopy (,,); hip surgery (Left, 2024); and Wound debridement (Right, 2024).     Surgeries this admission: 24:   CYSTOSCOPY RETROGRADE PYELOGRAM RIGHT STENT INSERTION     Precautions:  Fall Risk, Matute Catheter     Assessment of current deficits   [x] Functional mobility             [x]ADLs           [x] Strength                  []Cognition   [x] Functional transfers           [x] IADLs         [x] Safety Awareness   [x]Endurance   [] Fine Coordination

## 2025-01-03 VITALS
OXYGEN SATURATION: 98 % | RESPIRATION RATE: 18 BRPM | SYSTOLIC BLOOD PRESSURE: 115 MMHG | HEART RATE: 98 BPM | TEMPERATURE: 97.9 F | DIASTOLIC BLOOD PRESSURE: 61 MMHG

## 2025-01-03 LAB
ANION GAP SERPL CALCULATED.3IONS-SCNC: 11 MMOL/L (ref 7–16)
BUN SERPL-MCNC: 43 MG/DL (ref 6–23)
CALCIUM SERPL-MCNC: 8.5 MG/DL (ref 8.6–10.2)
CHLORIDE SERPL-SCNC: 104 MMOL/L (ref 98–107)
CO2 SERPL-SCNC: 20 MMOL/L (ref 22–29)
CREAT SERPL-MCNC: 1.6 MG/DL (ref 0.5–1)
ERYTHROCYTE [DISTWIDTH] IN BLOOD BY AUTOMATED COUNT: 14.5 % (ref 11.5–15)
GFR, ESTIMATED: 33 ML/MIN/1.73M2
GLUCOSE SERPL-MCNC: 103 MG/DL (ref 74–99)
HCT VFR BLD AUTO: 24 % (ref 34–48)
HGB BLD-MCNC: 8 G/DL (ref 11.5–15.5)
MCH RBC QN AUTO: 31 PG (ref 26–35)
MCHC RBC AUTO-ENTMCNC: 33.3 G/DL (ref 32–34.5)
MCV RBC AUTO: 93 FL (ref 80–99.9)
PHOSPHATE SERPL-MCNC: 2.8 MG/DL (ref 2.5–4.5)
PLATELET CONFIRMATION: NORMAL
PLATELET, FLUORESCENCE: 72 K/UL (ref 130–450)
PMV BLD AUTO: 11.4 FL (ref 7–12)
POTASSIUM SERPL-SCNC: 4.3 MMOL/L (ref 3.5–5)
RBC # BLD AUTO: 2.58 M/UL (ref 3.5–5.5)
WBC OTHER # BLD: 6.8 K/UL (ref 4.5–11.5)

## 2025-01-03 PROCEDURE — 6370000000 HC RX 637 (ALT 250 FOR IP)

## 2025-01-03 PROCEDURE — 6360000002 HC RX W HCPCS

## 2025-01-03 PROCEDURE — 6370000000 HC RX 637 (ALT 250 FOR IP): Performed by: INTERNAL MEDICINE

## 2025-01-03 PROCEDURE — 2500000003 HC RX 250 WO HCPCS: Performed by: INTERNAL MEDICINE

## 2025-01-03 PROCEDURE — 85027 COMPLETE CBC AUTOMATED: CPT

## 2025-01-03 PROCEDURE — 36415 COLL VENOUS BLD VENIPUNCTURE: CPT

## 2025-01-03 PROCEDURE — 84100 ASSAY OF PHOSPHORUS: CPT

## 2025-01-03 PROCEDURE — 83735 ASSAY OF MAGNESIUM: CPT

## 2025-01-03 PROCEDURE — 80048 BASIC METABOLIC PNL TOTAL CA: CPT

## 2025-01-03 PROCEDURE — 2500000003 HC RX 250 WO HCPCS

## 2025-01-03 RX ORDER — FERROUS SULFATE 325(65) MG
325 TABLET ORAL 2 TIMES DAILY
Qty: 60 TABLET | Refills: 0
Start: 2025-01-03

## 2025-01-03 RX ORDER — SODIUM BICARBONATE 650 MG/1
650 TABLET ORAL 2 TIMES DAILY
Qty: 60 TABLET | Refills: 0
Start: 2025-01-03

## 2025-01-03 RX ORDER — LEVOFLOXACIN 500 MG/1
500 TABLET, FILM COATED ORAL DAILY
Qty: 10 TABLET | Refills: 0 | Status: SHIPPED | OUTPATIENT
Start: 2025-01-03 | End: 2025-01-13

## 2025-01-03 RX ORDER — MAGNESIUM SULFATE IN WATER 40 MG/ML
2000 INJECTION, SOLUTION INTRAVENOUS ONCE
Status: COMPLETED | OUTPATIENT
Start: 2025-01-03 | End: 2025-01-03

## 2025-01-03 RX ORDER — PANTOPRAZOLE SODIUM 40 MG/1
40 TABLET, DELAYED RELEASE ORAL
Qty: 60 TABLET | Refills: 0
Start: 2025-01-03

## 2025-01-03 RX ORDER — LEVOFLOXACIN 500 MG/1
500 TABLET, FILM COATED ORAL DAILY
Status: DISCONTINUED | OUTPATIENT
Start: 2025-01-03 | End: 2025-01-03 | Stop reason: HOSPADM

## 2025-01-03 RX ADMIN — SODIUM BICARBONATE 650 MG: 650 TABLET ORAL at 09:21

## 2025-01-03 RX ADMIN — LEVOFLOXACIN 500 MG: 500 TABLET, FILM COATED ORAL at 14:37

## 2025-01-03 RX ADMIN — PRIMIDONE 150 MG: 50 TABLET ORAL at 09:23

## 2025-01-03 RX ADMIN — PANTOPRAZOLE SODIUM 40 MG: 40 TABLET, DELAYED RELEASE ORAL at 06:00

## 2025-01-03 RX ADMIN — SODIUM CHLORIDE, PRESERVATIVE FREE 20 ML: 5 INJECTION INTRAVENOUS at 09:24

## 2025-01-03 RX ADMIN — POLYETHYLENE GLYCOL 3350 17 G: 17 POWDER, FOR SOLUTION ORAL at 09:21

## 2025-01-03 RX ADMIN — MAGNESIUM SULFATE HEPTAHYDRATE 2000 MG: 40 INJECTION, SOLUTION INTRAVENOUS at 10:32

## 2025-01-03 RX ADMIN — SODIUM CHLORIDE, PRESERVATIVE FREE 10 ML: 5 INJECTION INTRAVENOUS at 09:25

## 2025-01-03 NOTE — CARE COORDINATION
CM Update: Spoke to Deanna with [facility] yesterday evening, pt will dc to [facility]. Pt updated and agreeable. Precert initiated yesterday. Will add to weekend therapy list. ESTRELLA/destination updated. Pasrr updated. Transport envelope on soft chart (TF)      3115--Discharge order noted. Transport arranged with facility for 3:30 pm. (TF)        SAMMY CastanonN,RN  Case Management  [phone]

## 2025-01-03 NOTE — PROGRESS NOTES
[facility] paged via answering service regarding discharge order and need for mayer to stay or not. Electronically signed by Toma Pizarro RN on 1/3/2025 at 2:55 PM

## 2025-01-03 NOTE — PROGRESS NOTES
N to N given to Jeanne at [facility]. Awaiting transport. Natalya from Urology called back and said that [facility] can do voiding trials in the AM.   99

## 2025-01-03 NOTE — PROGRESS NOTES
Dr. Reynoso paged via answering service for discharge. Electronically signed by Toma Pizarro RN on 1/3/2025 at 12:43 PM

## 2025-01-03 NOTE — PLAN OF CARE
Problem: Chronic Conditions and Co-morbidities  Goal: Patient's chronic conditions and co-morbidity symptoms are monitored and maintained or improved  1/2/2025 2023 by Maru Hand RN  Outcome: Progressing  1/2/2025 0911 by Latanya Oliver, RN  Outcome: Progressing     Problem: Discharge Planning  Goal: Discharge to home or other facility with appropriate resources  1/2/2025 2023 by Maru Hand RN  Outcome: Progressing  1/2/2025 0911 by Latanya Oliver RN  Outcome: Progressing     Problem: Safety - Adult  Goal: Free from fall injury  1/2/2025 2023 by Maru Hand RN  Outcome: Progressing  1/2/2025 0911 by Latanya Oliver RN  Outcome: Progressing     Problem: Skin/Tissue Integrity  Goal: Absence of new skin breakdown  Description: 1.  Monitor for areas of redness and/or skin breakdown  2.  Assess vascular access sites hourly  3.  Every 4-6 hours minimum:  Change oxygen saturation probe site  4.  Every 4-6 hours:  If on nasal continuous positive airway pressure, respiratory therapy assess nares and determine need for appliance change or resting period.  1/2/2025 2023 by Maru Hand RN  Outcome: Progressing  1/2/2025 0911 by Latanya Oliver RN  Outcome: Progressing

## 2025-01-03 NOTE — PROGRESS NOTES
[facility]  Nephrology Progress Note    Patient's Name: Suki Weir    History of Present Illness from 12/31 Consult Note:    \"Suki Weir is a 84 y.o. female with a past medical history of CKD, diabetes mellitus, essential tremor, and rheumatoid arthritis.  She presented to the ED on 12/30 for concerns of fall.  Vital signs on 12/30 includes temperature 97.8, respirations 16, pulse 105, BP 80/42, and she was 96% SpO2.  Lab data on 12/30 includes CO2 16, BUN 55, creatinine 2.7, anion gap 20, lactic acid 4.3, albumin 3.1, WBCs 18.3 K, and hemoglobin 9.  She had a chest x-ray on 12/30 which showed cardiomegaly with underlying chronic changes seen throughout the lung fields bilaterally, some atelectatic changes seen at the lung bases right greater than left with trace right pleural effusion.  She had an x-ray of her left knee on 12/30 which showed moderate degenerative changes seen throughout the left knee with no acute bony abnormality, small joint effusion.  X-ray of her right knee showed no acute bony abnormality, mild osteoarthritis.  CT head from 12/30 showed no acute intracranial abnormality.  She had a CT abdomen/pelvis on 12/30 which showed 3 mm proximal right ureteral calculus with mild associated hydroureteronephrosis and perinephric/periureteral fat stranding, bilateral nephrolithiasis.  She underwent cystoscopy, pyelogram right stent insertion on 12/30.  Cardiology has been consulted to see the patient for concerns of elevated troponin.  Nephrology has been consulted to see the patient for concerns of DAVI.  She has a baseline creatinine of 0.8-0.9 mg/dL.  At present, patient was seen and examined.  She explained that she came in because she fell out of bed and hit her knee and that she could not get up.  She denies any nausea or vomiting.  She denies any chest pain or shortness of breath.  She denies NSAIDs.  She notes that she lost her appetite for approximately 4 to 5  8.6 8.5* 8.5*   PHOS 3.2 3.3 2.8   MG 1.6 1.7 1.5*       Vit D, 25-Hydroxy   Date Value Ref Range Status   03/14/2024 66.4 30 - 100 ng/mL Final     Comment:         < 20 ng/mL - Vitamin D Deficiency   20 - 30 ng/mL - Vitamin D Insufficiency  30 - 100 ng/mL - Vitamin D sufficiency     > 100 ng/mL - Vitamin D Toxicity         No results found for: \"PTH\"    No results for input(s): \"ALT\", \"AST\", \"ALKPHOS\", \"BILITOT\", \"BILIDIR\" in the last 72 hours.      No results for input(s): \"LABALBU\" in the last 72 hours.    Ferritin   Date Value Ref Range Status   01/01/2025 243 ng/mL Final     Comment:           FERRITIN Reference Ranges:  Adult Males   20 - 60 years:    30 - 400 ng/mL  Adult females 17 - 60 years:    13 - 150 ng/mL  Adults greater than 60 years:   no established reference range  Pediatrics:  no established reference range       Iron   Date Value Ref Range Status   01/01/2025 16 (L) 37 - 145 ug/dL Final     TIBC   Date Value Ref Range Status   01/01/2025 136 (L) 250 - 450 ug/dL Final       Vitamin B-12   Date Value Ref Range Status   01/01/2025 436 211 - 946 pg/mL Final       Folate   Date Value Ref Range Status   01/01/2025 >20.0 4.8 - 24.2 ng/mL Final       Lab Results   Component Value Date/Time    COLORU Dark Yellow 12/30/2024 09:59 AM    NITRU NEGATIVE 12/30/2024 09:59 AM    GLUCOSEU NEGATIVE 12/30/2024 09:59 AM    KETUA NEGATIVE 12/30/2024 09:59 AM    UROBILINOGEN 0.2 12/30/2024 09:59 AM    BILIRUBINUR NEGATIVE 12/30/2024 09:59 AM       Lab Results   Component Value Date/Time    PROTEIN 5.2 (L) 12/30/2024 09:59 AM       No components found for: \"URIC\"    No results found for: \"LIPIDPAN\"    Assessment and Plans:    DAVI stage III  DAVI presumed likely multifactorial: With obstructive uropathy s/p cystoscopy with stent insertion; concern for decreased effective renal perfusion with hypotension (SBP's noted 80s-90s on 12/30) and poor appetite  Baseline serum creatinine 0.8-0.9 mg/dL  Creatinine peaked at 2.7 on

## 2025-01-03 NOTE — PLAN OF CARE
Problem: Chronic Conditions and Co-morbidities  Goal: Patient's chronic conditions and co-morbidity symptoms are monitored and maintained or improved  1/3/2025 0837 by Abimbola Bautista RN  Outcome: Progressing  1/2/2025 2023 by Maru Hand RN  Outcome: Progressing     Problem: Discharge Planning  Goal: Discharge to home or other facility with appropriate resources  1/3/2025 0837 by Abimbola Bautista RN  Outcome: Progressing  1/2/2025 2023 by Maru Hand RN  Outcome: Progressing     Problem: Safety - Adult  Goal: Free from fall injury  1/3/2025 0837 by Abimbola Bautista RN  Outcome: Progressing  1/2/2025 2023 by Maru Hand RN  Outcome: Progressing     Problem: Skin/Tissue Integrity  Goal: Absence of new skin breakdown  Description: 1.  Monitor for areas of redness and/or skin breakdown  2.  Assess vascular access sites hourly  3.  Every 4-6 hours minimum:  Change oxygen saturation probe site  4.  Every 4-6 hours:  If on nasal continuous positive airway pressure, respiratory therapy assess nares and determine need for appliance change or resting period.  1/3/2025 0837 by Abimbola Bautista RN  Outcome: Progressing  1/2/2025 2023 by Maru Hand RN  Outcome: Progressing

## 2025-01-03 NOTE — PLAN OF CARE
Problem: Chronic Conditions and Co-morbidities  Goal: Patient's chronic conditions and co-morbidity symptoms are monitored and maintained or improved  1/3/2025 1509 by Abimbola Bautista RN  Outcome: Adequate for Discharge  1/3/2025 0837 by Abimbola Bautista RN  Outcome: Progressing     Problem: Discharge Planning  Goal: Discharge to home or other facility with appropriate resources  1/3/2025 1509 by Abimbola Bautista RN  Outcome: Adequate for Discharge  1/3/2025 0837 by Abimbola Bautista RN  Outcome: Progressing     Problem: Safety - Adult  Goal: Free from fall injury  1/3/2025 1509 by Abimbola Bautista RN  Outcome: Adequate for Discharge  1/3/2025 0837 by Abimbola Bautista RN  Outcome: Progressing     Problem: Skin/Tissue Integrity  Goal: Absence of new skin breakdown  Description: 1.  Monitor for areas of redness and/or skin breakdown  2.  Assess vascular access sites hourly  3.  Every 4-6 hours minimum:  Change oxygen saturation probe site  4.  Every 4-6 hours:  If on nasal continuous positive airway pressure, respiratory therapy assess nares and determine need for appliance change or resting period.  1/3/2025 1509 by Abimbola Bautista RN  Outcome: Adequate for Discharge  1/3/2025 0837 by Abimbola Bautista RN  Outcome: Progressing

## 2025-01-03 NOTE — PROGRESS NOTES
[facility]  [facility]  Progress Note      C/C : E coli bacteremia, UTI     Denies fever or chills  Denies dysuria  Reports back pain  Afebrile         Current Facility-Administered Medications   Medication Dose Route Frequency Provider Last Rate Last Admin    magnesium sulfate 2000 mg in 50 mL IVPB premix  2,000 mg IntraVENous Once Esperanza Tate APRN - CNP 25 mL/hr at 01/03/25 1032 2,000 mg at 01/03/25 1032    cefTRIAXone (ROCEPHIN) 2,000 mg in sterile water 20 mL IV syringe  2,000 mg IntraVENous Q24H Jg Reeves MD   2,000 mg at 01/02/25 1347    sodium bicarbonate tablet 650 mg  650 mg Oral BID Esperanza Tate APRN - CNP   650 mg at 01/03/25 0921    sodium chloride flush 0.9 % injection 5-40 mL  5-40 mL IntraVENous 2 times per day Lauri Orr MD   10 mL at 01/03/25 0925    sodium chloride flush 0.9 % injection 5-40 mL  5-40 mL IntraVENous PRN Lauri Orr MD        0.9 % sodium chloride infusion   IntraVENous PRN Lauri Orr MD        atorvastatin (LIPITOR) tablet 40 mg  40 mg Oral Nightly Saad Reynoso DO   40 mg at 01/02/25 2024    folic acid (FOLVITE) tablet 1 mg  1 mg Oral Nightly Saad Reynoso, DO   1 mg at 01/02/25 2024    latanoprost (XALATAN) 0.005 % ophthalmic solution 1 drop  1 drop Both Eyes Nightly Saad Reynoso DO   1 drop at 01/02/25 2025    primidone (MYSOLINE) tablet 150 mg  150 mg Oral BID Saad Reynoso, DO   150 mg at 01/03/25 0923    sodium chloride flush 0.9 % injection 5-40 mL  5-40 mL IntraVENous 2 times per day Saad Reynoso DO   20 mL at 01/03/25 0924    sodium chloride flush 0.9 % injection 5-40 mL  5-40 mL IntraVENous PRN Saad Reynoso DO        0.9 % sodium chloride infusion   IntraVENous PRN Saad Reynoso, DO        potassium chloride (KLOR-CON M) extended release tablet 40 mEq  40 mEq Oral PRN Saad Reynoso DO        Or    potassium bicarb-citric acid (EFFER-K) effervescent tablet 40 mEq  40

## 2025-01-03 NOTE — DISCHARGE INSTRUCTIONS
Call upon discharge to schedule a follow-up visit with Harriet Kelly/Levy/Stanislaw/Noris ([facility]) at [phone]  A ureteral stent was inserted during your recent procedure.  Unlike a heart \"stent\" which is metal, short, and permanent, this ureteral stent plastic, and temporary.  It spans from your kidney, down the ureter, and into your bladder.  This will need to be removed, so it is very important that you follow-up with your doctor.    IMPORTANT - This ureteral stent will likely cause you to experience frequent urination, urgency to urinate, back/flank pain with urination, and/or blood in the urine.  These things are very normal.  Taking the pain medications and/or anti-inflammatories will help to manage this discomfort if present.  If you have any questions or concerns you can contact [facility] office at [phone].     Ureteral Stent Placement: What to Expect at Home  Your Recovery     A ureteral (say \"you-REE-ter-ul\") stent is a thin, hollow tube that is placed in the ureter to help urine pass from the kidney into the bladder. Ureters are the tubes that connect the kidneys to the bladder.  You may have a small amount of blood in your urine for 1 to 3 days after the procedure.  While the stent is in place, you may have to urinate more often, feel a sudden need to urinate, or feel like you can't completely empty your bladder. You may feel some pain when you urinate or do strenuous activity. You also may notice a small amount of blood in your urine after strenuous activities. These side effects usually don't prevent people from doing their normal daily activities.  You may have a thin string coming out of your urethra. Your urethra is the tube that carries urine from your bladder to outside your body. This string is attached to the stent. Try not to pull on the string. It will be used to pull out the stent when you no longer need it.  After the procedure, urine may flow better from your  your doctor has told you not to).  Ask your doctor for a different pain medicine.     If your doctor prescribed antibiotics, take them as directed. Do not stop taking them just because you feel better. You need to take the full course of antibiotics.   Follow-up care is a key part of your treatment and safety. Be sure to make and go to all appointments, and call your doctor if you are having problems. It's also a good idea to know your test results and keep a list of the medicines you take.  When should you call for help?   Call 911 anytime you think you may need emergency care. For example, call if:    You passed out (lost consciousness).     You have severe trouble breathing.     You have sudden chest pain and shortness of breath, or you cough up blood.     You have severe belly pain.   Call your doctor now or seek immediate medical care if:    Part or all of the stent comes out of your urethra.     You have pain that does not get better after you take pain medicine.     You have symptoms of a urinary infection. For example:  You have blood or pus in your urine.  You have pain in your back just below your rib cage. This is called flank pain.  You have a fever, chills, or body aches.  It hurts to urinate.  You have groin or belly pain.     You cannot control when you urinate, or you leak urine.   Watch closely for changes in your health, and be sure to contact your doctor if you have any problems.  Where can you learn more?  Go to [email or web address] and sign in to your [employer] account. Enter B869 in the Search Health Information box to learn more about \"Ureteral Stent Placement: What to Expect at Home.\"     If you do not have an account, please click on the \"Sign Up Now\" link.  Current as of: February 10, 2021               Content Version: 12.9  © 2006-2021 Healthwise, Incorporated.   Care instructions adapted under license by [employer]. If you have questions about a medical condition or

## 2025-01-03 NOTE — PROGRESS NOTES
[facility]    Subjective:  pt alert conversive reagan diet      Current Facility-Administered Medications:     levoFLOXacin (LEVAQUIN) tablet 500 mg, 500 mg, Oral, Daily, David Choi MD    sodium bicarbonate tablet 650 mg, 650 mg, Oral, BID, Esperanza Tate, APRN - CNP, 650 mg at 01/03/25 0921    sodium chloride flush 0.9 % injection 5-40 mL, 5-40 mL, IntraVENous, 2 times per day, Lauri Orr MD, 10 mL at 01/03/25 0925    sodium chloride flush 0.9 % injection 5-40 mL, 5-40 mL, IntraVENous, PRN, Lauri Orr MD    0.9 % sodium chloride infusion, , IntraVENous, PRN, Lauri Orr MD    atorvastatin (LIPITOR) tablet 40 mg, 40 mg, Oral, Nightly, Saad Reynoso, , 40 mg at 01/02/25 2024    folic acid (FOLVITE) tablet 1 mg, 1 mg, Oral, Nightly, Saad Reynoso DO, 1 mg at 01/02/25 2024    latanoprost (XALATAN) 0.005 % ophthalmic solution 1 drop, 1 drop, Both Eyes, Nightly, Saad Reynoso DO, 1 drop at 01/02/25 2025    primidone (MYSOLINE) tablet 150 mg, 150 mg, Oral, BID, Saad Reynoso DO, 150 mg at 01/03/25 0923    sodium chloride flush 0.9 % injection 5-40 mL, 5-40 mL, IntraVENous, 2 times per day, Saad Reynoso DO, 20 mL at 01/03/25 0924    sodium chloride flush 0.9 % injection 5-40 mL, 5-40 mL, IntraVENous, PRN, Saad Reynoso DO    0.9 % sodium chloride infusion, , IntraVENous, PRN, Saad Reynoso,     potassium chloride (KLOR-CON M) extended release tablet 40 mEq, 40 mEq, Oral, PRN **OR** potassium bicarb-citric acid (EFFER-K) effervescent tablet 40 mEq, 40 mEq, Oral, PRN **OR** potassium chloride 10 mEq/100 mL IVPB (Peripheral Line), 10 mEq, IntraVENous, PRN, Saad Reynoso, DO    magnesium sulfate 2000 mg in 50 mL IVPB premix, 2,000 mg, IntraVENous, PRN, Saad Reynoso, DO    ondansetron (ZOFRAN-ODT) disintegrating tablet 4 mg, 4 mg, Oral, Q8H PRN **OR** ondansetron (ZOFRAN) injection 4 mg, 4 mg, IntraVENous, Q6H PRN, Saad Reynoso, DO    acetaminophen  04:21 AM    BILITOT 0.5 12/30/2024 09:59 AM    ALKPHOS 59 12/30/2024 09:59 AM    AST 21 12/30/2024 09:59 AM    ALT 12 12/30/2024 09:59 AM     Warfarin PT/INR:    Lab Results   Component Value Date    INR 1.7 12/30/2024    INR 2.2 02/19/2024    INR 3.0 02/17/2024    PROTIME 18.3 (H) 12/30/2024    PROTIME 24.5 (H) 02/19/2024    PROTIME 32.5 (H) 02/17/2024       Assessment:    Principal Problem:    Sepsis (HCC)  Active Problems:    NSTEMI (non-ST elevated myocardial infarction) (HCC)    Kidney stone    Gastrointestinal hemorrhage    Right ureteral calculus    Fall    Acute renal failure (HCC)  Resolved Problems:    * No resolved hospital problems. *      Plan:  Dc to rehab resume adjusted dose eliquis resumw diltiazem start iron        Saad Reynoso DO  1:59 PM  1/3/2025

## 2025-01-03 NOTE — PROGRESS NOTES
1/3/2025 4:41 PM  Suki Weir  [id number]    Subjective:  awake and alert  Denies pain   Mayer with miguel urine   No family present  Being discharged today    Review of Systems  Constitutional: No fever or chills   Respiratory: negative for cough and hemoptysis  Cardiovascular: negative for chest pain and dyspnea  Gastrointestinal: negative for abdominal pain, diarrhea, nausea and vomiting   : See above  Derm: negative for rash and skin lesion(s)  Neurological: negative for seizures and tremors  Musculoskeletal: Negative    Psychiatric: Negative   All other reviews are negative      Scheduled Meds:   levoFLOXacin  500 mg Oral Daily    sodium bicarbonate  650 mg Oral BID    sodium chloride flush  5-40 mL IntraVENous 2 times per day    atorvastatin  40 mg Oral Nightly    folic acid  1 mg Oral Nightly    latanoprost  1 drop Both Eyes Nightly    primidone  150 mg Oral BID    sodium chloride flush  5-40 mL IntraVENous 2 times per day    polyethylene glycol  17 g Oral BID    senna  1 tablet Oral Nightly    pantoprazole  40 mg Oral BID AC       Objective:  Vitals:    01/03/25 1443   BP: 115/61   Pulse: 98   Resp: 18   Temp: 97.9 °F (36.6 °C)   SpO2: 98%         Allergies: Patient has no known allergies.    General Appearance: alert and oriented to person, place and time and in no acute distress  Skin: no rash or erythema  Head: normocephalic and atraumatic  Pulmonary/Chest: normal air movement, no respiratory distress  Abdomen: soft, non-tender, non-distended  Genitourinary: mayer with miguel urine   Extremities: no cyanosis, clubbing or edema         Labs:     Recent Labs     01/03/25  0421      K 4.3      CO2 20*   BUN 43*   CREATININE 1.6*   GLUCOSE 103*   CALCIUM 8.5*       Lab Results   Component Value Date/Time    HGB 8.0 01/03/2025 04:21 AM    HCT 24.0 01/03/2025 04:21 AM       No results found for: \"PSA\"      Assessment/Plan:  POD#4--cystoscopy, retrograde pyelogram, right stent insertion   Right  ureteral calculi     Continue the right ureteral stent   Will need definitive stone management as an outpatient   Continue the mayer   Voiding trial when ambulatory   Ok for discharge from  standpoint when ok with others   No further  interventions are planned at this time  Please call with any further questions or concerns  Thank you for allowing us to participate in her care         Laura Hernandez, APRN - CNP   [facility]  Urology

## 2025-01-08 NOTE — PROGRESS NOTES
Physician Progress Note      PATIENT:               MARIANN KILLIAN  [facility] #:                  [id number]  :                       1940  ADMIT DATE:       2024 9:36 AM  DISCH DATE:        1/3/2025 4:56 PM  RESPONDING  PROVIDER #:        Saad Reynoso DO          QUERY TEXT:    Dear Attending Physician,    Patient admitted with Sepsis, UTI. Noted documentation of NSTEMI in PCP notes.   In order to support the diagnosis of NSTEMI, please include additional   clinical indicators in your documentation.  Or please document if the   diagnosis of NSTEMIA has been ruled out after further study.    The medical record reflects the following:  Risk Factors: Chronic CHF, afib, CKD  Clinical Indicators: Per PCP -1/3,\"...Principal Problem:Sepsis ...Active   Problems:NSTEMI ...\" Per CARDIO ,\"...Cardiology consult was requested for   elevated troponin 329>> 269>> 261. ..Elevated troponin in the setting of   acute on chronic renal insufficiency, profound anemia, and urosepsis with   hypotension...\"  Troponin 329 269 261  Treatment: IV ATB, IVF    Thank you,  Latanya Vega RN [facility]  Clinical Documentation Improvement Specialist  Phone# [phone]  Options provided:  -- NSTEMI was ruled out  -- NSTEMI present as evidenced by, Please document evidence.  -- Other - I will add my own diagnosis  -- Disagree - Not applicable / Not valid  -- Disagree - Clinically unable to determine / Unknown  -- Refer to Clinical Documentation Reviewer    PROVIDER RESPONSE TEXT:    NSTEMI was ruled out after study.    Query created by: Latanya Vega on 1/3/2025 2:15 PM      Electronically signed by:  Saad Reynoso DO 2025 2:19 PM

## 2025-01-28 ENCOUNTER — PREP FOR PROCEDURE (OUTPATIENT)
Dept: UROLOGY | Age: 85
End: 2025-01-28

## 2025-01-28 RX ORDER — SODIUM CHLORIDE 0.9 % (FLUSH) 0.9 %
5-40 SYRINGE (ML) INJECTION PRN
Status: CANCELLED | OUTPATIENT
Start: 2025-01-28

## 2025-01-28 RX ORDER — SODIUM CHLORIDE 9 MG/ML
INJECTION, SOLUTION INTRAVENOUS PRN
Status: CANCELLED | OUTPATIENT
Start: 2025-01-28

## 2025-01-28 RX ORDER — SODIUM CHLORIDE 9 MG/ML
INJECTION, SOLUTION INTRAVENOUS CONTINUOUS
Status: CANCELLED | OUTPATIENT
Start: 2025-01-28

## 2025-01-28 RX ORDER — SODIUM CHLORIDE 0.9 % (FLUSH) 0.9 %
5-40 SYRINGE (ML) INJECTION EVERY 12 HOURS SCHEDULED
Status: CANCELLED | OUTPATIENT
Start: 2025-01-28

## 2025-01-28 NOTE — PROGRESS NOTES
Suki Weir (:  1940) is a 84 y.o. female, Established patient, here for evaluation of the following chief complaint(s):  Consultation (Patient is here to discuss EGD)         Assessment & Plan  1. Anemia.  Her blood count has been consistently low, with a reading of approximately 8. This could potentially be attributed to the presence of ulcers in her stomach, as evidenced by her vomiting of dark substances during her hospital stay. Her blood count was within the normal range in early  but has since decreased to the 10s, 9s, and 8s. Given her weight loss, anemia, and coughing up some stuff, both an EGD and a colonoscopy are recommended. An outpatient EGD is recommended to investigate the cause of her low blood count, specifically to rule out ulcers or H. pylori infection. She is advised to take a stool softener daily to prevent constipation. A colonoscopy is also recommended to rule out colon cancer as a potential cause of her weight loss and bleeding. She will be prepped for the colonoscopy, which includes drinking a prep to clear all stool from her system. The risks associated with the procedure, including bleeding from biopsy sites or perforation, have been discussed. She is currently on Protonix twice daily, and Carafate will be added to her regimen to coat any potential ulcers.    2. Knee pain.  She reports knee pain and has seen a couple of doctors who suggested it might be related to arthritis. No new treatment plan was discussed during this visit. Tx through primary care     3. Atrial fibrillation.  She is on Eliquis for atrial fibrillation. This medication will be held for 2 days prior to the scope procedure.    PROCEDURE  Colonoscopy over a decade ago revealed the presence of hemorrhoids.    Results  Laboratory Studies  Blood count is low, around 8.  1. Melena [K92.1]    No follow-ups on file.       Subjective   History of Present Illness  The patient is an 6-year-old female who presents

## 2025-01-29 ENCOUNTER — PREP FOR PROCEDURE (OUTPATIENT)
Dept: SURGERY | Age: 85
End: 2025-01-29

## 2025-01-29 ENCOUNTER — OFFICE VISIT (OUTPATIENT)
Dept: SURGERY | Age: 85
End: 2025-01-29
Payer: MEDICARE

## 2025-01-29 VITALS
RESPIRATION RATE: 16 BRPM | HEIGHT: 64 IN | BODY MASS INDEX: 27.46 KG/M2 | SYSTOLIC BLOOD PRESSURE: 124 MMHG | TEMPERATURE: 97 F | DIASTOLIC BLOOD PRESSURE: 57 MMHG | OXYGEN SATURATION: 94 % | HEART RATE: 101 BPM

## 2025-01-29 DIAGNOSIS — K92.1 MELENA: Primary | ICD-10-CM

## 2025-01-29 DIAGNOSIS — K92.1 MELENA: ICD-10-CM

## 2025-01-29 PROCEDURE — 3074F SYST BP LT 130 MM HG: CPT | Performed by: SURGERY

## 2025-01-29 PROCEDURE — 99214 OFFICE O/P EST MOD 30 MIN: CPT | Performed by: SURGERY

## 2025-01-29 PROCEDURE — 3078F DIAST BP <80 MM HG: CPT | Performed by: SURGERY

## 2025-01-29 PROCEDURE — 99212 OFFICE O/P EST SF 10 MIN: CPT | Performed by: SURGERY

## 2025-01-29 PROCEDURE — 1123F ACP DISCUSS/DSCN MKR DOCD: CPT | Performed by: SURGERY

## 2025-01-29 RX ORDER — LOPERAMIDE HYDROCHLORIDE 2 MG/1
2 TABLET ORAL 4 TIMES DAILY PRN
COMMUNITY

## 2025-01-29 RX ORDER — BUMETANIDE 0.5 MG/1
0.5 TABLET ORAL DAILY
COMMUNITY
Start: 2025-01-21

## 2025-01-29 RX ORDER — NITROGLYCERIN 0.4 MG/1
0.4 TABLET SUBLINGUAL EVERY 5 MIN PRN
COMMUNITY

## 2025-01-29 RX ORDER — BISACODYL 5 MG/1
5 TABLET, DELAYED RELEASE ORAL ONCE
Qty: 8 TABLET | Refills: 0 | Status: CANCELLED | OUTPATIENT
Start: 2025-01-29 | End: 2025-01-29

## 2025-01-29 RX ORDER — ONDANSETRON 4 MG/1
4 TABLET, ORALLY DISINTEGRATING ORAL EVERY 8 HOURS PRN
COMMUNITY

## 2025-01-29 RX ORDER — SUCRALFATE 1 G/1
1 TABLET ORAL 4 TIMES DAILY
Qty: 120 TABLET | Refills: 3 | Status: SHIPPED | OUTPATIENT
Start: 2025-01-29

## 2025-01-29 RX ORDER — SUCRALFATE 1 G/1
1 TABLET ORAL 3 TIMES DAILY
COMMUNITY
Start: 2025-01-21 | End: 2025-01-29 | Stop reason: ALTCHOICE

## 2025-01-29 RX ORDER — ARTIFICIAL TEARS 1; 2; 3 MG/ML; MG/ML; MG/ML
2 SOLUTION/ DROPS OPHTHALMIC EVERY 4 HOURS PRN
COMMUNITY

## 2025-01-29 RX ORDER — POLYETHYLENE GLYCOL 3350 17 G/17G
238 POWDER, FOR SOLUTION ORAL ONCE
Qty: 255 G | Refills: 0 | Status: CANCELLED | OUTPATIENT
Start: 2025-01-29 | End: 2025-01-29

## 2025-01-29 RX ORDER — DIPHENHYDRAMINE HCL 25 MG
25 CAPSULE ORAL EVERY 6 HOURS PRN
COMMUNITY

## 2025-01-29 RX ORDER — DILTIAZEM HYDROCHLORIDE 180 MG/1
360 CAPSULE, COATED, EXTENDED RELEASE ORAL
COMMUNITY
Start: 2025-01-21

## 2025-01-29 RX ORDER — SUCRALFATE 1 G/1
1 TABLET ORAL 4 TIMES DAILY
Qty: 120 TABLET | Refills: 3 | Status: CANCELLED | OUTPATIENT
Start: 2025-01-29

## 2025-01-29 NOTE — PATIENT INSTRUCTIONS
[facility]  MIRALAX/DULCOLAX TABLETS  COLON PREP FOR COLONOSCOPY OR COLON SURGERY    It is very important that you follow all of the instructions listed on this sheet carefully (they may be slightly different than the directions on the product that you purchase at the pharmacy) to ensure that your colon is adequately cleaned out or your risk of complications could be increased.    2 Days or More Before Endoscopy:    Start clear liquids 2 days before procedure.    · Obtain 2-Miralax powder 238 gm (8.3 oz) bottle and 64 oz of Gatorade from the pharmacy.    · Obtain bottle of Dulcolax tablets    · Do not eat corn, tomatoes, peas or watermelon 3 to 5 days before procedure.    · Two days before the colonoscopy, mix the entire bottles of Miralax in the 64 oz of Gatorade put in the refrigerator to get cold    · If you are on INSULIN or OTHER DIABETIC MEDICATIONS, then check with your primary care physician as to how to adjust your medication while on clear liquid diet and when nothing by mouth.    1 Day Before the Endoscopy:    · No solid food - only clear liquids (soup, jello, or juice that you can see through with no solid food) for breakfast, lunch and supper. DO NOT drink or eat anything that is red as it will turn the inside of the colon red and look like blood. Nothing to eat or drink after midnight.    · Have at least 8 oz or more of clear liquids for breakfast (7 am to 8 am) and lunch (11:30 am to 12:30 pm).    · 12 Noon Take 4 Dulcolax tablets followed immediately by at least 8 oz of clear liquids.    · 1:00 pm Drink at least 8 oz of clear liquids.    · 2:00 pm Take 4 Dulcolax tablets and drink at least 8 oz of clear liquids.    · 3:00 pm Drink at least 8 oz of clear liquids.    · 4:00 pm Drink the Gatorade with Miralax powder 8 oz every 30 minutes    · Can continue to take liquids until midnight    Day of Endoscopy:    Nothing to eat or drink until after the procedure    If any blood  Virtual Telephone Check-In    Reinier Henderson verbally consents to a Virtual/Telephone Check-In visit on 7/9/2020. Patient understands and accepts financial responsibility for any deductible, co-insurance and/or co-pays associated with this service. monitoring    Patient overweight  - CPM, no refills needed today.   -  on alternate home fitness and tools for support  - discussed reducing medication load potential to see if weight loss can be maintained- she defers at present    Adult BMI 25.0-25

## 2025-01-29 NOTE — PROGRESS NOTES
Facility notified of Or time and arrival time changes for DOS 2/5/25 to 0600/0800. Nurse verbalizes understanding

## 2025-01-30 DIAGNOSIS — K92.1 MELENA: Primary | ICD-10-CM
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
## 2025-01-30 RX ORDER — POLYETHYLENE GLYCOL 3350 17 G/17G
238 POWDER, FOR SOLUTION ORAL DAILY
Qty: 238 G | Refills: 0 | Status: SHIPPED
Start: 2025-01-30 | End: 2025-01-31

## 2025-01-30 RX ORDER — BISACODYL 5 MG/1
5 TABLET, DELAYED RELEASE ORAL DAILY PRN
Qty: 8 TABLET | Refills: 0 | Status: SHIPPED
Start: 2025-01-30 | End: 2025-01-31

## 2025-01-31 RX ORDER — CODEINE PHOSPHATE AND GUAIFENESIN 10; 100 MG/5ML; MG/5ML
5 SOLUTION ORAL EVERY 4 HOURS PRN
COMMUNITY

## 2025-01-31 NOTE — PROGRESS NOTES
Patient is a resident at THE [facility] , [facility] telephone assessment completed with NURSE JOAN AT [facility].      A&O:  YES  Code Status:  FULL  Ambulatory:  USES W/C CAN TRANSER WITH ASSIST  Oxygen:  NO  Hard of Hearing:  NO  Call light:  ABLE TO USE INDEPENDENTLY  Signs Documents?  YES  POA:  JULIANE ESTRELLAMORENITAKRISHNA MCDANIEL [phone]  Transport:  FACILITY [place]  [phone]  Wounds:  NONE  Isolation:  NONE  Lines/Drains/Implanted devices:  SMITH TO CYSTOFLOW  Trach: type ** size **N/A    Verified arrival time of 0600 with facility.  General instructions and medication instructions faxed to facility.      FACILITY STATES PT COUSINJULIANE JAZROBERTA (POA) WILL MEET PT AT HOSPITAL AND STAY WITH HER FOR SUGERY DAY.

## 2025-01-31 NOTE — PROGRESS NOTES
[facility] PRE-ADMISSION TESTING INSTRUCTIONS    The Preadmission Testing patient is instructed accordingly using the following criteria (check applicable):    ARRIVAL INSTRUCTIONS:  [x] Parking the day of Surgery is located in the Main Entrance lot.  Upon entering the door, make an immediate right to the surgery reception desk    [x] Bring photo ID and insurance card    [x] Bring in a copy of Living will or Durable Power of  papers.    [x] Please be sure to arrange for responsible adult to provide transportation to and from the hospital    [x] Please arrange for responsible adult to be with you for the 24 hour period post procedure due to having anesthesia    [x] If you awake am of surgery not feeling well or have temperature >100 please call [phone]    GENERAL INSTRUCTIONS:    [x] Nothng to eat or drink after midnight prior to surgery             No gum, candy or mints or solid food    [x] You may brush your teeth, but do not swallow any water    [x] Take medications as instructed with 1-2 oz of water:  FOLLOW PRE-PROCEDURE MEDICATION INSTRUCTION DOCUMENT PROVIDED.    [x] Stop herbal supplements and vitamins 5 days prior to procedure    [x] Follow preop dosing of blood thinners per physician instructions. NO HOLD ON ELIQUIS PER DR. VO    [x] Shower or bath with soap, lather and rinse well, AM of Surgery, no lotion, powders or creams     [x] No tobacco products within 24 hours of surgery     [x] No alcohol or illegal drug use within 24 hours of surgery.    [x] Jewelry, body piercing's, eyeglasses, contact lenses and dentures are not permitted into surgery (bring cases)      [x] Please do not wear any nail polish, make up or hair products on the day of surgery    [x] You can expect a call the business day prior to procedure to notify you if your arrival time changes    [x] If you receive a survey after surgery we would greatly appreciate your comments    [x] A caregiver or

## 2025-01-31 NOTE — PROGRESS NOTES
No HOLD on Eliquis for DOS 2/5/25 per Dr. Hamm office ([facility])

## 2025-02-03 NOTE — PROGRESS NOTES
Called to verify that Dr. Hamm is aware of platelet count from 1/3/25 of 72. Lori from office will message Dr. Hein to inform him.

## 2025-02-04 ENCOUNTER — ANESTHESIA EVENT (OUTPATIENT)
Dept: OPERATING ROOM | Age: 85
End: 2025-02-04
Payer: MEDICARE

## 2025-02-04 ASSESSMENT — ENCOUNTER SYMPTOMS: DYSPNEA ACTIVITY LEVEL: NO INTERVAL CHANGE

## 2025-02-04 ASSESSMENT — LIFESTYLE VARIABLES: SMOKING_STATUS: 0

## 2025-02-04 NOTE — PROGRESS NOTES
Spoke with Antoinette , nurse for Suki Weir, and informed her of new arrival time of 0515.   Antoinette stated CARYL Elder will be bringing pt to [facility] and facility van will be transporting pt back to facility.  Call placed to Jerrod (CARYL) and he verified he will transport pt to [facility] at 0515 and he verified facility van will return pt to facility after procedure. Jerrod stated he can be reached at [phone].     Facility  Jerrod's phone number is [phone]

## 2025-02-04 NOTE — ANESTHESIA PRE PROCEDURE
Department of Anesthesiology  Preprocedure Note       Name:  Suki Weir   Age:  84 y.o.  :  1940                                          MRN:  [id number]         Date:  2025      Surgeon: Surgeon(s):  Torres Hamm MD    Procedure: Procedure(s):  CYSTOSCOPY RETROGRADE PYELOGRAM URETEROSCOPY J STENT  LASER LITHOTRIPSY RIGHT (FACILITY)    Medications prior to admission:   Prior to Admission medications    Medication Sig Start Date End Date Taking? Authorizing Provider   dilTIAZem (CARDIZEM CD) 180 MG extended release capsule Take 2 capsules by mouth Daily with lunch 25  Yes Pradeep Mckeon MD   diclofenac sodium (VOLTAREN) 1 % GEL Apply 2 g topically every 8 hours as needed for Pain Apply to david knees topically    Pradeep Mckeon MD   guaiFENesin-codeine (GUAIFENESIN AC) 100-10 MG/5ML liquid Take 5 mLs by mouth every 4 hours as needed for Cough. Max Daily Amount: 30 mLs    Pradeep Mckeon MD   bumetanide (BUMEX) 0.5 MG tablet Take 1 tablet by mouth daily 25   Pradeep Mckeon MD   Artificial Tear Solution (ARTIFICIAL TEARS, DEXTRAN-HYPROMELLOSE-GLYCERIN,) SOLN ophthalmic solution Place 2 drops into both eyes every 4 hours as needed (for dry eyes)    Pradeep Mckeon MD   nitroGLYCERIN (NITROSTAT) 0.4 MG SL tablet Place 1 tablet under the tongue every 5 minutes as needed for Chest pain up to max of 3 total doses. If no relief after 1 dose, call 911.    Pradeep Mckeon MD   ondansetron (ZOFRAN-ODT) 4 MG disintegrating tablet Take 1 tablet by mouth every 8 hours as needed for Nausea or Vomiting    Pradeep Mckeon MD   diphenhydrAMINE (BENADRYL) 25 MG capsule Take 1 capsule by mouth every 6 hours as needed for Itching    Pradeep Mckeon MD   loperamide (IMODIUM A-D) 2 MG tablet Take 1 tablet by mouth 4 times daily as needed for Diarrhea    Pradeep Mckeon MD   sucralfate (CARAFATE) 1 GM tablet Take 1 tablet by mouth 4 times daily 25

## 2025-02-05 ENCOUNTER — HOSPITAL ENCOUNTER (OUTPATIENT)
Age: 85
Setting detail: OUTPATIENT SURGERY
Discharge: HOME | End: 2025-02-05
Attending: STUDENT IN AN ORGANIZED HEALTH CARE EDUCATION/TRAINING PROGRAM | Admitting: STUDENT IN AN ORGANIZED HEALTH CARE EDUCATION/TRAINING PROGRAM
Payer: MEDICARE

## 2025-02-05 ENCOUNTER — ANESTHESIA (OUTPATIENT)
Dept: OPERATING ROOM | Age: 85
End: 2025-02-05
Payer: MEDICARE

## 2025-02-05 ENCOUNTER — APPOINTMENT (OUTPATIENT)
Dept: GENERAL RADIOLOGY | Age: 85
End: 2025-02-05
Attending: STUDENT IN AN ORGANIZED HEALTH CARE EDUCATION/TRAINING PROGRAM
Payer: MEDICARE

## 2025-02-05 VITALS
DIASTOLIC BLOOD PRESSURE: 58 MMHG | HEIGHT: 64 IN | HEART RATE: 66 BPM | BODY MASS INDEX: 26.46 KG/M2 | WEIGHT: 155 LBS | TEMPERATURE: 97 F | OXYGEN SATURATION: 97 % | RESPIRATION RATE: 18 BRPM | SYSTOLIC BLOOD PRESSURE: 118 MMHG

## 2025-02-05 DIAGNOSIS — N20.1 CALCULUS OF URETER: ICD-10-CM

## 2025-02-05 DIAGNOSIS — N23 RENAL COLIC: ICD-10-CM

## 2025-02-05 DIAGNOSIS — N20.1 RIGHT URETERAL CALCULUS: Primary | ICD-10-CM

## 2025-02-05 PROCEDURE — 2580000003 HC RX 258

## 2025-02-05 PROCEDURE — 3600000003 HC SURGERY LEVEL 3 BASE: Performed by: STUDENT IN AN ORGANIZED HEALTH CARE EDUCATION/TRAINING PROGRAM

## 2025-02-05 PROCEDURE — 7100000011 HC PHASE II RECOVERY - ADDTL 15 MIN: Performed by: STUDENT IN AN ORGANIZED HEALTH CARE EDUCATION/TRAINING PROGRAM

## 2025-02-05 PROCEDURE — C1769 GUIDE WIRE: HCPCS | Performed by: STUDENT IN AN ORGANIZED HEALTH CARE EDUCATION/TRAINING PROGRAM

## 2025-02-05 PROCEDURE — 6360000004 HC RX CONTRAST MEDICATION: Performed by: STUDENT IN AN ORGANIZED HEALTH CARE EDUCATION/TRAINING PROGRAM

## 2025-02-05 PROCEDURE — 6360000002 HC RX W HCPCS

## 2025-02-05 PROCEDURE — 2720000010 HC SURG SUPPLY STERILE: Performed by: STUDENT IN AN ORGANIZED HEALTH CARE EDUCATION/TRAINING PROGRAM

## 2025-02-05 PROCEDURE — 88300 SURGICAL PATH GROSS: CPT

## 2025-02-05 PROCEDURE — 74420 UROGRAPHY RTRGR +-KUB: CPT

## 2025-02-05 PROCEDURE — 2709999900 HC NON-CHARGEABLE SUPPLY: Performed by: STUDENT IN AN ORGANIZED HEALTH CARE EDUCATION/TRAINING PROGRAM

## 2025-02-05 PROCEDURE — C2617 STENT, NON-COR, TEM W/O DEL: HCPCS | Performed by: STUDENT IN AN ORGANIZED HEALTH CARE EDUCATION/TRAINING PROGRAM

## 2025-02-05 PROCEDURE — 3700000000 HC ANESTHESIA ATTENDED CARE: Performed by: STUDENT IN AN ORGANIZED HEALTH CARE EDUCATION/TRAINING PROGRAM

## 2025-02-05 PROCEDURE — 7100000010 HC PHASE II RECOVERY - FIRST 15 MIN: Performed by: STUDENT IN AN ORGANIZED HEALTH CARE EDUCATION/TRAINING PROGRAM

## 2025-02-05 PROCEDURE — 3700000001 HC ADD 15 MINUTES (ANESTHESIA): Performed by: STUDENT IN AN ORGANIZED HEALTH CARE EDUCATION/TRAINING PROGRAM

## 2025-02-05 PROCEDURE — 82365 CALCULUS SPECTROSCOPY: CPT

## 2025-02-05 PROCEDURE — 3600000013 HC SURGERY LEVEL 3 ADDTL 15MIN: Performed by: STUDENT IN AN ORGANIZED HEALTH CARE EDUCATION/TRAINING PROGRAM

## 2025-02-05 DEVICE — URETERAL STENT
Type: IMPLANTABLE DEVICE | Site: URETER | Status: FUNCTIONAL
Brand: PERCUFLEX™

## 2025-02-05 RX ORDER — FENTANYL CITRATE 50 UG/ML
INJECTION, SOLUTION INTRAMUSCULAR; INTRAVENOUS
Status: DISCONTINUED | OUTPATIENT
Start: 2025-02-05 | End: 2025-02-05 | Stop reason: SDUPTHER

## 2025-02-05 RX ORDER — NALOXONE HYDROCHLORIDE 0.4 MG/ML
INJECTION, SOLUTION INTRAMUSCULAR; INTRAVENOUS; SUBCUTANEOUS PRN
Status: DISCONTINUED | OUTPATIENT
Start: 2025-02-05 | End: 2025-02-05 | Stop reason: HOSPADM

## 2025-02-05 RX ORDER — SODIUM CHLORIDE 9 MG/ML
INJECTION, SOLUTION INTRAVENOUS PRN
Status: DISCONTINUED | OUTPATIENT
Start: 2025-02-05 | End: 2025-02-05 | Stop reason: HOSPADM

## 2025-02-05 RX ORDER — SODIUM CHLORIDE 0.9 % (FLUSH) 0.9 %
5-40 SYRINGE (ML) INJECTION EVERY 12 HOURS SCHEDULED
Status: DISCONTINUED | OUTPATIENT
Start: 2025-02-05 | End: 2025-02-05 | Stop reason: HOSPADM

## 2025-02-05 RX ORDER — MEPERIDINE HYDROCHLORIDE 25 MG/ML
12.5 INJECTION INTRAMUSCULAR; INTRAVENOUS; SUBCUTANEOUS
Status: DISCONTINUED | OUTPATIENT
Start: 2025-02-05 | End: 2025-02-05 | Stop reason: HOSPADM

## 2025-02-05 RX ORDER — ONDANSETRON 2 MG/ML
INJECTION INTRAMUSCULAR; INTRAVENOUS
Status: DISCONTINUED | OUTPATIENT
Start: 2025-02-05 | End: 2025-02-05 | Stop reason: SDUPTHER

## 2025-02-05 RX ORDER — PROCHLORPERAZINE EDISYLATE 5 MG/ML
5 INJECTION INTRAMUSCULAR; INTRAVENOUS
Status: DISCONTINUED | OUTPATIENT
Start: 2025-02-05 | End: 2025-02-05 | Stop reason: HOSPADM

## 2025-02-05 RX ORDER — SODIUM CHLORIDE 0.9 % (FLUSH) 0.9 %
5-40 SYRINGE (ML) INJECTION PRN
Status: DISCONTINUED | OUTPATIENT
Start: 2025-02-05 | End: 2025-02-05 | Stop reason: HOSPADM

## 2025-02-05 RX ORDER — FENTANYL CITRATE 50 UG/ML
25 INJECTION, SOLUTION INTRAMUSCULAR; INTRAVENOUS EVERY 5 MIN PRN
Status: DISCONTINUED | OUTPATIENT
Start: 2025-02-05 | End: 2025-02-05 | Stop reason: HOSPADM

## 2025-02-05 RX ORDER — SODIUM CHLORIDE 9 MG/ML
INJECTION, SOLUTION INTRAVENOUS CONTINUOUS
Status: DISCONTINUED | OUTPATIENT
Start: 2025-02-05 | End: 2025-02-05 | Stop reason: HOSPADM

## 2025-02-05 RX ORDER — LABETALOL HYDROCHLORIDE 5 MG/ML
5 INJECTION, SOLUTION INTRAVENOUS
Status: DISCONTINUED | OUTPATIENT
Start: 2025-02-05 | End: 2025-02-05 | Stop reason: HOSPADM

## 2025-02-05 RX ORDER — DIPHENHYDRAMINE HYDROCHLORIDE 50 MG/ML
12.5 INJECTION INTRAMUSCULAR; INTRAVENOUS
Status: DISCONTINUED | OUTPATIENT
Start: 2025-02-05 | End: 2025-02-05 | Stop reason: HOSPADM

## 2025-02-05 RX ORDER — LEVOFLOXACIN 5 MG/ML
500 INJECTION, SOLUTION INTRAVENOUS
Status: COMPLETED | OUTPATIENT
Start: 2025-02-05 | End: 2025-02-05

## 2025-02-05 RX ORDER — LIDOCAINE HYDROCHLORIDE 20 MG/ML
INJECTION, SOLUTION EPIDURAL; INFILTRATION; INTRACAUDAL; PERINEURAL
Status: DISCONTINUED | OUTPATIENT
Start: 2025-02-05 | End: 2025-02-05 | Stop reason: SDUPTHER

## 2025-02-05 RX ORDER — OXYCODONE AND ACETAMINOPHEN 5; 325 MG/1; MG/1
1 TABLET ORAL EVERY 6 HOURS PRN
Qty: 12 TABLET | Refills: 0 | Status: SHIPPED | OUTPATIENT
Start: 2025-02-05 | End: 2025-02-08

## 2025-02-05 RX ORDER — PROPOFOL 10 MG/ML
INJECTION, EMULSION INTRAVENOUS
Status: DISCONTINUED | OUTPATIENT
Start: 2025-02-05 | End: 2025-02-05 | Stop reason: SDUPTHER

## 2025-02-05 RX ORDER — HYDRALAZINE HYDROCHLORIDE 20 MG/ML
5 INJECTION INTRAMUSCULAR; INTRAVENOUS
Status: DISCONTINUED | OUTPATIENT
Start: 2025-02-05 | End: 2025-02-05 | Stop reason: HOSPADM

## 2025-02-05 RX ORDER — IOPAMIDOL 612 MG/ML
INJECTION, SOLUTION INTRAVASCULAR PRN
Status: DISCONTINUED | OUTPATIENT
Start: 2025-02-05 | End: 2025-02-05 | Stop reason: ALTCHOICE

## 2025-02-05 RX ADMIN — LEVOFLOXACIN 500 MG: 500 INJECTION, SOLUTION INTRAVENOUS at 06:54

## 2025-02-05 RX ADMIN — ONDANSETRON 4 MG: 2 INJECTION, SOLUTION INTRAMUSCULAR; INTRAVENOUS at 07:35

## 2025-02-05 RX ADMIN — FENTANYL CITRATE 25 MCG: 50 INJECTION, SOLUTION INTRAMUSCULAR; INTRAVENOUS at 07:28

## 2025-02-05 RX ADMIN — SODIUM CHLORIDE: 9 INJECTION, SOLUTION INTRAVENOUS at 06:22

## 2025-02-05 RX ADMIN — PROPOFOL 50 MG: 10 INJECTION, EMULSION INTRAVENOUS at 07:28

## 2025-02-05 RX ADMIN — PROPOFOL 120 MCG/KG/MIN: 10 INJECTION, EMULSION INTRAVENOUS at 07:29

## 2025-02-05 RX ADMIN — LIDOCAINE HYDROCHLORIDE 40 MG: 20 INJECTION, SOLUTION EPIDURAL; INFILTRATION; INTRACAUDAL; PERINEURAL at 07:28

## 2025-02-05 ASSESSMENT — PAIN - FUNCTIONAL ASSESSMENT
PAIN_FUNCTIONAL_ASSESSMENT: ACTIVITIES ARE NOT PREVENTED
PAIN_FUNCTIONAL_ASSESSMENT: 0-10
PAIN_FUNCTIONAL_ASSESSMENT: ACTIVITIES ARE NOT PREVENTED
PAIN_FUNCTIONAL_ASSESSMENT: 0-10

## 2025-02-05 NOTE — H&P
thyromegaly  Cardiac:  RRR  Lungs:  No audible wheezes, symmetric respirations, non-labored  Abdomen: Soft, non-tender, non-distended  Extremities:  No clubbing, edema or cyanosis  Neurological:  Moves all extremities, normal DTR    Lab Results   Component Value Date    WBC 6.8 01/03/2025    HGB 8.0 (L) 01/03/2025    HCT 24.0 (L) 01/03/2025    MCV 93.0 01/03/2025    PLT 75 (L) 12/30/2024       Lab Results   Component Value Date    CREATININE 1.6 (H) 01/03/2025       No results found for: \"PSA\"    No results for input(s): \"LABURIN\" in the last 72 hours.    No results for input(s): \"BC\" in the last 72 hours.    No results for input(s): \"BLOODCULT2\" in the last 72 hours.    Assessment and Plan:  1. Right j Abilio

## 2025-02-05 NOTE — PROGRESS NOTES
Med rec and NPO status verified with nurse at [facility] at [address]

## 2025-02-05 NOTE — ANESTHESIA POSTPROCEDURE EVALUATION
Department of Anesthesiology  Postprocedure Note    Patient: Suki Weir  MRN: [id number]  YOB: 1940  Date of evaluation: 2/5/2025    Procedure Summary       Date: 02/05/25 Room / Location: [address]    Anesthesia Start: 0719 Anesthesia Stop:     Procedure: CYSTOSCOPY RETROGRADE PYELOGRAM RIGHT URETEROSCOPY WITH STONE BASKET EXTRACTION, RIGHT STENT EXCHANGE (Right: Ureter) Diagnosis:       Calculus of ureter      Renal colic      (Calculus of ureter [N20.1])      (Renal colic [N23])    Surgeons: Torres Hamm MD Responsible Provider: Connor Espino DO    Anesthesia Type: MAC ASA Status: 4            Anesthesia Type: No value filed.    Jas Phase I: Jas Score: 10    Jas Phase II:      Anesthesia Post Evaluation    Patient location during evaluation: PACU  Patient participation: complete - patient participated  Level of consciousness: awake  Airway patency: patent  Nausea & Vomiting: no nausea and no vomiting  Cardiovascular status: hemodynamically stable  Respiratory status: acceptable  Hydration status: euvolemic  Pain management: adequate        No notable events documented.

## 2025-02-05 NOTE — DISCHARGE INSTRUCTIONS
Laser Lithotripsy    These are general instructions for you to follow after your surgery. Your doctor or a member of his or her staff will outline any additional or special instructions that pertain to you.     What is a Laser Lithotripsy?  A laser lithotripsy is a method for using lasers to remove stones that are located in the urinary tract. This could include stones in the bladder, kidneys, ureters, or urethra. A small flexible camera called a ureteroscope is inserted into the urethra and up the ureter until it reaches the location of the kidney stone. The doctor can then use a laser to break up a kidney stone and a small basket to remove small stones or stone fragments. A ureteral stent is often left in place after the procedure to help with healing.    What are the advantages of laser lithotripsy? Laser lithotripsy is a minimally invasive procedure, and does not involve any incisions. It has been shown to be effective no matter the size, location, and/ or hardness of the stone. It also reduces the risk of steinstrasse (where several stones fragments line up in the ureter and block urine flow).     How long does surgery take? Surgery will take 1-2 hours to complete.     Will I have to stay in the hospital? No. This is an outpatient procedure, so you should be able to go home the same day that the procedure is performed.     Are there risks with a laser lithotripsy procedure? There are risks with any surgical procedure. Risks of laser lithotripsy in particular include pain, blood in the urine, difficulty urinating, injury to the bladder or ureters when removing the stone, and infection. General anesthesia also has the risk of breathing problems, heart attack and stroke in patients who are high risk.               Home Going Instructions:  Activity: You are encouraged to walk every day, increasing the distance each day. You may go up and down steps, but please rest when you are tired.   - Do NOT drive for 2-3

## 2025-02-05 NOTE — OP NOTE
Operative Note      Patient: Suki Weir  YOB: 1940  MRN: [id number]    Date of Procedure: 2/5/2025    Pre-Op Diagnosis Codes:      * Calculus of ureter [N20.1]     * Renal colic [N23]    Post-Op Diagnosis: Same       Procedure(s):  CYSTOSCOPY RETROGRADE PYELOGRAM RIGHT URETEROSCOPY WITH STONE BASKET EXTRACTION, RIGHT STENT EXCHANGE    Surgeon(s):  Torres Hamm MD    Assistant:   * No surgical staff found *    Anesthesia: Monitor Anesthesia Care    Estimated Blood Loss (mL): Minimal    Complications: None    Specimens:   ID Type Source Tests Collected by Time Destination   A : RIGHT URETERAL STONE FOR CHEMICAL ANALYSIS Stone (Calculus) Stone SURGICAL PATHOLOGY Torres Hamm MD 2/5/2025 0746        Implants:  Implant Name Type Inv. Item Serial No.  Lot No. LRB No. Used Action   STENT URET L26CM OD48FR PERCFLX DBL PGTL FLX TIP THRD W O - [id number]  STENT URET L26CM OD48FR PERCFLX DBL PGTL FLX TIP THRD W O  [employer]- [id number] Right 1 Implanted         Drains:   Ureteral Drain/Stent 02/05/25 Right Ureter (Active)       Findings:  Infection Present At Time Of Surgery (PATOS) (choose all levels that have infection present):  No infection present  Other Findings: see op note    Detailed Description of Procedure:       INDICATIONS FOR PROCEDURE:  The patient has a right ureteral calculus and is having severe pain.  The patient presents for ureteroscopy, laser lithotripsy, understands the risks, benefits and alternatives of the procedure, signed informed consent, and agreed to proceed.     DESCRIPTION OF PROCEDURE: The patient was brought into the operating room and placed under anesthesia in the dorsal lithotomy position. The patient was prepped and draped in a sterile fashion. A 21-Vietnamese cystoscope with a 30-degree lens was passed into the bladder.  The entire urethra was examined and found to be within normal limits. 30- degree endoscopy was utilized to inspect the entire

## 2025-02-07 LAB — SURGICAL PATHOLOGY REPORT: NORMAL

## 2025-02-09 LAB
STONE COMPOSITION: NORMAL
STONE DESCRIPTION: NORMAL
STONE MASS: 26 MG

## 2025-04-14 ENCOUNTER — TELEPHONE (OUTPATIENT)
Dept: SURGERY | Age: 85
End: 2025-04-14

## 2025-04-14 NOTE — TELEPHONE ENCOUNTER
MA received call from patient requesting to cancel upcoming EGD/CSP scheduled for 04/17 with Dr. Major. Patient states she is not having any symptoms that make her feel the procedures are necessary. Patient states she will contact office if any symptoms arise. MA verbalized understanding and advised patient procedure would be cancelled per request.     MA contacted surgery scheduling to have procedure cancelled.     MA routing message to MEEK Harrington for informational purposes.     Electronically signed by Laxmi Martínez MA on 4/14/2025 at 2:24 PM

## 2025-06-05 RX ORDER — SUCRALFATE 1 G/1
TABLET ORAL
Qty: 120 TABLET | Refills: 11 | OUTPATIENT
Start: 2025-06-05

## (undated) DEVICE — ALCOHOL RUBBING ISO 16OZ 70%

## (undated) DEVICE — BIT DRL L5IN DIA2.4MM STD ST S STL TWST BUSA

## (undated) DEVICE — CYSTO PACK: Brand: MEDLINE INDUSTRIES, INC.

## (undated) DEVICE — 3.2MM X 18.3MM METAL CUTTING HELIOCOIDAL RASP

## (undated) DEVICE — HANDPIECE SET WITH COAXIAL HIGH FLOW TIP AND SUCTION TUBE: Brand: INTERPULSE

## (undated) DEVICE — SYRINGE MED 10ML LUERLOCK TIP W/O SFTY DISP

## (undated) DEVICE — 450 ML BOTTLE OF 0.05% CHLORHEXIDINE GLUCONATE IN 99.95% STERILE WATER FOR IRRIGATION, USP AND APPLICATOR.: Brand: IRRISEPT ANTIMICROBIAL WOUND LAVAGE

## (undated) DEVICE — SOLUTION IV 1000ML 0.9% SOD CHL PH 5 INJ USP VIAFLX PLAS

## (undated) DEVICE — BLADE ES L6IN ELASTOMERIC COAT EXT DURABLE BEND UPTO 90DEG

## (undated) DEVICE — WIPES SKIN CLOTH READYPREP 9 X 10.5 IN 2% CHLORHEX GLUCONATE CHG PREOP

## (undated) DEVICE — DRAINBAG,ANTI-REFLUX TOWER,L/F,2000ML,LL: Brand: MEDLINE

## (undated) DEVICE — SOLUTION IRRIG 3000ML 0.9% SOD CHL USP UROMATIC PLAS CONT

## (undated) DEVICE — SOLUTION IRRIG 1000ML STRL H2O USP PLAS POUR BTL

## (undated) DEVICE — STRIP,CLOSURE,WOUND,MEDI-STRIP,1/2X4: Brand: MEDLINE

## (undated) DEVICE — SOLUTION IRRIG 3000ML STRL H2O USP UROMATIC PLAS CONT

## (undated) DEVICE — CATHETER F BLLN 5CC 16FR 2 W HYDRGEL COAT LESS TRAUM LUB

## (undated) DEVICE — BAG DRNGE COMB PK

## (undated) DEVICE — SUTURE STRATAFIX SYMMETRIC SZ 1 L18IN ABSRB VLT CT1 L36CM SXPP1A404

## (undated) DEVICE — 1.5 FR, 120 CM, NITI TIPLESS STONE BASKET: Brand: HALO

## (undated) DEVICE — DRAPE,REIN 53X77,STERILE: Brand: MEDLINE

## (undated) DEVICE — GLOVE ORANGE PI 7 1/2   MSG9075

## (undated) DEVICE — SOLUTION SCRB 4OZ 4% CHG H2O AIDED FOR PREOPERATIVE SKIN

## (undated) DEVICE — SYRINGE MED 20ML STD CLR PLAS LUERLOCK TIP N CTRL DISP

## (undated) DEVICE — PACK PROCEDURE SURG GEN CUST

## (undated) DEVICE — CYSTO: Brand: MEDLINE INDUSTRIES, INC.

## (undated) DEVICE — KIT SURG W7XL11IN 2 PKT UNTREATED NA

## (undated) DEVICE — GLOVE ORANGE PI 8   MSG9080

## (undated) DEVICE — GAUZE,SPONGE,4"X4",8PLY,STRL,LF,10/TRAY: Brand: MEDLINE

## (undated) DEVICE — ELECTRODE PT RET AD L9FT HI MOIST COND ADH HYDRGEL CORDED

## (undated) DEVICE — DRAPE,U/ SHT,SPLIT,PLAS,STERIL: Brand: MEDLINE

## (undated) DEVICE — 4-PORT MANIFOLD: Brand: NEPTUNE 2

## (undated) DEVICE — TUBING, SUCTION, 3/16" X 12', STRAIGHT: Brand: MEDLINE

## (undated) DEVICE — 3M™ COBAN™ NL STERILE NON-LATEX SELF-ADHERENT WRAP, 2084S, 4 IN X 5 YD (10 CM X 4,5 M), 18 ROLLS/CASE: Brand: 3M™ COBAN™

## (undated) DEVICE — SYRINGE IRRIG 60ML SFT PLIABLE BLB EZ TO GRP 1 HND USE W/

## (undated) DEVICE — GOWN,SIRUS,FABRNF,2XL,18/CS: Brand: MEDLINE

## (undated) DEVICE — HOSE CONN FOR WST MGMT SYS NEPTUNE 2

## (undated) DEVICE — NEEDLE HYPO 18GA L1.5IN PNK POLYPR HUB S STL REG BVL STR

## (undated) DEVICE — DRESSING HYDROFIBER AQUACEL AG ADVANTAGE 3.5X10 IN

## (undated) DEVICE — TUBING, SUCTION, 9/32" X 10', STRAIGHT: Brand: MEDLINE

## (undated) DEVICE — GUIDEWIRE ENDOSCP L150CM DIA0.035IN TIP 3CM PTFE NIT

## (undated) DEVICE — BAG DRAINAGE CONTAINER 15 LT FLUID COLLCTN

## (undated) DEVICE — PEEL-AWAY HOOD: Brand: FLYTE, SURGICOOL

## (undated) DEVICE — SYRINGE MED 30ML STD CLR PLAS LUERLOCK TIP N CTRL DISP

## (undated) DEVICE — PILLOW POS W15XH6XL22IN RASPBERRY FOAM ABD W/ STRP DISP FOR

## (undated) DEVICE — GOWN,SIRUS,POLYRNF,BRTHSLV,XLN/XL,20/CS: Brand: MEDLINE

## (undated) DEVICE — HEWSON SUTURE RETRIEVER: Brand: HEWSON SUTURE RETRIEVER

## (undated) DEVICE — APPLICATOR MEDICATED 26 CC SOLUTION HI LT ORNG CHLORAPREP

## (undated) DEVICE — Device

## (undated) DEVICE — TOTAL HIP PK

## (undated) DEVICE — 3M™ IOBAN™ 2 ANTIMICROBIAL INCISE DRAPE 6651EZ: Brand: IOBAN™ 2

## (undated) DEVICE — SPONGE LAP W18XL18IN WHT COT 4 PLY FLD STRUNG RADPQ DISP ST 2 PER PACK

## (undated) DEVICE — PENCIL ES CRD L10FT HND SWCHING ROCK SWCH W/ EDGE COAT BLDE

## (undated) DEVICE — DOUBLE BASIN SET: Brand: MEDLINE INDUSTRIES, INC.

## (undated) DEVICE — GOWN,SIRUS,POLYRNF,BRTHSLV,XL,30/CS: Brand: MEDLINE

## (undated) DEVICE — MEDIA CONTRAST ISOVUE 300 100ML

## (undated) DEVICE — CATHETER URET 5FR L70CM OPN END SGL LUMN INJ HUB FLEXIMA

## (undated) DEVICE — Y-TYPE TUR/BLADDER IRRIGATION SET, REGULATING CLAMP

## (undated) DEVICE — 3M™ STERI-DRAPE™ U-DRAPE 1015: Brand: STERI-DRAPE™

## (undated) DEVICE — TOWEL,OR,DSP,ST,BLUE,STD,6/PK,12PK/CS: Brand: MEDLINE